# Patient Record
Sex: FEMALE | Race: WHITE | NOT HISPANIC OR LATINO | Employment: OTHER | URBAN - METROPOLITAN AREA
[De-identification: names, ages, dates, MRNs, and addresses within clinical notes are randomized per-mention and may not be internally consistent; named-entity substitution may affect disease eponyms.]

---

## 2017-01-17 ENCOUNTER — RECORDS - HEALTHEAST (OUTPATIENT)
Dept: LAB | Facility: CLINIC | Age: 67
End: 2017-01-17

## 2017-01-17 LAB
ALT SERPL-CCNC: 16 U/L (ref 0–45)
AST SERPL-CCNC: 22 U/L (ref 0–40)
CHOLEST SERPL-MCNC: 218 MG/DL
CHOLEST SERPL-MCNC: 218 MG/DL
CREAT SERPL-MCNC: 0.6 MG/DL (ref 0.6–1.1)
FASTING STATUS PATIENT QL REPORTED: YES
GFR SERPL CREATININE-BSD FRML MDRD: >60 ML/MIN/1.73M2
GLUCOSE SERPL-MCNC: 93 MG/DL (ref 70–125)
HDLC SERPL-MCNC: 84 MG/DL
HDLC SERPL-MCNC: 84 MG/DL
LDLC SERPL CALC-MCNC: 112 MG/DL
LDLC SERPL CALC-MCNC: 112 MG/DL
POTASSIUM SERPL-SCNC: 4.6 MMOL/L (ref 3.5–5)
TRIGL SERPL-MCNC: 109 MG/DL
TRIGL SERPL-MCNC: 109 MG/DL

## 2017-01-18 LAB — HBA1C MFR BLD: 6.1 % (ref 4.2–6.1)

## 2017-01-23 ENCOUNTER — RECORDS - HEALTHEAST (OUTPATIENT)
Dept: ADMINISTRATIVE | Facility: OTHER | Age: 67
End: 2017-01-23

## 2017-01-23 LAB
BKR LAB AP ABNORMAL BLEEDING: NO
BKR LAB AP BIRTH CONTROL/HORMONES: NORMAL
BKR LAB AP CERVICAL APPEARANCE: NORMAL
BKR LAB AP GYN ADEQUACY: NORMAL
BKR LAB AP GYN INTERPRETATION: NORMAL
BKR LAB AP HPV REFLEX: NO
BKR LAB AP LMP: 1995
BKR LAB AP PATIENT STATUS: NORMAL
BKR LAB AP PREVIOUS ABNORMAL: NORMAL
BKR LAB AP PREVIOUS NORMAL: NORMAL
HIGH RISK?: NO
PATH REPORT.COMMENTS IMP SPEC: NORMAL
RESULT FLAG (HE HISTORICAL CONVERSION): NORMAL

## 2017-01-31 ENCOUNTER — TRANSFERRED RECORDS (OUTPATIENT)
Dept: HEALTH INFORMATION MANAGEMENT | Facility: CLINIC | Age: 67
End: 2017-01-31

## 2017-01-31 ENCOUNTER — RECORDS - HEALTHEAST (OUTPATIENT)
Dept: LAB | Facility: CLINIC | Age: 67
End: 2017-01-31

## 2017-02-01 LAB — HBA1C MFR BLD: 5.8 % (ref 4.2–6.1)

## 2017-02-14 ENCOUNTER — TRANSFERRED RECORDS (OUTPATIENT)
Dept: HEALTH INFORMATION MANAGEMENT | Facility: CLINIC | Age: 67
End: 2017-02-14

## 2017-03-15 ENCOUNTER — TRANSFERRED RECORDS (OUTPATIENT)
Dept: HEALTH INFORMATION MANAGEMENT | Facility: CLINIC | Age: 67
End: 2017-03-15

## 2018-01-23 ENCOUNTER — APPOINTMENT (OUTPATIENT)
Dept: GENERAL RADIOLOGY | Facility: CLINIC | Age: 68
DRG: 193 | End: 2018-01-23
Attending: INTERNAL MEDICINE
Payer: COMMERCIAL

## 2018-01-23 ENCOUNTER — RECORDS - HEALTHEAST (OUTPATIENT)
Dept: LAB | Facility: CLINIC | Age: 68
End: 2018-01-23

## 2018-01-23 ENCOUNTER — HOSPITAL ENCOUNTER (INPATIENT)
Facility: CLINIC | Age: 68
LOS: 13 days | Discharge: HOME | DRG: 193 | End: 2018-02-05
Attending: INTERNAL MEDICINE | Admitting: INTERNAL MEDICINE
Payer: COMMERCIAL

## 2018-01-23 ENCOUNTER — APPOINTMENT (OUTPATIENT)
Dept: CT IMAGING | Facility: CLINIC | Age: 68
DRG: 193 | End: 2018-01-23
Attending: INTERNAL MEDICINE
Payer: COMMERCIAL

## 2018-01-23 DIAGNOSIS — J44.1 CHRONIC OBSTRUCTIVE PULMONARY DISEASE WITH ACUTE EXACERBATION (H): Primary | ICD-10-CM

## 2018-01-23 DIAGNOSIS — I48.91 ATRIAL FIBRILLATION, UNSPECIFIED TYPE (H): ICD-10-CM

## 2018-01-23 DIAGNOSIS — F17.200 TOBACCO DEPENDENCE SYNDROME: ICD-10-CM

## 2018-01-23 DIAGNOSIS — J44.1 COPD EXACERBATION (H): ICD-10-CM

## 2018-01-23 LAB
ALBUMIN SERPL-MCNC: 3.5 G/DL (ref 3.4–5)
ALBUMIN UR-MCNC: 100 MG/DL
ALP SERPL-CCNC: 110 U/L (ref 40–150)
ALT SERPL W P-5'-P-CCNC: 70 U/L (ref 0–50)
AMORPH CRY #/AREA URNS HPF: ABNORMAL /HPF
ANION GAP SERPL CALCULATED.3IONS-SCNC: 11 MMOL/L (ref 3–14)
APPEARANCE UR: ABNORMAL
AST SERPL W P-5'-P-CCNC: 101 U/L (ref 0–45)
BASE DEFICIT BLDV-SCNC: 4.7 MMOL/L
BASOPHILS # BLD AUTO: 0 10E9/L (ref 0–0.2)
BASOPHILS NFR BLD AUTO: 0.5 %
BILIRUB SERPL-MCNC: 0.5 MG/DL (ref 0.2–1.3)
BILIRUB UR QL STRIP: NEGATIVE
BUN SERPL-MCNC: 32 MG/DL (ref 7–30)
CALCIUM SERPL-MCNC: 8.7 MG/DL (ref 8.5–10.1)
CHLORIDE SERPL-SCNC: 91 MMOL/L (ref 94–109)
CO2 SERPL-SCNC: 28 MMOL/L (ref 20–32)
COLOR UR AUTO: YELLOW
CREAT SERPL-MCNC: 1.03 MG/DL (ref 0.52–1.04)
DIFFERENTIAL METHOD BLD: ABNORMAL
EOSINOPHIL # BLD AUTO: 0 10E9/L (ref 0–0.7)
EOSINOPHIL NFR BLD AUTO: 0 %
ERYTHROCYTE [DISTWIDTH] IN BLOOD BY AUTOMATED COUNT: 13 % (ref 10–15)
GFR SERPL CREATININE-BSD FRML MDRD: 54 ML/MIN/1.7M2
GLUCOSE BLDC GLUCOMTR-MCNC: 171 MG/DL (ref 70–99)
GLUCOSE SERPL-MCNC: 195 MG/DL (ref 70–99)
GLUCOSE UR STRIP-MCNC: NEGATIVE MG/DL
HBA1C MFR BLD: 5.6 % (ref 4.3–6)
HCO3 BLDV-SCNC: 26 MMOL/L (ref 21–28)
HCT VFR BLD AUTO: 48.1 % (ref 35–47)
HGB BLD-MCNC: 16.3 G/DL (ref 11.7–15.7)
HGB UR QL STRIP: ABNORMAL
HYALINE CASTS #/AREA URNS LPF: 62 /LPF (ref 0–2)
IMM GRANULOCYTES # BLD: 0.1 10E9/L (ref 0–0.4)
IMM GRANULOCYTES NFR BLD: 0.6 %
KETONES UR STRIP-MCNC: NEGATIVE MG/DL
LACTATE BLD-SCNC: 1.3 MMOL/L (ref 0.7–2)
LEUKOCYTE ESTERASE UR QL STRIP: NEGATIVE
LYMPHOCYTES # BLD AUTO: 0.6 10E9/L (ref 0.8–5.3)
LYMPHOCYTES NFR BLD AUTO: 8 %
MAGNESIUM SERPL-MCNC: 2.5 MG/DL (ref 1.6–2.3)
MCH RBC QN AUTO: 32.6 PG (ref 26.5–33)
MCHC RBC AUTO-ENTMCNC: 33.9 G/DL (ref 31.5–36.5)
MCV RBC AUTO: 96 FL (ref 78–100)
MONOCYTES # BLD AUTO: 0.5 10E9/L (ref 0–1.3)
MONOCYTES NFR BLD AUTO: 6.6 %
MUCOUS THREADS #/AREA URNS LPF: PRESENT /LPF
NEUTROPHILS # BLD AUTO: 6.6 10E9/L (ref 1.6–8.3)
NEUTROPHILS NFR BLD AUTO: 84.3 %
NITRATE UR QL: NEGATIVE
NRBC # BLD AUTO: 0 10*3/UL
NRBC BLD AUTO-RTO: 0 /100
NT-PROBNP SERPL-MCNC: 7282 PG/ML (ref 0–900)
O2/TOTAL GAS SETTING VFR VENT: 12 %
PCO2 BLDV: 74 MM HG (ref 40–50)
PH BLDV: 7.16 PH (ref 7.32–7.43)
PH UR STRIP: 5 PH (ref 5–7)
PLATELET # BLD AUTO: 261 10E9/L (ref 150–450)
PO2 BLDV: 28 MM HG (ref 25–47)
POTASSIUM SERPL-SCNC: 3.6 MMOL/L (ref 3.4–5.3)
PROT SERPL-MCNC: 8.2 G/DL (ref 6.8–8.8)
RBC # BLD AUTO: 5 10E12/L (ref 3.8–5.2)
RBC #/AREA URNS AUTO: 3 /HPF (ref 0–2)
SODIUM SERPL-SCNC: 130 MMOL/L (ref 133–144)
SOURCE: ABNORMAL
SP GR UR STRIP: 1.02 (ref 1–1.03)
SQUAMOUS #/AREA URNS AUTO: 1 /HPF (ref 0–1)
TROPONIN I SERPL-MCNC: 0.03 UG/L (ref 0–0.04)
TSH SERPL DL<=0.005 MIU/L-ACNC: 2 MU/L (ref 0.4–4)
UROBILINOGEN UR STRIP-MCNC: 2 MG/DL (ref 0–2)
WBC # BLD AUTO: 7.8 10E9/L (ref 4–11)
WBC #/AREA URNS AUTO: 3 /HPF (ref 0–2)

## 2018-01-23 PROCEDURE — 25000125 ZZHC RX 250: Performed by: INTERNAL MEDICINE

## 2018-01-23 PROCEDURE — 40000275 ZZH STATISTIC RCP TIME EA 10 MIN

## 2018-01-23 PROCEDURE — 96366 THER/PROPH/DIAG IV INF ADDON: CPT

## 2018-01-23 PROCEDURE — 25000128 H RX IP 250 OP 636: Performed by: INTERNAL MEDICINE

## 2018-01-23 PROCEDURE — 96368 THER/DIAG CONCURRENT INF: CPT

## 2018-01-23 PROCEDURE — 83735 ASSAY OF MAGNESIUM: CPT | Performed by: INTERNAL MEDICINE

## 2018-01-23 PROCEDURE — 94640 AIRWAY INHALATION TREATMENT: CPT

## 2018-01-23 PROCEDURE — 96375 TX/PRO/DX INJ NEW DRUG ADDON: CPT

## 2018-01-23 PROCEDURE — 80053 COMPREHEN METABOLIC PANEL: CPT | Performed by: INTERNAL MEDICINE

## 2018-01-23 PROCEDURE — 71045 X-RAY EXAM CHEST 1 VIEW: CPT

## 2018-01-23 PROCEDURE — 00000146 ZZHCL STATISTIC GLUCOSE BY METER IP

## 2018-01-23 PROCEDURE — 99223 1ST HOSP IP/OBS HIGH 75: CPT | Mod: AI | Performed by: INTERNAL MEDICINE

## 2018-01-23 PROCEDURE — 96365 THER/PROPH/DIAG IV INF INIT: CPT | Mod: 59

## 2018-01-23 PROCEDURE — 81001 URINALYSIS AUTO W/SCOPE: CPT | Performed by: INTERNAL MEDICINE

## 2018-01-23 PROCEDURE — 99285 EMERGENCY DEPT VISIT HI MDM: CPT | Mod: 25

## 2018-01-23 PROCEDURE — 82803 BLOOD GASES ANY COMBINATION: CPT | Performed by: INTERNAL MEDICINE

## 2018-01-23 PROCEDURE — 36415 COLL VENOUS BLD VENIPUNCTURE: CPT | Performed by: INTERNAL MEDICINE

## 2018-01-23 PROCEDURE — 25000132 ZZH RX MED GY IP 250 OP 250 PS 637: Performed by: INTERNAL MEDICINE

## 2018-01-23 PROCEDURE — 83880 ASSAY OF NATRIURETIC PEPTIDE: CPT | Performed by: INTERNAL MEDICINE

## 2018-01-23 PROCEDURE — 12000047 ZZH R&B IMCU

## 2018-01-23 PROCEDURE — 83036 HEMOGLOBIN GLYCOSYLATED A1C: CPT | Performed by: INTERNAL MEDICINE

## 2018-01-23 PROCEDURE — 85025 COMPLETE CBC W/AUTO DIFF WBC: CPT | Performed by: INTERNAL MEDICINE

## 2018-01-23 PROCEDURE — 96376 TX/PRO/DX INJ SAME DRUG ADON: CPT

## 2018-01-23 PROCEDURE — 93005 ELECTROCARDIOGRAM TRACING: CPT

## 2018-01-23 PROCEDURE — 83605 ASSAY OF LACTIC ACID: CPT | Performed by: INTERNAL MEDICINE

## 2018-01-23 PROCEDURE — 84484 ASSAY OF TROPONIN QUANT: CPT | Performed by: INTERNAL MEDICINE

## 2018-01-23 PROCEDURE — 71260 CT THORAX DX C+: CPT

## 2018-01-23 PROCEDURE — 93010 ELECTROCARDIOGRAM REPORT: CPT | Performed by: INTERNAL MEDICINE

## 2018-01-23 PROCEDURE — 84443 ASSAY THYROID STIM HORMONE: CPT | Performed by: INTERNAL MEDICINE

## 2018-01-23 RX ORDER — DILTIAZEM HYDROCHLORIDE 5 MG/ML
10 INJECTION INTRAVENOUS ONCE
Status: COMPLETED | OUTPATIENT
Start: 2018-01-23 | End: 2018-01-23

## 2018-01-23 RX ORDER — PROCHLORPERAZINE 25 MG
12.5 SUPPOSITORY, RECTAL RECTAL EVERY 12 HOURS PRN
Status: DISCONTINUED | OUTPATIENT
Start: 2018-01-23 | End: 2018-02-05 | Stop reason: HOSPADM

## 2018-01-23 RX ORDER — AMOXICILLIN 250 MG
1 CAPSULE ORAL 2 TIMES DAILY PRN
Status: DISCONTINUED | OUTPATIENT
Start: 2018-01-23 | End: 2018-01-23

## 2018-01-23 RX ORDER — MAGNESIUM SULFATE HEPTAHYDRATE 40 MG/ML
4 INJECTION, SOLUTION INTRAVENOUS EVERY 4 HOURS PRN
Status: DISCONTINUED | OUTPATIENT
Start: 2018-01-23 | End: 2018-02-04

## 2018-01-23 RX ORDER — IPRATROPIUM BROMIDE AND ALBUTEROL SULFATE 2.5; .5 MG/3ML; MG/3ML
3 SOLUTION RESPIRATORY (INHALATION)
Status: DISCONTINUED | OUTPATIENT
Start: 2018-01-23 | End: 2018-01-26

## 2018-01-23 RX ORDER — POTASSIUM CL/LIDO/0.9 % NACL 10MEQ/0.1L
10 INTRAVENOUS SOLUTION, PIGGYBACK (ML) INTRAVENOUS
Status: DISCONTINUED | OUTPATIENT
Start: 2018-01-23 | End: 2018-02-04

## 2018-01-23 RX ORDER — AMOXICILLIN 250 MG
2 CAPSULE ORAL 2 TIMES DAILY PRN
Status: DISCONTINUED | OUTPATIENT
Start: 2018-01-23 | End: 2018-01-23

## 2018-01-23 RX ORDER — IOPAMIDOL 755 MG/ML
500 INJECTION, SOLUTION INTRAVASCULAR ONCE
Status: COMPLETED | OUTPATIENT
Start: 2018-01-23 | End: 2018-01-23

## 2018-01-23 RX ORDER — MEMANTINE HYDROCHLORIDE 28 MG/1
28 CAPSULE, EXTENDED RELEASE ORAL DAILY
Status: DISCONTINUED | OUTPATIENT
Start: 2018-01-24 | End: 2018-02-05 | Stop reason: HOSPADM

## 2018-01-23 RX ORDER — METHYLPREDNISOLONE SODIUM SUCCINATE 40 MG/ML
40 INJECTION, POWDER, LYOPHILIZED, FOR SOLUTION INTRAMUSCULAR; INTRAVENOUS ONCE
Status: COMPLETED | OUTPATIENT
Start: 2018-01-23 | End: 2018-01-23

## 2018-01-23 RX ORDER — POTASSIUM CHLORIDE 7.45 MG/ML
10 INJECTION INTRAVENOUS
Status: DISCONTINUED | OUTPATIENT
Start: 2018-01-23 | End: 2018-02-04

## 2018-01-23 RX ORDER — SODIUM CHLORIDE AND POTASSIUM CHLORIDE 150; 900 MG/100ML; MG/100ML
INJECTION, SOLUTION INTRAVENOUS CONTINUOUS
Status: DISCONTINUED | OUTPATIENT
Start: 2018-01-23 | End: 2018-01-24

## 2018-01-23 RX ORDER — PROCHLORPERAZINE MALEATE 5 MG
5 TABLET ORAL EVERY 6 HOURS PRN
Status: DISCONTINUED | OUTPATIENT
Start: 2018-01-23 | End: 2018-02-05 | Stop reason: HOSPADM

## 2018-01-23 RX ORDER — NICOTINE POLACRILEX 4 MG
15-30 LOZENGE BUCCAL
Status: DISCONTINUED | OUTPATIENT
Start: 2018-01-23 | End: 2018-02-05 | Stop reason: HOSPADM

## 2018-01-23 RX ORDER — METHYLPREDNISOLONE SODIUM SUCCINATE 40 MG/ML
40 INJECTION, POWDER, LYOPHILIZED, FOR SOLUTION INTRAMUSCULAR; INTRAVENOUS EVERY 8 HOURS
Status: DISCONTINUED | OUTPATIENT
Start: 2018-01-24 | End: 2018-01-28

## 2018-01-23 RX ORDER — POTASSIUM CHLORIDE 29.8 MG/ML
20 INJECTION INTRAVENOUS
Status: DISCONTINUED | OUTPATIENT
Start: 2018-01-23 | End: 2018-01-23

## 2018-01-23 RX ORDER — NALOXONE HYDROCHLORIDE 0.4 MG/ML
.1-.4 INJECTION, SOLUTION INTRAMUSCULAR; INTRAVENOUS; SUBCUTANEOUS
Status: DISCONTINUED | OUTPATIENT
Start: 2018-01-23 | End: 2018-02-05 | Stop reason: HOSPADM

## 2018-01-23 RX ORDER — POTASSIUM CHLORIDE 1500 MG/1
20-40 TABLET, EXTENDED RELEASE ORAL
Status: DISCONTINUED | OUTPATIENT
Start: 2018-01-23 | End: 2018-02-04

## 2018-01-23 RX ORDER — CEFTRIAXONE SODIUM 1 G/50ML
1 INJECTION, SOLUTION INTRAVENOUS ONCE
Status: COMPLETED | OUTPATIENT
Start: 2018-01-23 | End: 2018-01-23

## 2018-01-23 RX ORDER — DILTIAZEM HYDROCHLORIDE 5 MG/ML
10 INJECTION INTRAVENOUS
Status: COMPLETED | OUTPATIENT
Start: 2018-01-23 | End: 2018-01-23

## 2018-01-23 RX ORDER — IPRATROPIUM BROMIDE AND ALBUTEROL SULFATE 2.5; .5 MG/3ML; MG/3ML
3 SOLUTION RESPIRATORY (INHALATION) ONCE
Status: COMPLETED | OUTPATIENT
Start: 2018-01-23 | End: 2018-01-23

## 2018-01-23 RX ORDER — ACETAMINOPHEN 325 MG/1
650 TABLET ORAL EVERY 4 HOURS PRN
Status: DISCONTINUED | OUTPATIENT
Start: 2018-01-23 | End: 2018-02-05 | Stop reason: HOSPADM

## 2018-01-23 RX ORDER — DULOXETIN HYDROCHLORIDE 30 MG/1
120 CAPSULE, DELAYED RELEASE ORAL DAILY
Status: DISCONTINUED | OUTPATIENT
Start: 2018-01-24 | End: 2018-02-05 | Stop reason: HOSPADM

## 2018-01-23 RX ORDER — POTASSIUM CHLORIDE 1.5 G/1.58G
20-40 POWDER, FOR SOLUTION ORAL
Status: DISCONTINUED | OUTPATIENT
Start: 2018-01-23 | End: 2018-02-04

## 2018-01-23 RX ORDER — AMOXICILLIN 250 MG
2 CAPSULE ORAL 2 TIMES DAILY PRN
Status: DISCONTINUED | OUTPATIENT
Start: 2018-01-23 | End: 2018-02-05 | Stop reason: HOSPADM

## 2018-01-23 RX ORDER — ASPIRIN 81 MG/1
81 TABLET, CHEWABLE ORAL DAILY
Status: DISCONTINUED | OUTPATIENT
Start: 2018-01-23 | End: 2018-02-05 | Stop reason: HOSPADM

## 2018-01-23 RX ORDER — MEMANTINE HYDROCHLORIDE 28 MG/1
10 CAPSULE, EXTENDED RELEASE ORAL DAILY
COMMUNITY
End: 2020-07-16

## 2018-01-23 RX ORDER — NIFEDIPINE 30 MG/1
30 TABLET, EXTENDED RELEASE ORAL 2 TIMES DAILY
Status: CANCELLED | OUTPATIENT
Start: 2018-01-23

## 2018-01-23 RX ORDER — AMOXICILLIN 250 MG
1 CAPSULE ORAL 2 TIMES DAILY PRN
Status: DISCONTINUED | OUTPATIENT
Start: 2018-01-23 | End: 2018-02-05 | Stop reason: HOSPADM

## 2018-01-23 RX ORDER — NIFEDIPINE 30 MG
30 TABLET, EXTENDED RELEASE ORAL 2 TIMES DAILY
Status: ON HOLD | COMMUNITY
End: 2018-02-05

## 2018-01-23 RX ORDER — CEFTRIAXONE SODIUM 1 G/50ML
1 INJECTION, SOLUTION INTRAVENOUS EVERY 24 HOURS
Status: DISCONTINUED | OUTPATIENT
Start: 2018-01-24 | End: 2018-01-28

## 2018-01-23 RX ORDER — ACETAMINOPHEN 650 MG/1
650 SUPPOSITORY RECTAL EVERY 4 HOURS PRN
Status: DISCONTINUED | OUTPATIENT
Start: 2018-01-23 | End: 2018-02-05 | Stop reason: HOSPADM

## 2018-01-23 RX ORDER — DEXTROSE MONOHYDRATE 25 G/50ML
25-50 INJECTION, SOLUTION INTRAVENOUS
Status: DISCONTINUED | OUTPATIENT
Start: 2018-01-23 | End: 2018-02-05 | Stop reason: HOSPADM

## 2018-01-23 RX ORDER — ONDANSETRON 4 MG/1
4 TABLET, ORALLY DISINTEGRATING ORAL EVERY 6 HOURS PRN
Status: DISCONTINUED | OUTPATIENT
Start: 2018-01-23 | End: 2018-02-05 | Stop reason: HOSPADM

## 2018-01-23 RX ORDER — OLANZAPINE 5 MG/1
10 TABLET ORAL AT BEDTIME
Status: DISCONTINUED | OUTPATIENT
Start: 2018-01-23 | End: 2018-02-05 | Stop reason: HOSPADM

## 2018-01-23 RX ORDER — NICOTINE 21 MG/24HR
1 PATCH, TRANSDERMAL 24 HOURS TRANSDERMAL DAILY
Status: DISCONTINUED | OUTPATIENT
Start: 2018-01-23 | End: 2018-01-26

## 2018-01-23 RX ORDER — ONDANSETRON 2 MG/ML
4 INJECTION INTRAMUSCULAR; INTRAVENOUS EVERY 6 HOURS PRN
Status: DISCONTINUED | OUTPATIENT
Start: 2018-01-23 | End: 2018-02-05 | Stop reason: HOSPADM

## 2018-01-23 RX ORDER — ALBUTEROL SULFATE 0.83 MG/ML
2.5 SOLUTION RESPIRATORY (INHALATION)
Status: DISCONTINUED | OUTPATIENT
Start: 2018-01-23 | End: 2018-01-25

## 2018-01-23 RX ADMIN — DILTIAZEM HYDROCHLORIDE 10 MG/HR: 5 INJECTION INTRAVENOUS at 16:00

## 2018-01-23 RX ADMIN — IPRATROPIUM BROMIDE AND ALBUTEROL SULFATE 3 ML: .5; 3 SOLUTION RESPIRATORY (INHALATION) at 16:02

## 2018-01-23 RX ADMIN — SODIUM CHLORIDE 66 ML: 9 INJECTION, SOLUTION INTRAVENOUS at 18:36

## 2018-01-23 RX ADMIN — DILTIAZEM HYDROCHLORIDE 10 MG: 5 INJECTION INTRAVENOUS at 16:27

## 2018-01-23 RX ADMIN — ASPIRIN 81 MG 81 MG: 81 TABLET ORAL at 19:50

## 2018-01-23 RX ADMIN — POTASSIUM CHLORIDE AND SODIUM CHLORIDE: 900; 150 INJECTION, SOLUTION INTRAVENOUS at 19:49

## 2018-01-23 RX ADMIN — DILTIAZEM HYDROCHLORIDE 10 MG: 5 INJECTION INTRAVENOUS at 15:55

## 2018-01-23 RX ADMIN — IOPAMIDOL 46 ML: 755 INJECTION, SOLUTION INTRAVENOUS at 18:36

## 2018-01-23 RX ADMIN — METHYLPREDNISOLONE SODIUM SUCCINATE 40 MG: 40 INJECTION, POWDER, FOR SOLUTION INTRAMUSCULAR; INTRAVENOUS at 16:35

## 2018-01-23 RX ADMIN — Medication 2500 UNITS: at 18:12

## 2018-01-23 RX ADMIN — CEFTRIAXONE SODIUM 1 G: 1 INJECTION, SOLUTION INTRAVENOUS at 16:35

## 2018-01-23 RX ADMIN — HEPARIN SODIUM 12 UNITS/KG/HR: 10000 INJECTION, SOLUTION INTRAVENOUS at 18:12

## 2018-01-23 RX ADMIN — IPRATROPIUM BROMIDE AND ALBUTEROL SULFATE 3 ML: .5; 3 SOLUTION RESPIRATORY (INHALATION) at 20:07

## 2018-01-23 RX ADMIN — NICOTINE 1 PATCH: 21 PATCH, EXTENDED RELEASE TRANSDERMAL at 19:50

## 2018-01-23 RX ADMIN — OLANZAPINE 10 MG: 5 TABLET, FILM COATED ORAL at 21:07

## 2018-01-23 RX ADMIN — AZITHROMYCIN MONOHYDRATE 500 MG: 500 INJECTION, POWDER, LYOPHILIZED, FOR SOLUTION INTRAVENOUS at 19:55

## 2018-01-23 ASSESSMENT — ENCOUNTER SYMPTOMS
CONFUSION: 1
FEVER: 0
SHORTNESS OF BREATH: 1

## 2018-01-23 ASSESSMENT — ACTIVITIES OF DAILY LIVING (ADL): ADLS_ACUITY_SCORE: 13

## 2018-01-23 NOTE — LETTER
Transition Communication Hand-off for Care Transitions to Next Level of Care Provider    Name: Kelly Nunez  MRN #: [id number]  Primary Care Provider: Kenneth Amin  Primary Care MD Name: Dr. Kenneth Amin  Primary Clinic: [address]  [place] [address]  Primary Care Clinic Name: [facility]  Reason for Hospitalization:  COPD exacerbation (H) [J44.1]  Atrial fibrillation, unspecified type (H) [I48.91]  Admit Date/Time: 1/23/2018  3:05 PM  Discharge Date: ***  Payor Source: Payor: [employer] / Plan:  OPEN ACCESS FULLY INSURED / Product Type: HMO /     Key Recommendations:  Pt does have some cognitive deficits (from dementia) but her immediate needs are physical deconditioning. TCU has been discussed. She will be discharging on new home oxygen as her CXR showed emphysema.       Anna Arnold    AVS/Discharge Summary is the source of truth; this is a helpful guide for improved communication of patient story

## 2018-01-23 NOTE — IP AVS SNAPSHOT
MRN:[id number]                      After Visit Summary   1/23/2018    Kelly Nunez    MRN: [id number]           Thank you!     Thank you for choosing [facility] for your care. Our goal is always to provide you with excellent care. Hearing back from our patients is one way we can continue to improve our services. Please take a few minutes to complete the written survey that you may receive in the mail after you visit. If you would like to speak to someone directly about your visit please contact Patient Relations at [phone]. Thank you!          Patient Information     Date Of Birth          1950        Designated Caregiver       Most Recent Value    Caregiver    Will someone help with your care after discharge? yes    Name of designated caregiver Kendall    Phone number of caregiver [phone]    Caregiver address see [facility]      About your hospital stay     You were admitted on:  January 23, 2018 You last received care in the:  [place] [address] [facility]    You were discharged on:  February 5, 2018        Reason for your hospital stay       You were hospitalized for respiratory failure.  You were treated with antibiotics and steroids and will complete a course of prednisone on discharge.  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  We have prescribed a nebulizer machine that can be used as needed for shortness of breath and wheezing.  You will need ongoing home oxygen at all times, likely long term.  It also is recommended you have 24 hours supervision at home.  Continue with tobacco cessation and nicotine patches can be used as needed.  A cardiac event monitor is prescribed for the next 30 days to see if you have any more episodes of atrial fibrillation.                  Who to Call     For medical emergencies, please call 911.  For non-urgent questions about your medical care, please call your primary care provider or  [facility], [phone]          Attending Provider     Provider Specialty    Madeline Mistry MD Emergency Medicine    [facility], Darren Yañez MD Internal Medicine       Primary Care Provider Office Phone # Fax #    Kenneth Amin [phone]      After Care Instructions     Activity       Your activity upon discharge: activity as tolerated            Diet       Follow this diet upon discharge: Orders Placed This Encounter      Regular Diet Adult            Oxygen Adult       [place] [facility] Order 2 liter(s) by nasal cannula continuously with use of portable tank. Expected treatment length is indefinite (99 months).. Test on conserving device as applicable.    Patients who qualify for home O2 coverage under the CMS guidelines require ABG tests or O2 sat readings obtained closest to, but no earlier than 2 days prior to the discharge, as evidence of the need for home oxygen therapy. Testing must be performed while patient is in the chronic stable state. See notes for O2 sats.    I certify that this patient, Kelly Nunez has been under my care and that I, or a nurse practitioner or physician's assistant working with me, had a face-to-face encounter that meets the face-to-face encounter requirements with this patient on 2/5/2018. The patient, Kelly Nunez was evaluated or treated in whole, or in part, for the following medical condition, which necessitates the use of the ordered oxygen. Treatment Diagnosis: moderate to severe COPD with acute exacerbation    Attending Provider: Kyle Chung  Physician signature: See electronic signature associated with these discharge orders  Date of Order: February 5, 2018                  Follow-up Appointments     Follow-up and recommended labs and tests        Follow up with primary care provider, Kenneth Amin, within 7 days for hospital follow- up.  No follow up labs or test are needed.                  Further instructions from your care team      "  Continue oral nutrition supplements (see recipe book) at d/c until oral intake returns to baseline    Oxygen Provider:  Arranged through [facility], contact number [phone]. If you have any questions or concerns please call the oxygen company directly.      Pending Results     Date and Time Order Name Status Description    2018 1614 Zio Patch Holter In process             Statement of Approval     Ordered          18 1153  I have reviewed and agree with all the recommendations and orders detailed in this document.  EFFECTIVE NOW     Approved and electronically signed by:  Kyle Chung MD             Admission Information     Date & Time Provider Department Dept. Phone    2018 Darren Beard MD [place] [address] [facility] [phone]      Your Vitals Were     Blood Pressure Pulse Temperature Respirations Height Weight    130/84 (BP Location: Right arm) 66 97.9  F (36.6  C) (Oral) 18 1.6 m (5' 2.99\") 40.5 kg (89 lb 3.2 oz)    Pulse Oximetry BMI (Body Mass Index)                96% 15.81 kg/m2          [employer] Information     [employer] lets you send messages to your doctor, view your test results, renew your prescriptions, schedule appointments and more. To sign up, go to [email or web address] . Click on \"Log in\" on the left side of the screen, which will take you to the Welcome page. Then click on \"Sign up Now\" on the right side of the page.     You will be asked to enter the access code listed below, as well as some personal information. Please follow the directions to create your username and password.     Your access code is: [email or web address]  Expires: 2018  1:20 PM     Your access code will  in 90 days. If you need help or a new code, please call your [facility] clinic or [phone].        Care EveryWhere ID     This is your Care EveryWhere ID. This could be used by other organizations to access your [facility] " records  [id number]        Equal Access to Services     MEDINA SUMAN : Hadii desiree kitchen barbaragypsy Sosam, waaxda luqadaha, qaybta kaireneglenroy stovall, jasbir gardunosurajeneida cunningham. So [facility] [phone].    ATENCIÓN: Si habla español, tiene a wolf disposición servicios gratuitos de asistencia lingüística. Llame al [phone].    We comply with applicable federal civil rights laws and [place] laws. We do not discriminate on the basis of race, color, national origin, age, disability, sex, sexual orientation, or gender identity.               Review of your medicines      START taking        Dose / Directions    diltiazem 180 MG 24 hr capsule   Used for:  Atrial fibrillation, unspecified type (H)        Dose:  180 mg   Start taking on:  2/6/2018   Take 1 capsule (180 mg) by mouth daily   Quantity:  30 capsule   Refills:  0       ipratropium - albuterol 0.5 mg/2.5 mg/3 mL 0.5-2.5 (3) MG/3ML neb solution   Commonly known as:  DUONEB   Used for:  Chronic obstructive pulmonary disease with acute exacerbation (H)        Dose:  1 vial   Take 1 vial (3 mLs) by nebulization every 6 hours as needed for shortness of breath / dyspnea or wheezing   Quantity:  90 vial   Refills:  1       metoprolol tartrate 50 MG tablet   Commonly known as:  LOPRESSOR   Used for:  Atrial fibrillation, unspecified type (H)        Dose:  50 mg   Take 1 tablet (50 mg) by mouth 2 times daily   Quantity:  60 tablet   Refills:  0       nicotine 14 MG/24HR 24 hr patch   Commonly known as:  NICODERM CQ   Used for:  Tobacco dependence syndrome        Dose:  1 patch   Place 1 patch onto the skin every 24 hours   Quantity:  30 patch   Refills:  0       order for DME   Used for:  Chronic obstructive pulmonary disease with acute exacerbation (H)        Equipment being ordered: Nebulizer   Quantity:  1 each   Refills:  0       predniSONE 10 MG tablet   Commonly known as:  DELTASONE   Used for:  Chronic obstructive pulmonary disease with acute  exacerbation (H)        Take 20mg (2 tablets) for 3 days then 10mg (1tablet) for 3 days   Quantity:  9 tablet   Refills:  0         CONTINUE these medicines which have NOT CHANGED        Dose / Directions    ASPIRIN PO        Dose:  81 mg   Take 81 mg by mouth daily   Refills:  0       DULOXETINE HCL PO        Dose:  120 mg   Take 120 mg by mouth daily   Refills:  0       NAMENDA XR 28 MG 24 hr capsule   Generic drug:  memantine XR        Dose:  28 mg   Take 28 mg by mouth daily   Refills:  0       OLANZAPINE PO        Dose:  10 mg   Take 10 mg by mouth At Bedtime   Refills:  0         STOP taking     NIFEdipine ER 30 MG Tb24   Commonly known as:  ADALAT CC                Where to get your medicines      These medications were sent to [place], [place] - [address] [facility]  [address] [facility] [address]     Phone:  [phone]     diltiazem 180 MG 24 hr capsule    ipratropium - albuterol 0.5 mg/2.5 mg/3 mL 0.5-2.5 (3) MG/3ML neb solution    metoprolol tartrate 50 MG tablet    nicotine 14 MG/24HR 24 hr patch    predniSONE 10 MG tablet         Some of these will need a paper prescription and others can be bought over the counter. Ask your nurse if you have questions.     Bring a paper prescription for each of these medications     order for DME                Protect others around you: Learn how to safely use, store and throw away your medicines at www.disposemymeds.org.             Medication List: This is a list of all your medications and when to take them. Check marks below indicate your daily home schedule. Keep this list as a reference.      Medications           Morning Afternoon Evening Bedtime As Needed    ASPIRIN PO   Take 81 mg by mouth daily   Last time this was given:  81 mg on 2/5/2018  9:12 AM   Next Dose Due:  Tomorrow 2/6/2018                                   diltiazem 180 MG 24 hr capsule   Take 1 capsule (180 mg) by mouth daily   Start taking on:   2/6/2018   Last time this was given:  180 mg on 2/5/2018  9:12 AM   Next Dose Due:  Tomorrow 2/6/18                                       DULOXETINE HCL PO   Take 120 mg by mouth daily   Last time this was given:  120 mg on 2/5/2018  9:11 AM   Next Dose Due:  Tomorrow 2/6/18                                     ipratropium - albuterol 0.5 mg/2.5 mg/3 mL 0.5-2.5 (3) MG/3ML neb solution   Commonly known as:  DUONEB   Take 1 vial (3 mLs) by nebulization every 6 hours as needed for shortness of breath / dyspnea or wheezing   Last time this was given:  3 mLs on 2/5/2018  4:09 PM                                   metoprolol tartrate 50 MG tablet   Commonly known as:  LOPRESSOR   Take 1 tablet (50 mg) by mouth 2 times daily   Last time this was given:  50 mg on 2/5/2018  9:12 AM   Next Dose Due:  Today 2/5/2018                                      NAMENDA XR 28 MG 24 hr capsule   Take 28 mg by mouth daily   Last time this was given:  28 mg on 2/5/2018  9:12 AM   Generic drug:  memantine XR   Next Dose Due:  Tomorrow 02/06/18                                     nicotine 14 MG/24HR 24 hr patch   Commonly known as:  NICODERM CQ   Place 1 patch onto the skin every 24 hours                                OLANZAPINE PO   Take 10 mg by mouth At Bedtime   Last time this was given:  10 mg on 2/4/2018  9:21 PM   Next Dose Due:  Tonight 2/5/2018                                     order for DME   Equipment being ordered: Nebulizer                                predniSONE 10 MG tablet   Commonly known as:  DELTASONE   Take 20mg (2 tablets) for 3 days then 10mg (1tablet) for 3 days   Last time this was given:  20 mg on 2/5/2018  9:12 AM                                             More Information        What is COPD?  COPD stands for chronic obstructive pulmonary disease. It means the airways in your lungs are blocked (obstructed). Because of this, it is hard to breathe. You may have trouble with daily activities because of shortness  of breath. Over time the shortness of breath usually worsens making it more and more difficult to take care of yourself and take part in activities. Chronic bronchitis and emphysema are two common types of COPD.  What happens in chronic bronchitis?    The cells in the airways make more mucus than normal. The mucus builds up, narrowing the airways. This means less air travels into and out of the lungs. The lining of the airways may also become inflamed (swollen) and causes the airways to narrow even more.        What happens in emphysema?    The small airways are damaged and lose their stretchiness. The airways collapse when you exhale, causing air to get trapped in the air sacs. This means that less oxygen enters the blood vessels and less oxygen is delivered to all of the cells of your body. This makes it hard to breathe.     Damage to cilia    Cilia are small hairs that line and protect the airways. Smoking damages the cilia. Damaged cilia can t sweep mucus and particles away. Some of the cilia are destroyed. This damage worsens COPD.  How did I get COPD?  Most people get COPD from smoking. Cigarette smoke damages lungs, which can develop into COPD over many years.  How COPD affects you  COPD makes you work harder to breathe. Air may get trapped in the lungs, which prevents your lungs from filling completely with fresh oxygen-filled air when you inhale (breathe in). It's harder to take deep breaths especially when you are active and start breathing faster. Over time, your lungs may become enlarged filling the lung with air that does not transfer oxygen into the blood. These problems cause you to have shortness of breath (also called dyspnea). Wheezing (hoarse, whistling breathing), chronic cough, and fatigue (feeling tired and worn out) are also common.   Date Last Reviewed: 5/1/2016 2000-2017 [employer]. [address] [place] [address], [place] [address]. All rights reserved. This information is not  intended as a substitute for professional medical care. Always follow your healthcare professional's instructions.                Atrial Fibrillation    Atrial fibrillation is a condition in which the heart beats in an irregular pattern. It is caused by a problem in the heart's electrical pathways. It can be a sign of heart disease or other health problems that affect the heart.  Heart palpitations are the most common symptom of atrial fibrillation. This is the feeling that your heart is fluttering, beating fast, hard, or irregular. When the heart beats too fast, it doesn't pump blood very well. This can cause other symptoms like anxiety, fatigue, shortness of breath, chest pain, dizziness, or fainting. Atrial fibrillation may come and go. It can last from a few hours to a couple of days. Or, it may become chronic, lasting for months at a time or even become permanent.  Atrial fibrillation may be caused by heart disease or other conditions in the body that affect the heart:    Coronary artery disease (atherosclerosis)    High blood pressure    Disease of the heart valves    Enlarged heart    Heart failure  Atrial fibrillation can also occur without heart disease because of:    Overactive thyroid (hyperthyroid)    Chronic lung disease (COPD, emphysema, bronchitis)    Heavy alcohol use    Cardiac stimulants like cocaine, amphetamines, diet pills, certain decongestant cold medicines, caffeine, or nicotine    Infection    Blood clot in the lung (pulmonary embolus)    Diabetes    Chronic kidney disease    Obesity    Extreme athletic conditioning  Treating or removing these causes will help your treatment for atrial fibrillation. It will also make it less likely for the atrial fibrillation to come back.  Atrial fibrillation can alternate back and forth with another abnormal rhythm called atrial flutter. Atrial flutter is a more regular heart rhythm and is also associated with an increased stroke risk. Proper treatment can  lower your risk for stroke.  Home care  Follow these guidelines when caring for yourself at home:    Go back to your usual activities as soon as you are feeling back to normal.    If you smoke, stop smoking. Contact your healthcare provider or a local stop-smoking program for help.    Don't use stimulants like alcohol, cocaine, amphetamines, diet pills, certain decongestant cold medicines, caffeine, or nicotine.    If your provider prescribed medicine to stop atrial fibrillation from coming back, take it exactly as directed. Some medicines must be taken every day, not just when you have symptoms. This will help them work as they should.    If you were prescribed warfarin to lower your risk for stroke, have your blood tested on a regular basis as advised by your provider. This will make sure you are getting the dose that is right for you. It also lower your risk for side effects.  Follow-up care  Follow up with your healthcare provider, or as advised.  When to seek medical advice  Call your healthcare provider right away if any of these following occur:    Shortness of breath or swelling in the legs gets worse    Unexpected weight gain    Chest pain or the sense that your heart is fluttering or beating fast or hard (palpitations)    Any sign of bleeding if you are on a blood thinner     Pain, redness, or swelling in one leg  Also call your provider right away if you have these signs of stroke:    Weakness of an arm or leg or one side of the face    Difficulty with speech or vision    Extreme drowsiness, confusion, dizziness, or fainting  Date Last Reviewed: 5/1/2016 2000-2017 [employer]. [address] [place] [address]. All rights reserved. This information is not intended as a substitute for professional medical care. Always follow your healthcare professional's instructions.                Patient Education    Diltiazem Hydrochloride Oral capsule, extended-release    Diltiazem  Hydrochloride Oral tablet    Diltiazem Hydrochloride Oral tablet, extended-release    Diltiazem Hydrochloride Solution for injection    Diltiazem Maleate Oral tablet, extended-release  Diltiazem Hydrochloride Oral capsule, extended-release  What is this medicine?  DILTIAZEM (dil JAMIN gonzalez zetyrone) is a calcium-channel blocker. It affects the amount of calcium found in your heart and muscle cells. This relaxes your blood vessels, which can reduce the amount of work the heart has to do. This medicine is used to treat high blood pressure and chest pain caused by angina.  This medicine may be used for other purposes; ask your health care provider or pharmacist if you have questions.  What should I tell my health care provider before I take this medicine?  They need to know if you have any of these conditions:    heart problems, low blood pressure, irregular heartbeat    liver disease    previous heart attack    an unusual or allergic reaction to diltiazem, other medicines, foods, dyes, or preservatives    pregnant or trying to get pregnant    breast-feeding  How should I use this medicine?  Take this medicine by mouth with a glass of water. Follow the directions on the prescription label. Swallow whole, do not crush or chew. Ask your doctor or pharmacist if your should take this medicine with food. Take your doses at regular intervals. Do not take your medicine more often then directed. Do not stop taking except on the advice of your doctor or health care professional. Ask your doctor or health care professional how to gradually reduce the dose.  Talk to your pediatrician regarding the use of this medicine in children. Special care may be needed.  Overdosage: If you think you have taken too much of this medicine contact a poison control center or emergency room at once.  NOTE: This medicine is only for you. Do not share this medicine with others.  What if I miss a dose?  If you miss a dose, take it as soon as you can. If it is  almost time for your next dose, take only that dose. Do not take double or extra doses.  What may interact with this medicine?  Do not take this medicine with any of the following medications:    cisapride    hawthorn    pimozide    ranolazine    red yeast rice  This medicine may also interact with the following medications:    buspirone    carbamazepine    cimetidine    cyclosporine    digoxin    local anesthetics or general anesthetics    lovastatin    medicines for anxiety or difficulty sleeping like midazolam and triazolam    medicines for high blood pressure or heart problems    quinidine    rifampin, rifabutin, or rifapentine  This list may not describe all possible interactions. Give your health care provider a list of all the medicines, herbs, non-prescription drugs, or dietary supplements you use. Also tell them if you smoke, drink alcohol, or use illegal drugs. Some items may interact with your medicine.  What should I watch for while using this medicine?  Check your blood pressure and pulse rate regularly. Ask your doctor or health care professional what your blood pressure and pulse rate should be and when you should contact him or her.  You may feel dizzy or lightheaded. Do not drive, use machinery, or do anything that needs mental alertness until you know how this medicine affects you. To reduce the risk of dizzy or fainting spells, do not sit or stand up quickly, especially if you are an older patient. Alcohol can make you more dizzy or increase flushing and rapid heartbeats. Avoid alcoholic drinks.  What side effects may I notice from receiving this medicine?  Side effects that you should report to your doctor or health care professional as soon as possible:    allergic reactions like skin rash, itching or hives, swelling of the face, lips, or tongue    confusion, mental depression    feeling faint or lightheaded, falls    redness, blistering, peeling or loosening of the skin, including inside the  mouth    slow, irregular heartbeat    swelling of the feet and ankles    unusual bleeding or bruising, pinpoint red spots on the skin  Side effects that usually do not require medical attention (report to your doctor or health care professional if they continue or are bothersome):    constipation or diarrhea    difficulty sleeping    facial flushing    headache    nausea, vomiting    sexual dysfunction    weak or tired  This list may not describe all possible side effects. Call your doctor for medical advice about side effects. You may report side effects to FDA at 3-906-SMB-4590.  Where should I keep my medicine?  Keep out of the reach of children.  Store at room temperature between 15 and 30 degrees C (59 and 86 degrees F). Protect from humidity. Throw away any unused medicine after the expiration date.  NOTE:This sheet is a summary. It may not cover all possible information. If you have questions about this medicine, talk to your doctor, pharmacist, or health care provider. Copyright  2016 [employer]                Metoprolol tablets  Brand Name: Lopressor  What is this medicine?  METOPROLOL (me TOE proe lole) is a beta-blocker. Beta-blockers reduce the workload on the heart and help it to beat more regularly. This medicine is used to treat high blood pressure and to prevent chest pain. It is also used to after a heart attack and to prevent an additional heart attack from occurring.  How should I use this medicine?  Take this medicine by mouth with a drink of water. Follow the directions on the prescription label. Take this medicine immediately after meals. Take your doses at regular intervals. Do not take more medicine than directed. Do not stop taking this medicine suddenly. This could lead to serious heart-related effects.  Talk to your pediatrician regarding the use of this medicine in children. Special care may be needed.  What side effects may I notice from receiving this medicine?  Side effects that you  should report to your doctor or health care professional as soon as possible:    allergic reactions like skin rash, itching or hives    cold or numb hands or feet    depression    difficulty breathing    faint    fever with sore throat    irregular heartbeat, chest pain    rapid weight gain    swollen legs or ankles  Side effects that usually do not require medical attention (report to your doctor or health care professional if they continue or are bothersome):    anxiety or nervousness    change in sex drive or performance    dry skin    headache    nightmares or trouble sleeping    short term memory loss    stomach upset or diarrhea    unusually tired  What may interact with this medicine?  This medicine may interact with the following medications:    certain medicines for blood pressure, heart disease, irregular heart beat    certain medicines for depression like monoamine oxidase (MAO) inhibitors, fluoxetine, or paroxetine    clonidine    dobutamine    epinephrine    isoproterenol    reserpine  What if I miss a dose?  If you miss a dose, take it as soon as you can. If it is almost time for your next dose, take only that dose. Do not take double or extra doses.  Where should I keep my medicine?  Keep out of the reach of children.  Store at room temperature between 15 and 30 degrees C (59 and 86 degrees F). Throw away any unused medicine after the expiration date.  What should I tell my health care provider before I take this medicine?  They need to know if you have any of these conditions:    diabetes    heart or vessel disease like slow heart rate, worsening heart failure, heart block, sick sinus syndrome or Raynaud's disease    kidney disease    liver disease    lung or breathing disease, like asthma or emphysema    pheochromocytoma    thyroid disease    an unusual or allergic reaction to metoprolol, other beta-blockers, medicines, foods, dyes, or preservatives    pregnant or trying to get  pregnant    breast-feeding  What should I watch for while using this medicine?  Visit your doctor or health care professional for regular check ups. Contact your doctor right away if your symptoms worsen. Check your blood pressure and pulse rate regularly. Ask your health care professional what your blood pressure and pulse rate should be, and when you should contact them.  You may get drowsy or dizzy. Do not drive, use machinery, or do anything that needs mental alertness until you know how this medicine affects you. Do not sit or stand up quickly, especially if you are an older patient. This reduces the risk of dizzy or fainting spells. Contact your doctor if these symptoms continue. Alcohol may interfere with the effect of this medicine. Avoid alcoholic drinks.  NOTE:This sheet is a summary. It may not cover all possible information. If you have questions about this medicine, talk to your doctor, pharmacist, or health care provider. Copyright  2017 [employer]

## 2018-01-23 NOTE — IP AVS SNAPSHOT
[place] [address] [facility]    [address]    [facility] [address]    Phone:  [phone]    Fax:  [phone]                                       After Visit Summary   1/23/2018    Kelly Nunez    MRN: [id number]           After Visit Summary Signature Page     I have received my discharge instructions, and my questions have been answered. I have discussed any challenges I see with this plan with the nurse or doctor.    ..........................................................................................................................................  Patient/Patient Representative Signature      ..........................................................................................................................................  Patient Representative Print Name and Relationship to Patient    ..................................................               ................................................  Date                                            Time    ..........................................................................................................................................  Reviewed by Signature/Title    ...................................................              ..............................................  Date                                                            Time

## 2018-01-23 NOTE — PHARMACY-ADMISSION MEDICATION HISTORY
Admission medication history interview status for this patient is complete. See [facility] admission navigator for allergy information, prior to admission medications and immunization status.     Medication history interview source(s):Patient and Family  Medication history resources (including written lists, pill bottles, clinic record):pill bottles    Changes made to PTA medication list:  Added: none  Deleted: none  Changed: namenda and nifedipine    Actions taken by pharmacist (provider contacted, etc):None     Additional medication history information:patient has memory issues and Family unsure of last doses.     Medication reconciliation/reorder completed by provider prior to medication history? No    For patients on insulin therapy: no (Yes/No)   Lantus/levemir/NPH/Mix 70/30 dose: ___ in AM/PM or twice daily   Sliding scale Novolog Y/N   If Yes, do you have a baseline novolog pre-meal dose: ______units with meals   Patients eat three meals a day: Y/N ---  How many episodes of hypoglycemia (low blood glucose) do you have weekly: ---   How many missed doses do you have a week: ---  How many times do you check your blood glucose per day: ---  Any Barriers to therapy: cost of medications/comfortable with giving injections (if applicable)/ comfortable and confident with current diabetes regimen ---      Prior to Admission medications    Medication Sig Last Dose Taking? Auth Provider   DULOXETINE HCL PO Take 120 mg by mouth daily  Yes Reported, Patient   OLANZAPINE PO Take 10 mg by mouth At Bedtime  Yes Reported, Patient   ASPIRIN PO Take 81 mg by mouth daily  Yes Reported, Patient   memantine XR (NAMENDA XR) 28 MG 24 hr capsule Take 28 mg by mouth daily  Yes Unknown, Entered By History   NIFEdipine ER (ADALAT CC) 30 MG TB24 Take 30 mg by mouth 2 times daily  Yes Unknown, Entered By History

## 2018-01-23 NOTE — LETTER
Transition Communication Hand-off for Care Transitions to Next Level of Care Provider    Hand-off for Care Transitions to Next Level of Care Provider  Name: Kelly Nunez  : 1950  MRN #: [id number]  Reason for Hospitalization:  COPD exacerbation (H) [J44.1]  Atrial fibrillation, unspecified type (H) [I48.91]  Admit Date/Time: 2018  3:05 PM  Discharge Date: 2018    Reason for Communication Hand-off Referral: Admission diagnoses: PN  Admission diagnoses: COPD    Discharge Plan:  Discharged to: Home with homecare                   Patient agreeable to post-hospital support suggestions:  Yes  Discharge Plan:  Home with [facility].            Patient is on new medications:   Yes. See After Visit Summary (AVS).            MTM follow up recommended: No           Tel-Assurance program:  Ineligible           Patient will receive  HOME CARE  at:  Discharge through [facility].   Follow-up specialty is recommended: No.     Follow-up plan:  No future appointments.    Any outstanding tests or procedures:    Procedures     Future Labs/Procedures    Oxygen Adult     Comments:    New Home Oxygen Order 2 liter(s) by nasal cannula continuously with use of portable tank. Expected treatment length is indefinite (99 months).. Test on conserving device as applicable.    Patients who qualify for home O2 coverage under the CMS guidelines require ABG tests or O2 sat readings obtained closest to, but no earlier than 2 days prior to the discharge, as evidence of the need for home oxygen therapy. Testing must be performed while patient is in the chronic stable state. See notes for O2 sats.    I certify that this patient, Kelly Nunez has been under my care and that I, or a nurse practitioner or physician's assistant working with me, had a face-to-face encounter that meets the face-to-face encounter requirements with this patient on 2018. The patient, Kelly Nunez was evaluated or treated in  whole, or in part, for the following medical condition, which necessitates the use of the ordered oxygen. Treatment Diagnosis: moderate to severe COPD with acute exacerbation    Attending Provider: Kyle Chung  Physician signature: See electronic signature associated with these discharge orders  Date of Order: February 5, 2018          Key Recommendations:  Pt does have some cognitive deficits (from dementia) but her immediate needs are physical deconditioning. TCU has been discussed. She did not qualify for TCU by the end of her hospitalization. Her family arranged private pay home health aid for while her spouse is at work. She will be discharging on new home oxygen as her CXR showed emphysema. Family is actively looking into memory care options.     Communicated handoff via [employer] Mgt to (PCP's CC) at (fax number)     Yasmin Arnold RN, BSN, CTS  [facility]  [phone]    AVS/Discharge Summary is the source of truth; this is a helpful guide for improved communication of patient story

## 2018-01-23 NOTE — ED NOTES
Pt went to clinic today due to some memory issues and confusion that was getting worse. Pt having increased breathing difficulty and UTI symptoms. At the clinic /90 and oxygen saturations low. Family thinks in the 70s. Sent to ER. Upon arrival  and saturation in the 70s on room air. Pt has been having a cough. Is a smoker. Was diagnosed with UTI at clinic.

## 2018-01-24 ENCOUNTER — APPOINTMENT (OUTPATIENT)
Dept: CARDIOLOGY | Facility: CLINIC | Age: 68
DRG: 193 | End: 2018-01-24
Attending: INTERNAL MEDICINE
Payer: COMMERCIAL

## 2018-01-24 LAB
ANION GAP SERPL CALCULATED.3IONS-SCNC: 8 MMOL/L (ref 3–14)
BACTERIA SPEC CULT: NO GROWTH
BUN SERPL-MCNC: 26 MG/DL (ref 7–30)
CALCIUM SERPL-MCNC: 8.1 MG/DL (ref 8.5–10.1)
CHLORIDE SERPL-SCNC: 101 MMOL/L (ref 94–109)
CO2 SERPL-SCNC: 28 MMOL/L (ref 20–32)
CREAT SERPL-MCNC: 0.57 MG/DL (ref 0.52–1.04)
ERYTHROCYTE [DISTWIDTH] IN BLOOD BY AUTOMATED COUNT: 12.6 % (ref 10–15)
GFR SERPL CREATININE-BSD FRML MDRD: >90 ML/MIN/1.7M2
GLUCOSE BLDC GLUCOMTR-MCNC: 138 MG/DL (ref 70–99)
GLUCOSE BLDC GLUCOMTR-MCNC: 150 MG/DL (ref 70–99)
GLUCOSE BLDC GLUCOMTR-MCNC: 180 MG/DL (ref 70–99)
GLUCOSE BLDC GLUCOMTR-MCNC: 194 MG/DL (ref 70–99)
GLUCOSE BLDC GLUCOMTR-MCNC: 252 MG/DL (ref 70–99)
GLUCOSE SERPL-MCNC: 155 MG/DL (ref 70–99)
HCT VFR BLD AUTO: 43.2 % (ref 35–47)
HGB BLD-MCNC: 14.6 G/DL (ref 11.7–15.7)
INTERPRETATION ECG - MUSE: NORMAL
INTERPRETATION ECG - MUSE: NORMAL
LMWH PPP CHRO-ACNC: <0.1 IU/ML
LMWH PPP CHRO-ACNC: <0.1 IU/ML
MCH RBC QN AUTO: 32.6 PG (ref 26.5–33)
MCHC RBC AUTO-ENTMCNC: 33.8 G/DL (ref 31.5–36.5)
MCV RBC AUTO: 96 FL (ref 78–100)
PLATELET # BLD AUTO: 231 10E9/L (ref 150–450)
POTASSIUM SERPL-SCNC: 4 MMOL/L (ref 3.4–5.3)
RBC # BLD AUTO: 4.48 10E12/L (ref 3.8–5.2)
SODIUM SERPL-SCNC: 137 MMOL/L (ref 133–144)
WBC # BLD AUTO: 4.5 10E9/L (ref 4–11)

## 2018-01-24 PROCEDURE — 80048 BASIC METABOLIC PNL TOTAL CA: CPT | Performed by: INTERNAL MEDICINE

## 2018-01-24 PROCEDURE — 94640 AIRWAY INHALATION TREATMENT: CPT | Mod: 76

## 2018-01-24 PROCEDURE — 25000128 H RX IP 250 OP 636: Performed by: INTERNAL MEDICINE

## 2018-01-24 PROCEDURE — 99222 1ST HOSP IP/OBS MODERATE 55: CPT | Performed by: INTERNAL MEDICINE

## 2018-01-24 PROCEDURE — 25000131 ZZH RX MED GY IP 250 OP 636 PS 637: Performed by: INTERNAL MEDICINE

## 2018-01-24 PROCEDURE — 25000132 ZZH RX MED GY IP 250 OP 250 PS 637: Performed by: INTERNAL MEDICINE

## 2018-01-24 PROCEDURE — 93306 TTE W/DOPPLER COMPLETE: CPT | Mod: 26 | Performed by: INTERNAL MEDICINE

## 2018-01-24 PROCEDURE — 90732 PPSV23 VACC 2 YRS+ SUBQ/IM: CPT | Performed by: INTERNAL MEDICINE

## 2018-01-24 PROCEDURE — 00000146 ZZHCL STATISTIC GLUCOSE BY METER IP

## 2018-01-24 PROCEDURE — 93306 TTE W/DOPPLER COMPLETE: CPT

## 2018-01-24 PROCEDURE — 36415 COLL VENOUS BLD VENIPUNCTURE: CPT | Performed by: INTERNAL MEDICINE

## 2018-01-24 PROCEDURE — 25000125 ZZHC RX 250: Performed by: INTERNAL MEDICINE

## 2018-01-24 PROCEDURE — 40000275 ZZH STATISTIC RCP TIME EA 10 MIN

## 2018-01-24 PROCEDURE — 85520 HEPARIN ASSAY: CPT | Performed by: INTERNAL MEDICINE

## 2018-01-24 PROCEDURE — 85027 COMPLETE CBC AUTOMATED: CPT | Performed by: INTERNAL MEDICINE

## 2018-01-24 PROCEDURE — 40000914 ZZH STATISTIC SITTER, DAY HOURS

## 2018-01-24 PROCEDURE — 94640 AIRWAY INHALATION TREATMENT: CPT

## 2018-01-24 PROCEDURE — 90662 IIV NO PRSV INCREASED AG IM: CPT | Performed by: INTERNAL MEDICINE

## 2018-01-24 PROCEDURE — 40000916 ZZH STATISTIC SITTER, NIGHT HOURS

## 2018-01-24 PROCEDURE — 12000007 ZZH R&B INTERMEDIATE

## 2018-01-24 PROCEDURE — 99233 SBSQ HOSP IP/OBS HIGH 50: CPT | Performed by: INTERNAL MEDICINE

## 2018-01-24 RX ORDER — HEPARIN SODIUM 5000 [USP'U]/.5ML
5000 INJECTION, SOLUTION INTRAVENOUS; SUBCUTANEOUS EVERY 12 HOURS
Status: DISCONTINUED | OUTPATIENT
Start: 2018-01-24 | End: 2018-02-05 | Stop reason: HOSPADM

## 2018-01-24 RX ORDER — AMLODIPINE BESYLATE 5 MG/1
5 TABLET ORAL DAILY
Status: DISCONTINUED | OUTPATIENT
Start: 2018-01-24 | End: 2018-01-25

## 2018-01-24 RX ORDER — DILTIAZEM HYDROCHLORIDE 60 MG/1
60 CAPSULE, EXTENDED RELEASE ORAL 2 TIMES DAILY
Status: DISCONTINUED | OUTPATIENT
Start: 2018-01-24 | End: 2018-01-31

## 2018-01-24 RX ADMIN — INSULIN ASPART 1 UNITS: 100 INJECTION, SOLUTION INTRAVENOUS; SUBCUTANEOUS at 13:16

## 2018-01-24 RX ADMIN — AMLODIPINE BESYLATE 5 MG: 5 TABLET ORAL at 16:14

## 2018-01-24 RX ADMIN — DILTIAZEM HYDROCHLORIDE 60 MG: 60 CAPSULE, EXTENDED RELEASE ORAL at 20:59

## 2018-01-24 RX ADMIN — INSULIN ASPART 2 UNITS: 100 INJECTION, SOLUTION INTRAVENOUS; SUBCUTANEOUS at 17:20

## 2018-01-24 RX ADMIN — INFLUENZA A VIRUSA/MICHIGAN/45/2015 X-275 (H1N1) ANTIGEN (FORMALDEHYDE INACTIVATED), INFLUENZA A VIRUS A/HONG KONG/4801/2014 X-263B (H3N2) ANTIGEN (FORMALDEHYDE INACTIVATED), AND INFLUENZA B VIRUS B/BRISBANE/60/2008 ANTIGEN (FORMALDEHYDE INACTIVATED) 0.5 ML: 60; 60; 60 INJECTION, SUSPENSION INTRAMUSCULAR at 10:40

## 2018-01-24 RX ADMIN — NICOTINE 1 PATCH: 21 PATCH, EXTENDED RELEASE TRANSDERMAL at 09:06

## 2018-01-24 RX ADMIN — ASPIRIN 81 MG 81 MG: 81 TABLET ORAL at 09:05

## 2018-01-24 RX ADMIN — IPRATROPIUM BROMIDE AND ALBUTEROL SULFATE 3 ML: .5; 3 SOLUTION RESPIRATORY (INHALATION) at 08:04

## 2018-01-24 RX ADMIN — INSULIN ASPART 2 UNITS: 100 INJECTION, SOLUTION INTRAVENOUS; SUBCUTANEOUS at 21:00

## 2018-01-24 RX ADMIN — METHYLPREDNISOLONE SODIUM SUCCINATE 40 MG: 40 INJECTION, POWDER, FOR SOLUTION INTRAMUSCULAR; INTRAVENOUS at 01:06

## 2018-01-24 RX ADMIN — DULOXETINE HYDROCHLORIDE 120 MG: 30 CAPSULE, DELAYED RELEASE ORAL at 09:05

## 2018-01-24 RX ADMIN — MEMANTINE HYDROCHLORIDE 28 MG: 28 CAPSULE, EXTENDED RELEASE ORAL at 09:05

## 2018-01-24 RX ADMIN — DILTIAZEM HYDROCHLORIDE 60 MG: 60 CAPSULE, EXTENDED RELEASE ORAL at 01:23

## 2018-01-24 RX ADMIN — Medication 1 MG: at 23:05

## 2018-01-24 RX ADMIN — INSULIN ASPART 1 UNITS: 100 INJECTION, SOLUTION INTRAVENOUS; SUBCUTANEOUS at 09:44

## 2018-01-24 RX ADMIN — PNEUMOCOCCAL VACCINE POLYVALENT 0.5 ML
25; 25; 25; 25; 25; 25; 25; 25; 25; 25; 25; 25; 25; 25; 25; 25; 25; 25; 25; 25; 25; 25; 25 INJECTION, SOLUTION INTRAMUSCULAR; SUBCUTANEOUS at 10:39

## 2018-01-24 RX ADMIN — METHYLPREDNISOLONE SODIUM SUCCINATE 40 MG: 40 INJECTION, POWDER, FOR SOLUTION INTRAMUSCULAR; INTRAVENOUS at 09:41

## 2018-01-24 RX ADMIN — AZITHROMYCIN MONOHYDRATE 500 MG: 500 INJECTION, POWDER, LYOPHILIZED, FOR SOLUTION INTRAVENOUS at 19:27

## 2018-01-24 RX ADMIN — DILTIAZEM HYDROCHLORIDE 60 MG: 60 CAPSULE, EXTENDED RELEASE ORAL at 09:05

## 2018-01-24 RX ADMIN — IPRATROPIUM BROMIDE AND ALBUTEROL SULFATE 3 ML: .5; 3 SOLUTION RESPIRATORY (INHALATION) at 11:22

## 2018-01-24 RX ADMIN — CEFTRIAXONE SODIUM 1 G: 1 INJECTION, SOLUTION INTRAVENOUS at 15:43

## 2018-01-24 RX ADMIN — OLANZAPINE 10 MG: 5 TABLET, FILM COATED ORAL at 21:01

## 2018-01-24 RX ADMIN — HEPARIN SODIUM 5000 UNITS: 5000 INJECTION, SOLUTION INTRAVENOUS; SUBCUTANEOUS at 15:37

## 2018-01-24 RX ADMIN — IPRATROPIUM BROMIDE AND ALBUTEROL SULFATE 3 ML: .5; 3 SOLUTION RESPIRATORY (INHALATION) at 20:35

## 2018-01-24 RX ADMIN — IPRATROPIUM BROMIDE AND ALBUTEROL SULFATE 3 ML: .5; 3 SOLUTION RESPIRATORY (INHALATION) at 15:21

## 2018-01-24 RX ADMIN — METHYLPREDNISOLONE SODIUM SUCCINATE 40 MG: 40 INJECTION, POWDER, FOR SOLUTION INTRAMUSCULAR; INTRAVENOUS at 15:37

## 2018-01-24 ASSESSMENT — ACTIVITIES OF DAILY LIVING (ADL)
ADLS_ACUITY_SCORE: 11

## 2018-01-24 NOTE — ED NOTES
[facility]  ED Nurse Handoff Report    Kelly Nunez is a 67 year old female   ED Chief complaint: Low Oxygen Saturation and UTI  . ED Diagnosis:   Final diagnoses:   COPD exacerbation (H)   Atrial fibrillation, unspecified type (H)     Allergies:   Allergies   Allergen Reactions     Penicillins Swelling     Pt states she has tolerated cephalosporins in the past.        Code Status: Full Code  Activity level - Baseline/Home:  Independent. Activity Level - Current:   Stand with Assist. Lift room needed: No. Bariatric: No   Needed: No   Isolation: No. Infection: Not Applicable.     Vital Signs:   Vitals:    01/23/18 1715 01/23/18 1723 01/23/18 1730 01/23/18 1745   BP: 111/54  115/83 (!) 132/97   Pulse:       Resp: 24  26 (!) 39   Temp:       TempSrc:       SpO2: 95% 97% 92%    Weight:       Height:           Cardiac Rhythm:  ,      Pain level:    Patient confused: Forgetful Patient Falls Risk: Yes.   Elimination Status: Has voided   Patient Report - Initial Complaint: Hypoxia. Focused Assessment: Saturation 60-70s on room air upon arrival. SOB and tachypnea. HR 170s afib RVR upon arrival.    Tests Performed: UA, CT, XR, Labs. Abnormal Results:   Labs Ordered and Resulted from Time of ED Arrival Up to the Time of Departure from the ED   CBC WITH PLATELETS DIFFERENTIAL - Abnormal; Notable for the following:        Result Value    Hemoglobin 16.3 (*)     Hematocrit 48.1 (*)     Absolute Lymphocytes 0.6 (*)     All other components within normal limits   COMPREHENSIVE METABOLIC PANEL - Abnormal; Notable for the following:     Sodium 130 (*)     Chloride 91 (*)     Glucose 195 (*)     Urea Nitrogen 32 (*)     GFR Estimate 54 (*)     ALT 70 (*)      (*)     All other components within normal limits   NT PROBNP INPATIENT - Abnormal; Notable for the following:     N-Terminal Pro BNP Inpatient 7282 (*)     All other components within normal limits   BLOOD GAS VENOUS - Abnormal; Notable for  the following:     Ph Venous 7.16 (*)     PCO2 Venous 74 (*)     All other components within normal limits   ROUTINE UA WITH MICROSCOPIC REFLEX TO CULTURE - Abnormal; Notable for the following:     Blood Urine Small (*)     Protein Albumin Urine 100 (*)     WBC Urine 3 (*)     RBC Urine 3 (*)     Mucous Urine Present (*)     Hyaline Casts 62 (*)     Amorphous Crystals Moderate (*)     All other components within normal limits   TROPONIN I   TSH   CARDIAC CONTINUOUS MONITORING   PULSE OXIMETRY NURSING   PERIPHERAL IV CATHETER   PLATELETS MONITORED PER HEPARIN TREATMENT PROTOCOL (FOR MEANINGFUL USE   MEASURE WEIGHT     .   Treatments provided: Meds, oxygen, neb  Family Comments: Starting dementia- short term memory  OBS brochure/video discussed/provided to patient:  N/A  ED Medications:   Medications   diltiazem (CARDIZEM) 125 mg in NaCl 0.9 % 125 mL infusion (10 mg/hr Intravenous New Bag 1/23/18 1600)   heparin infusion 25,000 units in 0.45% NaCl 250 mL (12 Units/kg/hr × 41.7 kg Intravenous New Bag 1/23/18 1812)   diltiazem (CARDIZEM) injection 10 mg (10 mg Intravenous Given 1/23/18 1555)   diltiazem (CARDIZEM) injection 10 mg (10 mg Intravenous Given 1/23/18 1627)   ipratropium - albuterol 0.5 mg/2.5 mg/3 mL (DUONEB) neb solution 3 mL (3 mLs Nebulization Given 1/23/18 1602)   methylPREDNISolone sodium succinate (solu-MEDROL) injection 40 mg (40 mg Intravenous Given 1/23/18 1635)   cefTRIAXone in d5w (ROCEPHIN) intermittent infusion 1 g (0 g Intravenous Stopped 1/23/18 1720)   heparin Loading Dose bolus dose from infusion pump 2,500 Units (2,500 Units Intravenous Given 1/23/18 1812)     Drips infusing:  Yes  For the majority of the shift, the patient's behavior Green. Interventions performed were NA.     Severe Sepsis OR Septic Shock Diagnosis Present: No      ED Nurse Name/Phone Number: Pilar Olivo,   6:21 PM      RECEIVING UNIT ED HANDOFF REVIEW    Above ED Nurse Handoff Report was reviewed: Yes  Reviewed by:  Sanam Lowry on January 23, 2018 at 6:42 PM

## 2018-01-24 NOTE — PROGRESS NOTES
[facility]  Hospitalist Progress Note  Name: Kelly Nunez    MRN: [id number]  Provider:  Breana Gaines MD  01/24/18    Initial presenting complaint/issue to hospital (Diagnosis): acute respiratory distress.         Assessment and Plan:      Summary of Stay: Kelly Nunez is a 67 year old female admitted on 1/23/2018 with acute respiratory distress. Hx of dementia, DM, likely COPD who presented with SOB.      Problem List:     1. Acute respiratory distress likely due to CAP with probable superimposed severe COPD.  - On IV rocephin and azithromycin.  - On oxygen, nebs and IV solumedrol.    2. PAF.  - Converted spontaneously to NSR.  - Cardiology consulted and to assist with OAC.  - TTE pending.    3. Dementia with superimposed acute encephalopathy.  - Likely due to pneumonia.  - Monitor, avoid benzo's.  - On namenda.    4. DM.  - On insulin.    DVT Prophylaxis:  -  PCD's.  Code Status: Full Code  Discharge Dispo: TBD.  Estimated Disch Date / # of Days until Discharge: > 3 days.        Interval History:        + SOB. No chest pain/fever/chills/productive cough.                  Physical Exam:      Last Vital Signs:  Temp: 97.3  F (36.3  C) Temp src: Axillary BP: 121/68 Pulse: 170 Heart Rate: 75 Resp: 20 SpO2: 96 % O2 Device: Nasal cannula Oxygen Delivery: 5 LPM    Intake/Output Summary (Last 24 hours) at 01/24/18 0803  Last data filed at 01/23/18 2309   Gross per 24 hour   Intake              240 ml   Output                0 ml   Net              240 ml     I/O last 3 completed shifts:  In: 240 [P.O.:240]  Out: -   Vitals:    01/23/18 1512   Weight: 41.7 kg (92 lb)       Gen - Alert, awake, oriented to self in NAD, follows commands. Cachetic and appears older than stated age.  Lungs - diminished BS.  Heart - distant HS, RR, S1+S2 nml, no m/g/r.  Abd - soft, NT, ND, + BS.  Ext - no edema.         Medications:      All current medications were reviewed.         Data:      All new lab and imaging  data was reviewed.   Labs:  No results for input(s): CULT in the last 168 hours.    Recent Labs  Lab 01/23/18  1524   WBC 7.8   HGB 16.3*   HCT 48.1*   MCV 96          Recent Labs  Lab 01/23/18  1524   *   POTASSIUM 3.6   CHLORIDE 91*   CO2 28   ANIONGAP 11   *   BUN 32*   CR 1.03   GFRESTIMATED 54*   GFRESTBLACK 65   ATTILA 8.7   MAG 2.5*   PROTTOTAL 8.2   ALBUMIN 3.5   BILITOTAL 0.5   ALKPHOS 110   *   ALT 70*       Recent Labs  Lab 01/23/18  1524   TROPI 0.030      Recent Imaging:   Recent Results (from the past 24 hour(s))   XR Chest Port 1 View    Narrative    CHEST ONE VIEW PORTABLE    1/23/2018 3:55 PM     HISTORY: Chest pain/shortness of breath.    COMPARISON: None.      Impression    IMPRESSION: Pulmonary vasculature is indistinct suggestive of vascular  congestion. Micronodule airspace opacities at the left lung base  concerning for pneumonia versus atelectasis. Small bilateral pleural  effusions. Right apical pleural thickening. No definite pneumothorax.  Cardiac silhouette within normal limits.    MAR ALEXIS MD   Chest CT, IV contrast only - PE protocol    Narrative    CT CHEST WITH CONTRAST  1/23/2018  6:47 PM    HISTORY: Shortness of breath. Evaluate for pulmonary embolism.    COMPARISON: A radiograph earlier today.    TECHNIQUE: Routine transverse CT imaging of the chest was performed  following the uneventful intravenous administration of 46mL Isovue-370  contrast. A pulmonary embolism protocol was utilized. Radiation dose  for this scan was reduced using automated exposure control, adjustment  of the mA and/or kV according to patient size, or iterative  reconstruction technique.    FINDINGS: The heart size is normal. No enlarged lymph node or other  abnormal mediastinal mass is seen. There is calcification of the  thoracic aorta. There is very good opacification of the pulmonary  arteries with contrast. No pulmonary embolism is seen. Emphysematous  changes are seen in both  lungs. There is more prominent abnormality in  the superior aspect of the right lung adjacent to the major fissure.  This consists of more prominent emphysematous change with diffuse  thickening of the adjacent pulmonary parenchyma. This area measures  just over 8 cm transverse. This appears to be a chronic process. There  is a small area of consolidation in the anterior aspect of the lingula  of the left lung. There is additional mild atelectasis in the left  lung base. The lungs are otherwise clear. No pneumothorax is  demonstrated. No pleural effusion is identified. There are  degenerative changes in the spine. No chest wall pathology is seen.  The visualized upper abdomen is unremarkable.      Impression    IMPRESSION:   1. There is a small area of consolidation of the lingula of the left  lung. An infiltrate/pneumonia cannot be excluded.  2. Diffuse emphysematous changes throughout both lungs. There is a  prominent area of more extensive emphysematous change and probable  chronic scarring within the right upper lung.   3. No evidence of pulmonary embolism.    JR FENG MD

## 2018-01-24 NOTE — PLAN OF CARE
Pt admitted this shift due to SOB. Pt O2 in 70s on RA upon admit. Pt HR A-fib in 170s on admit. Cardizem gtt initiated per ED.    PMH of DM and Dementia. Pt 2 pack per day smoker since age of 14 per pt report. Recent diagnosis of UTI (1/22).     Pt alert and oriented to person and time only. Pt very forgetful and impulsive. Per spouse do not believe what pt says due to significant dementia. Pt attempting to get out of bed and pulling at lines throughout shift. VSS on 2L O2 per NC. Wheezes throughout lung fields. RT following with nebs. Bilateral infiltrates per chest x-ray. Pt converted to NSR around 1930, cardizem gtt stopped. Cardiology consult ordered. Echo scheduled for tomorrow. Heparin gtt discontinued. NS+20KCl at 75mL/hr.  Intermittent zithromax. PIV to R AC and R FA. Pt SBA with transfers and ambulation. Clear liquid diet, advance as tolerated. Pt only had water this shift, tolerated well. Stated she did not want anything else. QID blood glucose checks, last blood sugar 171. Pt resting in bed at this time.     Will continue to monitor and report off to next nurse.

## 2018-01-24 NOTE — PLAN OF CARE
Confused and somewhat impulsive  Steady. Alarms on.  Dyspneic even at rest.   02 on 4L   Tele remains in SR

## 2018-01-24 NOTE — PLAN OF CARE
Pt lethargic throughout shift. Oriented only to person and time, occasionally to situation. History of dementia. Pt confused and very forgetful, impulsive at times. BP elevated today, amlodipine started this shift. Pt on 5L O2 per NC, saturations in low 90s. Pt does not wear any O2 at home. NSR per telemetry. Started on oral cardizem yesterday. Seen by cardiology today, see note. Echo done today, EF: 55-60%. PIV to L FA, SL with intermittent antibiotics. Pt SBA with transfers and ambulation. Regular diet, fair apetite. QID glucose monitoring, blood sugars this shift: 194 and 252, corrective insulin given as ordered.  Pt would like Restasis eye drops ordered, as she takes these at home. Sticky note to physician made.      Will continue to monitor and report off to next nurse.

## 2018-01-24 NOTE — CONSULTS
Cardiology Consultation      Kelly Nunez MRN# [id number]   YOB: 1950 Age: 67 year old   Date of Admission: 1/23/2018     Reason for consult:  Paroxysmal atrial fibrillation           Assessment and Plan:     1. Paroxysmal atrial fibrillation, now in sinus rhythm.  Minimally symptomatic when in atrial fibrillation.    Presentation was in the setting of acute illness.  Continue medical treatment for her pneumonia/COPD exacerbation.    30 day event monitor on discharge and if recurrence of atrial fibrillation then anticoagulation would be reasonable if patient's strength and stability of gait has improved.  At this time okay to not anticoagulate unless recurrent atrial fibrillation.      Please call with questions        2. History of coronary artery disease and angioplasty, outside institution    Continue medical management.  Tolerated rapid ventricular response with trivial troponin elevation and no ECG evidence of ischemia.  Echocardiogram also without regional wall motion abnormalities or standing, suggests that major epicardial coronary arteries are patent at this time.               Chief Complaint:   Low Oxygen Saturation and UTI           History of Present Illness:   This patient is a 67 year old female who does not have extensive records in our system but apparently has history of coronary artery disease status post PCI as well as hypertension and COPD with long-term smoking and diabetes.  She comes in with presumptive pneumonia.  She was found to be in atrial fibrillation on admission.  Troponin was trivially elevated at 0.030.  Echocardiogram without regional wall motion abnormalities.  Some pulmonary hypertension, no significant valvular heart disease.    ECG 1/23/2018 with atrial fibrillation, heart rate 168 bpm with no gross ischemia despite heart rates that fast.  Patient denies any chest discomfort or lightheadedness with the atrial fibrillation.  Her memory is very poor.  Her  "daughter is here and corroborates that her memory is poor.  They deny imbalance but she has been kind of weak here and is unsteady on her feet.  Currently in sinus rhythm.         Physical Exam:   Vitals were reviewed  Blood pressure (!) 188/111, pulse 170, temperature 98.1  F (36.7  C), temperature source Axillary, resp. rate 20, height 1.6 m (5' 3\"), weight 41.7 kg (92 lb), SpO2 92 %.  Temperatures:  Current - Temp: 98.1  F (36.7  C); Max - Temp  Av.8  F (36.6  C)  Min: 97.3  F (36.3  C)  Max: 98.3  F (36.8  C)  Respiration range: Resp  Av.4  Min: 17  Max: 60  Pulse range: Pulse  Av  Min: 170  Max: 170  Blood pressure range: Systolic (24hrs), Av , Min:111 , Max:193   ; Diastolic (24hrs), Av, Min:54, Max:140    Pulse oximetry range: SpO2  Av.8 %  Min: 70 %  Max: 100 %    Intake/Output Summary (Last 24 hours) at 18 1220  Last data filed at 18 2309   Gross per 24 hour   Intake              240 ml   Output                0 ml   Net              240 ml     Constitutional:   awake, alert, cooperative, no apparent distress, and appears older than stated age     Eyes:   Lids and lashes normal, pupils equal, round and reactive to light, extra ocular muscles intact, sclera clear, conjunctiva normal     Neck:   supple, symmetrical, trachea midline, no JVD     Back:   symmetric     Lungs:   Diffuse wheezes     Cardiovascular:   Regular, II/VI LLSB     Abdomen:   scaphoid     Musculoskeletal:   motor strength is 5 out of 5 all extremities bilaterally     Neurologic:   Decreased short term memory     Skin:   normal skin color, texture, turgor     Additional findings:   Clubbing of fingernails               Past Medical History:   I have reviewed this patient's past medical history  Past Medical History:   Diagnosis Date     Dementia      Diabetes (H)              Past Surgical History:   I have reviewed this patient's past surgical history  Past Surgical History:   Procedure Laterality " Date     HEART CATH, ANGIOPLASTY                 Social History:   I have reviewed this patient's social history  Social History   Substance Use Topics     Smoking status: Current Every Day Smoker     Packs/day: 1.00     Smokeless tobacco: Never Used     Alcohol use Yes      Comment: 1/week             Family History:   I have reviewed this patient's family history  No family history on file.          Allergies:     Allergies   Allergen Reactions     Penicillins Swelling     Pt states she has tolerated cephalosporins in the past.              Medications:   I have reviewed this patient's current medications  Prescriptions Prior to Admission   Medication Sig Dispense Refill Last Dose     DULOXETINE HCL PO Take 120 mg by mouth daily        OLANZAPINE PO Take 10 mg by mouth At Bedtime        ASPIRIN PO Take 81 mg by mouth daily        memantine XR (NAMENDA XR) 28 MG 24 hr capsule Take 28 mg by mouth daily        NIFEdipine ER (ADALAT CC) 30 MG TB24 Take 30 mg by mouth 2 times daily        Current Facility-Administered Medications Ordered in Epic   Medication Dose Route Frequency Last Rate Last Dose     diltiazem (CARDIZEM SR) 12 hr capsule 60 mg  60 mg Oral BID   60 mg at 01/24/18 0905     aspirin chewable tablet 81 mg  81 mg Oral Daily   81 mg at 01/24/18 0905     DULoxetine (CYMBALTA) EC capsule 120 mg  120 mg Oral Daily   120 mg at 01/24/18 0905     memantine XR (NAMENDA XR) 24 hr capsule 28 mg  28 mg Oral Daily   28 mg at 01/24/18 0905     OLANZapine (zyPREXA) tablet 10 mg  10 mg Oral At Bedtime   10 mg at 01/23/18 2107     naloxone (NARCAN) injection 0.1-0.4 mg  0.1-0.4 mg Intravenous Q2 Min PRN         melatonin tablet 1 mg  1 mg Oral At Bedtime PRN         Patient is already receiving mechanical prophylaxis   Does not apply Continuous PRN         potassium chloride SA (K-DUR/KLOR-CON M) CR tablet 20-40 mEq  20-40 mEq Oral Q2H PRN         potassium chloride (KLOR-CON) Packet 20-40 mEq  20-40 mEq Oral or Feeding  Tube Q2H PRN         potassium chloride 10 mEq in 100 mL sterile water intermittent infusion (premix)  10 mEq Intravenous Q1H PRN         potassium chloride 10 mEq in 100 mL intermittent infusion with 10 mg lidocaine  10 mEq Intravenous Q1H PRN         magnesium sulfate 2 g in NS intermittent infusion (PharMEDium or FV Cmpd)  2 g Intravenous Daily PRN         magnesium sulfate 4 g in 100 mL sterile water (premade)  4 g Intravenous Q4H PRN         acetaminophen (TYLENOL) tablet 650 mg  650 mg Oral Q4H PRN         acetaminophen (TYLENOL) Suppository 650 mg  650 mg Rectal Q4H PRN         senna-docusate (SENOKOT-S;PERICOLACE) 8.6-50 MG per tablet 1 tablet  1 tablet Oral BID PRN        Or     senna-docusate (SENOKOT-S;PERICOLACE) 8.6-50 MG per tablet 2 tablet  2 tablet Oral BID PRN         ondansetron (ZOFRAN-ODT) ODT tab 4 mg  4 mg Oral Q6H PRN        Or     ondansetron (ZOFRAN) injection 4 mg  4 mg Intravenous Q6H PRN         prochlorperazine (COMPAZINE) injection 5 mg  5 mg Intravenous Q6H PRN        Or     prochlorperazine (COMPAZINE) tablet 5 mg  5 mg Oral Q6H PRN        Or     prochlorperazine (COMPAZINE) Suppository 12.5 mg  12.5 mg Rectal Q12H PRN         cefTRIAXone in d5w (ROCEPHIN) intermittent infusion 1 g  1 g Intravenous Q24H         methylPREDNISolone sodium succinate (solu-MEDROL) injection 40 mg  40 mg Intravenous Q8H   40 mg at 01/24/18 0941     azithromycin (ZITHROMAX) 500 mg in NaCl 0.9 % 250 mL intermittent infusion  500 mg Intravenous Q24H   500 mg at 01/23/18 1955     ipratropium - albuterol 0.5 mg/2.5 mg/3 mL (DUONEB) neb solution 3 mL  3 mL Nebulization 4x daily   3 mL at 01/24/18 1122     nicotine Patch in Place   Transdermal Q8H         nicotine patch REMOVAL   Transdermal Daily         nicotine (NICODERM CQ) 21 MG/24HR 24 hr patch 1 patch  1 patch Transdermal Daily   1 patch at 01/24/18 0906     glucose 40 % gel 15-30 g  15-30 g Oral Q15 Min PRN        Or     dextrose 50 % injection 25-50 mL   25-50 mL Intravenous Q15 Min PRN        Or     glucagon injection 1 mg  1 mg Subcutaneous Q15 Min PRN         insulin aspart (NovoLOG) inj (RAPID ACTING)  1-7 Units Subcutaneous TID AC   1 Units at 01/24/18 0944     insulin aspart (NovoLOG) inj (RAPID ACTING)  1-5 Units Subcutaneous At Bedtime         albuterol neb solution 2.5 mg  2.5 mg Nebulization Q2H PRN         No current Epic-ordered outpatient prescriptions on file.             Review of Systems:   The 10 point Review of Systems is negative other than noted in the HPI            Data:   All laboratory data reviewed  Results for orders placed or performed during the hospital encounter of 01/23/18 (from the past 24 hour(s))   EKG 12 lead   Result Value Ref Range    Interpretation ECG Click View Image link to view waveform and result    CBC with platelets differential   Result Value Ref Range    WBC 7.8 4.0 - 11.0 10e9/L    RBC Count 5.00 3.8 - 5.2 10e12/L    Hemoglobin 16.3 (H) 11.7 - 15.7 g/dL    Hematocrit 48.1 (H) 35.0 - 47.0 %    MCV 96 78 - 100 fl    MCH 32.6 26.5 - 33.0 pg    MCHC 33.9 31.5 - 36.5 g/dL    RDW 13.0 10.0 - 15.0 %    Platelet Count 261 150 - 450 10e9/L    Diff Method Automated Method     % Neutrophils 84.3 %    % Lymphocytes 8.0 %    % Monocytes 6.6 %    % Eosinophils 0.0 %    % Basophils 0.5 %    % Immature Granulocytes 0.6 %    Nucleated RBCs 0 0 /100    Absolute Neutrophil 6.6 1.6 - 8.3 10e9/L    Absolute Lymphocytes 0.6 (L) 0.8 - 5.3 10e9/L    Absolute Monocytes 0.5 0.0 - 1.3 10e9/L    Absolute Eosinophils 0.0 0.0 - 0.7 10e9/L    Absolute Basophils 0.0 0.0 - 0.2 10e9/L    Abs Immature Granulocytes 0.1 0 - 0.4 10e9/L    Absolute Nucleated RBC 0.0    Comprehensive metabolic panel   Result Value Ref Range    Sodium 130 (L) 133 - 144 mmol/L    Potassium 3.6 3.4 - 5.3 mmol/L    Chloride 91 (L) 94 - 109 mmol/L    Carbon Dioxide 28 20 - 32 mmol/L    Anion Gap 11 3 - 14 mmol/L    Glucose 195 (H) 70 - 99 mg/dL    Urea Nitrogen 32 (H) 7 - 30  mg/dL    Creatinine 1.03 0.52 - 1.04 mg/dL    GFR Estimate 54 (L) >60 mL/min/1.7m2    GFR Estimate If Black 65 >60 mL/min/1.7m2    Calcium 8.7 8.5 - 10.1 mg/dL    Bilirubin Total 0.5 0.2 - 1.3 mg/dL    Albumin 3.5 3.4 - 5.0 g/dL    Protein Total 8.2 6.8 - 8.8 g/dL    Alkaline Phosphatase 110 40 - 150 U/L    ALT 70 (H) 0 - 50 U/L     (H) 0 - 45 U/L   Troponin I   Result Value Ref Range    Troponin I ES 0.030 0.000 - 0.045 ug/L   Nt probnp inpatient (BNP)   Result Value Ref Range    N-Terminal Pro BNP Inpatient 7282 (H) 0 - 900 pg/mL   TSH   Result Value Ref Range    TSH 2.00 0.40 - 4.00 mU/L   Magnesium   Result Value Ref Range    Magnesium 2.5 (H) 1.6 - 2.3 mg/dL   Blood gas venous   Result Value Ref Range    Ph Venous 7.16 (LL) 7.32 - 7.43 pH    PCO2 Venous 74 (H) 40 - 50 mm Hg    PO2 Venous 28 25 - 47 mm Hg    Bicarbonate Venous 26 21 - 28 mmol/L    Base Deficit Venous 4.7 mmol/L    FIO2 12    XR Chest Port 1 View    Narrative    CHEST ONE VIEW PORTABLE    1/23/2018 3:55 PM     HISTORY: Chest pain/shortness of breath.    COMPARISON: None.      Impression    IMPRESSION: Pulmonary vasculature is indistinct suggestive of vascular  congestion. Micronodule airspace opacities at the left lung base  concerning for pneumonia versus atelectasis. Small bilateral pleural  effusions. Right apical pleural thickening. No definite pneumothorax.  Cardiac silhouette within normal limits.    MAR ALEXIS MD   UA with Microscopic reflex to Culture   Result Value Ref Range    Color Urine Yellow     Appearance Urine Cloudy     Glucose Urine Negative NEG^Negative mg/dL    Bilirubin Urine Negative NEG^Negative    Ketones Urine Negative NEG^Negative mg/dL    Specific Gravity Urine 1.018 1.003 - 1.035    Blood Urine Small (A) NEG^Negative    pH Urine 5.0 5.0 - 7.0 pH    Protein Albumin Urine 100 (A) NEG^Negative mg/dL    Urobilinogen mg/dL 2.0 0.0 - 2.0 mg/dL    Nitrite Urine Negative NEG^Negative    Leukocyte Esterase Urine  Negative NEG^Negative    Source Catheterized Urine     WBC Urine 3 (H) 0 - 2 /HPF    RBC Urine 3 (H) 0 - 2 /HPF    Squamous Epithelial /HPF Urine 1 0 - 1 /HPF    Mucous Urine Present (A) NEG^Negative /LPF    Hyaline Casts 62 (H) 0 - 2 /LPF    Amorphous Crystals Moderate (A) NEG^Negative /HPF   Chest CT, IV contrast only - PE protocol    Narrative    CT CHEST WITH CONTRAST  1/23/2018  6:47 PM    HISTORY: Shortness of breath. Evaluate for pulmonary embolism.    COMPARISON: A radiograph earlier today.    TECHNIQUE: Routine transverse CT imaging of the chest was performed  following the uneventful intravenous administration of 46mL Isovue-370  contrast. A pulmonary embolism protocol was utilized. Radiation dose  for this scan was reduced using automated exposure control, adjustment  of the mA and/or kV according to patient size, or iterative  reconstruction technique.    FINDINGS: The heart size is normal. No enlarged lymph node or other  abnormal mediastinal mass is seen. There is calcification of the  thoracic aorta. There is very good opacification of the pulmonary  arteries with contrast. No pulmonary embolism is seen. Emphysematous  changes are seen in both lungs. There is more prominent abnormality in  the superior aspect of the right lung adjacent to the major fissure.  This consists of more prominent emphysematous change with diffuse  thickening of the adjacent pulmonary parenchyma. This area measures  just over 8 cm transverse. This appears to be a chronic process. There  is a small area of consolidation in the anterior aspect of the lingula  of the left lung. There is additional mild atelectasis in the left  lung base. The lungs are otherwise clear. No pneumothorax is  demonstrated. No pleural effusion is identified. There are  degenerative changes in the spine. No chest wall pathology is seen.  The visualized upper abdomen is unremarkable.      Impression    IMPRESSION:   1. There is a small area of  consolidation of the lingula of the left  lung. An infiltrate/pneumonia cannot be excluded.  2. Diffuse emphysematous changes throughout both lungs. There is a  prominent area of more extensive emphysematous change and probable  chronic scarring within the right upper lung.   3. No evidence of pulmonary embolism.    JR FENG MD   Lactic acid level STAT   Result Value Ref Range    Lactic Acid 1.3 0.7 - 2.0 mmol/L   Hemoglobin A1c   Result Value Ref Range    Hemoglobin A1C 5.6 4.3 - 6.0 %   Glucose by meter   Result Value Ref Range    Glucose 171 (H) 70 - 99 mg/dL   Heparin 10a Level   Result Value Ref Range    Heparin 10A Level <0.10 IU/mL   Glucose by meter   Result Value Ref Range    Glucose 138 (H) 70 - 99 mg/dL   Echo complete    Narrative    [id number]  [id number]  [id number]  [id number]^RUBY^CAMI^[facility]  Echocardiography Laboratory  [address] [place] [address]        Name: KIM JORDAN  MRN: [id number]  : 1950  Study Date: 2018 07:21 AM  Age: 67 yrs  Gender: Female  Patient Location: [facility]  Reason For Study: Afib  Ordering Physician: CAMI BELTRAN  Referring Physician: Kenneth Amin MD  Performed By: Leanne Hidalgo RDCS     BSA: 1.4 m2  Height: 63 in  Weight: 92 lb  HR: 102  BP: 131/73 mmHg  _____________________________________________________________________________  __        Procedure  Complete Portable Echo Adult.  _____________________________________________________________________________  __        Interpretation Summary     The left ventricle is normal in size. There is normal left ventricular wall  thickness. Left ventricular systolic function is normal. The visual ejection  fraction is estimated at 55-60%. Grade II or moderate diastolic dysfunction. E  by E prime ratio is greater than 15, that likely suggests increased left  ventricular filling pressures. No regional wall motion abnormalities noted.  The right ventricle  is normal size. The right ventricular systolic function is  normal.  Moderate biatrial enlargement.  There is mild (1+) tricuspid regurgitation. The right ventricular systolic  pressure is approximated at 47.6 mmHg plus the right atrial pressure. Right  ventricular systolic pressure is elevated, consistent with moderate to severe  pulmonary hypertension.  No pericardial effusion.  No previous study for comparison.  _____________________________________________________________________________  __        Left Ventricle  The left ventricle is normal in size. There is normal left ventricular wall  thickness. Left ventricular systolic function is normal. The visual ejection  fraction is estimated at 55-60%. Grade II or moderate diastolic dysfunction. E  by E prime ratio is greater than 15, that likely suggests increased left  ventricular filling pressures. No regional wall motion abnormalities noted.     Right Ventricle  The right ventricle is normal size. The right ventricular systolic function is  normal.     Atria  There is moderate biatrial enlargement. There is no color Doppler evidence of  an atrial shunt.     Mitral Valve  There is trace to mild mitral regurgitation.        Tricuspid Valve  There is mild (1+) tricuspid regurgitation. The right ventricular systolic  pressure is approximated at 47.6 mmHg plus the right atrial pressure. Right  ventricular systolic pressure is elevated, consistent with moderate to severe  pulmonary hypertension.     Aortic Valve  There is mild trileaflet aortic sclerosis. No aortic regurgitation is present.  No aortic stenosis is present.     Pulmonic Valve  There is no pulmonic valvular stenosis.     Vessels  The aortic root is normal size. Normal size ascending aorta. The IVC is normal  in size and reactivity with respiration, suggesting normal central venous  pressure.     Pericardium  There is no pericardial effusion.        Rhythm  Sinus rhythm was  noted.  _____________________________________________________________________________  __  MMode/2D Measurements & Calculations  IVSd: 0.84 cm     LVIDd: 4.5 cm  LVIDs: 2.8 cm  LVPWd: 1.0 cm  FS: 37.6 %  EDV(Teich): 93.4 ml  ESV(Teich): 30.1 ml  LV mass(C)d: 142.6 grams  LV mass(C)dI: 102.6 grams/m2  Ao root diam: 2.9 cm  LA dimension: 3.8 cm  asc Aorta Diam: 2.9 cm  LA/Ao: 1.3  LVOT diam: 2.2 cm  LVOT area: 3.8 cm2  LA Volume (BP): 68.0 ml  LA Volume Index (BP): 48.9 ml/m2  RWT: 0.46           Doppler Measurements & Calculations  MV E max [place]: 104.0 cm/sec  MV A max [place]: 91.8 cm/sec  MV E/A: 1.1  MV dec time: 0.14 sec  Ao V2 max: 125.0 cm/sec  Ao max P.0 mmHg  PA acc time: 0.07 sec  TR max [place]: 332.7 cm/sec  TR max P.6 mmHg  E/E' avg: 15.5  Lateral E/e': 14.8  Medial E/e': 16.2           _____________________________________________________________________________  __           Report approved by: Alexi Asencio 2018 08:47 AM      Basic metabolic panel   Result Value Ref Range    Sodium 137 133 - 144 mmol/L    Potassium 4.0 3.4 - 5.3 mmol/L    Chloride 101 94 - 109 mmol/L    Carbon Dioxide 28 20 - 32 mmol/L    Anion Gap 8 3 - 14 mmol/L    Glucose 155 (H) 70 - 99 mg/dL    Urea Nitrogen 26 7 - 30 mg/dL    Creatinine 0.57 0.52 - 1.04 mg/dL    GFR Estimate >90 >60 mL/min/1.7m2    GFR Estimate If Black >90 >60 mL/min/1.7m2    Calcium 8.1 (L) 8.5 - 10.1 mg/dL   CBC with platelets   Result Value Ref Range    WBC 4.5 4.0 - 11.0 10e9/L    RBC Count 4.48 3.8 - 5.2 10e12/L    Hemoglobin 14.6 11.7 - 15.7 g/dL    Hematocrit 43.2 35.0 - 47.0 %    MCV 96 78 - 100 fl    MCH 32.6 26.5 - 33.0 pg    MCHC 33.8 31.5 - 36.5 g/dL    RDW 12.6 10.0 - 15.0 %    Platelet Count 231 150 - 450 10e9/L   Heparin Xa (10a) Level   Result Value Ref Range    Heparin 10A Level <0.10 IU/mL   Glucose by meter   Result Value Ref Range    Glucose 150 (H) 70 - 99 mg/dL       No results found for: CHOL  No results found for: HDL  No results  found for: LDL  No results found for: TRIG  No results found for: CHOLHDLRATIO  TSH   Date Value Ref Range Status   01/23/2018 2.00 0.40 - 4.00 mU/L Final

## 2018-01-24 NOTE — PROGRESS NOTES
"[facility] RCAT     Date: 1-23-18    Admission Dx: COPD Exacerbation, A-FIB    Pulmonary History Current Smoker    Home Nebulizer/MDI Use: None    Home Oxygen: None    Acuity Level (RCAT flow sheet): 3    Aerosol Therapy initiated: Duoneb QID, Albuterol Q2 prn      Pulmonary Hygiene initiated: Deep Breathe and Cough      Volume Expansion initiated: Incentive Spirometery      Current Oxygen Requirements: 5L Oxymizer Cannula    Current SpO2: 92%    Re-evaluation date: 1-26-18    Patient Education: Educated patient on Bronchodilators      See \"RT Assessments\" flow sheet for patient assessment scoring and Acuity Level Details.             "

## 2018-01-24 NOTE — PROGRESS NOTES
Notified patient converted to sinus.   - Cardizem gtt and heparin stopped.  - Continue to monitor on telemetry.   - Start her on oral Cardizem Po low dose.  - CHADS vasc score is  4 pending echo.  - She needs AC. Consider starting her on Oral AC tomorrow.  - Need to discuss with patient and family long term AC.

## 2018-01-24 NOTE — PLAN OF CARE
Problem: Breathing Pattern Ineffective (Adult)  Goal: Effective Oxygenation/Ventilation  Patient will demonstrate the desired outcomes by discharge/transition of care.  Outcome: Improving  Ms. Nunez was admitted on the prior shift.  Reportedly has dementia and has difficulty with orientation.  Zyprexa administered at HS and patient has been very slumberous since.  It was quite difficult to arouse her for 0030 dilt PO medication.  She has remained converted to SR thus far this shift.  VSS.on 4LNC.  [id number] continues to infuse at 75/hr.  Sitter at bedside due to reported agitation yesterday and pulling out of her IV's and oxygen off.  Sitter reports she has been calm and sleeping thus far this shift.  Lung sounds are diminished posteriorly at rest.  Defined inspiratory wheeze in all bases after activity. BG stable.  Will continue to monitor.      0545 update: patient more alert and lucid at this time.  Remains confused as is baseline but able to answer questions.  She was reoriented to her situation and place and the time and her plan of care was explained as well as how she became to be in the ER.  Very kind to talk to.  Will continue to monitor. Sitter remains at bedside.

## 2018-01-24 NOTE — H&P
Admitted:     01/23/2018      CHIEF COMPLAINT:  Progressively worsening shortness of breath.      HISTORY OF PRESENT ILLNESS:  Kelly Nunez is a 67-year-old female with past medical history of diabetes, cognitive deficit and dementia, a smoker since teenager, never diagnosed with COPD, who presented today to the emergency room with shortness of breath.  The patient was seen at [facility] earlier and evaluated there and sent to the emergency room for shortness of breath.  The patient had UA done, which was positive for urinary tract infection at the clinic.  At the clinic, she was also having low oxygen saturation.  Per her , the patient has been having breathing heavily the last 1 day and there was some confusion earlier this morning after she woke up.  The patient also has coughing and using accessory muscles.  She has been smoking since teenager and smokes about 1-1/2 packs per day.  The patient had felt warm and episode of sweating, but she did not have fever.  The patient does not give any of the history.  Her daughter and  at bedside, and most of the history is obtained from them and also from ED physician, Dr. Mistry.       PAST MEDICAL HISTORY:  Diabetes mellitus, dementia, depression and hypertension.       PAST SURGICAL HISTORY:  History of coronary artery disease, status post angiography with angioplasty.      FAMILY HISTORY:  Reviewed and significant family medical condition unknown by the patient.      SOCIAL HISTORY:  She is , lives with her .   She still smokes about 1-1/2 packs per day.  She drinks alcohol twice a week.  She does not use illicit drugs.      REVIEW OF SYSTEMS:  Ten points reviewed, all are negative except those mentioned in history of present illness.        HOME MEDICATIONS:    Prior to Admission Medications   Prescriptions Last Dose Informant Patient Reported? Taking?   ASPIRIN PO   Yes Yes   Sig: Take 81 mg by mouth daily   DULOXETINE HCL  PO   Yes Yes   Sig: Take 120 mg by mouth daily   NIFEdipine ER (ADALAT CC) 30 MG TB24   Yes Yes   Sig: Take 30 mg by mouth 2 times daily   OLANZAPINE PO   Yes Yes   Sig: Take 10 mg by mouth At Bedtime   memantine XR (NAMENDA XR) 28 MG 24 hr capsule   Yes Yes   Sig: Take 28 mg by mouth daily      Facility-Administered Medications: None       PHYSICAL EXAMINATION:   GENERAL:  The patient is awake, alert, oriented.   VITAL SIGNS:  Blood pressure 172/100, temperature 97, oxygen saturation on presentation was 70, later on 93% on Oxymizer.   HEENT:  Pink, nonicteric.  Extraocular muscle movement intact.   NECK:  Supple, no JVD, no thyromegaly.   CHEST:  Good air entry in upper lung field.  Decreased breath sounds, bilateral expiratory wheezes.   CARDIOVASCULAR:  S1, S2 were heard.  Irregular, tachycardic.   ABDOMEN:  Soft, nontender, nondistended.  Positive bowel sounds.  No organomegaly.   EXTREMITIES:  The patient is emaciated.  Poor skin turgor.  No edema.   NEUROLOGIC:  No focal neurologic deficit.  Cranial nerves grossly intact.  The patient moves all her extremities.   PSYCHIATRIC:  Normal mood and affect, keeps eye contact, responds to questions appropriately.      LABORATORIES:  Sodium 130, potassium 3.6, BUN 32, creatinine 1, calcium 8.7.  Liver function tests showed ALT 70, .  Troponin 0.03.  TSH 2.  Mag pending.  Glucose 195.  Venous pH is 7.16, pCO2 is 74, pO2 is 28.  Chest x-ray showed pulmonary vasculature suggestive of vascular congestion, micronodules, airspace opacities in left lung base concerning for pneumonia.  Small bilateral effusions seen.  Right apical pleural thickening also seen.  CT scan of the chest, pulmonary protocol, showed no pulmonary embolism.  Diffuse emphysematous change, small area of consolidation in the left lung.  Urinalysis done here does not show any urinary tract infection, but it was reported earlier from clinic.       ASSESSMENT:  Kelly Nunez is a 67-year-old female  with a history significant for dementia and diabetes, lifelong smoker who does not carry any diagnosis of chronic obstructive pulmonary disease, presented with shortness of breath, seen in the clinic, found to have possible urinary tract infection, remained hypoxic and transferred here to the hospital.  In the emergency room, she was evaluated, found to be in atrial fibrillation with rapid ventricular response and hypoxic and basically started on treatment and being admitted to the hospital as an inpatient.   1.  Acute respiratory failure secondary to chronic obstructive pulmonary disease exacerbation and pneumonia. She is a lifelong smoker and there is evidence of significant emphysema on imaging studies.  We will treat her with steroids, IV antibiotics, bronchodilators, titrate her oxygen down.  VBG showed increased pCO2 and monitor her mental status as she is retaining CO2.   2.  Community-acquired pneumonia.  Continue IV antibiotics, Zithromax and Rocephin and steroids.   3.  Chronic obstructive pulmonary disease exacerbation. The patient does not carry a diagnosis of chronic obstructive pulmonary disease, but definitely she has one and needs to follow up with pulmonologist as an outpatient when the infection and inflammation subside for pulmonary function test and to be treated according with appropriate maintenance medications for COPD .  Monitor pulse ox.  At this point, the patient is stable, saturating well.  Does not need any additional support for breathing like BiPAP.  But she is at increased risk of deterioration and needs to be monitored  4.  Atrial fibrillation with rapid ventricular response.  This is new for this patient.  Likely exacerbating factor is pneumonia and chronic obstructive pulmonary disease.  Started on Cardizem drip and heparin drip.  We will get echocardiogram in the morning.  Cardiology will be consulted. We will closely monitor the patient.  Goal is rate controlled,  but if she  becomes hemodynamically unstable, she may need cardioversion.  His CHADS Vasc score is at least 4.  She needs long term anticoagulation, and that needs to be discussed with the patient for now she is on heparin drip.  5.  Urinary tract infection.  The patient was told she had a urinary tract infection earlier at the clinic and that is confirmed by the .  She is on IV antibiotics.  Repeated UA here is negative.  Urine culture is pending.  In any case she is getting Rocephin for pneumonia can cover the UTI.  It to get the urine test done earlier the clinic.  6.  History of diabetes mellitus.  The patient is not currently on any medication. We will monitor on sliding scale insulin.   7.  Hyponatremia.  Sodium is 130.  We will check sodium level again in the morning.  I will hold off giving her more IV fluid at this time.   8.  Tobacco dependence.  Smoking cessation counseled.  Nicotine patch ordered.      I discussed with the patient, her , daughter and son-in-law at bedside, the plan of care.  All of their questions and concerns addressed and showed understanding.  In the event of cardiorespiratory arrest, the patient is FULL CODE.         CAMI BELTRAN MD             D: 2018   T: 2018   MT: MEGHANA      Name:     KIM JORDAN   MRN:      [id number]        Account:      [id number]   :      1950        Admitted:     2018                   Document: [id number]

## 2018-01-25 ENCOUNTER — APPOINTMENT (OUTPATIENT)
Dept: OCCUPATIONAL THERAPY | Facility: CLINIC | Age: 68
DRG: 193 | End: 2018-01-25
Attending: INTERNAL MEDICINE
Payer: COMMERCIAL

## 2018-01-25 LAB
ANION GAP SERPL CALCULATED.3IONS-SCNC: 5 MMOL/L (ref 3–14)
BASOPHILS # BLD AUTO: 0 10E9/L (ref 0–0.2)
BASOPHILS NFR BLD AUTO: 0.1 %
BUN SERPL-MCNC: 20 MG/DL (ref 7–30)
CALCIUM SERPL-MCNC: 8.3 MG/DL (ref 8.5–10.1)
CHLORIDE SERPL-SCNC: 99 MMOL/L (ref 94–109)
CO2 SERPL-SCNC: 34 MMOL/L (ref 20–32)
CREAT SERPL-MCNC: 0.46 MG/DL (ref 0.52–1.04)
DIFFERENTIAL METHOD BLD: ABNORMAL
EOSINOPHIL # BLD AUTO: 0 10E9/L (ref 0–0.7)
EOSINOPHIL NFR BLD AUTO: 0 %
ERYTHROCYTE [DISTWIDTH] IN BLOOD BY AUTOMATED COUNT: 12.7 % (ref 10–15)
GFR SERPL CREATININE-BSD FRML MDRD: >90 ML/MIN/1.7M2
GLUCOSE BLDC GLUCOMTR-MCNC: 147 MG/DL (ref 70–99)
GLUCOSE BLDC GLUCOMTR-MCNC: 163 MG/DL (ref 70–99)
GLUCOSE BLDC GLUCOMTR-MCNC: 164 MG/DL (ref 70–99)
GLUCOSE BLDC GLUCOMTR-MCNC: 175 MG/DL (ref 70–99)
GLUCOSE BLDC GLUCOMTR-MCNC: 181 MG/DL (ref 70–99)
GLUCOSE SERPL-MCNC: 157 MG/DL (ref 70–99)
HCT VFR BLD AUTO: 44.3 % (ref 35–47)
HGB BLD-MCNC: 14.9 G/DL (ref 11.7–15.7)
IMM GRANULOCYTES # BLD: 0 10E9/L (ref 0–0.4)
IMM GRANULOCYTES NFR BLD: 0.5 %
LYMPHOCYTES # BLD AUTO: 0.6 10E9/L (ref 0.8–5.3)
LYMPHOCYTES NFR BLD AUTO: 7.6 %
MCH RBC QN AUTO: 32.7 PG (ref 26.5–33)
MCHC RBC AUTO-ENTMCNC: 33.6 G/DL (ref 31.5–36.5)
MCV RBC AUTO: 97 FL (ref 78–100)
MONOCYTES # BLD AUTO: 0.4 10E9/L (ref 0–1.3)
MONOCYTES NFR BLD AUTO: 4.9 %
NEUTROPHILS # BLD AUTO: 7.1 10E9/L (ref 1.6–8.3)
NEUTROPHILS NFR BLD AUTO: 86.9 %
NRBC # BLD AUTO: 0 10*3/UL
NRBC BLD AUTO-RTO: 0 /100
PLATELET # BLD AUTO: 274 10E9/L (ref 150–450)
POTASSIUM SERPL-SCNC: 3.8 MMOL/L (ref 3.4–5.3)
RBC # BLD AUTO: 4.55 10E12/L (ref 3.8–5.2)
SODIUM SERPL-SCNC: 138 MMOL/L (ref 133–144)
WBC # BLD AUTO: 8.1 10E9/L (ref 4–11)

## 2018-01-25 PROCEDURE — 85025 COMPLETE CBC W/AUTO DIFF WBC: CPT | Performed by: INTERNAL MEDICINE

## 2018-01-25 PROCEDURE — 93010 ELECTROCARDIOGRAM REPORT: CPT | Performed by: INTERNAL MEDICINE

## 2018-01-25 PROCEDURE — 40000133 ZZH STATISTIC OT WARD VISIT

## 2018-01-25 PROCEDURE — 25000132 ZZH RX MED GY IP 250 OP 250 PS 637: Performed by: INTERNAL MEDICINE

## 2018-01-25 PROCEDURE — 40000893 ZZH STATISTIC PT IP EVAL DEFER: Performed by: PHYSICAL THERAPIST

## 2018-01-25 PROCEDURE — 25000125 ZZHC RX 250

## 2018-01-25 PROCEDURE — 97530 THERAPEUTIC ACTIVITIES: CPT | Mod: GO

## 2018-01-25 PROCEDURE — 40000275 ZZH STATISTIC RCP TIME EA 10 MIN

## 2018-01-25 PROCEDURE — 97165 OT EVAL LOW COMPLEX 30 MIN: CPT | Mod: GO

## 2018-01-25 PROCEDURE — 97535 SELF CARE MNGMENT TRAINING: CPT | Mod: GO

## 2018-01-25 PROCEDURE — 25000128 H RX IP 250 OP 636: Performed by: INTERNAL MEDICINE

## 2018-01-25 PROCEDURE — 94640 AIRWAY INHALATION TREATMENT: CPT

## 2018-01-25 PROCEDURE — 93005 ELECTROCARDIOGRAM TRACING: CPT

## 2018-01-25 PROCEDURE — 99406 BEHAV CHNG SMOKING 3-10 MIN: CPT

## 2018-01-25 PROCEDURE — 80048 BASIC METABOLIC PNL TOTAL CA: CPT | Performed by: INTERNAL MEDICINE

## 2018-01-25 PROCEDURE — 12000007 ZZH R&B INTERMEDIATE

## 2018-01-25 PROCEDURE — 94640 AIRWAY INHALATION TREATMENT: CPT | Mod: 76

## 2018-01-25 PROCEDURE — 00000146 ZZHCL STATISTIC GLUCOSE BY METER IP

## 2018-01-25 PROCEDURE — 36415 COLL VENOUS BLD VENIPUNCTURE: CPT | Performed by: INTERNAL MEDICINE

## 2018-01-25 PROCEDURE — 25000125 ZZHC RX 250: Performed by: INTERNAL MEDICINE

## 2018-01-25 PROCEDURE — 99233 SBSQ HOSP IP/OBS HIGH 50: CPT | Performed by: INTERNAL MEDICINE

## 2018-01-25 RX ORDER — AMLODIPINE BESYLATE 10 MG/1
10 TABLET ORAL DAILY
Status: DISCONTINUED | OUTPATIENT
Start: 2018-01-25 | End: 2018-01-31

## 2018-01-25 RX ORDER — METOPROLOL TARTRATE 1 MG/ML
5 INJECTION, SOLUTION INTRAVENOUS EVERY 5 MIN PRN
Status: DISCONTINUED | OUTPATIENT
Start: 2018-01-25 | End: 2018-02-05 | Stop reason: HOSPADM

## 2018-01-25 RX ORDER — CYCLOSPORINE 0.5 MG/ML
1 EMULSION OPHTHALMIC 2 TIMES DAILY
Status: DISCONTINUED | OUTPATIENT
Start: 2018-01-25 | End: 2018-01-28 | Stop reason: RX

## 2018-01-25 RX ORDER — LEVALBUTEROL INHALATION SOLUTION 1.25 MG/3ML
1.25 SOLUTION RESPIRATORY (INHALATION) EVERY 4 HOURS PRN
Status: DISCONTINUED | OUTPATIENT
Start: 2018-01-25 | End: 2018-02-05 | Stop reason: HOSPADM

## 2018-01-25 RX ORDER — HYDRALAZINE HYDROCHLORIDE 20 MG/ML
10 INJECTION INTRAMUSCULAR; INTRAVENOUS EVERY 4 HOURS PRN
Status: DISCONTINUED | OUTPATIENT
Start: 2018-01-25 | End: 2018-02-05 | Stop reason: HOSPADM

## 2018-01-25 RX ORDER — METOPROLOL TARTRATE 1 MG/ML
INJECTION, SOLUTION INTRAVENOUS
Status: COMPLETED
Start: 2018-01-25 | End: 2018-01-25

## 2018-01-25 RX ADMIN — IPRATROPIUM BROMIDE AND ALBUTEROL SULFATE 3 ML: .5; 3 SOLUTION RESPIRATORY (INHALATION) at 11:21

## 2018-01-25 RX ADMIN — AMLODIPINE BESYLATE 10 MG: 10 TABLET ORAL at 09:53

## 2018-01-25 RX ADMIN — NICOTINE 1 PATCH: 21 PATCH, EXTENDED RELEASE TRANSDERMAL at 08:17

## 2018-01-25 RX ADMIN — METHYLPREDNISOLONE SODIUM SUCCINATE 40 MG: 40 INJECTION, POWDER, FOR SOLUTION INTRAMUSCULAR; INTRAVENOUS at 08:14

## 2018-01-25 RX ADMIN — DULOXETINE HYDROCHLORIDE 120 MG: 30 CAPSULE, DELAYED RELEASE ORAL at 08:20

## 2018-01-25 RX ADMIN — METOPROLOL TARTRATE 5 MG: 5 INJECTION, SOLUTION INTRAVENOUS at 18:39

## 2018-01-25 RX ADMIN — DILTIAZEM HYDROCHLORIDE 60 MG: 60 CAPSULE, EXTENDED RELEASE ORAL at 08:20

## 2018-01-25 RX ADMIN — CYCLOSPORINE 1 DROP: 0.5 EMULSION OPHTHALMIC at 08:25

## 2018-01-25 RX ADMIN — HEPARIN SODIUM 5000 UNITS: 5000 INJECTION, SOLUTION INTRAVENOUS; SUBCUTANEOUS at 19:43

## 2018-01-25 RX ADMIN — MEMANTINE HYDROCHLORIDE 28 MG: 28 CAPSULE, EXTENDED RELEASE ORAL at 08:20

## 2018-01-25 RX ADMIN — INSULIN ASPART 1 UNITS: 100 INJECTION, SOLUTION INTRAVENOUS; SUBCUTANEOUS at 08:27

## 2018-01-25 RX ADMIN — Medication 1 MG: at 21:15

## 2018-01-25 RX ADMIN — HEPARIN SODIUM 5000 UNITS: 5000 INJECTION, SOLUTION INTRAVENOUS; SUBCUTANEOUS at 05:50

## 2018-01-25 RX ADMIN — IPRATROPIUM BROMIDE AND ALBUTEROL SULFATE 3 ML: .5; 3 SOLUTION RESPIRATORY (INHALATION) at 19:21

## 2018-01-25 RX ADMIN — METHYLPREDNISOLONE SODIUM SUCCINATE 40 MG: 40 INJECTION, POWDER, FOR SOLUTION INTRAMUSCULAR; INTRAVENOUS at 23:35

## 2018-01-25 RX ADMIN — IPRATROPIUM BROMIDE AND ALBUTEROL SULFATE 3 ML: .5; 3 SOLUTION RESPIRATORY (INHALATION) at 07:19

## 2018-01-25 RX ADMIN — DILTIAZEM HYDROCHLORIDE 60 MG: 60 CAPSULE, EXTENDED RELEASE ORAL at 21:15

## 2018-01-25 RX ADMIN — INSULIN ASPART 1 UNITS: 100 INJECTION, SOLUTION INTRAVENOUS; SUBCUTANEOUS at 18:22

## 2018-01-25 RX ADMIN — AZITHROMYCIN MONOHYDRATE 500 MG: 500 INJECTION, POWDER, LYOPHILIZED, FOR SOLUTION INTRAVENOUS at 19:43

## 2018-01-25 RX ADMIN — INSULIN ASPART 1 UNITS: 100 INJECTION, SOLUTION INTRAVENOUS; SUBCUTANEOUS at 13:00

## 2018-01-25 RX ADMIN — METOPROLOL TARTRATE 5 MG: 1 INJECTION, SOLUTION INTRAVENOUS at 18:39

## 2018-01-25 RX ADMIN — METHYLPREDNISOLONE SODIUM SUCCINATE 40 MG: 40 INJECTION, POWDER, FOR SOLUTION INTRAMUSCULAR; INTRAVENOUS at 01:03

## 2018-01-25 RX ADMIN — ASPIRIN 81 MG 81 MG: 81 TABLET ORAL at 08:20

## 2018-01-25 RX ADMIN — CYCLOSPORINE 1 DROP: 0.5 EMULSION OPHTHALMIC at 22:10

## 2018-01-25 RX ADMIN — METHYLPREDNISOLONE SODIUM SUCCINATE 40 MG: 40 INJECTION, POWDER, FOR SOLUTION INTRAMUSCULAR; INTRAVENOUS at 16:20

## 2018-01-25 RX ADMIN — OLANZAPINE 10 MG: 5 TABLET, FILM COATED ORAL at 21:15

## 2018-01-25 RX ADMIN — CEFTRIAXONE SODIUM 1 G: 1 INJECTION, SOLUTION INTRAVENOUS at 16:20

## 2018-01-25 RX ADMIN — IPRATROPIUM BROMIDE AND ALBUTEROL SULFATE 3 ML: .5; 3 SOLUTION RESPIRATORY (INHALATION) at 16:27

## 2018-01-25 ASSESSMENT — ACTIVITIES OF DAILY LIVING (ADL)
ADLS_ACUITY_SCORE: 14
ADLS_ACUITY_SCORE: 11
ADLS_ACUITY_SCORE: 14
ADLS_ACUITY_SCORE: 13

## 2018-01-25 NOTE — PLAN OF CARE
Problem: Patient Care Overview  Goal: Plan of Care/Patient Progress Review  PT: Order received; Chart reviewed; Per conversation with OT, patient currently unable to tolerate both PT/OT services currently as an IP; Will plan to defer PT start to TCU to prevent duplication of services; Patient to continue to mobilize with nursing in addition to OT while hospitalized.

## 2018-01-25 NOTE — PROGRESS NOTES
01/25/18 0846   Quick Adds   Type of Visit Initial Occupational Therapy Evaluation   Living Environment   Lives With spouse   Living Arrangements house   Home Accessibility stairs to enter home;stairs within home   Number of Stairs to Enter Home 2   Number of Stairs Within Home 13   Transportation Available family or friend will provide  (pt no longer drives)   Living Environment Comment 1 level home w/basement walk-out but all needs on main floor including laundry; daughter reports pt does not use stairs   Self-Care   Dominant Hand right   Usual Activity Tolerance moderate   Current Activity Tolerance poor   Regular Exercise no   Equipment Currently Used at Home none   Functional Level Prior   Ambulation 0-->independent   Transferring 0-->independent   Toileting 0-->independent   Bathing 0-->independent   Dressing 0-->independent   Eating 0-->independent   Communication 0-->understands/communicates without difficulty   Swallowing 0-->swallows foods/liquids without difficulty   Cognition 1 - attention or memory deficits   Fall history within last six months no   Which of the above functional risks had a recent onset or change? cognition   Prior Functional Level Comment Pt and spouse share household tasks, cooking. Hire cleaning company and help for outdoor tasks (lawn/snow removal).  Daughter lives close and helps frequently when needed.   General Information   Onset of Illness/Injury or Date of Surgery - Date 01/23/18   Referring Physician Dr. Breana Ardon   Patient/Family Goals Statement return home w/family    Additional Occupational Profile Info/Pertinent History of Current Problem 66yo female admitted with SOB, respiratory failure, in afib with RVR. No uptrending trops, echo negative for sig ds. Diagnoses pneumonia, COPD exac, UTI. PMH includes DM2 non-insulin dep at baseline, HTN, CAD w/PCI, dementia   Precautions/Limitations fall precautions;oxygen therapy device and L/min  (3L at 87% at rest)   General Info  Comments OT increased 02 to 4L for out of bed activity,nursing agreed   Cognitive Status Examination   Orientation person   Level of Consciousness alert   Able to Follow Commands success, 1-step commands   Memory impaired   Cognitive Comment Not oriented to month, KISHA, state, place or time   Visual Perception   Visual Perception No deficits were identified   Sensory Examination   Sensory Quick Adds No deficits were identified   Pain Assessment   Patient Currently in Pain No   Range of Motion (ROM)   ROM Quick Adds No deficits were identified   Strength   Strength Comments B geovanny 4/5   Coordination   Upper Extremity Coordination No deficits were identified   Mobility   Bed Mobility Comments SBA   Transfer Skills   Transfer Comments SBA   Transfer Skill: Toilet Transfer   Level of Coles: Toilet stand-by assist   Physical Assist/Nonphysical Assist: Toilet supervision;verbal cues   Assistive Device grab bars   Balance   Balance Comments slightly unsteady but no LOB with turns, transfers   Upper Body Dressing   Level of Coles: Dress Upper Body independent   Physical Assist/Nonphysical Assist: Dress Upper Body set-up required  (seated)   Lower Body Dressing   Level of Coles: Dress Lower Body stand-by assist   Physical Assist/Nonphysical Assist: Dress Lower Body set-up required;supervision   Toileting   Level of Coles: Toilet stand-by assist   Physical Assist/Nonphysical Assist: Toilet supervision   Instrumental Activities of Daily Living (IADL)   Previous Responsibilities medication management;meal prep;laundry   Activities of Daily Living Analysis   Impairments Contributing to Impaired Activities of Daily Living cognition impaired;balance impaired;strength decreased   General Therapy Interventions   Planned Therapy Interventions ADL retraining;strengthening;cognition;transfer training;progressive activity/exercise   Intervention Comments Smoking cessation counselling as well   Clinical  "Impression   Criteria for Skilled Therapeutic Interventions Met yes, treatment indicated   OT Diagnosis decreased ADL/IADL performance   Influenced by the following impairments cognitive, strength, respiratory function   Assessment of Occupational Performance 1-3 Performance Deficits   Identified Performance Deficits functional mob, household mgmt, med mgmt   Clinical Decision Making (Complexity) Low complexity   Therapy Frequency daily   Predicted Duration of Therapy Intervention (days/wks) 5 days   Anticipated Discharge Disposition Transitional Care Facility   Risks and Benefits of Treatment have been explained. Yes   Patient, Family & other staff in agreement with plan of care Yes   [facility] TM \"6 Clicks\"   2016, Trustees of [facility], under license to [employer].  All rights reserved.   6 Clicks Short Forms Daily Activity Inpatient Short Form   [facility]  \"6 Clicks\" Daily Activity Inpatient Short Form   1. Putting on and taking off regular lower body clothing? 4 - None   2. Bathing (including washing, rinsing, drying)? 3 - A Little   3. Toileting, which includes using toilet, bedpan or urinal? 4 - None   4. Putting on and taking off regular upper body clothing? 4 - None   5. Taking care of personal grooming such as brushing teeth? 4 - None   6. Eating meals? 4 - None   Daily Activity Raw Score (Score out of 24.Lower scores equate to lower levels of function) 23   Total Evaluation Time   Total Evaluation Time (Minutes) 15     "

## 2018-01-25 NOTE — PLAN OF CARE
"Problem: OT General Care Plan  Goal: OT Goal 1  OT Goal 1  OT eval completed and treatment initiated in am. Daughter present throughout. Pt admitted with SOB, respiratory failure, confusion in afib with RVR. Diagnoses include pneumonia, COPD exac, UTI. Echo negative for significant cardiac disease. Pt provided baseline information however daughter present and corrected pt's report when needed. Pt lives w/spouse in 1-story home w/walkout, she can stay on main level. She is home alone during day as spouse works. She has been indep with mob, no falls, indep with basic self-cares and med mgmt and she and spouse share meal prep, hire cleaning and outdoor work. Daughter reports pt's memory progressively worse, that spouse will be taking over medication management and they will be getting a Life Alert button. Pt no longer drives. Pt inactive due to poor activity tolerance, she continues to smoke 1-2ppd.  Discharge Planner OT   Patient plan for discharge: did not state  Current status: SBA bed mob, SBA bed to bathroom for toileting however no further standing or ambulatory activity attempted due to high BP and desatting with minimal activity. Able to perform dressing task EOB with set-up. BP's 189/100, 171/99, 173/105. 02 88% on 3L at rest, increased up to 5L when up and still desatted to low 80's quickly. Pt oriented to self, , season, \"health facility\". Not oriented to month, year, state, time, KISHA  and provided inaccurate information regarding baseline at times per daughter. OT will cont to see to advance ADLs. PT can be deferred to next level of care as balance currently minimally affected by her general weakness and OT will address as pt's tolerance for out of bed activity improves.   Barriers to return to prior living situation: impaired cognition/safety awareness, slightly unsteady on feet, home alone during day  Recommendations for discharge: TCU  Rationale for recommendations:  Daughter reports pt will now need " home 02; pt will need much training to manage safely w/use given cognitive deficits and will benefit from daily therapies to improve strength, balance and for further training in modified ADLs and home 02 use prior to return home. Daughter supportive if needed. Pt had no comment on recommendation - may not have understood.        Entered by: Zana Marsh 01/25/2018 12:58 PM     .dc

## 2018-01-25 NOTE — PROGRESS NOTES
[facility]  Hospitalist Progress Note  Name: Kelly Nunez    MRN: [id number]  Provider:  Breana Gaines MD  18    Initial presenting complaint/issue to hospital (Diagnosis): acute respiratory distress.         Assessment and Plan:      Summary of Stay: Kelly Nunez is a 67 year old female admitted on 2018 with acute respiratory distress. Hx of dementia, DM, likely COPD who presented with SOB.       Problem List:      1. Acute respiratory distress likely due to CAP with probable superimposed severe COPD.  - On IV rocephin and azithromycin.  - On oxygen, nebs and IV solumedrol.     2. PAF.  - Converted spontaneously to NSR.  - Cardiology consulted and we plan to hold OAC due to concern for falls.  - TTE showed grade 2 DD and moderate to severe pulm HTN.     3. Dementia with superimposed acute encephalopathy.  - Likely due to pneumonia.  - Monitor, avoid benzo's.  - On namenda.     4. DM.  - On insulin.    5. HTN urgency.  - On norvasc and diltiazem.      Pt discussed with RN and daughter Brianna in rounds.    DVT Prophylaxis:  -  SQ heparin.  Code Status: Full Code  Discharge Dispo: TCU.  Estimated Disch Date / # of Days until Discharge: > 3 days.              Interval History:        No fevers/chills/chest pain. + SOB.                   Physical Exam:      Last Vital Signs:  Temp: 98.3  F (36.8  C) Temp src: Oral BP: 153/89 Pulse: 89 Heart Rate: 93 Resp: 18 SpO2: 92 % O2 Device: Nasal cannula Oxygen Delivery: 4 LPM    Intake/Output Summary (Last 24 hours) at 18 0802  Last data filed at 18   Gross per 24 hour   Intake              600 ml   Output                0 ml   Net              600 ml     I/O last 3 completed shifts:  In: 600 [P.O.:600]  Out: -   Vitals:    18 1512   Weight: 41.7 kg (92 lb)     Gen - Alert, awake, oriented to self, thinks she is in a hotel and going for a . Cachetic and appears older than stated age.  Lungs - + wheezing bilaterally  with some crackles LLL.  Heart - RR,S1+S2 nml, no m/g/r.  Abd - soft, NT, ND, + BS.  Ext - no edema.           Medications:      All current medications were reviewed.         Data:      All new lab and imaging data was reviewed.   Labs:  No results for input(s): CULT in the last 168 hours.    Recent Labs  Lab 01/25/18  0727 01/24/18  0744 01/23/18  1524   WBC 8.1 4.5 7.8   HGB 14.9 14.6 16.3*   HCT 44.3 43.2 48.1*   MCV 97 96 96    231 261       Recent Labs  Lab 01/25/18 0727 01/24/18  0744 01/23/18  1524    137 130*   POTASSIUM 3.8 4.0 3.6   CHLORIDE 99 101 91*   CO2 34* 28 28   ANIONGAP 5 8 11   * 155* 195*   BUN 20 26 32*   CR 0.46* 0.57 1.03   GFRESTIMATED >90 >90 54*   GFRESTBLACK >90 >90 65   ATTILA 8.3* 8.1* 8.7   MAG  --   --  2.5*   PROTTOTAL  --   --  8.2   ALBUMIN  --   --  3.5   BILITOTAL  --   --  0.5   ALKPHOS  --   --  110   AST  --   --  101*   ALT  --   --  70*      Recent Imaging:   No results found for this or any previous visit (from the past 24 hour(s)).

## 2018-01-25 NOTE — CONSULTS
Care Transition Initial Assessment - RN    Reason For Consult: care coordination/care conference, discharge planning   Met with: Patient and Family (spouse, Kendall, and daughter Brianna).    DATA   Active Problems:    A-fib (H)     CAP Action Plan discussed with pt & family at bedside.   Cognitive Status: awake, alert and confused.  Primary Care Clinic Name: [facility]  Primary Care MD Name: Dr. Kenneth Amin  Contact information and PCP information verified: Yes    Lives With: spouse  Living Arrangements: house     Description of Support System: Supportive, Involved   Who is your support system?: , Children       Insurance concerns: No Insurance issues identified    ASSESSMENT  Patient currently receives the following services:  Housekeeping, lawn & snow removal services. No homecare.         Identified issues/concerns regarding health management: Pt does have some cognitive deficits (from dementia) but her immediate needs are physical deconditioning. TCU has been discussed. She will be discharging on new home oxygen as her CXR showed emphysema.     PLAN  Financial costs for the patient were not discussed.  Patient given options and choices for discharge.  Patient/family is agreeable to the plan?  Yes: spouse and dtr agreeable to TCU.   Patient anticipates discharging to a TCU.     Other Resources: Other (see comment) (PNA Action Plan)  Patient anticipates needs for home equipment: No  Discharge Planner   Discharge Plans in progress: Yes  Barriers to discharge plan: IV antibiotics; therapies have been unable to complete their assessments.   Plan/Disposition: TCU   Appointments: No appointments made d/t plan to dc to TCU.     CM will continue to follow patient until discharge for any additional needs.     Yasmin Arnold RN, BSN, CTS  [facility]  [phone]

## 2018-01-25 NOTE — PLAN OF CARE
Confused  Continue with 02 at 4L, as desats to 87% on 3L at rest. Very dyspneic with any activity  HR .  Continue to treat pneumonia and UTI with TCU planned at dischg

## 2018-01-25 NOTE — PLAN OF CARE
Problem: Patient Care Overview  Goal: Plan of Care/Patient Progress Review  T98.3 R18 P89 /89 90% 4L.    Transfers SBA.  Pt has been lethargic.  Disorientated to place, time & situation.   Denies pain.  Tele SR.  BS was 163.  LS dim.  SMITH.  Encouraged use of IS.  Pt continues on Solu-medrol, Rocephin, & Zithromax.  Pt voiding w/o difficulty.

## 2018-01-25 NOTE — PROGRESS NOTES
"   01/25/18 1047   General Information   Patient is receptive to smoking cessation at this time Yes   Packs Per Day 1 PPD  (daughter states \"2\")   Years Smoked (#) 40 yrs   Cigarette Pack Years 40   Stage of Behavior Change   Patient's Stage of Behavior Change Action \"I am\"   Processes of Change   The following interventions were used (smoking cessation) Increase awareness of effects of smoking on health;Develop strategies to increase confidence to quit;Problem-solve barriers to quitting;Identify healthy alternatives;Identify support system   Motivation to Quit Scale (1-10) 10   Confidence to Quit Scale (1-10) 8   Final Quit Date (pt states \"I haven't smoked in 2 days since here\")   Education/Recommendations   Education [place] [employer] phone follow up program   Recommendations Nicotine Replacement   Treatment Time (Minutes) 10 minutes   Total Evaluation Time   Total Evaluation Time (Minutes) 5     "

## 2018-01-25 NOTE — PLAN OF CARE
Problem: Breathing Pattern Ineffective (Adult)  Goal: Effective Oxygenation/Ventilation  Patient will demonstrate the desired outcomes by discharge/transition of care.   Outcome: Improving  Pt on 5L O2 per NC. O2 saturations >90 this shift. Pt using incentive spirometer appropriately. Pt able to get up to 1000, only was able to get up to 500 1/23 upon admit. RT following with nebs. Wheezes present throughout lung fields. Will continue to monitor.

## 2018-01-26 LAB
ANION GAP SERPL CALCULATED.3IONS-SCNC: 4 MMOL/L (ref 3–14)
BASOPHILS # BLD AUTO: 0 10E9/L (ref 0–0.2)
BASOPHILS NFR BLD AUTO: 0 %
BUN SERPL-MCNC: 20 MG/DL (ref 7–30)
CALCIUM SERPL-MCNC: 8.5 MG/DL (ref 8.5–10.1)
CHLORIDE SERPL-SCNC: 98 MMOL/L (ref 94–109)
CO2 SERPL-SCNC: 37 MMOL/L (ref 20–32)
CREAT SERPL-MCNC: 0.45 MG/DL (ref 0.52–1.04)
DIFFERENTIAL METHOD BLD: ABNORMAL
EOSINOPHIL # BLD AUTO: 0 10E9/L (ref 0–0.7)
EOSINOPHIL NFR BLD AUTO: 0 %
ERYTHROCYTE [DISTWIDTH] IN BLOOD BY AUTOMATED COUNT: 12.7 % (ref 10–15)
GFR SERPL CREATININE-BSD FRML MDRD: >90 ML/MIN/1.7M2
GLUCOSE BLDC GLUCOMTR-MCNC: 153 MG/DL (ref 70–99)
GLUCOSE BLDC GLUCOMTR-MCNC: 156 MG/DL (ref 70–99)
GLUCOSE BLDC GLUCOMTR-MCNC: 178 MG/DL (ref 70–99)
GLUCOSE BLDC GLUCOMTR-MCNC: 186 MG/DL (ref 70–99)
GLUCOSE BLDC GLUCOMTR-MCNC: 188 MG/DL (ref 70–99)
GLUCOSE SERPL-MCNC: 153 MG/DL (ref 70–99)
HCT VFR BLD AUTO: 44.5 % (ref 35–47)
HGB BLD-MCNC: 14.7 G/DL (ref 11.7–15.7)
IMM GRANULOCYTES # BLD: 0 10E9/L (ref 0–0.4)
IMM GRANULOCYTES NFR BLD: 0.3 %
LYMPHOCYTES # BLD AUTO: 0.6 10E9/L (ref 0.8–5.3)
LYMPHOCYTES NFR BLD AUTO: 9 %
MCH RBC QN AUTO: 32 PG (ref 26.5–33)
MCHC RBC AUTO-ENTMCNC: 33 G/DL (ref 31.5–36.5)
MCV RBC AUTO: 97 FL (ref 78–100)
MONOCYTES # BLD AUTO: 0.3 10E9/L (ref 0–1.3)
MONOCYTES NFR BLD AUTO: 4.1 %
NEUTROPHILS # BLD AUTO: 5.5 10E9/L (ref 1.6–8.3)
NEUTROPHILS NFR BLD AUTO: 86.6 %
NRBC # BLD AUTO: 0 10*3/UL
NRBC BLD AUTO-RTO: 0 /100
PLATELET # BLD AUTO: 287 10E9/L (ref 150–450)
POTASSIUM SERPL-SCNC: 3.6 MMOL/L (ref 3.4–5.3)
RBC # BLD AUTO: 4.59 10E12/L (ref 3.8–5.2)
SODIUM SERPL-SCNC: 139 MMOL/L (ref 133–144)
WBC # BLD AUTO: 6.3 10E9/L (ref 4–11)

## 2018-01-26 PROCEDURE — 25000132 ZZH RX MED GY IP 250 OP 250 PS 637: Performed by: INTERNAL MEDICINE

## 2018-01-26 PROCEDURE — 94640 AIRWAY INHALATION TREATMENT: CPT | Mod: 76

## 2018-01-26 PROCEDURE — 80048 BASIC METABOLIC PNL TOTAL CA: CPT | Performed by: INTERNAL MEDICINE

## 2018-01-26 PROCEDURE — 40000275 ZZH STATISTIC RCP TIME EA 10 MIN

## 2018-01-26 PROCEDURE — 25000128 H RX IP 250 OP 636: Performed by: INTERNAL MEDICINE

## 2018-01-26 PROCEDURE — 25000125 ZZHC RX 250: Performed by: INTERNAL MEDICINE

## 2018-01-26 PROCEDURE — 99233 SBSQ HOSP IP/OBS HIGH 50: CPT | Performed by: INTERNAL MEDICINE

## 2018-01-26 PROCEDURE — 99223 1ST HOSP IP/OBS HIGH 75: CPT | Performed by: CLINICAL NURSE SPECIALIST

## 2018-01-26 PROCEDURE — 85025 COMPLETE CBC W/AUTO DIFF WBC: CPT | Performed by: INTERNAL MEDICINE

## 2018-01-26 PROCEDURE — 12000007 ZZH R&B INTERMEDIATE

## 2018-01-26 PROCEDURE — 00000146 ZZHCL STATISTIC GLUCOSE BY METER IP

## 2018-01-26 PROCEDURE — 94150 VITAL CAPACITY TEST: CPT

## 2018-01-26 PROCEDURE — 36415 COLL VENOUS BLD VENIPUNCTURE: CPT | Performed by: INTERNAL MEDICINE

## 2018-01-26 PROCEDURE — 94640 AIRWAY INHALATION TREATMENT: CPT

## 2018-01-26 PROCEDURE — 40000274 ZZH STATISTIC RCP CONSULT EA 30 MIN

## 2018-01-26 PROCEDURE — 25000125 ZZHC RX 250: Performed by: CLINICAL NURSE SPECIALIST

## 2018-01-26 RX ORDER — IPRATROPIUM BROMIDE AND ALBUTEROL SULFATE 2.5; .5 MG/3ML; MG/3ML
3 SOLUTION RESPIRATORY (INHALATION)
Status: DISCONTINUED | OUTPATIENT
Start: 2018-01-26 | End: 2018-01-28

## 2018-01-26 RX ORDER — HYDROXYZINE HYDROCHLORIDE 25 MG/1
25 TABLET, FILM COATED ORAL EVERY 6 HOURS PRN
Status: DISCONTINUED | OUTPATIENT
Start: 2018-01-26 | End: 2018-02-05 | Stop reason: HOSPADM

## 2018-01-26 RX ADMIN — OLANZAPINE 10 MG: 5 TABLET, FILM COATED ORAL at 21:20

## 2018-01-26 RX ADMIN — IPRATROPIUM BROMIDE AND ALBUTEROL SULFATE 3 ML: .5; 3 SOLUTION RESPIRATORY (INHALATION) at 11:44

## 2018-01-26 RX ADMIN — METHYLPREDNISOLONE SODIUM SUCCINATE 40 MG: 40 INJECTION, POWDER, FOR SOLUTION INTRAMUSCULAR; INTRAVENOUS at 16:47

## 2018-01-26 RX ADMIN — CYCLOSPORINE 1 DROP: 0.5 EMULSION OPHTHALMIC at 21:19

## 2018-01-26 RX ADMIN — METHYLPREDNISOLONE SODIUM SUCCINATE 40 MG: 40 INJECTION, POWDER, FOR SOLUTION INTRAMUSCULAR; INTRAVENOUS at 23:56

## 2018-01-26 RX ADMIN — CEFTRIAXONE SODIUM 1 G: 1 INJECTION, SOLUTION INTRAVENOUS at 16:47

## 2018-01-26 RX ADMIN — IPRATROPIUM BROMIDE AND ALBUTEROL SULFATE 3 ML: .5; 3 SOLUTION RESPIRATORY (INHALATION) at 07:16

## 2018-01-26 RX ADMIN — AMLODIPINE BESYLATE 10 MG: 10 TABLET ORAL at 08:20

## 2018-01-26 RX ADMIN — DILTIAZEM HYDROCHLORIDE 60 MG: 60 CAPSULE, EXTENDED RELEASE ORAL at 20:21

## 2018-01-26 RX ADMIN — HEPARIN SODIUM 5000 UNITS: 5000 INJECTION, SOLUTION INTRAVENOUS; SUBCUTANEOUS at 20:21

## 2018-01-26 RX ADMIN — DULOXETINE HYDROCHLORIDE 120 MG: 30 CAPSULE, DELAYED RELEASE ORAL at 08:20

## 2018-01-26 RX ADMIN — INSULIN ASPART 1 UNITS: 100 INJECTION, SOLUTION INTRAVENOUS; SUBCUTANEOUS at 14:00

## 2018-01-26 RX ADMIN — AZITHROMYCIN MONOHYDRATE 500 MG: 500 INJECTION, POWDER, LYOPHILIZED, FOR SOLUTION INTRAVENOUS at 19:48

## 2018-01-26 RX ADMIN — NICOTINE 1 PATCH: 21 PATCH, EXTENDED RELEASE TRANSDERMAL at 08:19

## 2018-01-26 RX ADMIN — ASPIRIN 81 MG 81 MG: 81 TABLET ORAL at 08:20

## 2018-01-26 RX ADMIN — NICOTINE 1 CARTRIDGE: 4 INHALANT RESPIRATORY (INHALATION) at 20:17

## 2018-01-26 RX ADMIN — INSULIN ASPART 1 UNITS: 100 INJECTION, SOLUTION INTRAVENOUS; SUBCUTANEOUS at 08:30

## 2018-01-26 RX ADMIN — HEPARIN SODIUM 5000 UNITS: 5000 INJECTION, SOLUTION INTRAVENOUS; SUBCUTANEOUS at 08:19

## 2018-01-26 RX ADMIN — MINERAL OIL AND WHITE PETROLATUM: 150; 830 OINTMENT OPHTHALMIC at 21:19

## 2018-01-26 RX ADMIN — CYCLOSPORINE 1 DROP: 0.5 EMULSION OPHTHALMIC at 09:40

## 2018-01-26 RX ADMIN — IPRATROPIUM BROMIDE AND ALBUTEROL SULFATE 3 ML: .5; 3 SOLUTION RESPIRATORY (INHALATION) at 19:53

## 2018-01-26 RX ADMIN — MEMANTINE HYDROCHLORIDE 28 MG: 28 CAPSULE, EXTENDED RELEASE ORAL at 08:20

## 2018-01-26 RX ADMIN — INSULIN ASPART 1 UNITS: 100 INJECTION, SOLUTION INTRAVENOUS; SUBCUTANEOUS at 18:13

## 2018-01-26 RX ADMIN — METHYLPREDNISOLONE SODIUM SUCCINATE 40 MG: 40 INJECTION, POWDER, FOR SOLUTION INTRAMUSCULAR; INTRAVENOUS at 08:19

## 2018-01-26 RX ADMIN — DILTIAZEM HYDROCHLORIDE 60 MG: 60 CAPSULE, EXTENDED RELEASE ORAL at 08:20

## 2018-01-26 RX ADMIN — IPRATROPIUM BROMIDE AND ALBUTEROL SULFATE 3 ML: .5; 3 SOLUTION RESPIRATORY (INHALATION) at 15:47

## 2018-01-26 ASSESSMENT — ACTIVITIES OF DAILY LIVING (ADL)
ADLS_ACUITY_SCORE: 13
ADLS_ACUITY_SCORE: 13
ADLS_ACUITY_SCORE: 14
ADLS_ACUITY_SCORE: 13
ADLS_ACUITY_SCORE: 14
ADLS_ACUITY_SCORE: 14

## 2018-01-26 NOTE — PLAN OF CARE
Problem: Patient Care Overview  Goal: Plan of Care/Patient Progress Review  Transfers SBA.  Disorientated to place, time & situation.   Denies pain.  Tele SR.  BS was 156.  LS coarse/exp wheezes.  SMITH.  Encouraged use of IS.  Pt continues on Solu-medrol, Rocephin, & Zithromax. Pt voiding w/o difficulty.  OT recommending TCU at DC.

## 2018-01-26 NOTE — PROGRESS NOTES
[facility]  Hospitalist Progress Note  Name: Klely Nunez    MRN: [id number]  Provider:  Breana Gaines MD  01/26/18    Initial presenting complaint/issue to hospital (Diagnosis): acute resp distress.         Assessment and Plan:      Summary of Stay: Kelly Nunez is a 67 year old female admitted on 1/23/2018 withacute respiratory distress. Hx of dementia, DM, likely COPD who presented with SOB.        Problem List:       1. Acute respiratory distress likely due to CAP with probable superimposed severe COPD.  - On IV rocephin and azithromycin.  - On oxygen, nebs and IV solumedrol.      2. PAF.  - Converted spontaneously to NSR.  - Cardiology consulted and we plan to hold OAC due to concern for falls.  - TTE showed grade 2 DD and moderate to severe pulm HTN.      3. Dementia with superimposed acute encephalopathy.  - Likely due to pneumonia.  - Monitor, avoid benzo's.  - On namenda.      4. DM.  - On insulin.     5. HTN urgency.  - On norvasc and diltiazem.       Pt discussed with RN and daughter Brianna in rounds.     DVT Prophylaxis:  -  SQ heparin.  Code Status: Full Code  Discharge Dispo: TCU.  Estimated Disch Date / # of Days until Discharge:  2 days.                 Interval History:        No fevers/chills/chest pain. + SOB. + A fib last night spontaneously converted.                  Physical Exam:      Last Vital Signs:  Temp: 97.7  F (36.5  C) Temp src: Oral BP: 131/74 Pulse: 93 Heart Rate: 99 Resp: 18 SpO2: 96 % O2 Device: Oxymizer cannula Oxygen Delivery: 5 LPM    Intake/Output Summary (Last 24 hours) at 01/26/18 0741  Last data filed at 01/26/18 0605   Gross per 24 hour   Intake              790 ml   Output                0 ml   Net              790 ml     I/O last 3 completed shifts:  In: 790 [P.O.:540; I.V.:250]  Out: -   Vitals:    01/23/18 1512   Weight: 41.7 kg (92 lb)     Gen - Alert, awake, oriented to self, unsure of month/year. Cachetic and appears older than stated  age.  Lungs - + wheezing bilaterally.  Heart - RR,S1+S2 nml, no m/g/r.  Abd - soft, NT, ND, + BS.  Ext - no edema.           Medications:      All current medications were reviewed.         Data:      All new lab and imaging data was reviewed.   Labs:  No results for input(s): CULT in the last 168 hours.    Recent Labs  Lab 01/26/18  0700 01/25/18  0727 01/24/18  0744   WBC 6.3 8.1 4.5   HGB 14.7 14.9 14.6   HCT 44.5 44.3 43.2   MCV 97 97 96    274 231       Recent Labs  Lab 01/25/18  0727 01/24/18  0744 01/23/18  1524    137 130*   POTASSIUM 3.8 4.0 3.6   CHLORIDE 99 101 91*   CO2 34* 28 28   ANIONGAP 5 8 11   * 155* 195*   BUN 20 26 32*   CR 0.46* 0.57 1.03   GFRESTIMATED >90 >90 54*   GFRESTBLACK >90 >90 65   ATTILA 8.3* 8.1* 8.7   MAG  --   --  2.5*   PROTTOTAL  --   --  8.2   ALBUMIN  --   --  3.5   BILITOTAL  --   --  0.5   ALKPHOS  --   --  110   AST  --   --  101*   ALT  --   --  70*      Recent Imaging:   No results found for this or any previous visit (from the past 24 hour(s)).

## 2018-01-26 NOTE — PROGRESS NOTES
Care Coordinator Progress Note     Admission Date/Time:  1/23/2018  Attending MD:  Darren Beard, *     Data  Chart reviewed, discussed with interdisciplinary team.   Patient was admitted for:    COPD exacerbation (H)  Atrial fibrillation, unspecified type (H).    Concerns with insurance coverage for discharge needs: None.  Current Living Situation: Patient lives with spouse.  Support System: Supportive and Involved  Services Involved: Housekeeping/ Chore Agency  Transportation: Family or Friend will provide  Barriers to Discharge: needs improvement in oxygen sats    Coordination of Care and Referrals: Provided patient/family with options for Acute Rehab.        Assessment  CM met with pt and daughter, Brianna for TCU choices. Brianna is thinking her mother will not be ready for discharge until after the weekend with her current oxygen needs. They are expecting a need for new home oxygen at discharge. TCU choices are 1. [facility] 2. [facility] in [place] 3. [facility] 4. [facility] 5.  [facility].     Plan  Anticipated Discharge Date:  1/29/18?  Anticipated Discharge Plan:  TCU    Rosario Geller RN, BSN CTS  Care Coordinator  [phone]

## 2018-01-26 NOTE — PROGRESS NOTES
"[facility] RCAT     Date: 1/26/2018  Admission Dx: COPD  Pulmonary History: COPD (new)  Home Nebulizer/MDI Use: None  Home Oxygen: Room Air  Acuity Level (RCAT flow sheet): 2  Aerosol Therapy initiated: Increasing Duoneb from QID to Q4 hours wile awake.  Pt did not feel that they were helping much and did not want to be woken up.      Pulmonary Hygiene initiated: Deep breath and cough QID.      Volume Expansion initiated: IS QID      Current Oxygen Requirements: 6 Lpm Oxymizer  Current SpO2: 94%    Re-evaluation date: 1/29/2017    Patient Education: Informed Pt of RCAT.  Gave instruction in the use of an IS and deep breathing.      See \"RT Assessments\" flow sheet for patient assessment scoring and Acuity Level Details.     Adiel Ang  January 26, 2018.3:47 PM                "

## 2018-01-26 NOTE — PLAN OF CARE
Problem: Patient Care Overview  Goal: Plan of Care/Patient Progress Review  Outcome: Improving  Neuro: Alert. Orientated to self only. Calm, cooperative.   VS:  VSS  Tele: SR  Respiratory: Lung sounds course, diminished. Productive cough. Continues on Nebs, 5-6 L oxygen via oximyzer.   GI: WDL  : WDL  Pain: denies pain  IV: PIV WDL SL  Transfer: A1  Diet: regular. Tolerating PO foods and fluids. Poor appetite.   Plan: TBD. Hospitalist following. Palliative consult today.

## 2018-01-26 NOTE — PLAN OF CARE
Problem: Patient Care Overview  Goal: Plan of Care/Patient Progress Review  Outcome: No Change  Cared for patient from 1611-1117. Hypertensive at times. Initially on 4L 02 via NC. After ambulating to bathroom, desaturated quickly and took a while to recover. Placed on oxymizer and 02 increased to 6L. Denies pain. Up with SBA.  Oriented to self, baseline dementia. Bed alarm on, however patient frequently setting off bed alarm and taking off 02. VPM ordered and at bedside. L/S inspiratory wheezing and coarse. SMITH. Frequent, loose cough. Need sputum sample when able. On rocephin, zithromax and solumedrol. Tele SR, however earlier in shift patient was afib RVR, this was corrected with PRN metoprolol. Likely will need 02 at d/c, wean as able. Continue POC.

## 2018-01-26 NOTE — PROGRESS NOTES
"SPIRITUAL HEALTH SERVICES  SPIRITUAL ASSESSMENT Progress Note  [facility] 3rd floor Med Surg    PRIMARY FOCUS:     Goals of care    Symptom/pain management    Emotional/spiritual/Buddhist distress    Support for coping    ILLNESS CIRCUMSTANCES:   Reviewed documentation. Reflective conversation shared with Adeline which integrated elements of illness and family narratives.     Context of Serious Illness/Symptom(s) - Adeline was admitted with pneumonia and a new diagnosis of COPD    Persons/Resources Involved -     DISTRESS:     Emotional/Existential/Relational Distress - Adeline talked about the distress of being in the hospital. She is hoping to be able to feel better, have more strength and go home soon. She stated \" I have had it. I am sick of being sick.\"    Spiritual/Christian Distress - No direct distress expressed.    Social/Cultural/Economic Distress - None expressed.    SPIRIT (Coping):     Sabianism/Anne - Kelly does not currently attend a Bahai but has a history of being Sabianist.    Spiritual Practice(s) - None expressed.    Emotional/Existential/Relational Connections -  and children. She specifically mentioned how helpful her daughter has been to her.    SENSE-MAKING:    Goals of Care - Kelly's goals are restorative. She said she just wants to feel well and go home.    Meaning/Sense-Making - Kelly talked about her supportive family. She said her  is \"committed\" and their marriage is \"one that will last.\" She said her daughter has been very helpful to her. 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      Bina Parisi  Chaplain Resident  Pager [phone]    "

## 2018-01-27 LAB
GLUCOSE BLDC GLUCOMTR-MCNC: 147 MG/DL (ref 70–99)
GLUCOSE BLDC GLUCOMTR-MCNC: 157 MG/DL (ref 70–99)
GLUCOSE BLDC GLUCOMTR-MCNC: 171 MG/DL (ref 70–99)
GLUCOSE BLDC GLUCOMTR-MCNC: 178 MG/DL (ref 70–99)
GLUCOSE BLDC GLUCOMTR-MCNC: 208 MG/DL (ref 70–99)

## 2018-01-27 PROCEDURE — 25000128 H RX IP 250 OP 636: Performed by: INTERNAL MEDICINE

## 2018-01-27 PROCEDURE — 25000125 ZZHC RX 250: Performed by: CLINICAL NURSE SPECIALIST

## 2018-01-27 PROCEDURE — 25000132 ZZH RX MED GY IP 250 OP 250 PS 637: Performed by: INTERNAL MEDICINE

## 2018-01-27 PROCEDURE — 94640 AIRWAY INHALATION TREATMENT: CPT

## 2018-01-27 PROCEDURE — 94640 AIRWAY INHALATION TREATMENT: CPT | Mod: 76

## 2018-01-27 PROCEDURE — 40000275 ZZH STATISTIC RCP TIME EA 10 MIN

## 2018-01-27 PROCEDURE — 00000146 ZZHCL STATISTIC GLUCOSE BY METER IP

## 2018-01-27 PROCEDURE — 25000125 ZZHC RX 250: Performed by: INTERNAL MEDICINE

## 2018-01-27 PROCEDURE — 12000007 ZZH R&B INTERMEDIATE

## 2018-01-27 PROCEDURE — 99233 SBSQ HOSP IP/OBS HIGH 50: CPT | Performed by: INTERNAL MEDICINE

## 2018-01-27 RX ADMIN — NICOTINE 1 CARTRIDGE: 4 INHALANT RESPIRATORY (INHALATION) at 19:28

## 2018-01-27 RX ADMIN — INSULIN ASPART 1 UNITS: 100 INJECTION, SOLUTION INTRAVENOUS; SUBCUTANEOUS at 13:48

## 2018-01-27 RX ADMIN — IPRATROPIUM BROMIDE AND ALBUTEROL SULFATE 3 ML: .5; 3 SOLUTION RESPIRATORY (INHALATION) at 08:22

## 2018-01-27 RX ADMIN — HEPARIN SODIUM 5000 UNITS: 5000 INJECTION, SOLUTION INTRAVENOUS; SUBCUTANEOUS at 08:12

## 2018-01-27 RX ADMIN — IPRATROPIUM BROMIDE AND ALBUTEROL SULFATE 3 ML: .5; 3 SOLUTION RESPIRATORY (INHALATION) at 16:01

## 2018-01-27 RX ADMIN — HEPARIN SODIUM 5000 UNITS: 5000 INJECTION, SOLUTION INTRAVENOUS; SUBCUTANEOUS at 19:20

## 2018-01-27 RX ADMIN — CEFTRIAXONE SODIUM 1 G: 1 INJECTION, SOLUTION INTRAVENOUS at 16:47

## 2018-01-27 RX ADMIN — DULOXETINE HYDROCHLORIDE 120 MG: 30 CAPSULE, DELAYED RELEASE ORAL at 08:12

## 2018-01-27 RX ADMIN — IPRATROPIUM BROMIDE AND ALBUTEROL SULFATE 3 ML: .5; 3 SOLUTION RESPIRATORY (INHALATION) at 19:47

## 2018-01-27 RX ADMIN — METHYLPREDNISOLONE SODIUM SUCCINATE 40 MG: 40 INJECTION, POWDER, FOR SOLUTION INTRAMUSCULAR; INTRAVENOUS at 16:46

## 2018-01-27 RX ADMIN — DILTIAZEM HYDROCHLORIDE 60 MG: 60 CAPSULE, EXTENDED RELEASE ORAL at 08:12

## 2018-01-27 RX ADMIN — ASPIRIN 81 MG 81 MG: 81 TABLET ORAL at 08:12

## 2018-01-27 RX ADMIN — INSULIN ASPART 1 UNITS: 100 INJECTION, SOLUTION INTRAVENOUS; SUBCUTANEOUS at 22:10

## 2018-01-27 RX ADMIN — AMLODIPINE BESYLATE 10 MG: 10 TABLET ORAL at 08:12

## 2018-01-27 RX ADMIN — OLANZAPINE 10 MG: 5 TABLET, FILM COATED ORAL at 22:15

## 2018-01-27 RX ADMIN — CYCLOSPORINE 1 DROP: 0.5 EMULSION OPHTHALMIC at 09:57

## 2018-01-27 RX ADMIN — INSULIN ASPART 1 UNITS: 100 INJECTION, SOLUTION INTRAVENOUS; SUBCUTANEOUS at 09:57

## 2018-01-27 RX ADMIN — METHYLPREDNISOLONE SODIUM SUCCINATE 40 MG: 40 INJECTION, POWDER, FOR SOLUTION INTRAMUSCULAR; INTRAVENOUS at 09:56

## 2018-01-27 RX ADMIN — NICOTINE 1 CARTRIDGE: 4 INHALANT RESPIRATORY (INHALATION) at 12:23

## 2018-01-27 RX ADMIN — IPRATROPIUM BROMIDE AND ALBUTEROL SULFATE 3 ML: .5; 3 SOLUTION RESPIRATORY (INHALATION) at 11:40

## 2018-01-27 RX ADMIN — MINERAL OIL AND WHITE PETROLATUM: 150; 830 OINTMENT OPHTHALMIC at 22:10

## 2018-01-27 RX ADMIN — MEMANTINE HYDROCHLORIDE 28 MG: 28 CAPSULE, EXTENDED RELEASE ORAL at 08:12

## 2018-01-27 RX ADMIN — DILTIAZEM HYDROCHLORIDE 60 MG: 60 CAPSULE, EXTENDED RELEASE ORAL at 20:22

## 2018-01-27 RX ADMIN — AZITHROMYCIN MONOHYDRATE 500 MG: 500 INJECTION, POWDER, LYOPHILIZED, FOR SOLUTION INTRAVENOUS at 18:59

## 2018-01-27 RX ADMIN — INSULIN ASPART 1 UNITS: 100 INJECTION, SOLUTION INTRAVENOUS; SUBCUTANEOUS at 16:55

## 2018-01-27 ASSESSMENT — ACTIVITIES OF DAILY LIVING (ADL)
ADLS_ACUITY_SCORE: 13
ADLS_ACUITY_SCORE: 13
ADLS_ACUITY_SCORE: 14

## 2018-01-27 NOTE — PROGRESS NOTES
[facility]  Hospitalist Progress Note  Name: Kelly Nunez    MRN: [id number]  Provider:  Breana Gaines MD  01/27/18    Initial presenting complaint/issue to hospital (Diagnosis): acute resp distress.         Assessment and Plan:      Summary of Stay: Kelly Nunez is a 67 year old female admitted on 1/23/2018 with acute respiratory distress. Hx of dementia, DM, likely COPD who presented with SOB.        Problem List:       1. Acute respiratory distress likely due to CAP with probable superimposed severe COPD.  - On IV rocephin and azithromycin.  - On oxygen, nebs and IV solumedrol.      2. PAF.  - Converted spontaneously to NSR.  - Cardiology consulted and we plan to hold OAC due to concern for falls.  - TTE showed grade 2 DD and moderate to severe pulm HTN.      3. Dementia with superimposed acute encephalopathy.  - Likely due to pneumonia.  - Monitor, avoid benzo's.  - On namenda.      4. DM.  - On insulin.      5. HTN urgency.  - On norvasc and diltiazem.       Pt discussed with RN and daughter Brianna in rounds.      DVT Prophylaxis:  -  SQ heparin.  Code Status: Full Code  Discharge Dispo: TCU.  Estimated Disch Date / # of Days until Discharge:  1-2 days.             Interval History:        No fevers/chills/chest pain. + SOB which is slightly better.                  Physical Exam:      Last Vital Signs:  Temp: 98  F (36.7  C) Temp src: Oral BP: (!) 151/94 Pulse: 88 Heart Rate: 92 Resp: 16 SpO2: 92 % O2 Device: Oxymizer cannula Oxygen Delivery: 6 LPM  No intake or output data in the 24 hours ending 01/27/18 0751     Vitals:    01/23/18 1512   Weight: 41.7 kg (92 lb)     Gen - Alert, awake, oriented to self, unsure of month/year. Cachetic and appears older than stated age.  Lungs - + diminished BS bilaterally.  Heart - RR,S1+S2 nml, no m/g/r.  Abd - soft, NT, ND, + BS.  Ext - no edema.           Medications:      All current medications were reviewed.         Data:      All new lab and  imaging data was reviewed.   Labs:  No results for input(s): CULT in the last 168 hours.    Recent Labs  Lab 01/26/18  0700 01/25/18  0727 01/24/18  0744   WBC 6.3 8.1 4.5   HGB 14.7 14.9 14.6   HCT 44.5 44.3 43.2   MCV 97 97 96    274 231       Recent Labs  Lab 01/26/18  0700 01/25/18  0727 01/24/18  0744 01/23/18  1524    138 137 130*   POTASSIUM 3.6 3.8 4.0 3.6   CHLORIDE 98 99 101 91*   CO2 37* 34* 28 28   ANIONGAP 4 5 8 11   * 157* 155* 195*   BUN 20 20 26 32*   CR 0.45* 0.46* 0.57 1.03   GFRESTIMATED >90 >90 >90 54*   GFRESTBLACK >90 >90 >90 65   ATTILA 8.5 8.3* 8.1* 8.7   MAG  --   --   --  2.5*   PROTTOTAL  --   --   --  8.2   ALBUMIN  --   --   --  3.5   BILITOTAL  --   --   --  0.5   ALKPHOS  --   --   --  110   AST  --   --   --  101*   ALT  --   --   --  70*      Recent Imaging:   No results found for this or any previous visit (from the past 24 hour(s)).

## 2018-01-27 NOTE — PLAN OF CARE
Problem: Patient Care Overview  Goal: Plan of Care/Patient Progress Review  Transfers SBA.  A/O to self.  Denies pain.  Tele SR. LS coarse/ins & exp wheezes.  SMITH.  Encouraged use of IS.  Pt continues on Solu-medrol, Rocephin, & Zithromax. Pt voiding w/o difficulty.  TCU when ready for DC.  Pt had palliative consult-see note for plan.  VPM at bedside for safety as pt can be impulsive at times.

## 2018-01-27 NOTE — PLAN OF CARE
Problem: Patient Care Overview  Goal: Plan of Care/Patient Progress Review  Outcome: No Change  VSS, Alert, oriented to self.  SBA.  LS-dim, coarse, scattered exp wheezes.  Pt on 6L/oxymizer, sats 93-94%.  Tele- SR.  NEbs q4h.  IV solumedrol q8h, IV zithro and rocephin q24h.  Nicotine patch on R deltoid.  Palliative following.  BG checks, 186 this evening.  Family visiting this evening.  Discharge in about 2 days to TCU.  VPM in room for safety.      [id number] Text page to MD: Pt anxious and restless. Family requesting something for anxiety. Can you please change nicotine patch to nicotrol inhaler?  Thank you

## 2018-01-27 NOTE — PLAN OF CARE
Problem: Patient Care Overview  Goal: Plan of Care/Patient Progress Review  Remains confused, VPM in room. Up in chair and ambulates to bathroom. O2 decreased to 2.5 Liters via oximizer, Sats 93% Lung sounds diminished. Up with SBA.  Appetite improved from Thursday.  and daughter in during the day.

## 2018-01-27 NOTE — PLAN OF CARE
Problem: Infection, Risk/Actual (Adult)  Goal: Identify Related Risk Factors and Signs and Symptoms  Related risk factors and signs and symptoms are identified upon initiation of Human Response Clinical Practice Guideline (CPG).   VSS, tele SR.  Pt up SBA in room, impulsive most of shift.  Confused with her location, thinking that she needed to see cats etc.  Pt sleeping well intermittently.  BG stable this shift.  VPM still in place.  Continue POC.

## 2018-01-27 NOTE — PROGRESS NOTES
D: SWS is following to coordinate d/c to TCU. Pt is likely to d/c in 1-2 days.   A: SW sent the TCU referral to [facility], [facility] and [facility].   P: TCU placement is pending. SWS will continue to follow to coordinate d/c.     Update: [facility] isn't able to verify pt's insurance until Monday. [facility] does not have weekend admission.    JOSE Garvin  Casual SW [phone]

## 2018-01-28 LAB
GLUCOSE BLDC GLUCOMTR-MCNC: 166 MG/DL (ref 70–99)
GLUCOSE BLDC GLUCOMTR-MCNC: 179 MG/DL (ref 70–99)
GLUCOSE BLDC GLUCOMTR-MCNC: 191 MG/DL (ref 70–99)
GLUCOSE BLDC GLUCOMTR-MCNC: 207 MG/DL (ref 70–99)
GLUCOSE BLDC GLUCOMTR-MCNC: 216 MG/DL (ref 70–99)
PLATELET # BLD AUTO: 305 10E9/L (ref 150–450)

## 2018-01-28 PROCEDURE — 99233 SBSQ HOSP IP/OBS HIGH 50: CPT | Performed by: INTERNAL MEDICINE

## 2018-01-28 PROCEDURE — 25000125 ZZHC RX 250: Performed by: INTERNAL MEDICINE

## 2018-01-28 PROCEDURE — 25000132 ZZH RX MED GY IP 250 OP 250 PS 637: Performed by: INTERNAL MEDICINE

## 2018-01-28 PROCEDURE — 12000007 ZZH R&B INTERMEDIATE

## 2018-01-28 PROCEDURE — 25000131 ZZH RX MED GY IP 250 OP 636 PS 637: Performed by: INTERNAL MEDICINE

## 2018-01-28 PROCEDURE — 40000274 ZZH STATISTIC RCP CONSULT EA 30 MIN

## 2018-01-28 PROCEDURE — 94640 AIRWAY INHALATION TREATMENT: CPT

## 2018-01-28 PROCEDURE — 94640 AIRWAY INHALATION TREATMENT: CPT | Mod: 76

## 2018-01-28 PROCEDURE — 25000128 H RX IP 250 OP 636: Performed by: INTERNAL MEDICINE

## 2018-01-28 PROCEDURE — 85049 AUTOMATED PLATELET COUNT: CPT | Performed by: INTERNAL MEDICINE

## 2018-01-28 PROCEDURE — 25000125 ZZHC RX 250: Performed by: CLINICAL NURSE SPECIALIST

## 2018-01-28 PROCEDURE — 36415 COLL VENOUS BLD VENIPUNCTURE: CPT | Performed by: INTERNAL MEDICINE

## 2018-01-28 PROCEDURE — 40000275 ZZH STATISTIC RCP TIME EA 10 MIN

## 2018-01-28 PROCEDURE — 00000146 ZZHCL STATISTIC GLUCOSE BY METER IP

## 2018-01-28 PROCEDURE — 25000132 ZZH RX MED GY IP 250 OP 250 PS 637

## 2018-01-28 RX ORDER — CEFUROXIME AXETIL 250 MG/1
500 TABLET ORAL EVERY 12 HOURS SCHEDULED
Status: DISCONTINUED | OUTPATIENT
Start: 2018-01-28 | End: 2018-02-01

## 2018-01-28 RX ORDER — IPRATROPIUM BROMIDE AND ALBUTEROL SULFATE 2.5; .5 MG/3ML; MG/3ML
3 SOLUTION RESPIRATORY (INHALATION) 4 TIMES DAILY
Status: DISCONTINUED | OUTPATIENT
Start: 2018-01-29 | End: 2018-02-05 | Stop reason: HOSPADM

## 2018-01-28 RX ADMIN — DEXTRAN 70, AND HYPROMELLOSE 2910 1 DROP: 1; 3 SOLUTION/ DROPS OPHTHALMIC at 20:10

## 2018-01-28 RX ADMIN — IPRATROPIUM BROMIDE AND ALBUTEROL SULFATE 3 ML: .5; 3 SOLUTION RESPIRATORY (INHALATION) at 20:28

## 2018-01-28 RX ADMIN — HEPARIN SODIUM 5000 UNITS: 5000 INJECTION, SOLUTION INTRAVENOUS; SUBCUTANEOUS at 20:09

## 2018-01-28 RX ADMIN — INSULIN ASPART 1 UNITS: 100 INJECTION, SOLUTION INTRAVENOUS; SUBCUTANEOUS at 13:35

## 2018-01-28 RX ADMIN — DEXTRAN 70, AND HYPROMELLOSE 2910 1 DROP: 1; 3 SOLUTION/ DROPS OPHTHALMIC at 10:56

## 2018-01-28 RX ADMIN — DILTIAZEM HYDROCHLORIDE 60 MG: 60 CAPSULE, EXTENDED RELEASE ORAL at 20:09

## 2018-01-28 RX ADMIN — CEFUROXIME AXETIL 500 MG: 250 TABLET ORAL at 20:09

## 2018-01-28 RX ADMIN — IPRATROPIUM BROMIDE AND ALBUTEROL SULFATE 3 ML: .5; 3 SOLUTION RESPIRATORY (INHALATION) at 08:09

## 2018-01-28 RX ADMIN — Medication 1 MG: at 21:27

## 2018-01-28 RX ADMIN — DULOXETINE HYDROCHLORIDE 120 MG: 30 CAPSULE, DELAYED RELEASE ORAL at 08:45

## 2018-01-28 RX ADMIN — NICOTINE 1 CARTRIDGE: 4 INHALANT RESPIRATORY (INHALATION) at 16:51

## 2018-01-28 RX ADMIN — IPRATROPIUM BROMIDE AND ALBUTEROL SULFATE 3 ML: .5; 3 SOLUTION RESPIRATORY (INHALATION) at 11:37

## 2018-01-28 RX ADMIN — NICOTINE 1 CARTRIDGE: 4 INHALANT RESPIRATORY (INHALATION) at 08:52

## 2018-01-28 RX ADMIN — AMLODIPINE BESYLATE 10 MG: 10 TABLET ORAL at 08:45

## 2018-01-28 RX ADMIN — IPRATROPIUM BROMIDE AND ALBUTEROL SULFATE 3 ML: .5; 3 SOLUTION RESPIRATORY (INHALATION) at 15:30

## 2018-01-28 RX ADMIN — METHYLPREDNISOLONE SODIUM SUCCINATE 40 MG: 40 INJECTION, POWDER, FOR SOLUTION INTRAMUSCULAR; INTRAVENOUS at 00:28

## 2018-01-28 RX ADMIN — OLANZAPINE 10 MG: 5 TABLET, FILM COATED ORAL at 21:27

## 2018-01-28 RX ADMIN — MINERAL OIL AND WHITE PETROLATUM: 150; 830 OINTMENT OPHTHALMIC at 21:23

## 2018-01-28 RX ADMIN — PREDNISONE 60 MG: 10 TABLET ORAL at 08:45

## 2018-01-28 RX ADMIN — CEFUROXIME AXETIL 500 MG: 250 TABLET ORAL at 09:22

## 2018-01-28 RX ADMIN — INSULIN ASPART 1 UNITS: 100 INJECTION, SOLUTION INTRAVENOUS; SUBCUTANEOUS at 21:24

## 2018-01-28 RX ADMIN — DILTIAZEM HYDROCHLORIDE 60 MG: 60 CAPSULE, EXTENDED RELEASE ORAL at 08:45

## 2018-01-28 RX ADMIN — INSULIN ASPART 2 UNITS: 100 INJECTION, SOLUTION INTRAVENOUS; SUBCUTANEOUS at 17:50

## 2018-01-28 RX ADMIN — MEMANTINE HYDROCHLORIDE 28 MG: 28 CAPSULE, EXTENDED RELEASE ORAL at 08:45

## 2018-01-28 RX ADMIN — HEPARIN SODIUM 5000 UNITS: 5000 INJECTION, SOLUTION INTRAVENOUS; SUBCUTANEOUS at 08:45

## 2018-01-28 RX ADMIN — ASPIRIN 81 MG 81 MG: 81 TABLET ORAL at 08:45

## 2018-01-28 ASSESSMENT — ACTIVITIES OF DAILY LIVING (ADL)
ADLS_ACUITY_SCORE: 14

## 2018-01-28 NOTE — PROGRESS NOTES
Pt has not attempted to get OOB on own this AM, pt able to be reoriented at this time, family at bedside at this time. VPM discontinued and will trial without this in anticipation of discharge next 1-2 days. Discussed with family and they are in agreement as well. Discussed with VPM monitor tech and removed at 0900.

## 2018-01-28 NOTE — PLAN OF CARE
Problem: Patient Care Overview  Goal: Plan of Care/Patient Progress Review  Outcome: Improving  Transfers SBA.  A/O to self.  Denies pain. VSS.  Tele SR 90's. LS dim.  SMITH.  Encouraged use of IS.  Pt meds changed to PO. Pt voiding w/o difficulty.  TCU when ready for DC.   VPM discontinued as pt needs to be off 24 prior to TCU. , 166.  Pt ate breakfast prior to BS check, did not give AM insulin dose. O2 weaned on nocs to 2L, cont if able.

## 2018-01-28 NOTE — PLAN OF CARE
"Problem: Patient Care Overview  Goal: Plan of Care/Patient Progress Review  OT session attempted, pt declining participation at this time stating \"I'm watching my show right now and in a little bit I need to go straighten up my bedroom upstairs.\" Pt re-oriented to hospital, however continues to decline participation at this time.       "

## 2018-01-28 NOTE — PLAN OF CARE
Problem: Patient Care Overview  Goal: Plan of Care/Patient Progress Review  Transfers SBA.  A/O to self.  Denies pain. VSS.  Tele SR. LS coarse.  SMITH.  Encouraged use of IS.  Pt continues on Solu-medrol, Rocephin, & Zithromax. Pt voiding w/o difficulty.  TCU when ready for DC.   VPM at bedside for safety as pt can be impulsive at times.

## 2018-01-28 NOTE — PLAN OF CARE
Problem: Infection, Risk/Actual (Adult)  Goal: Identify Related Risk Factors and Signs and Symptoms  Related risk factors and signs and symptoms are identified upon initiation of Human Response Clinical Practice Guideline (CPG).   Outcome: Improving  VSS, afebrile.  Cont on IV abx for PNA/UTI.  Unable to wean O2 lower than 2.5 L.   IV  Solumedrol Q 8 hrs.  Cont to set off bed alarm and VPM, usually because she needs to use bathroom.   Denies pain.  POC reviewed with pt, dtr and family, questions answered.

## 2018-01-29 ENCOUNTER — APPOINTMENT (OUTPATIENT)
Dept: OCCUPATIONAL THERAPY | Facility: CLINIC | Age: 68
DRG: 193 | End: 2018-01-29
Payer: COMMERCIAL

## 2018-01-29 LAB
GLUCOSE BLDC GLUCOMTR-MCNC: 139 MG/DL (ref 70–99)
GLUCOSE BLDC GLUCOMTR-MCNC: 151 MG/DL (ref 70–99)
GLUCOSE BLDC GLUCOMTR-MCNC: 199 MG/DL (ref 70–99)
GLUCOSE BLDC GLUCOMTR-MCNC: 233 MG/DL (ref 70–99)
GLUCOSE BLDC GLUCOMTR-MCNC: 74 MG/DL (ref 70–99)

## 2018-01-29 PROCEDURE — 94640 AIRWAY INHALATION TREATMENT: CPT

## 2018-01-29 PROCEDURE — 97535 SELF CARE MNGMENT TRAINING: CPT | Mod: GO | Performed by: STUDENT IN AN ORGANIZED HEALTH CARE EDUCATION/TRAINING PROGRAM

## 2018-01-29 PROCEDURE — 25000125 ZZHC RX 250: Performed by: INTERNAL MEDICINE

## 2018-01-29 PROCEDURE — 25000128 H RX IP 250 OP 636: Performed by: INTERNAL MEDICINE

## 2018-01-29 PROCEDURE — 40000275 ZZH STATISTIC RCP TIME EA 10 MIN

## 2018-01-29 PROCEDURE — 25000131 ZZH RX MED GY IP 250 OP 636 PS 637: Performed by: INTERNAL MEDICINE

## 2018-01-29 PROCEDURE — 94640 AIRWAY INHALATION TREATMENT: CPT | Mod: 76

## 2018-01-29 PROCEDURE — 00000146 ZZHCL STATISTIC GLUCOSE BY METER IP

## 2018-01-29 PROCEDURE — 12000007 ZZH R&B INTERMEDIATE

## 2018-01-29 PROCEDURE — 99233 SBSQ HOSP IP/OBS HIGH 50: CPT | Performed by: INTERNAL MEDICINE

## 2018-01-29 PROCEDURE — 40000133 ZZH STATISTIC OT WARD VISIT: Performed by: STUDENT IN AN ORGANIZED HEALTH CARE EDUCATION/TRAINING PROGRAM

## 2018-01-29 PROCEDURE — 25000125 ZZHC RX 250: Performed by: CLINICAL NURSE SPECIALIST

## 2018-01-29 PROCEDURE — 25000132 ZZH RX MED GY IP 250 OP 250 PS 637: Performed by: INTERNAL MEDICINE

## 2018-01-29 RX ORDER — CYCLOSPORINE 0.5 MG/ML
1 EMULSION OPHTHALMIC 2 TIMES DAILY
Status: DISCONTINUED | OUTPATIENT
Start: 2018-01-29 | End: 2018-01-29 | Stop reason: RX

## 2018-01-29 RX ADMIN — CEFUROXIME AXETIL 500 MG: 250 TABLET ORAL at 08:41

## 2018-01-29 RX ADMIN — MEMANTINE HYDROCHLORIDE 28 MG: 28 CAPSULE, EXTENDED RELEASE ORAL at 08:41

## 2018-01-29 RX ADMIN — DILTIAZEM HYDROCHLORIDE 60 MG: 60 CAPSULE, EXTENDED RELEASE ORAL at 21:10

## 2018-01-29 RX ADMIN — DEXTRAN 70, AND HYPROMELLOSE 2910 1 DROP: 1; 3 SOLUTION/ DROPS OPHTHALMIC at 08:42

## 2018-01-29 RX ADMIN — DEXTRAN 70, AND HYPROMELLOSE 2910 1 DROP: 1; 3 SOLUTION/ DROPS OPHTHALMIC at 21:14

## 2018-01-29 RX ADMIN — MINERAL OIL AND WHITE PETROLATUM: 150; 830 OINTMENT OPHTHALMIC at 21:18

## 2018-01-29 RX ADMIN — AMLODIPINE BESYLATE 10 MG: 10 TABLET ORAL at 08:41

## 2018-01-29 RX ADMIN — HEPARIN SODIUM 5000 UNITS: 5000 INJECTION, SOLUTION INTRAVENOUS; SUBCUTANEOUS at 21:12

## 2018-01-29 RX ADMIN — DULOXETINE HYDROCHLORIDE 120 MG: 30 CAPSULE, DELAYED RELEASE ORAL at 08:40

## 2018-01-29 RX ADMIN — ASPIRIN 81 MG 81 MG: 81 TABLET ORAL at 08:41

## 2018-01-29 RX ADMIN — INSULIN ASPART 1 UNITS: 100 INJECTION, SOLUTION INTRAVENOUS; SUBCUTANEOUS at 21:17

## 2018-01-29 RX ADMIN — INSULIN ASPART 2 UNITS: 100 INJECTION, SOLUTION INTRAVENOUS; SUBCUTANEOUS at 12:24

## 2018-01-29 RX ADMIN — HEPARIN SODIUM 5000 UNITS: 5000 INJECTION, SOLUTION INTRAVENOUS; SUBCUTANEOUS at 08:39

## 2018-01-29 RX ADMIN — CEFUROXIME AXETIL 500 MG: 250 TABLET ORAL at 21:10

## 2018-01-29 RX ADMIN — DILTIAZEM HYDROCHLORIDE 60 MG: 60 CAPSULE, EXTENDED RELEASE ORAL at 08:41

## 2018-01-29 RX ADMIN — INSULIN ASPART 1 UNITS: 100 INJECTION, SOLUTION INTRAVENOUS; SUBCUTANEOUS at 18:19

## 2018-01-29 RX ADMIN — PREDNISONE 60 MG: 10 TABLET ORAL at 08:41

## 2018-01-29 RX ADMIN — IPRATROPIUM BROMIDE AND ALBUTEROL SULFATE 3 ML: .5; 3 SOLUTION RESPIRATORY (INHALATION) at 19:42

## 2018-01-29 RX ADMIN — OLANZAPINE 10 MG: 5 TABLET, FILM COATED ORAL at 21:10

## 2018-01-29 RX ADMIN — IPRATROPIUM BROMIDE AND ALBUTEROL SULFATE 3 ML: .5; 3 SOLUTION RESPIRATORY (INHALATION) at 15:09

## 2018-01-29 RX ADMIN — IPRATROPIUM BROMIDE AND ALBUTEROL SULFATE 3 ML: .5; 3 SOLUTION RESPIRATORY (INHALATION) at 11:30

## 2018-01-29 ASSESSMENT — ACTIVITIES OF DAILY LIVING (ADL)
ADLS_ACUITY_SCORE: 12
ADLS_ACUITY_SCORE: 12
ADLS_ACUITY_SCORE: 14
ADLS_ACUITY_SCORE: 12

## 2018-01-29 NOTE — PROGRESS NOTES
[facility]  Hospitalist Progress Note  Name: Kelly Nunez    MRN: [id number]  Provider: Tulio Thomas MD  01/29/2018    Initial presenting complaint/issue to hospital (Diagnosis): acute resp distress.         Assessment and Plan:      Summary of Stay: Kelly Nunez is a 67 year old female admitted on 1/23/2018 with acute respiratory distress. Hx of dementia, DM, likely underlying COPD who presented with SOB.        Problem List:       # Acute respiratory distress likely due to CAP with probable superimposed severe COPD.  - On IV rocephin and azithromycin.--> now switched to Ceftin   - On oxygen, nebs and steroids, changed to PO prednisone       # PAF.  - Converted spontaneously to NSR.  - Cardiology consulted and plan to hold OAC due to concern for falls.   -- Changed from procardia to diltiazem   - TTE showed grade 2 DD and moderate to severe pulm HTN.  - Needs event monitor on d/c and fup with cards as outpatient to re-visit       # Dementia with superimposed acute encephalopathy.  - Likely due to pneumonia.  - Monitor, avoid benzo's.  - On namenda.  - On zyprexa.      #. HTN urgency.  - On norvasc and diltiazem    Discussed in detail with daughter Brianna       DVT Prophylaxis:  -  SQ heparin.    Code Status: Full Code  Discharge Dispo: TCU.  Estimated Disch Date / # of Days until Discharge:  1-2 days. If oxygen requirements stay stable, ability to work with therapies , will need oxygen at discharge         Interval History:        Chart reviewed and patient seen. Case discussed with nursing staff.     Patient daughter at bedside, tells me that patient's daughter avoids going to doctors, had been declining for some time, long term smoker, no established dx of COPD previously, doing better since admission, breathing improved, they are aware that mentation may not improve a lot further, Denies any chest pain,  No nausea, vomiting or diarrhea. No other complaints voiced.                      Physical Exam:      Last Vital Signs:  Temp: 97.4  F (36.3  C) Temp src: Oral BP: 162/88   Heart Rate: 97 Resp: 18 SpO2: 91 % O2 Device: Nasal cannula Oxygen Delivery: 2 LPM    I/O last 3 completed shifts:  In: 920 [P.O.:920]  Out: -   Vitals:    01/23/18 1512   Weight: 41.7 kg (92 lb)     Gen - Alert, awake, Cachetic and appears much older than stated age.  Lungs - diminished BS bilaterally. No crackles, no wheezing   Heart - RRR,S1+S2 nml, no m/g/r.  Abd - soft, NT, ND, + BS.  Ext - no edema.  Neuro - oriented to self, unsure of month/year. , moving all extremities          Medications:      All current medications were reviewed.         Data:      All new lab and imaging data was reviewed.   Labs:  No results for input(s): CULT in the last 168 hours.    Recent Labs  Lab 01/28/18  0718 01/26/18  0700 01/25/18  0727 01/24/18  0744   WBC  --  6.3 8.1 4.5   HGB  --  14.7 14.9 14.6   HCT  --  44.5 44.3 43.2   MCV  --  97 97 96    287 274 231       Recent Labs  Lab 01/26/18  0700 01/25/18  0727 01/24/18  0744 01/23/18  1524    138 137 130*   POTASSIUM 3.6 3.8 4.0 3.6   CHLORIDE 98 99 101 91*   CO2 37* 34* 28 28   ANIONGAP 4 5 8 11   * 157* 155* 195*   BUN 20 20 26 32*   CR 0.45* 0.46* 0.57 1.03   GFRESTIMATED >90 >90 >90 54*   GFRESTBLACK >90 >90 >90 65   ATTILA 8.5 8.3* 8.1* 8.7   MAG  --   --   --  2.5*   PROTTOTAL  --   --   --  8.2   ALBUMIN  --   --   --  3.5   BILITOTAL  --   --   --  0.5   ALKPHOS  --   --   --  110   AST  --   --   --  101*   ALT  --   --   --  70*      Recent Imaging:   No results found for this or any previous visit (from the past 24 hour(s)).

## 2018-01-29 NOTE — PLAN OF CARE
Problem: Patient Care Overview  Goal: Plan of Care/Patient Progress Review  Transfers SBA.  A/O to self.  Denies pain. VSS.  Tele SR. LS dim.  SMITH.  Encouraged use of IS.  Pt continues on Prednisone & ceftin.   Pt voiding w/o difficulty.  TCU when ready for DC.   Pt will need event monitor at DC.  [facility] has been off this shift.

## 2018-01-29 NOTE — PLAN OF CARE
Problem: Infection, Risk/Actual (Adult)  Goal: Identify Related Risk Factors and Signs and Symptoms  Related risk factors and signs and symptoms are identified upon initiation of Human Response Clinical Practice Guideline (CPG).   Outcome: Improving  VSS, afebrile.  Cont to need 2 L O2 via nc, pt desats to mid-upper 80's when attempts to wean.   Denies pain.  Cont to set off alarms when she needs to go to BR, she is easily redirected.  Up to BR with SBA and 1.  IV abx & steroids now po.  POC reviewed with pt and family, questions answered.

## 2018-01-29 NOTE — PLAN OF CARE
Problem: Patient Care Overview  Goal: Plan of Care/Patient Progress Review  Outcome: No Change  Vss ex /88 this afternoon (PRN only for sbp > 180), no co pain/cp/sob, [place] - pt does desat with activity and has needed increased O2 to 4L and 2L at rest (RA sats as low as 84%).   Tele SR with biphasic Ps.  Alarms intact for safety, pt is confused/forgetful, family at bedside this shift and supportive.  Up with SBA, in chair for meals, LS with insp wheezes, BM yester per report.  BG 74, 199.  Po ceftin, sq heparin, restasis eye drops ordered and then dc'd by pharmacy due to availability issues.  Plan to dc to TCU.  Continue poc and monitoring.

## 2018-01-29 NOTE — PLAN OF CARE
Problem: OT General Care Plan    Discharge Planner OT   Patient plan for discharge: did not state  Current status: Pt able to complete bed mobility, supine<>sitting edge of bed, SBA; completed sit<>stand transfers and functional mobility in room with close SBA-CGA and management of O2 tubing for safety; pt able to gather ADL items and complete 2 standing g/h tasks at sink, SBA with verbal directive cues as pt demonstrated confusion, making references to home bathroom set-up; pt oriented to self only and aware that she did not know where she was or why, able to redirect and focus pt attention on task; pt noted shortness of breath with activity, O2 sats upon return to bed 84% with elevated , able to improve within 1-2 minutes of rest, pt remained on supplemental O2 of 2 Lpm via NC  Barriers to return to prior living situation: impaired cognition/safety awareness, slightly unsteady on feet, home alone during day  Recommendations for discharge: TCU  Rationale for recommendations:  Daughter reports pt will now need home 02; pt will need much training to manage safely w/use given cognitive deficits and will benefit from daily therapies to improve strength, balance and for further training in modified ADLs and home 02 use prior to return home. Daughter supportive if needed. Pt had no comment on recommendation - may not have understood.        Entered by: Nkechi Morales 01/29/2018 4:07 PM

## 2018-01-29 NOTE — PROGRESS NOTES
"[facility] RCAT     Date: 1/28/18  Admission Dx: COPD  Pulmonary History: COPD  Home Nebulizer/MDI Use: none  Home Oxygen: none  Acuity Level (RCAT flow sheet): 3  Aerosol Therapy initiated: Changed from Q4 WA to QID.      Pulmonary Hygiene initiated: continue deep breathe and cough      Volume Expansion initiated: continue IS      Current Oxygen Requirements: 4lpm NC  Current SpO2: 92%    Re-evaluation date: 1/31/18    Patient Education: Patient aware of medication benefits      See \"RT Assessments\" flow sheet for patient assessment scoring and Acuity Level Details.             "

## 2018-01-29 NOTE — PLAN OF CARE
Problem: Patient Care Overview  Goal: Plan of Care/Patient Progress Review  Transfers SBA.  A/O to self.  Denies pain. VSS.  Tele SR. LS dim.  SMITH.  Encouraged use of IS.  Pt continues on Prednisone & ceftin.   Pt voiding w/o difficulty.  TCU when ready for DC.   Pt will need event monitor at DC.  [facility] has been off this shift.

## 2018-01-30 LAB
ANION GAP SERPL CALCULATED.3IONS-SCNC: 4 MMOL/L (ref 3–14)
BUN SERPL-MCNC: 17 MG/DL (ref 7–30)
CALCIUM SERPL-MCNC: 8.3 MG/DL (ref 8.5–10.1)
CHLORIDE SERPL-SCNC: 101 MMOL/L (ref 94–109)
CO2 SERPL-SCNC: 37 MMOL/L (ref 20–32)
CREAT SERPL-MCNC: 0.46 MG/DL (ref 0.52–1.04)
ERYTHROCYTE [DISTWIDTH] IN BLOOD BY AUTOMATED COUNT: 12.5 % (ref 10–15)
GFR SERPL CREATININE-BSD FRML MDRD: >90 ML/MIN/1.7M2
GLUCOSE BLDC GLUCOMTR-MCNC: 113 MG/DL (ref 70–99)
GLUCOSE BLDC GLUCOMTR-MCNC: 131 MG/DL (ref 70–99)
GLUCOSE BLDC GLUCOMTR-MCNC: 213 MG/DL (ref 70–99)
GLUCOSE BLDC GLUCOMTR-MCNC: 221 MG/DL (ref 70–99)
GLUCOSE BLDC GLUCOMTR-MCNC: 78 MG/DL (ref 70–99)
GLUCOSE SERPL-MCNC: 79 MG/DL (ref 70–99)
HCT VFR BLD AUTO: 46.1 % (ref 35–47)
HGB BLD-MCNC: 15.1 G/DL (ref 11.7–15.7)
MCH RBC QN AUTO: 31.9 PG (ref 26.5–33)
MCHC RBC AUTO-ENTMCNC: 32.8 G/DL (ref 31.5–36.5)
MCV RBC AUTO: 97 FL (ref 78–100)
PLATELET # BLD AUTO: 334 10E9/L (ref 150–450)
POTASSIUM SERPL-SCNC: 3.8 MMOL/L (ref 3.4–5.3)
RBC # BLD AUTO: 4.74 10E12/L (ref 3.8–5.2)
SODIUM SERPL-SCNC: 142 MMOL/L (ref 133–144)
WBC # BLD AUTO: 4.7 10E9/L (ref 4–11)

## 2018-01-30 PROCEDURE — 25000125 ZZHC RX 250: Performed by: INTERNAL MEDICINE

## 2018-01-30 PROCEDURE — 36415 COLL VENOUS BLD VENIPUNCTURE: CPT | Performed by: INTERNAL MEDICINE

## 2018-01-30 PROCEDURE — 25000128 H RX IP 250 OP 636: Performed by: INTERNAL MEDICINE

## 2018-01-30 PROCEDURE — 25000125 ZZHC RX 250: Performed by: CLINICAL NURSE SPECIALIST

## 2018-01-30 PROCEDURE — 80048 BASIC METABOLIC PNL TOTAL CA: CPT | Performed by: INTERNAL MEDICINE

## 2018-01-30 PROCEDURE — 85027 COMPLETE CBC AUTOMATED: CPT | Performed by: INTERNAL MEDICINE

## 2018-01-30 PROCEDURE — 94640 AIRWAY INHALATION TREATMENT: CPT

## 2018-01-30 PROCEDURE — 25000132 ZZH RX MED GY IP 250 OP 250 PS 637: Performed by: INTERNAL MEDICINE

## 2018-01-30 PROCEDURE — 99232 SBSQ HOSP IP/OBS MODERATE 35: CPT | Performed by: INTERNAL MEDICINE

## 2018-01-30 PROCEDURE — 25000131 ZZH RX MED GY IP 250 OP 636 PS 637: Performed by: INTERNAL MEDICINE

## 2018-01-30 PROCEDURE — 94640 AIRWAY INHALATION TREATMENT: CPT | Mod: 76

## 2018-01-30 PROCEDURE — 40000275 ZZH STATISTIC RCP TIME EA 10 MIN

## 2018-01-30 PROCEDURE — 00000146 ZZHCL STATISTIC GLUCOSE BY METER IP

## 2018-01-30 PROCEDURE — 12000007 ZZH R&B INTERMEDIATE

## 2018-01-30 RX ADMIN — PREDNISONE 60 MG: 10 TABLET ORAL at 09:15

## 2018-01-30 RX ADMIN — MINERAL OIL AND WHITE PETROLATUM: 150; 830 OINTMENT OPHTHALMIC at 21:02

## 2018-01-30 RX ADMIN — IPRATROPIUM BROMIDE AND ALBUTEROL SULFATE 3 ML: .5; 3 SOLUTION RESPIRATORY (INHALATION) at 15:22

## 2018-01-30 RX ADMIN — INSULIN ASPART 1 UNITS: 100 INJECTION, SOLUTION INTRAVENOUS; SUBCUTANEOUS at 21:04

## 2018-01-30 RX ADMIN — CEFUROXIME AXETIL 500 MG: 250 TABLET ORAL at 09:14

## 2018-01-30 RX ADMIN — CEFUROXIME AXETIL 500 MG: 250 TABLET ORAL at 20:29

## 2018-01-30 RX ADMIN — MEMANTINE HYDROCHLORIDE 28 MG: 28 CAPSULE, EXTENDED RELEASE ORAL at 09:14

## 2018-01-30 RX ADMIN — HEPARIN SODIUM 5000 UNITS: 5000 INJECTION, SOLUTION INTRAVENOUS; SUBCUTANEOUS at 09:15

## 2018-01-30 RX ADMIN — DILTIAZEM HYDROCHLORIDE 60 MG: 60 CAPSULE, EXTENDED RELEASE ORAL at 20:29

## 2018-01-30 RX ADMIN — DILTIAZEM HYDROCHLORIDE 60 MG: 60 CAPSULE, EXTENDED RELEASE ORAL at 09:15

## 2018-01-30 RX ADMIN — IPRATROPIUM BROMIDE AND ALBUTEROL SULFATE 3 ML: .5; 3 SOLUTION RESPIRATORY (INHALATION) at 11:25

## 2018-01-30 RX ADMIN — DEXTRAN 70, AND HYPROMELLOSE 2910 1 DROP: 1; 3 SOLUTION/ DROPS OPHTHALMIC at 09:16

## 2018-01-30 RX ADMIN — IPRATROPIUM BROMIDE AND ALBUTEROL SULFATE 3 ML: .5; 3 SOLUTION RESPIRATORY (INHALATION) at 20:10

## 2018-01-30 RX ADMIN — HEPARIN SODIUM 5000 UNITS: 5000 INJECTION, SOLUTION INTRAVENOUS; SUBCUTANEOUS at 20:29

## 2018-01-30 RX ADMIN — ASPIRIN 81 MG 81 MG: 81 TABLET ORAL at 09:15

## 2018-01-30 RX ADMIN — IPRATROPIUM BROMIDE AND ALBUTEROL SULFATE 3 ML: .5; 3 SOLUTION RESPIRATORY (INHALATION) at 07:32

## 2018-01-30 RX ADMIN — INSULIN ASPART 2 UNITS: 100 INJECTION, SOLUTION INTRAVENOUS; SUBCUTANEOUS at 17:57

## 2018-01-30 RX ADMIN — DULOXETINE HYDROCHLORIDE 120 MG: 30 CAPSULE, DELAYED RELEASE ORAL at 09:14

## 2018-01-30 RX ADMIN — AMLODIPINE BESYLATE 10 MG: 10 TABLET ORAL at 09:15

## 2018-01-30 RX ADMIN — OLANZAPINE 10 MG: 5 TABLET, FILM COATED ORAL at 21:02

## 2018-01-30 RX ADMIN — DEXTRAN 70, AND HYPROMELLOSE 2910 1 DROP: 1; 3 SOLUTION/ DROPS OPHTHALMIC at 20:31

## 2018-01-30 ASSESSMENT — ACTIVITIES OF DAILY LIVING (ADL)
ADLS_ACUITY_SCORE: 13
ADLS_ACUITY_SCORE: 12
ADLS_ACUITY_SCORE: 13
ADLS_ACUITY_SCORE: 12

## 2018-01-30 NOTE — PLAN OF CARE
Problem: Patient Care Overview  Goal: Plan of Care/Patient Progress Review  Transfers SBA.  Pt is disorientated to situation.  Denies pain.  VSS.  Tele SR. LS dim.  SMITH.  Encouraged use of IS.  Pt continues on Prednisone & ceftin.   Pt voiding w/o difficulty.  TCU when ready for DC.   Pt will need event monitor at DC.

## 2018-01-30 NOTE — PROGRESS NOTES
"CLINICAL NUTRITION SERVICES  -  ASSESSMENT NOTE      Malnutrition:     Severe malnutrition in the context of chronic illness     REASON FOR ASSESSMENT  Kelly Nunez is a 67 year old female seen by the dietitian for LOS  Hx of DM, dementia, underlying COPD    NUTRITION HISTORY  - Information obtained from patient  - Patient is on a regular diet at home - chronically low intake.  - Typical food/fluid intake PTA:  Breakfast: egs (made by )  Lunch: often skips - doesn't prepare food for self and  goes to work  Dinner: \"big meal\" based on patient report, family suggested this is only a small portion of food  Previously consumed [place] or Oral nutrition supplements but not recently  Issues chewing or swallowing: denies  Endorses decreased appetite PTA  Food allergies/intolerances: NKFA and denies food intolerances    CURRENT NUTRITION ORDERS  - Diet: regular  - Supplement: none   Current Intake/Tolerance:  - Per flow sheet review, % intake for documented meals. Patient reports good appetite and overall volume has increased with meals readily available, ordering TID  - Factors affecting nutrition intake include: none since admit, SOB and decreased appetite PTA    PHYSICAL FINDINGS  Observed  Overall very thin  Fat wasting:    Orbital region and surrounding area - somewhat hollow look    Upper arm region / triceps/biceps - fingers touch, very little space between folds with loose skin and bone structure very pronounced    Thoracic and lumbar region: midaxillary line, lower back, ribs - depression between ribs very apparent, iliac crest very prominent; ribs apparent, depressions between them less pronounced; chest is full, ribs do not show, slight protrusion of the iliac crest  Muscle wasting:    Temple - at least moderate hollowing    Clavicle and acromion bone region: deltoid muscle - shoulder to arm joint look square and clavicle protruding    Dorsal hand / Interosseous Muscle - depressed area " "between thumb and forefinger  Did not observe lower extremities - pants on  Obtained from Chart/Interdisciplinary Team  Cachectic, emaciated, poor skin turgor    ANTHROPOMETRICS  Height: 5' 3\"  Weight: 41.7 kg (92 lb)  Body mass index is 16.3 kg/(m^2).  Weight Status:  Underweight BMI <18.5  Ideal body weight: 52.3 kg+/- 10%, 80% of IBW    Weight History:  Unable to evaluate with no previous weight documentation in EMR. Family endorses stable weight loss after losing weight 1-2 years ago  Wt Readings from Last 10 Encounters:   01/23/18 41.7 kg (92 lb)       ASSESSED NUTRITION NEEDS (PER APPROVED PRACTICE GUIDELINES, Dosing weight: 41.7 kg)  Estimated Energy Needs: 6028-9420+ kcals (35-40 Kcal/Kg)  Justification: underweight  Estimated Protein Needs: 50-75+ grams protein (1.2-1.8 g pro/Kg)  Justification: Repletion  Estimated Fluid Needs: >1 mL/Kcal  Justification: maintenance    LABS  Labs reviewed    MEDICATIONS  Medications reviewed  Prednisone    MALNUTRITION:  % Weight Loss:Weight loss does not meet criteria for malnutrition, chronically underweight  % Intake:</= 75% for >/= 1 month (severe malnutrition)  Subcutaneous Fat Loss: Moderate to severe, as noted above  Muscle Loss: Severe, as noted above  Fluid Retention:None noted    Malnutrition Diagnosis: Severe malnutrition  In Context of:  Chronic illness or disease    NUTRITION DIAGNOSIS:  Malnutrition related to inadequate intake and increased metabolic demands 2/2 COPD as evidenced by fat and muscle wasting, BMI of 16.3 and chronically poor PO likely meeting <75% of needs for >1 month    INTERVENTIONS  Recommendations / Nutrition Prescription  Continue diet as ordered. If appetite drops during admit, scheduled Oral nutrition supplements    MVI+M if remains hospitalized (d/c to TCU pending)    Implementation  Nutrition education: Provided education on tips for increasing protein and nutrient-dense snacks:    Nutrition Education (Content):  a. Provided " handouts:    Tips to increase calories in your diet    Tips to increase protein in your diet    High calorie high protein recipes  b. Discussed role of adequate calories and protein to maintain LBM, prevent further weight loss.   c. Reviewed nutrient/energy dense foods and snacks, protein sources    Nutrition Education (Application):  a. Discusses small, frequent meals and source of protein at each meal/snack with snacks at bedside.   b. Also reviewed oral nutrition supplement options and use of powdered milk, encouraged consumption at d/c as appropriate to promote ongoing intake    Anticipate good compliance with family support    Left RD contact information should future questions arise  Collaboration and Referral of care: Discussed patient during interdisciplinary care rounds this morning    Goals  Patient to consume >/= 75% of meals TID   Weight >41.7 kg during review period    MONITORING AND EVALUATION:  Progress towards goals will be monitored and evaluated per protocol and Practice Guidelines      Cheryl Ardon RDN, LD, [facility]  Pager - 3rd floor/ICU: [phone]  Pager - All other floors: [phone]  Pager - Weekend/holiday: [phone]  Office: [phone]

## 2018-01-30 NOTE — PLAN OF CARE
Problem: Patient Care Overview  Goal: Plan of Care/Patient Progress Review  Outcome: Improving  Patient improving SBA. Ready for discharge, SW looking for TCU placement

## 2018-01-30 NOTE — PLAN OF CARE
Problem: Patient Care Overview  Goal: Plan of Care/Patient Progress Review  Outcome: No Change  Confused, up with assist of one and walker. Patient on Prednisone and Ceftin PO, continue to monitor.

## 2018-01-30 NOTE — PROGRESS NOTES
[facility]  Hospitalist Progress Note  Name: Kelly Nunez    MRN: [id number]  Provider: Tulio Thomas MD  01/30/2018    Initial presenting complaint/issue to hospital (Diagnosis): acute resp distress.         Assessment and Plan:      Summary of Stay: Kelly Nunez is a 67 year old female admitted on 1/23/2018 with acute respiratory distress. Hx of dementia, DM, likely underlying COPD who presented with SOB.        Problem List:       # Acute respiratory distress likely due to CAP with probable superimposed severe COPD.  - On IV rocephin and azithromycin.--> now switched to Ceftin   - On oxygen, nebs and steroids, changed to PO prednisone       # PAF.  - Converted spontaneously to NSR.  - Cardiology consulted and plan to hold OAC due to concern for falls.   -- Changed from procardia to diltiazem   - TTE showed grade 2 DD and moderate to severe pulm HTN.  - Needs event monitor on d/c and fup with cards as outpatient to re-visit       # Dementia with superimposed acute encephalopathy.  - Likely due to pneumonia.  - Monitor, avoid benzo's.  - On namenda.  - On zyprexa.      #. HTN urgency. BP better now.   - On norvasc and diltiazem    Discussed in detail with daughter Brianna       DVT Prophylaxis:  -  SQ heparin.    Code Status: Full Code  Discharge Dispo: TCU.  Estimated Disch Date / # of Days until Discharge:  pending bed availability at TCU, anticipate 1 day         Interval History:        Chart reviewed and patient seen. Case discussed with nursing staff.     Doing well, breathing is stable, dyspnea with activity, states she is not very active at baseline, oxygenation stable on 2L, Denies any chest pain,  No nausea, vomiting or diarrhea. No other complaints voiced.                     Physical Exam:      Last Vital Signs:  Temp: 97.8  F (36.6  C) Temp src: Oral BP: 146/70   Heart Rate: 92 Resp: 18 SpO2: 92 % O2 Device: Nasal cannula Oxygen Delivery: 2 LPM    I/O last 3 completed  shifts:  In: 720 [P.O.:720]  Out: -   Vitals:    01/23/18 1512   Weight: 41.7 kg (92 lb)     Gen - Alert, awake, Cachetic and appears much older than stated age.  Lungs - diminished BS bilaterally. No crackles, no wheezing   Heart - RRR,S1+S2 nml, no m/g/r.  Abd - soft, NT, ND, + BS.  Ext - no edema.  Neuro - oriented to self, unsure of month/year. , moving all extremities          Medications:      All current medications were reviewed.         Data:      All new lab and imaging data was reviewed.   Labs:  No results for input(s): CULT in the last 168 hours.    Recent Labs  Lab 01/30/18 0743 01/28/18 0718 01/26/18  0700 01/25/18  0727   WBC 4.7  --  6.3 8.1   HGB 15.1  --  14.7 14.9   HCT 46.1  --  44.5 44.3   MCV 97  --  97 97    305 287 274       Recent Labs  Lab 01/30/18  0743 01/26/18  0700 01/25/18  0727 01/23/18  1524    139 138  < > 130*   POTASSIUM 3.8 3.6 3.8  < > 3.6   CHLORIDE 101 98 99  < > 91*   CO2 37* 37* 34*  < > 28   ANIONGAP 4 4 5  < > 11   GLC 79 153* 157*  < > 195*   BUN 17 20 20  < > 32*   CR 0.46* 0.45* 0.46*  < > 1.03   GFRESTIMATED >90 >90 >90  < > 54*   GFRESTBLACK >90 >90 >90  < > 65   ATTILA 8.3* 8.5 8.3*  < > 8.7   MAG  --   --   --   --  2.5*   PROTTOTAL  --   --   --   --  8.2   ALBUMIN  --   --   --   --  3.5   BILITOTAL  --   --   --   --  0.5   ALKPHOS  --   --   --   --  110   AST  --   --   --   --  101*   ALT  --   --   --   --  70*   < > = values in this interval not displayed.   Recent Imaging:   No results found for this or any previous visit (from the past 24 hour(s)).

## 2018-01-31 LAB
ALBUMIN SERPL-MCNC: 2.6 G/DL (ref 3.4–5)
ALP SERPL-CCNC: 47 U/L (ref 40–150)
ALT SERPL W P-5'-P-CCNC: 64 U/L (ref 0–50)
AST SERPL W P-5'-P-CCNC: 38 U/L (ref 0–45)
BILIRUB DIRECT SERPL-MCNC: <0.1 MG/DL (ref 0–0.2)
BILIRUB SERPL-MCNC: 0.2 MG/DL (ref 0.2–1.3)
GLUCOSE BLDC GLUCOMTR-MCNC: 124 MG/DL (ref 70–99)
GLUCOSE BLDC GLUCOMTR-MCNC: 147 MG/DL (ref 70–99)
GLUCOSE BLDC GLUCOMTR-MCNC: 179 MG/DL (ref 70–99)
GLUCOSE BLDC GLUCOMTR-MCNC: 196 MG/DL (ref 70–99)
GLUCOSE BLDC GLUCOMTR-MCNC: 83 MG/DL (ref 70–99)
INTERPRETATION ECG - MUSE: NORMAL
PLATELET # BLD AUTO: 334 10E9/L (ref 150–450)
PROT SERPL-MCNC: 6 G/DL (ref 6.8–8.8)

## 2018-01-31 PROCEDURE — 36415 COLL VENOUS BLD VENIPUNCTURE: CPT | Performed by: INTERNAL MEDICINE

## 2018-01-31 PROCEDURE — 25000132 ZZH RX MED GY IP 250 OP 250 PS 637: Performed by: INTERNAL MEDICINE

## 2018-01-31 PROCEDURE — 85049 AUTOMATED PLATELET COUNT: CPT | Performed by: INTERNAL MEDICINE

## 2018-01-31 PROCEDURE — 99232 SBSQ HOSP IP/OBS MODERATE 35: CPT | Performed by: INTERNAL MEDICINE

## 2018-01-31 PROCEDURE — 25000131 ZZH RX MED GY IP 250 OP 636 PS 637: Performed by: INTERNAL MEDICINE

## 2018-01-31 PROCEDURE — 25000125 ZZHC RX 250: Performed by: INTERNAL MEDICINE

## 2018-01-31 PROCEDURE — 40000274 ZZH STATISTIC RCP CONSULT EA 30 MIN

## 2018-01-31 PROCEDURE — 80076 HEPATIC FUNCTION PANEL: CPT | Performed by: INTERNAL MEDICINE

## 2018-01-31 PROCEDURE — 94640 AIRWAY INHALATION TREATMENT: CPT | Mod: 76

## 2018-01-31 PROCEDURE — 25000125 ZZHC RX 250: Performed by: CLINICAL NURSE SPECIALIST

## 2018-01-31 PROCEDURE — 40000275 ZZH STATISTIC RCP TIME EA 10 MIN

## 2018-01-31 PROCEDURE — 00000146 ZZHCL STATISTIC GLUCOSE BY METER IP

## 2018-01-31 PROCEDURE — 25000128 H RX IP 250 OP 636: Performed by: INTERNAL MEDICINE

## 2018-01-31 PROCEDURE — 94640 AIRWAY INHALATION TREATMENT: CPT

## 2018-01-31 PROCEDURE — 12000007 ZZH R&B INTERMEDIATE

## 2018-01-31 RX ORDER — DILTIAZEM HYDROCHLORIDE 180 MG/1
180 CAPSULE, COATED, EXTENDED RELEASE ORAL DAILY
Status: DISCONTINUED | OUTPATIENT
Start: 2018-01-31 | End: 2018-02-05 | Stop reason: HOSPADM

## 2018-01-31 RX ORDER — LOSARTAN POTASSIUM 50 MG/1
50 TABLET ORAL DAILY
Status: DISCONTINUED | OUTPATIENT
Start: 2018-01-31 | End: 2018-02-02

## 2018-01-31 RX ADMIN — CEFUROXIME AXETIL 500 MG: 250 TABLET ORAL at 08:43

## 2018-01-31 RX ADMIN — CEFUROXIME AXETIL 500 MG: 250 TABLET ORAL at 21:06

## 2018-01-31 RX ADMIN — LOSARTAN POTASSIUM 50 MG: 50 TABLET ORAL at 08:44

## 2018-01-31 RX ADMIN — ASPIRIN 81 MG 81 MG: 81 TABLET ORAL at 08:44

## 2018-01-31 RX ADMIN — PREDNISONE 60 MG: 10 TABLET ORAL at 08:44

## 2018-01-31 RX ADMIN — MEMANTINE HYDROCHLORIDE 28 MG: 28 CAPSULE, EXTENDED RELEASE ORAL at 08:44

## 2018-01-31 RX ADMIN — IPRATROPIUM BROMIDE AND ALBUTEROL SULFATE 3 ML: .5; 3 SOLUTION RESPIRATORY (INHALATION) at 19:25

## 2018-01-31 RX ADMIN — OLANZAPINE 10 MG: 5 TABLET, FILM COATED ORAL at 21:06

## 2018-01-31 RX ADMIN — IPRATROPIUM BROMIDE AND ALBUTEROL SULFATE 3 ML: .5; 3 SOLUTION RESPIRATORY (INHALATION) at 07:26

## 2018-01-31 RX ADMIN — DEXTRAN 70, AND HYPROMELLOSE 2910 1 DROP: 1; 3 SOLUTION/ DROPS OPHTHALMIC at 08:52

## 2018-01-31 RX ADMIN — MINERAL OIL AND WHITE PETROLATUM: 150; 830 OINTMENT OPHTHALMIC at 21:06

## 2018-01-31 RX ADMIN — HEPARIN SODIUM 5000 UNITS: 5000 INJECTION, SOLUTION INTRAVENOUS; SUBCUTANEOUS at 08:44

## 2018-01-31 RX ADMIN — IPRATROPIUM BROMIDE AND ALBUTEROL SULFATE 3 ML: .5; 3 SOLUTION RESPIRATORY (INHALATION) at 11:19

## 2018-01-31 RX ADMIN — HEPARIN SODIUM 5000 UNITS: 5000 INJECTION, SOLUTION INTRAVENOUS; SUBCUTANEOUS at 21:06

## 2018-01-31 RX ADMIN — DILTIAZEM HYDROCHLORIDE 180 MG: 180 CAPSULE, COATED, EXTENDED RELEASE ORAL at 08:44

## 2018-01-31 RX ADMIN — INSULIN ASPART 1 UNITS: 100 INJECTION, SOLUTION INTRAVENOUS; SUBCUTANEOUS at 18:04

## 2018-01-31 RX ADMIN — IPRATROPIUM BROMIDE AND ALBUTEROL SULFATE 3 ML: .5; 3 SOLUTION RESPIRATORY (INHALATION) at 15:14

## 2018-01-31 RX ADMIN — DEXTRAN 70, AND HYPROMELLOSE 2910 1 DROP: 1; 3 SOLUTION/ DROPS OPHTHALMIC at 21:05

## 2018-01-31 RX ADMIN — DULOXETINE HYDROCHLORIDE 120 MG: 30 CAPSULE, DELAYED RELEASE ORAL at 08:44

## 2018-01-31 ASSESSMENT — ACTIVITIES OF DAILY LIVING (ADL)
ADLS_ACUITY_SCORE: 12

## 2018-01-31 NOTE — PROGRESS NOTES
[facility]    Palliative Care Chart Check    This patient's most recent hospitalist note, medication profile and labs from the past 24 hours have been reviewed.    Recommendation:   1. Dementia - does not demonstrate medical capacity. Family is assisting in care plan.      2.  Dyspnea - Continues to improve.     3. Weakness - Appreciate input of Occupational Therapist Zana Marsh, OT and Physical Therapist Marjan Cadena, PT.  Anna Arnold CM has seen regarding care transitions.      4. Anxiety - due to tobacco withdrawal and dyspnea. Continue restorative efforts. Avoid benzodiazepines.      Goals of Care: FULL CODE - Restorative care.  Plan for TCU on dismissal.  It has been determined that no change is necessary to the current plan noted above.   The chart will be reviewed regularly and the patient will be seen if necessary.   If you would like the patient to be seen, please contact the service at [phone] and ask to have the patient seen.  Time Spent 5  minutes, none in direct contact with patient or family.    Thank you!    Brissa Lopez   Pain Management and Palliative Care  [facility]  Pgr: [phone]

## 2018-01-31 NOTE — PROGRESS NOTES
"[facility] RCAT     Date: 01/31/2018  Admission Dx: acute resp distress, COPD Ex  Pulmonary History: COPD, Sm 1 1/2/ppd since teenager  Home Nebulizer/MDI Use: None  Home Oxygen: None  Acuity Level (RCAT flow sheet): 3  Aerosol Therapy initiated: Duoneb QID, Xopenex Q4 prn  Pulmonary Hygiene initiated: COugh and deep breathing techniques  Volume Expansion initiated: IS TID  Current Oxygen Requirements: 3 LPM NC  Current SpO2: 95%  Re-evaluation date:02/30/2018  Patient Education:  Ongoing education was performed with the patient in regards to indications/benefits and possible side effects of the bronchodilators. Will continue to do education with patient.     See \"RT Assessments\" flow sheet for patient assessment scoring and Acuity Level Details.       Vital signs:  Temp: 97.5  F (36.4  C) Temp src: Oral BP: 129/75   Heart Rate: 90 Resp: 16 SpO2: 95 % O2 Device: Nasal cannula Oxygen Delivery: 3 LPM Height: 160 cm (5' 2.99\") Weight: 41.7 kg (92 lb)  Estimated body mass index is 16.3 kg/(m^2) as calculated from the following:    Height as of this encounter: 1.6 m (5' 3\").    Weight as of this encounter: 41.7 kg (92 lb).      PAST MEDICAL HISTORY:   Past Medical History:   Diagnosis Date     Dementia      Diabetes (H)        PAST SURGICAL HISTORY:   Past Surgical History:   Procedure Laterality Date     HEART CATH, ANGIOPLASTY         FAMILY HISTORY: No family history on file.    SOCIAL HISTORY:   Social History   Substance Use Topics     Smoking status: Current Every Day Smoker     Packs/day: 1.00     Smokeless tobacco: Never Used     Alcohol use Yes      Comment: 1/week     Study Result      CT CHEST WITH CONTRAST  1/23/2018  6:47 PM     HISTORY: Shortness of breath. Evaluate for pulmonary embolism.     COMPARISON: A radiograph earlier today.     TECHNIQUE: Routine transverse CT imaging of the chest was performed  following the uneventful intravenous administration of 46mL Isovue-370  contrast. A pulmonary embolism protocol " was utilized. Radiation dose  for this scan was reduced using automated exposure control, adjustment  of the mA and/or kV according to patient size, or iterative  reconstruction technique.     FINDINGS: The heart size is normal. No enlarged lymph node or other  abnormal mediastinal mass is seen. There is calcification of the  thoracic aorta. There is very good opacification of the pulmonary  arteries with contrast. No pulmonary embolism is seen. Emphysematous  changes are seen in both lungs. There is more prominent abnormality in  the superior aspect of the right lung adjacent to the major fissure.  This consists of more prominent emphysematous change with diffuse  thickening of the adjacent pulmonary parenchyma. This area measures  just over 8 cm transverse. This appears to be a chronic process. There  is a small area of consolidation in the anterior aspect of the lingula  of the left lung. There is additional mild atelectasis in the left  lung base. The lungs are otherwise clear. No pneumothorax is  demonstrated. No pleural effusion is identified. There are  degenerative changes in the spine. No chest wall pathology is seen.  The visualized upper abdomen is unremarkable.         IMPRESSION:   1. There is a small area of consolidation of the lingula of the left  lung. An infiltrate/pneumonia cannot be excluded.  2. Diffuse emphysematous changes throughout both lungs. There is a  prominent area of more extensive emphysematous change and probable  chronic scarring within the right upper lung.   3. No evidence of pulmonary embolism.     JR FENG MD           Prior to Admission medications    Medication Sig Last Dose Taking? Auth Provider   DULOXETINE HCL PO Take 120 mg by mouth daily   Yes Reported, Patient   OLANZAPINE PO Take 10 mg by mouth At Bedtime   Yes Reported, Patient   ASPIRIN PO Take 81 mg by mouth daily   Yes Reported, Patient   memantine XR (NAMENDA XR) 28 MG 24 hr capsule Take 28 mg by mouth  daily   Yes Unknown, Entered By History   NIFEdipine ER (ADALAT CC) 30 MG TB24 Take 30 mg by mouth 2 times daily   Yes Unknown, Entered By History      Adam Glover RT  1/31/2018

## 2018-01-31 NOTE — PLAN OF CARE
Problem: Patient Care Overview  Goal: Plan of Care/Patient Progress Review  Outcome: No Change  Vss and afebrile. O2 sats stable on 2.5L via NC. . Pt denies CP, SOB, N/V, pain. Disoriented to situation, time intermittently. Up SBA and walker. Tele NSR. Cont with POC.

## 2018-01-31 NOTE — CONSULTS
SWS:  SW aware of dc planning consult. TCU referrals sent. Daughter reviewing list of facilities and also looking into [facility] in [place].  CTS RN also following.  SW will complete consult but continue to follow.

## 2018-01-31 NOTE — PROGRESS NOTES
[facility]  Hospitalist Progress Note  Name: Kelly Nunez    MRN: [id number]  Provider: Tulio Thomas MD  01/31/2018    Initial presenting complaint/issue to hospital (Diagnosis): acute resp distress.         Assessment and Plan:      Summary of Stay: Kelly Nunez is a 67 year old female admitted on 1/23/2018 with acute respiratory distress. Hx of dementia, DM, likely underlying COPD who presented with SOB.        Problem List:       # Acute respiratory distress likely due to CAP with probable superimposed severe COPD.  - On IV rocephin and azithromycin.--> now switched to Ceftin   - On oxygen, nebs and steroids, changed to PO prednisone       # PAF.  - Converted spontaneously to NSR.  - Cardiology consulted and plan to hold OAC due to concern for falls.   -- Changed from procardia to diltiazem   - TTE showed grade 2 DD and moderate to severe pulm HTN.  - Needs event monitor on d/c and fup with cards as outpatient to re-visit       # Dementia with superimposed acute encephalopathy.  - Likely due to pneumonia.  - Monitor, avoid benzo's.  - On namenda.  - On zyprexa.      #. HTN urgency. BP better but still high at times   - On diltiazem, stop amlodipine and start losartan 50 mg daily     Discussed in detail with daughter Brianna       DVT Prophylaxis:  -  SQ heparin.    Code Status: Full Code  Discharge Dispo: TCU.  Estimated Disch Date / # of Days until Discharge:  pending bed availability at TCU, SW consulted, family also looking at memory care , will need oxygen at discharge         Interval History:        Chart reviewed and patient seen. Case discussed with nursing staff.     Doing well, breathing is stable, dyspnea with activity, oxygenation stable , tolerating PO, some confusion persists, Denies any chest pain,  No nausea, vomiting or diarrhea. No other complaints voiced.                     Physical Exam:      Last Vital Signs:  Temp: 97.5  F (36.4  C) Temp src: Oral BP: 129/75    Heart Rate: 90 Resp: 16 SpO2: 92 % O2 Device: Nasal cannula Oxygen Delivery: 3 LPM    I/O last 3 completed shifts:  In: 240 [P.O.:240]  Out: -   Vitals:    01/23/18 1512   Weight: 41.7 kg (92 lb)     Gen - Alert, awake, Cachetic and appears much older than stated age.  Lungs - diminished BS bilaterally. No crackles, no wheezing   Heart - RRR,S1+S2 nml, no m/g/r.  Abd - soft, NT, ND, + BS.  Ext - no edema.  Neuro - oriented to self, knows in hospital, intermittent confusion. , moving all extremities          Medications:      All current medications were reviewed.         Data:      All new lab and imaging data was reviewed.   Labs:  No results for input(s): CULT in the last 168 hours.    Recent Labs  Lab 01/31/18 0730 01/30/18 0743 01/28/18 0718 01/26/18  0700 01/25/18  0727   WBC  --  4.7  --  6.3 8.1   HGB  --  15.1  --  14.7 14.9   HCT  --  46.1  --  44.5 44.3   MCV  --  97  --  97 97    334 305 287 274       Recent Labs  Lab 01/31/18 0730 01/30/18 0743 01/26/18 0700 01/25/18  0727   NA  --  142 139 138   POTASSIUM  --  3.8 3.6 3.8   CHLORIDE  --  101 98 99   CO2  --  37* 37* 34*   ANIONGAP  --  4 4 5   GLC  --  79 153* 157*   BUN  --  17 20 20   CR  --  0.46* 0.45* 0.46*   GFRESTIMATED  --  >90 >90 >90   GFRESTBLACK  --  >90 >90 >90   ATTILA  --  8.3* 8.5 8.3*   PROTTOTAL 6.0*  --   --   --    ALBUMIN 2.6*  --   --   --    BILITOTAL 0.2  --   --   --    ALKPHOS 47  --   --   --    AST 38  --   --   --    ALT 64*  --   --   --       Recent Imaging:   No results found for this or any previous visit (from the past 24 hour(s)).

## 2018-01-31 NOTE — PROGRESS NOTES
Patient's daughter contacted CM this am re: TCU placement. Her father, pt's spouse, has visited [facility] in [place]. They have the ability to place her in LTC/MC. Waiting to hear from [facility] re: possible bed tomorrow, 2/1/18.     CM will continue to follow patient until discharge for any additional needs.     Yasmin Arnold RN, BSN, CTS  [facility]  [phone]    Addendum 2:50pm - received call from Emelyn at [facility]. They will NOT have an opening this week. Need additional choices from family.    Ds    Addendum 3:15pm - Received call from Kendall, patient's . He has been unable to connect with anyone from [facility]. CM asked for additional choices. [facility] [address]  [facility]  [facility]) [facility]. Sent referrals. Updated SW.      ely

## 2018-02-01 LAB
GLUCOSE BLDC GLUCOMTR-MCNC: 110 MG/DL (ref 70–99)
GLUCOSE BLDC GLUCOMTR-MCNC: 175 MG/DL (ref 70–99)
GLUCOSE BLDC GLUCOMTR-MCNC: 183 MG/DL (ref 70–99)
GLUCOSE BLDC GLUCOMTR-MCNC: 196 MG/DL (ref 70–99)
GLUCOSE BLDC GLUCOMTR-MCNC: 88 MG/DL (ref 70–99)

## 2018-02-01 PROCEDURE — 40000275 ZZH STATISTIC RCP TIME EA 10 MIN

## 2018-02-01 PROCEDURE — 99232 SBSQ HOSP IP/OBS MODERATE 35: CPT | Performed by: INTERNAL MEDICINE

## 2018-02-01 PROCEDURE — 25000128 H RX IP 250 OP 636: Performed by: INTERNAL MEDICINE

## 2018-02-01 PROCEDURE — 00000146 ZZHCL STATISTIC GLUCOSE BY METER IP

## 2018-02-01 PROCEDURE — 94640 AIRWAY INHALATION TREATMENT: CPT | Mod: 76

## 2018-02-01 PROCEDURE — 99207 ZZC CDG-MDM COMPONENT: MEETS MODERATE - UP CODED: CPT | Performed by: INTERNAL MEDICINE

## 2018-02-01 PROCEDURE — 25000125 ZZHC RX 250: Performed by: INTERNAL MEDICINE

## 2018-02-01 PROCEDURE — 25000132 ZZH RX MED GY IP 250 OP 250 PS 637: Performed by: INTERNAL MEDICINE

## 2018-02-01 PROCEDURE — 12000007 ZZH R&B INTERMEDIATE

## 2018-02-01 PROCEDURE — 40000893 ZZH STATISTIC PT IP EVAL DEFER: Performed by: PHYSICAL THERAPIST

## 2018-02-01 PROCEDURE — 25000125 ZZHC RX 250: Performed by: CLINICAL NURSE SPECIALIST

## 2018-02-01 PROCEDURE — 25000131 ZZH RX MED GY IP 250 OP 636 PS 637: Performed by: INTERNAL MEDICINE

## 2018-02-01 PROCEDURE — 94640 AIRWAY INHALATION TREATMENT: CPT

## 2018-02-01 RX ORDER — PREDNISONE 20 MG/1
40 TABLET ORAL DAILY
Status: DISCONTINUED | OUTPATIENT
Start: 2018-02-02 | End: 2018-02-04

## 2018-02-01 RX ORDER — CEFUROXIME AXETIL 250 MG/1
500 TABLET ORAL EVERY 12 HOURS SCHEDULED
Status: COMPLETED | OUTPATIENT
Start: 2018-02-01 | End: 2018-02-01

## 2018-02-01 RX ADMIN — DULOXETINE HYDROCHLORIDE 120 MG: 30 CAPSULE, DELAYED RELEASE ORAL at 08:30

## 2018-02-01 RX ADMIN — CEFUROXIME AXETIL 500 MG: 250 TABLET ORAL at 08:30

## 2018-02-01 RX ADMIN — INSULIN ASPART 1 UNITS: 100 INJECTION, SOLUTION INTRAVENOUS; SUBCUTANEOUS at 13:03

## 2018-02-01 RX ADMIN — IPRATROPIUM BROMIDE AND ALBUTEROL SULFATE 3 ML: .5; 3 SOLUTION RESPIRATORY (INHALATION) at 11:27

## 2018-02-01 RX ADMIN — MEMANTINE HYDROCHLORIDE 28 MG: 28 CAPSULE, EXTENDED RELEASE ORAL at 08:30

## 2018-02-01 RX ADMIN — IPRATROPIUM BROMIDE AND ALBUTEROL SULFATE 3 ML: .5; 3 SOLUTION RESPIRATORY (INHALATION) at 07:41

## 2018-02-01 RX ADMIN — PREDNISONE 60 MG: 10 TABLET ORAL at 08:30

## 2018-02-01 RX ADMIN — LOSARTAN POTASSIUM 50 MG: 50 TABLET ORAL at 08:31

## 2018-02-01 RX ADMIN — IPRATROPIUM BROMIDE AND ALBUTEROL SULFATE 3 ML: .5; 3 SOLUTION RESPIRATORY (INHALATION) at 15:20

## 2018-02-01 RX ADMIN — CEFUROXIME AXETIL 500 MG: 250 TABLET ORAL at 19:55

## 2018-02-01 RX ADMIN — HEPARIN SODIUM 5000 UNITS: 5000 INJECTION, SOLUTION INTRAVENOUS; SUBCUTANEOUS at 08:31

## 2018-02-01 RX ADMIN — DILTIAZEM HYDROCHLORIDE 180 MG: 180 CAPSULE, COATED, EXTENDED RELEASE ORAL at 08:30

## 2018-02-01 RX ADMIN — GUAIFENESIN AND DEXTROMETHORPHAN HYDROBROMIDE 1 TABLET: 600; 30 TABLET, EXTENDED RELEASE ORAL at 21:19

## 2018-02-01 RX ADMIN — INSULIN ASPART 2 UNITS: 100 INJECTION, SOLUTION INTRAVENOUS; SUBCUTANEOUS at 18:46

## 2018-02-01 RX ADMIN — DEXTRAN 70, AND HYPROMELLOSE 2910 1 DROP: 1; 3 SOLUTION/ DROPS OPHTHALMIC at 21:17

## 2018-02-01 RX ADMIN — HEPARIN SODIUM 5000 UNITS: 5000 INJECTION, SOLUTION INTRAVENOUS; SUBCUTANEOUS at 19:54

## 2018-02-01 RX ADMIN — ASPIRIN 81 MG 81 MG: 81 TABLET ORAL at 08:30

## 2018-02-01 RX ADMIN — OLANZAPINE 10 MG: 5 TABLET, FILM COATED ORAL at 21:18

## 2018-02-01 RX ADMIN — GUAIFENESIN AND DEXTROMETHORPHAN HYDROBROMIDE 1 TABLET: 600; 30 TABLET, EXTENDED RELEASE ORAL at 11:26

## 2018-02-01 RX ADMIN — MINERAL OIL AND WHITE PETROLATUM: 150; 830 OINTMENT OPHTHALMIC at 21:19

## 2018-02-01 RX ADMIN — IPRATROPIUM BROMIDE AND ALBUTEROL SULFATE 3 ML: .5; 3 SOLUTION RESPIRATORY (INHALATION) at 19:59

## 2018-02-01 ASSESSMENT — ACTIVITIES OF DAILY LIVING (ADL)
ADLS_ACUITY_SCORE: 11
ADLS_ACUITY_SCORE: 12
ADLS_ACUITY_SCORE: 12
ADLS_ACUITY_SCORE: 11
ADLS_ACUITY_SCORE: 11
ADLS_ACUITY_SCORE: 12

## 2018-02-01 NOTE — PLAN OF CARE
Problem: Patient Care Overview  Goal: Plan of Care/Patient Progress Review  Outcome: Improving  Pt on 2.5-3 lpm/NC. Sats 92%. LS diminished/coarse. Encouraged cough/deep breathing/IS use. Pt ambulated in the schroeder with assist of 1; 02 sats dropped to 85% on 3 lpm/NC; recovered within 2 minutes on 4 lpm. Denied SOB at the time, but appeared dyspneic. Up in chair most of the day. Denies pain. Tele- SR. Afebrile. Plan for TCU when available.

## 2018-02-01 NOTE — PLAN OF CARE
Problem: Patient Care Overview  Goal: Plan of Care/Patient Progress Review    PT:  New orders received.  Per chart review, PT orders were initially deferred with OT following and plan in place for TCU.  Although, pt did not discharge as planned, now with new PT orders.  Appears pt is requiring CGA/SBA with close supervision to ensure safety, limited functional activity tolerance, O2 desaturation with activity, and does not have full 24/7 support/supervision at time of discharge.  Given cognition concerns, unsteadiness at times on her feet, and alone during the day at home, TCU is recommended discharge destination in order to have 24/7 support and continued work with skilled PT/OT to regain strength, balance, gait stability, and safety with ADLs/mobility needs.

## 2018-02-01 NOTE — PLAN OF CARE
Problem: Breathing Pattern Ineffective (Adult)  Goal: Identify Related Risk Factors and Signs and Symptoms  Related risk factors and signs and symptoms are identified upon initiation of Human Response Clinical Practice Guideline (CPG).   Outcome: No Change  BP elevated, did not meet parameters for prn intervention; improved on recheck. Other VSS on 2.5 LPM. Denies pain, CP, SMITH/SOB. Weak, non-productive cough, unable to collect sputum. PIV SL, on reg diet and tolerating well. Voiding without saving, no BM overnight. Tele in place, SR. Bed alarm in place with infrequent attempts to exit bed without assistance. Oriented to self, place; able to respond appropriately to conversation and make needs known. Continue to monitor.

## 2018-02-01 NOTE — PLAN OF CARE
Problem: Patient Care Overview  Goal: Plan of Care/Patient Progress Review  Outcome: Improving  Patient forgetful, A1 with ambulation. Desats with activity. Showered-tolerated while. Await TCU bed placement, reorder PT consult due to request of AV TCU.

## 2018-02-01 NOTE — PROGRESS NOTES
Updated that [facility] has no beds available. Also, [facility] has open shared rooms. CM updated family on closer facility w/shared rooms & requested additional choices from family. Resubmitted referral to [facility]; sent [facility], [facility] & [facility]. Contacted [facility] to let them know family agreeable to shared until private available. Await return call.     CM will continue to follow patient until discharge for any additional needs.     Yasmin Arnold RN, BSN, CTS  [facility]  [phone]    Addendum 2:53pm - Sent referral to [facility]. Contacted [facility] ([phone]) to request assistance with placement to TCU, MC or LTC. Faxed referral directly to Ria per her request to [phone].      ds

## 2018-02-01 NOTE — PROGRESS NOTES
Patient has been assessed for Home Oxygen needs. Oxygen readings:    *Pulse oximetry (SpO2) = 87 % on room air at rest while awake.    *SpO2 improved to 92% on 3 liters/minute at rest.    *SpO2 = 85% on 3 lpm during activity/with exercise.    *SpO2 improved to 89-90% on 4 liters/minute during activity/with exercise.

## 2018-02-01 NOTE — PROGRESS NOTES
[facility]  Hospitalist Progress Note  Name: Kelly Nunez    MRN: [id number]  Provider: Tulio Thomas MD  02/01/2018    Initial presenting complaint/issue to hospital (Diagnosis): acute resp distress.         Assessment and Plan:      Summary of Stay: Kelly Nunez is a 67 year old female admitted on 1/23/2018 with acute respiratory distress. Hx of dementia, DM, likely underlying COPD who presented with SOB.        Problem List:       # Acute respiratory distress likely due to CAP with probable superimposed severe COPD.  - On IV rocephin and azithromycin.--> now switched to Ceftin   - On oxygen, nebs and steroids, changed to PO prednisone , decrease to 40 mg daily       # PAF.  - Converted spontaneously to NSR.  - Cardiology consulted and plan to hold OAC due to concern for falls.   -- Changed from procardia to diltiazem   - TTE showed grade 2 DD and moderate to severe pulm HTN.  - Needs event monitor on d/c and fup with cards as outpatient to re-visit       # Dementia with superimposed acute encephalopathy.  - Likely due to pneumonia.  - Monitor, avoid benzo's.  - On namenda.  - On zyprexa.      #. HTN urgency. BP better but still high at times   - On diltiazem, stop amlodipine and start losartan 50 mg daily     Discussed in detail with daughter Brianna and spouse at bedside      DVT Prophylaxis:  -  SQ heparin.    Code Status: Full Code  Discharge Dispo: TCU.  Estimated Disch Date / # of Days until Discharge:  pending bed availability at TCU, SW consulted, family also looking at memory care , will need oxygen at discharge         Interval History:        Chart reviewed and patient seen. Case discussed with nursing staff.     Doing well, breathing is stable, dyspnea with activity, oxygenation stable between 2-4L , tolerating PO, some confusion persists, Denies any chest pain,  No nausea, vomiting or diarrhea. No other complaints voiced.                     Physical Exam:      Last Vital  Signs:  Temp: 97.5  F (36.4  C) Temp src: Oral BP: (!) 132/95   Heart Rate: 92 Resp: 20 SpO2: 91 % O2 Device: Nasal cannula Oxygen Delivery: 2.5 LPM    I/O last 3 completed shifts:  In: 480 [P.O.:480]  Out: -   Vitals:    01/23/18 1512   Weight: 41.7 kg (92 lb)     Gen - Alert, awake, Cachetic and appears much older than stated age.  Lungs - diminished BS bilaterally. No crackles, no wheezing   Heart - RRR,S1+S2 nml, no m/g/r.  Abd - soft, NT, ND, + BS.  Ext - no edema.  Neuro - oriented to self, knows in hospital, intermittent confusion. , moving all extremities          Medications:      All current medications were reviewed.         Data:      All new lab and imaging data was reviewed.   Labs:  No results for input(s): CULT in the last 168 hours.    Recent Labs  Lab 01/31/18  0730 01/30/18  0743 01/28/18  0718 01/26/18  0700   WBC  --  4.7  --  6.3   HGB  --  15.1  --  14.7   HCT  --  46.1  --  44.5   MCV  --  97  --  97    334 305 287       Recent Labs  Lab 01/31/18 0730 01/30/18  0743 01/26/18  0700   NA  --  142 139   POTASSIUM  --  3.8 3.6   CHLORIDE  --  101 98   CO2  --  37* 37*   ANIONGAP  --  4 4   GLC  --  79 153*   BUN  --  17 20   CR  --  0.46* 0.45*   GFRESTIMATED  --  >90 >90   GFRESTBLACK  --  >90 >90   ATTILA  --  8.3* 8.5   PROTTOTAL 6.0*  --   --    ALBUMIN 2.6*  --   --    BILITOTAL 0.2  --   --    ALKPHOS 47  --   --    AST 38  --   --    ALT 64*  --   --       Recent Imaging:   No results found for this or any previous visit (from the past 24 hour(s)).

## 2018-02-02 ENCOUNTER — APPOINTMENT (OUTPATIENT)
Dept: OCCUPATIONAL THERAPY | Facility: CLINIC | Age: 68
DRG: 193 | End: 2018-02-02
Payer: COMMERCIAL

## 2018-02-02 ENCOUNTER — APPOINTMENT (OUTPATIENT)
Dept: GENERAL RADIOLOGY | Facility: CLINIC | Age: 68
DRG: 193 | End: 2018-02-02
Attending: INTERNAL MEDICINE
Payer: COMMERCIAL

## 2018-02-02 LAB
ANION GAP SERPL CALCULATED.3IONS-SCNC: 5 MMOL/L (ref 3–14)
BUN SERPL-MCNC: 26 MG/DL (ref 7–30)
CALCIUM SERPL-MCNC: 8.6 MG/DL (ref 8.5–10.1)
CHLORIDE SERPL-SCNC: 102 MMOL/L (ref 94–109)
CO2 SERPL-SCNC: 34 MMOL/L (ref 20–32)
CREAT SERPL-MCNC: 0.55 MG/DL (ref 0.52–1.04)
GFR SERPL CREATININE-BSD FRML MDRD: >90 ML/MIN/1.7M2
GLUCOSE BLDC GLUCOMTR-MCNC: 109 MG/DL (ref 70–99)
GLUCOSE BLDC GLUCOMTR-MCNC: 86 MG/DL (ref 70–99)
GLUCOSE SERPL-MCNC: 69 MG/DL (ref 70–99)
LACTATE BLD-SCNC: 1.2 MMOL/L (ref 0.7–2)
MAGNESIUM SERPL-MCNC: 2.3 MG/DL (ref 1.6–2.3)
POTASSIUM SERPL-SCNC: 4.1 MMOL/L (ref 3.4–5.3)
SODIUM SERPL-SCNC: 141 MMOL/L (ref 133–144)

## 2018-02-02 PROCEDURE — 94640 AIRWAY INHALATION TREATMENT: CPT

## 2018-02-02 PROCEDURE — 83605 ASSAY OF LACTIC ACID: CPT | Performed by: INTERNAL MEDICINE

## 2018-02-02 PROCEDURE — 25000128 H RX IP 250 OP 636: Performed by: INTERNAL MEDICINE

## 2018-02-02 PROCEDURE — 83735 ASSAY OF MAGNESIUM: CPT | Performed by: INTERNAL MEDICINE

## 2018-02-02 PROCEDURE — 40000133 ZZH STATISTIC OT WARD VISIT

## 2018-02-02 PROCEDURE — 25000132 ZZH RX MED GY IP 250 OP 250 PS 637: Performed by: INTERNAL MEDICINE

## 2018-02-02 PROCEDURE — 71046 X-RAY EXAM CHEST 2 VIEWS: CPT

## 2018-02-02 PROCEDURE — 99233 SBSQ HOSP IP/OBS HIGH 50: CPT | Performed by: INTERNAL MEDICINE

## 2018-02-02 PROCEDURE — 36415 COLL VENOUS BLD VENIPUNCTURE: CPT | Performed by: INTERNAL MEDICINE

## 2018-02-02 PROCEDURE — 94640 AIRWAY INHALATION TREATMENT: CPT | Mod: 76

## 2018-02-02 PROCEDURE — 80048 BASIC METABOLIC PNL TOTAL CA: CPT | Performed by: INTERNAL MEDICINE

## 2018-02-02 PROCEDURE — 40000275 ZZH STATISTIC RCP TIME EA 10 MIN

## 2018-02-02 PROCEDURE — 25000125 ZZHC RX 250: Performed by: INTERNAL MEDICINE

## 2018-02-02 PROCEDURE — 00000146 ZZHCL STATISTIC GLUCOSE BY METER IP

## 2018-02-02 PROCEDURE — 97535 SELF CARE MNGMENT TRAINING: CPT | Mod: GO

## 2018-02-02 PROCEDURE — 12000007 ZZH R&B INTERMEDIATE

## 2018-02-02 RX ORDER — METOPROLOL TARTRATE 1 MG/ML
10 INJECTION, SOLUTION INTRAVENOUS ONCE
Status: DISCONTINUED | OUTPATIENT
Start: 2018-02-02 | End: 2018-02-02

## 2018-02-02 RX ORDER — METOPROLOL TARTRATE 50 MG
50 TABLET ORAL 2 TIMES DAILY
Status: DISCONTINUED | OUTPATIENT
Start: 2018-02-02 | End: 2018-02-05 | Stop reason: HOSPADM

## 2018-02-02 RX ADMIN — METOPROLOL TARTRATE 50 MG: 50 TABLET, FILM COATED ORAL at 07:58

## 2018-02-02 RX ADMIN — METOPROLOL TARTRATE 50 MG: 50 TABLET, FILM COATED ORAL at 00:53

## 2018-02-02 RX ADMIN — DILTIAZEM HYDROCHLORIDE 180 MG: 180 CAPSULE, COATED, EXTENDED RELEASE ORAL at 07:56

## 2018-02-02 RX ADMIN — OLANZAPINE 10 MG: 5 TABLET, FILM COATED ORAL at 21:28

## 2018-02-02 RX ADMIN — LOSARTAN POTASSIUM 50 MG: 50 TABLET ORAL at 07:57

## 2018-02-02 RX ADMIN — GUAIFENESIN AND DEXTROMETHORPHAN HYDROBROMIDE 1 TABLET: 600; 30 TABLET, EXTENDED RELEASE ORAL at 21:28

## 2018-02-02 RX ADMIN — METOPROLOL TARTRATE 50 MG: 50 TABLET, FILM COATED ORAL at 21:28

## 2018-02-02 RX ADMIN — DEXTRAN 70, AND HYPROMELLOSE 2910 1 DROP: 1; 3 SOLUTION/ DROPS OPHTHALMIC at 21:34

## 2018-02-02 RX ADMIN — DEXTRAN 70, AND HYPROMELLOSE 2910 1 DROP: 1; 3 SOLUTION/ DROPS OPHTHALMIC at 08:04

## 2018-02-02 RX ADMIN — HEPARIN SODIUM 5000 UNITS: 5000 INJECTION, SOLUTION INTRAVENOUS; SUBCUTANEOUS at 19:43

## 2018-02-02 RX ADMIN — GUAIFENESIN AND DEXTROMETHORPHAN HYDROBROMIDE 1 TABLET: 600; 30 TABLET, EXTENDED RELEASE ORAL at 07:56

## 2018-02-02 RX ADMIN — ASPIRIN 81 MG 81 MG: 81 TABLET ORAL at 07:57

## 2018-02-02 RX ADMIN — IPRATROPIUM BROMIDE AND ALBUTEROL SULFATE 3 ML: .5; 3 SOLUTION RESPIRATORY (INHALATION) at 16:14

## 2018-02-02 RX ADMIN — IPRATROPIUM BROMIDE AND ALBUTEROL SULFATE 3 ML: .5; 3 SOLUTION RESPIRATORY (INHALATION) at 19:22

## 2018-02-02 RX ADMIN — IPRATROPIUM BROMIDE AND ALBUTEROL SULFATE 3 ML: .5; 3 SOLUTION RESPIRATORY (INHALATION) at 08:38

## 2018-02-02 RX ADMIN — DULOXETINE HYDROCHLORIDE 120 MG: 30 CAPSULE, DELAYED RELEASE ORAL at 07:57

## 2018-02-02 RX ADMIN — IPRATROPIUM BROMIDE AND ALBUTEROL SULFATE 3 ML: .5; 3 SOLUTION RESPIRATORY (INHALATION) at 12:33

## 2018-02-02 RX ADMIN — MEMANTINE HYDROCHLORIDE 28 MG: 28 CAPSULE, EXTENDED RELEASE ORAL at 08:07

## 2018-02-02 RX ADMIN — PREDNISONE 40 MG: 20 TABLET ORAL at 07:57

## 2018-02-02 RX ADMIN — Medication 1 MG: at 00:53

## 2018-02-02 RX ADMIN — HEPARIN SODIUM 5000 UNITS: 5000 INJECTION, SOLUTION INTRAVENOUS; SUBCUTANEOUS at 07:56

## 2018-02-02 ASSESSMENT — ACTIVITIES OF DAILY LIVING (ADL)
ADLS_ACUITY_SCORE: 13
ADLS_ACUITY_SCORE: 12
ADLS_ACUITY_SCORE: 13
ADLS_ACUITY_SCORE: 12

## 2018-02-02 NOTE — PLAN OF CARE
Problem: OT General Care Plan    Discharge Planner OT   Patient plan for discharge: did not state    Current status: Pt ambulated to the bathroom with SBA/CGA. While standing at the sink pt completed 2 hygiene tasks with CGA/SBA and verbal cues for safety with Oxygen cord. Pt transferred to the toilet with SBA/CGA with verbal cues for safety. Pt on oxygen during session, oxygen 86% with activity, requiring education and verbal cues for breathing techniques.  Frequency changed to 3x/week. Recommend nursing to walk with patient in the hallway 2-3x per day      Barriers to return to prior living situation: impaired cognition/safety awareness, slightly unsteady on feet, home alone during day    Recommendations for discharge: TCU    Rationale for recommendations:  pt will need much training to manage oxygen safely with use given cognitive deficits and will benefit from daily therapies to improve strength, balance and for further training in modified ADLs and home 02 use prior to return home. Daughter supportive if needed        Entered by: Giuseppe Loco 02/02/2018 3:15 PM

## 2018-02-02 NOTE — PROGRESS NOTES
D;  Received call from Marcia in Admissions at [facility] ([phone]) they have a bed available and could accept pt on Saturday.  Note:  Pt cannot smoke at this facility.  Neither pt nor family have been notified at this time.

## 2018-02-02 NOTE — PLAN OF CARE
Problem: Patient Care Overview  Goal: Plan of Care/Patient Progress Review  Outcome: Improving  VSS, tele SR 80's. Forgetful. Regular diet. LS wheezes. BG checks d/t Prednisone. Plan to discharge to TCU/Memory Care facility when bed available.

## 2018-02-02 NOTE — PLAN OF CARE
Problem: Patient Care Overview  Goal: Plan of Care/Patient Progress Review  Orientation: Alert w/cares disoriented to time and situation   VSS. 96% on 2.5 LPM NC. Afebrile.   IVF: SL  Tele: SR. HR 60s. Episodes of A flutter HR in the 150's, PO metoprolol given and HR remained down in the 60's  LS: fine crackles w/expiratory wheezes.   GI: Passing gas. No BM. Denies N/V. BS active.   : no urine output this shift  Skin: bruising noted, Skin otherwise intact.   Activity: SBA to assist of 1. Restless and c/o not being able to sleep, melatonin given, Pt slept comfortably throughout shift.   Pain: No c/o pain. No PRN interventions utilized. Pt sleeping.   DC Plan: Continue with current cares.

## 2018-02-02 NOTE — PLAN OF CARE
Problem: Patient Care Overview  Goal: Plan of Care/Patient Progress Review  Damian barger advised of AFlutter - VS: /81(left arm), , T 98.0, Resp 16. Oxygen saturation 96% on 2.5LPM. NC. Alert and oriented X2, forgetful at times, no c/o chest pain, lactate flagged,VS due Q2-1/2 hour and Labs drawn.

## 2018-02-02 NOTE — PROGRESS NOTES
[facility]  Hospitalist Progress Note  Name: Kelly Nunez    MRN: [id number]  Provider: Tulio Thomas MD  02/02/2018    Initial presenting complaint/issue to hospital (Diagnosis): acute resp distress.         Assessment and Plan:      Summary of Stay: Kelly Nunez is a 67 year old female admitted on 1/23/2018 with acute respiratory distress. Hx of dementia, DM, likely underlying COPD who presented with SOB.  CT chest on admission was negative for PE. Showed consolidation in left lung lingula. Also showed extensive emphysema. She was also in a.fib RVR.       Problem List:       # Acute respiratory distress likely due to CAP with probable superimposed severe COPD.  - On IV rocephin and azithromycin.--> now switched to Ceftin. Completed 10 days, stop ABx  - On oxygen, nebs and steroids, changed to PO prednisone , decrease to 40 mg daily.     --> patient had elevated BNP on admission, but otherwise no convincing signs of CHD, no vascular congestion on imaging, No edema, ECHO showed normal IVC. Will repeat CXR today to evaluate for vascular congestion       # PAF. With RVR. Converted spontaneously to NSR soon after admission.   -- Cardiology consulted and plan to hold OAC due to concern for falls.   -- Changed from procardia to diltiazem   - TTE showed grade 2 DD and moderate to severe pulm HTN.  - Needs event monitor on d/c and fup with cards as outpatient to re-visit     --> Has been in NSR for several days, had some runs of atrial flutter last night, fairly short duration, prompting her to be started on metoprolol 50 mg BID. I again had a detailed discussion with patient and her  about anti-coagulation, risk of stroke etc. They are worried about fall risk and prefer to her keep her on event monitor to assess a.fib burden and then fup with cardiology as outpatient. Aware of increased risk of stroke.       # Dementia with superimposed acute encephalopathy.  - Likely due to pneumonia.  Mentation improved now.  I have discussed this with the family in detail.  They mentioned that the patient has had memory issues going on for some time.  She has also not been seeking regular medical care.  She likely has chronic dementia with worsening due to the acute illness and possible delirium which has unmasked underling cognitive issues.  I discussed with them that we hope for some improvement as her medical condition improves, although not sure what her new baseline will be like.  Family is quite aware of that and looking at options including memory care down the line.  - On namenda. And zyprexa.      #. HTN urgency. BP better but still high at times   - On diltiazem, we started her on losartan 50 mg daily, however, now on metoprolol 50 BID due to the a.fib, will stop losartan     # Nicotine dependence - complete smoking cessation, patient in agreement     Discussed in detail with spouse at bedside      DVT Prophylaxis:  -  SQ heparin.    Code Status: Full Code  Discharge Dispo: TCU.  Estimated Disch Date / # of Days until Discharge:  1-2 days. pending bed availability at TCU, difficulty with arranging bed due to her memory issues. Has been declined at multiple TCU,. She still has a medical necessity to be in hospital due to need to control a.fib, eval for pulm edema. Etc. However, I discussed with family that she will be ready soon and if no TCU is available, they need to look at home care options, hiring extra help.  mentioned that he is aware of his and will work on a back up plan in case TCU option does not work out         Interval History:        Chart reviewed and patient seen. Case discussed with nursing staff.     Doing well, breathing is stable, dyspnea with activity, oxygenation stable between 2-4L , tolerating PO, confusion and mentation is about the same, no major changes. Denies any chest pain,  No nausea, vomiting or diarrhea. No other complaints voiced.                     Physical  Exam:      Last Vital Signs:  Temp: 97.4  F (36.3  C) Temp src: Oral BP: 144/89 Pulse: 92 Heart Rate: 67 Resp: 20 SpO2: 92 % O2 Device: Nasal cannula Oxygen Delivery: 2.5 LPM    I/O last 3 completed shifts:  In: 300 [P.O.:300]  Out: -   Vitals:    01/23/18 1512   Weight: 41.7 kg (92 lb)     Gen - Alert, awake, Cachetic and appears much older than stated age.  Lungs - diminished BS bilaterally. No crackles, mild wheezing   Heart - RRR,S1+S2 nml, no m/g/r.  Abd - soft, NT, ND, + BS.  Ext - no edema.  Neuro - oriented to self, knows in hospital, intermittent confusion. , moving all extremities          Medications:      All current medications were reviewed.         Data:      All new lab and imaging data was reviewed.   Labs:  No results for input(s): CULT in the last 168 hours.    Recent Labs  Lab 01/31/18  0730 01/30/18  0743 01/28/18  0718   WBC  --  4.7  --    HGB  --  15.1  --    HCT  --  46.1  --    MCV  --  97  --     334 305       Recent Labs  Lab 02/02/18  0835 01/31/18  0730 01/30/18  0743     --  142   POTASSIUM 4.1  --  3.8   CHLORIDE 102  --  101   CO2 34*  --  37*   ANIONGAP 5  --  4   GLC 69*  --  79   BUN 26  --  17   CR 0.55  --  0.46*   GFRESTIMATED >90  --  >90   GFRESTBLACK >90  --  >90   ATTILA 8.6  --  8.3*   MAG 2.3  --   --    PROTTOTAL  --  6.0*  --    ALBUMIN  --  2.6*  --    BILITOTAL  --  0.2  --    ALKPHOS  --  47  --    AST  --  38  --    ALT  --  64*  --       Recent Imaging:   No results found for this or any previous visit (from the past 24 hour(s)).

## 2018-02-02 NOTE — PLAN OF CARE
Problem: Infection, Risk/Actual (Adult)  Goal: Identify Related Risk Factors and Signs and Symptoms  Related risk factors and signs and symptoms are identified upon initiation of Human Response Clinical Practice Guideline (CPG).   Outcome: Improving  Dx: Intermittent Afib, Pneumonia    Vitals: Temp: 97.4  F (36.3  C) Temp src: Oral BP: 144/89 Pulse: 92 Heart Rate: 67 Resp: 20 SpO2: 95 % O2 Device: Nasal cannula Oxygen Delivery: 2.5 LPM    Pain: Denies  Neuro: Oriented to self and situation. Dementia. On Namenda and Zyprexa.   Cardiac: Tele: SB/SR with rare PVC's.  Resp: Continues on 2.5L NC, non productive cough. Continues on PO prednisone and Duonebs QID.   GI/: Voiding, BM today.   Diet: Regular  Labs: K+ 4.1-no replacement needed per protocol. Mag 2.3-no replacement needed per protocol. Crt 0.55, lactic 1.2.  Skin: pale, bruising.   Activity: Up with SBA.   IV: Right PIV SL'd.     PLAN: Completed Ceftin for UTI. Event monitor on discharge-continue on Diltiazem. PPX: SQ heparin. Discussion about starting blood thinner for intermittent Afib. No discharge today-SW following for TCU vs memory care placement.

## 2018-02-02 NOTE — PROGRESS NOTES
CM received call from [facility] - admissions. They need therapy notes. Answered questions re: patient. Bed available over weekend. Faxed latest OT & PT notes to [phone]. Await decision.     [facility] agreed to review pt again. They declined d/t mentation, smoker, etc.     CM will continue to follow patient until discharge for any additional needs.     Yasmin Arnold RN, BSN, CTS  [facility]  [phone]

## 2018-02-02 NOTE — PROVIDER NOTIFICATION
MD Mallory - Pt is experiencing small spurts of A Flutter - 's-150's, no c/o of chest pain, VSS recorded. Please advise    0045 - PO 50 mg Metoprolol given per MD order, 10 mg IV metoprolol Held due to low HR in the 80's

## 2018-02-02 NOTE — PROVIDER NOTIFICATION
FYI pt had a run of Aflutter 160bpm then converted back to SR. Pt was resting in bed at the time of event.

## 2018-02-02 NOTE — PLAN OF CARE
0012  Problem: Patient Care Overview  Goal: Plan of Care/Patient Progress Review  Tele tech advised of A flutter episode VS: /89 (BP Location: Left arm)  Temp 98  F (Oral)  Resp 16  SpO2 96%  Oxygen saturation 96% on 2.5LPM. NC. Alert and oriented X2.Forgetful/confused, no c/o chest pain, MD paged

## 2018-02-03 ENCOUNTER — APPOINTMENT (OUTPATIENT)
Dept: OCCUPATIONAL THERAPY | Facility: CLINIC | Age: 68
DRG: 193 | End: 2018-02-03
Payer: COMMERCIAL

## 2018-02-03 LAB
ANION GAP SERPL CALCULATED.3IONS-SCNC: 4 MMOL/L (ref 3–14)
BUN SERPL-MCNC: 28 MG/DL (ref 7–30)
CALCIUM SERPL-MCNC: 8.1 MG/DL (ref 8.5–10.1)
CHLORIDE SERPL-SCNC: 103 MMOL/L (ref 94–109)
CO2 SERPL-SCNC: 34 MMOL/L (ref 20–32)
CREAT SERPL-MCNC: 0.6 MG/DL (ref 0.52–1.04)
ERYTHROCYTE [DISTWIDTH] IN BLOOD BY AUTOMATED COUNT: 12.6 % (ref 10–15)
GFR SERPL CREATININE-BSD FRML MDRD: >90 ML/MIN/1.7M2
GLUCOSE SERPL-MCNC: 80 MG/DL (ref 70–99)
HCT VFR BLD AUTO: 42.8 % (ref 35–47)
HGB BLD-MCNC: 13.8 G/DL (ref 11.7–15.7)
MAGNESIUM SERPL-MCNC: 2 MG/DL (ref 1.6–2.3)
MCH RBC QN AUTO: 31.2 PG (ref 26.5–33)
MCHC RBC AUTO-ENTMCNC: 32.2 G/DL (ref 31.5–36.5)
MCV RBC AUTO: 97 FL (ref 78–100)
PLATELET # BLD AUTO: 322 10E9/L (ref 150–450)
POTASSIUM SERPL-SCNC: 3.9 MMOL/L (ref 3.4–5.3)
RBC # BLD AUTO: 4.43 10E12/L (ref 3.8–5.2)
SODIUM SERPL-SCNC: 141 MMOL/L (ref 133–144)
WBC # BLD AUTO: 7.1 10E9/L (ref 4–11)

## 2018-02-03 PROCEDURE — 94640 AIRWAY INHALATION TREATMENT: CPT | Mod: 76

## 2018-02-03 PROCEDURE — 99232 SBSQ HOSP IP/OBS MODERATE 35: CPT | Performed by: INTERNAL MEDICINE

## 2018-02-03 PROCEDURE — 25000125 ZZHC RX 250: Performed by: INTERNAL MEDICINE

## 2018-02-03 PROCEDURE — 25000132 ZZH RX MED GY IP 250 OP 250 PS 637: Performed by: INTERNAL MEDICINE

## 2018-02-03 PROCEDURE — 40000274 ZZH STATISTIC RCP CONSULT EA 30 MIN

## 2018-02-03 PROCEDURE — 83735 ASSAY OF MAGNESIUM: CPT | Performed by: INTERNAL MEDICINE

## 2018-02-03 PROCEDURE — 85027 COMPLETE CBC AUTOMATED: CPT | Performed by: INTERNAL MEDICINE

## 2018-02-03 PROCEDURE — 40000133 ZZH STATISTIC OT WARD VISIT: Performed by: REHABILITATION PRACTITIONER

## 2018-02-03 PROCEDURE — 94640 AIRWAY INHALATION TREATMENT: CPT

## 2018-02-03 PROCEDURE — 80048 BASIC METABOLIC PNL TOTAL CA: CPT | Performed by: INTERNAL MEDICINE

## 2018-02-03 PROCEDURE — 12000007 ZZH R&B INTERMEDIATE

## 2018-02-03 PROCEDURE — 97530 THERAPEUTIC ACTIVITIES: CPT | Mod: GO | Performed by: REHABILITATION PRACTITIONER

## 2018-02-03 PROCEDURE — 36415 COLL VENOUS BLD VENIPUNCTURE: CPT | Performed by: INTERNAL MEDICINE

## 2018-02-03 PROCEDURE — 25000128 H RX IP 250 OP 636: Performed by: INTERNAL MEDICINE

## 2018-02-03 PROCEDURE — 25000125 ZZHC RX 250: Performed by: CLINICAL NURSE SPECIALIST

## 2018-02-03 PROCEDURE — 40000275 ZZH STATISTIC RCP TIME EA 10 MIN

## 2018-02-03 RX ADMIN — METOPROLOL TARTRATE 50 MG: 50 TABLET, FILM COATED ORAL at 08:44

## 2018-02-03 RX ADMIN — DULOXETINE HYDROCHLORIDE 120 MG: 30 CAPSULE, DELAYED RELEASE ORAL at 08:44

## 2018-02-03 RX ADMIN — MEMANTINE HYDROCHLORIDE 28 MG: 28 CAPSULE, EXTENDED RELEASE ORAL at 08:44

## 2018-02-03 RX ADMIN — IPRATROPIUM BROMIDE AND ALBUTEROL SULFATE 3 ML: .5; 3 SOLUTION RESPIRATORY (INHALATION) at 11:24

## 2018-02-03 RX ADMIN — IPRATROPIUM BROMIDE AND ALBUTEROL SULFATE 3 ML: .5; 3 SOLUTION RESPIRATORY (INHALATION) at 07:16

## 2018-02-03 RX ADMIN — DILTIAZEM HYDROCHLORIDE 180 MG: 180 CAPSULE, COATED, EXTENDED RELEASE ORAL at 08:44

## 2018-02-03 RX ADMIN — HEPARIN SODIUM 5000 UNITS: 5000 INJECTION, SOLUTION INTRAVENOUS; SUBCUTANEOUS at 20:38

## 2018-02-03 RX ADMIN — GUAIFENESIN AND DEXTROMETHORPHAN HYDROBROMIDE 1 TABLET: 600; 30 TABLET, EXTENDED RELEASE ORAL at 08:44

## 2018-02-03 RX ADMIN — OLANZAPINE 10 MG: 5 TABLET, FILM COATED ORAL at 20:38

## 2018-02-03 RX ADMIN — IPRATROPIUM BROMIDE AND ALBUTEROL SULFATE 3 ML: .5; 3 SOLUTION RESPIRATORY (INHALATION) at 15:36

## 2018-02-03 RX ADMIN — GUAIFENESIN AND DEXTROMETHORPHAN HYDROBROMIDE 1 TABLET: 600; 30 TABLET, EXTENDED RELEASE ORAL at 20:42

## 2018-02-03 RX ADMIN — HEPARIN SODIUM 5000 UNITS: 5000 INJECTION, SOLUTION INTRAVENOUS; SUBCUTANEOUS at 08:44

## 2018-02-03 RX ADMIN — METOPROLOL TARTRATE 50 MG: 50 TABLET, FILM COATED ORAL at 20:38

## 2018-02-03 RX ADMIN — PREDNISONE 40 MG: 20 TABLET ORAL at 08:44

## 2018-02-03 RX ADMIN — DEXTRAN 70, AND HYPROMELLOSE 2910 1 DROP: 1; 3 SOLUTION/ DROPS OPHTHALMIC at 20:46

## 2018-02-03 RX ADMIN — ASPIRIN 81 MG 81 MG: 81 TABLET ORAL at 08:44

## 2018-02-03 RX ADMIN — IPRATROPIUM BROMIDE AND ALBUTEROL SULFATE 3 ML: .5; 3 SOLUTION RESPIRATORY (INHALATION) at 19:51

## 2018-02-03 RX ADMIN — DEXTRAN 70, AND HYPROMELLOSE 2910 1 DROP: 1; 3 SOLUTION/ DROPS OPHTHALMIC at 08:50

## 2018-02-03 RX ADMIN — MINERAL OIL AND WHITE PETROLATUM: 150; 830 OINTMENT OPHTHALMIC at 20:47

## 2018-02-03 ASSESSMENT — ACTIVITIES OF DAILY LIVING (ADL)
ADLS_ACUITY_SCORE: 12

## 2018-02-03 NOTE — PLAN OF CARE
Problem: Patient Care Overview  Goal: Plan of Care/Patient Progress Review  Discharge Planner OT   Patient plan for discharge: not stated  Current status: Patient was I with sit in bed to sit at EOB and sit<>stand. SBA for functional mobility in room and schroeder and transfer to chair. Ambulated approx. 450 feet with 1 rest break while remaining standing on 2L O2. Noted heavier breathing with functional mobility, but patient denied SOB. O2 sats upon return back to room were 90%. Patient was SBA for toilet transfer and toileting skills, including clothing management, pericares and sinkside hygiene, min vc' s for follow throught with tasks. Patient asked to look and recite recall room number upon leaving after aprox 2-3 minutes into walk, patient was unable to recall room number or locate schroeder room was located. Patient able to locate room after given number when we were approaching.    Barriers to return to prior living situation:  impaired cognition/safety awareness, slightly unsteady on feet, home alone during day, O2 needs    Recommendations for discharge: TCU, if patient is to dc home, strict 24 hour supervision is needed due to below mentioned safety concerns.     Rationale for recommendations:  Per previous OT notes; Daughter reports pt will now need home 02; pt will need much training to manage safely w/use given cognitive deficits and will benefit from daily therapies to improve strength, balance and for further training in modified ADLs and home 02 use prior to return home. Currently, patient does not attend to O2 tubing and balance was slightly unsteady. Due to need for continued O2 paired with cognitive deficits- impulsive, poor insight, decreased sequencing- patient continues to be a safety and fall risk.        Entered by: Dayanara Stone 02/03/2018 1:37 PM

## 2018-02-03 NOTE — PROGRESS NOTES
AMERICO met with pt to discuss referral to [facility] for TCU.  Pt said she is in agreement but would like SW to call her  as well and let him know.  AMERICO contacted Kendlal at [phone] and he said he would like to discuss it with his daughter and call SW back.   states Plan B would be pt goes home on Monday with services already in place.      AMERICO contacted [facility] and confirmed availability of room.     AMERICO discussed placement with pt's  and he declined [facility] due to bad Yelp review.   states he made a plan with doctor yesterday who said he would authorize pt to stay in hospital until Monday.   said [facility] will meet with family and staff on Monday at 10 am at hospital to arrange for services at home and pt will discharge at that time.   states services will include someone to stay with pt while he works and he and his daughter will provide care all other hours.   also states he will begin process of possible memory care for his wife.

## 2018-02-03 NOTE — PLAN OF CARE
Problem: Breathing Pattern Ineffective (Adult)  Intervention: Optimize Oxygenation/Ventilation/Perfusion  Pleasant confused, easily redirectable. Alarms on for safety. On oxygen at 2.5L per NC, sats mid 90's, rest VS, afebrile. Tele SR, HR 70's, lung sounds coarse/dimished.  PIV saline locked to R arm. MD rounded on patient this morning, no new changes to medical plan. SW, PT following. Anticipated discharge today, awaiting for placement. Patient's  at bedside. Will cont with POC.     1600: Per SW, patient will not be discharging today, anticipated discharge likely Monday after family meets with home care providers. Please see SW's note.

## 2018-02-03 NOTE — PLAN OF CARE
Problem: Patient Care Overview  Goal: Plan of Care/Patient Progress Review  Outcome: No Change  Orientation:Pt sleeping throughout shift, alert w/cares, oriented to self only.   VSS. 79% on 2.5 LM NC. Afebrile.  IVF: SL  Tele: SR. HR 60s.   LS: Diminished.   GI: Passing gas. No BM. Denies N/V.   : Adequate urine output. Clear yellow.   Skin: bruising noted, Skin otherwise intact.   Activity: SBA to assist of 1. Pt slept comfortably throughout shift.   Pain: No c/o pain. No PRN interventions utilized. Pt sleeping.   DC Plan: Continue with current cares.

## 2018-02-03 NOTE — PROGRESS NOTES
[facility]  Hospitalist Progress Note  Kyle Chung MD 02/03/18    Reason for Stay (Diagnosis): CAP and COPD         Assessment and Plan:      Summary of Stay: Kelly Nunez is a 67 year old female w/ hx of dementia DM2, tobacco dependence, HTN, and COPD who was admitted on 1/23/2018 with hypoxemic respiratory failure likely due to CAP and COPD exacerbation.  CTPA showed extensive emphysema and consolidation in L lingula.  Started on ceftriaxone and azithromycin and solumedrol.  Also presented with paroxysmal afib with RVR while here, now back in NSR.  Seen by cardiology who recommended to hold on AC due to falls.  Completed 10 days abx.  PT and OT recommending TCU due to cognitive deficits and gait instability.  Family thinking about 24 hr supervision at home.    Problem List/Assessment and Plan:   Acute hypoxemic respiratory failure:  CTPA showing now PE, but did have L lingula consolidation so likely CAP.  Also extensive emphysematous changes and given her ongoing smoking suspected COPD exacerbation.  Patient had elevated BNP on admission, but otherwise no convincing signs of CHD, no vascular congestion on imaging, no edema, ECHO showed normal IVC.  Unable to fully wean off oxygen.  -was on IV rocephin and azithromycin then switched to po Ceftin. Completed 10 days abx now stopped.  -prednisone down to 40 mg daily  -nebs  -wean O2, likely will need home O2      Paroxysmal afib with RVR: Converted spontaneously to NSR soon after admission.   -Cardiology consulted and plan to hold AC due to concern for falls.   -Changed from procardia to diltiazem 180mg q 24 hrs  -metoprolol 50mg bid  -30 day event monitor on discharge, if afib returns would need to consider AC    Severe pulmonary HTN and chronic diastolic CHF:  TTE showed grade 2 DD and moderate to severe pulm HTN.  Does not look volume overloaded now.  Suspect in part severe pulmonary HTN is from her underlying emphysema.  -metoprolol 50mg  "bid      Dementia with superimposed acute encephalopathy:  Likely infectious encephalopathy due to pneumonia. Mentation improved now.  Family informed that the patient has had memory issues going on for some time.  She has also not been seeking regular medical care.  She likely has chronic dementia with worsening due to the acute illness and possible delirium which has unmasked underling cognitive issues.  I discussed with them that we hope for some improvement as her medical condition improves, although not sure what her new baseline will be like.  Family is quite aware of that and looking at options including memory care down the line.  -continue namenda and zyprexa  -OT recommending 24 hr supervision on discharge      HTN urgency:  Now improved control with med adjustment.  -nifedipine stopped  -now on diltiazem 180mg q 24 hr and metoprolol 50mg bid     Nicotine dependence: complete smoking cessation, patient in agreement     DVT Prophylaxis: Heparin SQ  Code Status: Full Code  FEN: regular diet  Discharge Dispo: likely home with 24 hr supervision vs TCU  Estimated Disch Date / # of Days until Disch: discharge Monday to home likely after family meeting with a home care provider        Interval History (Subjective):      Assumed care today.  Patient denies feeling sob, but remains on supplemental O2.  Says she walked in the schroeder yesterday.  Denies pain.  No fevers or chills.                  Physical Exam:      Last Vital Signs:  /79 (BP Location: Right arm)  Pulse 92  Temp 97.4  F (36.3  C) (Oral)  Resp 18  Ht 1.6 m (5' 2.99\")  Wt 40.5 kg (89 lb 3.2 oz)  SpO2 94%  BMI 15.81 kg/m2      Intake/Output Summary (Last 24 hours) at 02/03/18 1537  Last data filed at 02/03/18 1407   Gross per 24 hour   Intake              590 ml   Output             1150 ml   Net             -560 ml       Constitutional: Awake, NAD   Eyes: sclera white   HEENT:   MMM  Respiratory:   lungs cta bilaterally, no crackles or " wheeze  Cardiovascular: RRR.  No murmur   GI: non-tender, not distended, bowel sounds present  Skin: no rash  Musculoskeletal/extremities:   No edema  Neurologic: Alert, oriented to being in the hospital  Psychiatric: calm, cooperative          Medications:      All current medications were reviewed with changes reflected in problem list.         Data:      All new lab and imaging data was reviewed.   Labs:    Recent Labs  Lab 02/03/18  0608 02/02/18  0835 01/30/18  0743    141 142   POTASSIUM 3.9 4.1 3.8   CHLORIDE 103 102 101   CO2 34* 34* 37*   ANIONGAP 4 5 4   GLC 80 69* 79   BUN 28 26 17   CR 0.60 0.55 0.46*   GFRESTIMATED >90 >90 >90   GFRESTBLACK >90 >90 >90   ATTILA 8.1* 8.6 8.3*       Recent Labs  Lab 02/03/18  0608 01/31/18  0730 01/30/18  0743   WBC 7.1  --  4.7   HGB 13.8  --  15.1   HCT 42.8  --  46.1   MCV 97  --  97    334 334      Imaging:   None today      Kyle Chung MD

## 2018-02-04 ENCOUNTER — APPOINTMENT (OUTPATIENT)
Dept: OCCUPATIONAL THERAPY | Facility: CLINIC | Age: 68
DRG: 193 | End: 2018-02-04
Payer: COMMERCIAL

## 2018-02-04 PROCEDURE — 25000125 ZZHC RX 250: Performed by: INTERNAL MEDICINE

## 2018-02-04 PROCEDURE — 12000007 ZZH R&B INTERMEDIATE

## 2018-02-04 PROCEDURE — 25000125 ZZHC RX 250: Performed by: CLINICAL NURSE SPECIALIST

## 2018-02-04 PROCEDURE — 25000132 ZZH RX MED GY IP 250 OP 250 PS 637: Performed by: INTERNAL MEDICINE

## 2018-02-04 PROCEDURE — 40000275 ZZH STATISTIC RCP TIME EA 10 MIN

## 2018-02-04 PROCEDURE — 25000128 H RX IP 250 OP 636: Performed by: INTERNAL MEDICINE

## 2018-02-04 PROCEDURE — 94640 AIRWAY INHALATION TREATMENT: CPT

## 2018-02-04 PROCEDURE — 94640 AIRWAY INHALATION TREATMENT: CPT | Mod: 76

## 2018-02-04 PROCEDURE — 99232 SBSQ HOSP IP/OBS MODERATE 35: CPT | Performed by: INTERNAL MEDICINE

## 2018-02-04 PROCEDURE — 40000133 ZZH STATISTIC OT WARD VISIT: Performed by: REHABILITATION PRACTITIONER

## 2018-02-04 PROCEDURE — 97530 THERAPEUTIC ACTIVITIES: CPT | Mod: GO | Performed by: REHABILITATION PRACTITIONER

## 2018-02-04 RX ORDER — PREDNISONE 20 MG/1
20 TABLET ORAL DAILY
Status: DISCONTINUED | OUTPATIENT
Start: 2018-02-05 | End: 2018-02-05 | Stop reason: HOSPADM

## 2018-02-04 RX ADMIN — MINERAL OIL AND WHITE PETROLATUM: 150; 830 OINTMENT OPHTHALMIC at 21:22

## 2018-02-04 RX ADMIN — GUAIFENESIN AND DEXTROMETHORPHAN HYDROBROMIDE 1 TABLET: 600; 30 TABLET, EXTENDED RELEASE ORAL at 21:21

## 2018-02-04 RX ADMIN — DEXTRAN 70, AND HYPROMELLOSE 2910 1 DROP: 1; 3 SOLUTION/ DROPS OPHTHALMIC at 21:22

## 2018-02-04 RX ADMIN — IPRATROPIUM BROMIDE AND ALBUTEROL SULFATE 3 ML: .5; 3 SOLUTION RESPIRATORY (INHALATION) at 11:18

## 2018-02-04 RX ADMIN — DILTIAZEM HYDROCHLORIDE 180 MG: 180 CAPSULE, COATED, EXTENDED RELEASE ORAL at 08:42

## 2018-02-04 RX ADMIN — IPRATROPIUM BROMIDE AND ALBUTEROL SULFATE 3 ML: .5; 3 SOLUTION RESPIRATORY (INHALATION) at 19:40

## 2018-02-04 RX ADMIN — HEPARIN SODIUM 5000 UNITS: 5000 INJECTION, SOLUTION INTRAVENOUS; SUBCUTANEOUS at 08:42

## 2018-02-04 RX ADMIN — OLANZAPINE 10 MG: 5 TABLET, FILM COATED ORAL at 21:21

## 2018-02-04 RX ADMIN — HEPARIN SODIUM 5000 UNITS: 5000 INJECTION, SOLUTION INTRAVENOUS; SUBCUTANEOUS at 21:20

## 2018-02-04 RX ADMIN — GUAIFENESIN AND DEXTROMETHORPHAN HYDROBROMIDE 1 TABLET: 600; 30 TABLET, EXTENDED RELEASE ORAL at 08:42

## 2018-02-04 RX ADMIN — PREDNISONE 40 MG: 20 TABLET ORAL at 08:42

## 2018-02-04 RX ADMIN — MEMANTINE HYDROCHLORIDE 28 MG: 28 CAPSULE, EXTENDED RELEASE ORAL at 08:42

## 2018-02-04 RX ADMIN — DULOXETINE HYDROCHLORIDE 120 MG: 30 CAPSULE, DELAYED RELEASE ORAL at 08:42

## 2018-02-04 RX ADMIN — DEXTRAN 70, AND HYPROMELLOSE 2910 1 DROP: 1; 3 SOLUTION/ DROPS OPHTHALMIC at 08:42

## 2018-02-04 RX ADMIN — Medication 1 MG: at 00:02

## 2018-02-04 RX ADMIN — METOPROLOL TARTRATE 50 MG: 50 TABLET, FILM COATED ORAL at 08:42

## 2018-02-04 RX ADMIN — METOPROLOL TARTRATE 50 MG: 50 TABLET, FILM COATED ORAL at 21:21

## 2018-02-04 RX ADMIN — ASPIRIN 81 MG 81 MG: 81 TABLET ORAL at 08:42

## 2018-02-04 RX ADMIN — IPRATROPIUM BROMIDE AND ALBUTEROL SULFATE 3 ML: .5; 3 SOLUTION RESPIRATORY (INHALATION) at 15:44

## 2018-02-04 RX ADMIN — IPRATROPIUM BROMIDE AND ALBUTEROL SULFATE 3 ML: .5; 3 SOLUTION RESPIRATORY (INHALATION) at 07:23

## 2018-02-04 ASSESSMENT — ACTIVITIES OF DAILY LIVING (ADL)
ADLS_ACUITY_SCORE: 12
ADLS_ACUITY_SCORE: 13
ADLS_ACUITY_SCORE: 13

## 2018-02-04 NOTE — PLAN OF CARE
Problem: Patient Care Overview  Goal: Plan of Care/Patient Progress Review  Outcome: No Change    Orientation: Alert w/cares, oriented to self.   VSS. 96% on 2.5 LPM NC. Afebrile.  IVF: SL  Tele: SR. HR 60s.   LS: Diminished, dyspneic on exertion   GI: Passing gas. No BM. Denies N/V. BS active.   : No urine output this shift   Skin: bruising noted, Skin otherwise intact.   Activity: SBA to assist of 1. Pt slept comfortably throughout shift.   Pain: No c/o pain. No PRN interventions utilized. Pt sleeping.   DC Plan: Continue with current cares.

## 2018-02-04 NOTE — PROGRESS NOTES
"[facility] RCAT     Date: 2/3/2018  Admission Dx: Hypoxemic respiratory failure due to CAP and COPD  Pulmonary History  Never diagnosed with CPAP but long standing smoking hx.   Home Nebulizer/MDI Use: NO  Home Oxygen: NO  Acuity Level (RCAT flow sheet): 13   Aerosol Therapy initiated: Duonebs QID, Xopenex Q4prn      Pulmonary Hygiene initiated: Cough and deep breathing      Volume Expansion initiated: patient has IS in room      Current Oxygen Requirements: 2.5 L   Current SpO2: 98%    Re-evaluation date: 2/6/18    Patient Education:      See \"RT Assessments\" flow sheet for patient assessment scoring and Acuity Level Details.           "

## 2018-02-04 NOTE — PLAN OF CARE
Problem: Patient Care Overview  Goal: Plan of Care/Patient Progress Review  Discharge Planner OT   Patient plan for discharge: home with help  Current status: Patient was I with sit to stand. Ambulated approx. 450 feet without rest break while remaining standing on 2L O2. Noted heavier breathing with functional mobility, patient reported mild SOB, but wanted to keep walking. O2 sats upon return back to room were 90%.  Patient able to follow single step directions with mobility and locate room after given number when we were approaching. Noted barriers in hallway floor that patient did not initiate moving around until directed by OT.  Barriers to return to prior living situation:  impaired cognition/safety awareness, slightly unsteady on feet, home alone during day, O2 needs     Recommendations for discharge: TCU, if patient is to dc home, strict 24 hour supervision is needed due to below mentioned safety concerns.      Rationale for recommendations:  Per previous OT notes; Daughter reports pt will now need home 02; pt will need much training to manage safely w/use given cognitive deficits and will benefit from daily therapies to improve strength, balance and for further training in modified ADLs and home 02 use prior to return home. Currently, patient does not attend to O2 tubing and balance was slightly unsteady. Due to need for continued O2 paired with cognitive deficits- impulsive, poor insight, decreased sequencing- patient continues to be a safety and fall risk.        Entered by: Dayanara Stone 02/04/2018 3:24 PM

## 2018-02-04 NOTE — PLAN OF CARE
Problem: Patient Care Overview  Goal: Individualization & Mutuality  Outcome: Improving  A&OX3. Very forgetful. Alarms on. Up SBA ambulated schroeder well. desats to 85% on RA. Sob with exertion. Good appetite. Impulsive. Vss. Tele sr.

## 2018-02-04 NOTE — PLAN OF CARE
"Problem: Patient Care Overview  Goal: Plan of Care/Patient Progress Review  Outcome: No Change  /83  Pulse 92  Temp 97.7  F (36.5  C) (Oral)  Resp 20  Ht 1.6 m (5' 2.99\")  Wt 40.5 kg (89 lb 3.2 oz)  SpO2 94%  BMI 15.81 kg/m2    VSS et afebrile on 2.5 L NC. Disoriented and gets up out of bed and takes off O2 by herself. Frequent checks and bed alarm in place. Unable to give Mag IVPB that's due with 2.0 Mg level b/c of confusion and high fall risk. SLIV to right in place. Tele reads SR. Up SBA.    Plan to DC Monday.      "

## 2018-02-04 NOTE — PROGRESS NOTES
.Patient has been assessed for Home Oxygen needs.  Oxygen readings:   *   RA - at rest  Pulse oximetry SPO2 85 %  *   RA - during activity/with exercise SPO2 87 %  *   O2 at  2 liters/minute (at rest) ...SPO2 93%  *   O2 at  2 liters/minute (during activity/with exercise) ...SPO2 90 %

## 2018-02-05 ENCOUNTER — DOCUMENTATION ONLY (OUTPATIENT)
Dept: MEDSURG UNIT | Facility: CLINIC | Age: 68
End: 2018-02-05

## 2018-02-05 VITALS
HEART RATE: 66 BPM | HEIGHT: 63 IN | RESPIRATION RATE: 18 BRPM | WEIGHT: 89.2 LBS | BODY MASS INDEX: 15.8 KG/M2 | TEMPERATURE: 97.9 F | DIASTOLIC BLOOD PRESSURE: 84 MMHG | OXYGEN SATURATION: 96 % | SYSTOLIC BLOOD PRESSURE: 130 MMHG

## 2018-02-05 PROCEDURE — 25000132 ZZH RX MED GY IP 250 OP 250 PS 637: Performed by: INTERNAL MEDICINE

## 2018-02-05 PROCEDURE — 40000275 ZZH STATISTIC RCP TIME EA 10 MIN

## 2018-02-05 PROCEDURE — 94640 AIRWAY INHALATION TREATMENT: CPT

## 2018-02-05 PROCEDURE — 25000125 ZZHC RX 250: Performed by: INTERNAL MEDICINE

## 2018-02-05 PROCEDURE — 94640 AIRWAY INHALATION TREATMENT: CPT | Mod: 76

## 2018-02-05 PROCEDURE — 25000128 H RX IP 250 OP 636: Performed by: INTERNAL MEDICINE

## 2018-02-05 PROCEDURE — 99239 HOSP IP/OBS DSCHRG MGMT >30: CPT | Performed by: INTERNAL MEDICINE

## 2018-02-05 PROCEDURE — 0298T ZZC EXT ECG > 48HR TO 21 DAY REVIEW AND INTERPRETATN: CPT | Performed by: INTERNAL MEDICINE

## 2018-02-05 PROCEDURE — 0296T ZIO PATCH HOLTER: CPT | Performed by: INTERNAL MEDICINE

## 2018-02-05 RX ORDER — IPRATROPIUM BROMIDE AND ALBUTEROL SULFATE 2.5; .5 MG/3ML; MG/3ML
1 SOLUTION RESPIRATORY (INHALATION) EVERY 6 HOURS PRN
Qty: 90 VIAL | Refills: 1 | Status: ON HOLD | OUTPATIENT
Start: 2018-02-05 | End: 2020-10-11

## 2018-02-05 RX ORDER — METOPROLOL TARTRATE 50 MG
50 TABLET ORAL 2 TIMES DAILY
Qty: 60 TABLET | Refills: 0 | Status: SHIPPED | OUTPATIENT
Start: 2018-02-05 | End: 2020-07-10

## 2018-02-05 RX ORDER — PREDNISONE 10 MG/1
TABLET ORAL
Qty: 9 TABLET | Refills: 0 | Status: SHIPPED | OUTPATIENT
Start: 2018-02-05 | End: 2020-07-16

## 2018-02-05 RX ORDER — DILTIAZEM HYDROCHLORIDE 180 MG/1
180 CAPSULE, COATED, EXTENDED RELEASE ORAL DAILY
Qty: 30 CAPSULE | Refills: 0 | Status: SHIPPED | OUTPATIENT
Start: 2018-02-06 | End: 2020-07-10

## 2018-02-05 RX ORDER — NICOTINE 21 MG/24HR
1 PATCH, TRANSDERMAL 24 HOURS TRANSDERMAL EVERY 24 HOURS
Qty: 30 PATCH | Refills: 0 | Status: SHIPPED | OUTPATIENT
Start: 2018-02-05 | End: 2020-07-16

## 2018-02-05 RX ADMIN — IPRATROPIUM BROMIDE AND ALBUTEROL SULFATE 3 ML: .5; 3 SOLUTION RESPIRATORY (INHALATION) at 16:09

## 2018-02-05 RX ADMIN — PREDNISONE 20 MG: 20 TABLET ORAL at 09:12

## 2018-02-05 RX ADMIN — IPRATROPIUM BROMIDE AND ALBUTEROL SULFATE 3 ML: .5; 3 SOLUTION RESPIRATORY (INHALATION) at 12:11

## 2018-02-05 RX ADMIN — HEPARIN SODIUM 5000 UNITS: 5000 INJECTION, SOLUTION INTRAVENOUS; SUBCUTANEOUS at 09:12

## 2018-02-05 RX ADMIN — IPRATROPIUM BROMIDE AND ALBUTEROL SULFATE 3 ML: .5; 3 SOLUTION RESPIRATORY (INHALATION) at 07:14

## 2018-02-05 RX ADMIN — MEMANTINE HYDROCHLORIDE 28 MG: 28 CAPSULE, EXTENDED RELEASE ORAL at 09:12

## 2018-02-05 RX ADMIN — ASPIRIN 81 MG 81 MG: 81 TABLET ORAL at 09:12

## 2018-02-05 RX ADMIN — DEXTRAN 70, AND HYPROMELLOSE 2910 1 DROP: 1; 3 SOLUTION/ DROPS OPHTHALMIC at 09:12

## 2018-02-05 RX ADMIN — GUAIFENESIN AND DEXTROMETHORPHAN HYDROBROMIDE 1 TABLET: 600; 30 TABLET, EXTENDED RELEASE ORAL at 09:12

## 2018-02-05 RX ADMIN — DULOXETINE HYDROCHLORIDE 120 MG: 30 CAPSULE, DELAYED RELEASE ORAL at 09:11

## 2018-02-05 RX ADMIN — DILTIAZEM HYDROCHLORIDE 180 MG: 180 CAPSULE, COATED, EXTENDED RELEASE ORAL at 09:12

## 2018-02-05 RX ADMIN — METOPROLOL TARTRATE 50 MG: 50 TABLET, FILM COATED ORAL at 09:12

## 2018-02-05 ASSESSMENT — ACTIVITIES OF DAILY LIVING (ADL)
ADLS_ACUITY_SCORE: 12

## 2018-02-05 NOTE — PROGRESS NOTES
"Discharge plan reviewed with pt and  Kendall, verbalized understanding. IV is out, Tele removed. All belonging with pt. Oxygen tank with patient this nurse helped with setup. RT helped with Nebulizer equipment education. All prescription medication with pt. All questions answered. Discharged home with  transporting. [facility] contacted, will follow up with pt in a \"couple of days\".   "

## 2018-02-05 NOTE — PROGRESS NOTES
Placed a 14 day Zio patch. Written and verbal instructions were given and all questions were answered.

## 2018-02-05 NOTE — PLAN OF CARE
Problem: Patient Care Overview  Goal: Plan of Care/Patient Progress Review  Outcome: Improving  Vss. Confused. Alert. Sr. Will d/c with family

## 2018-02-05 NOTE — PLAN OF CARE
Problem: Patient Care Overview  Goal: Plan of Care/Patient Progress Review  OT: Pt is discharging home with family and homecare services    Occupational Therapy Discharge Summary    Reason for therapy discharge:    Discharged to home with home therapy.    Progress towards therapy goal(s). See goals on Care Plan in [facility] electronic health record for goal details.  Goals partially met.  Barriers to achieving goals:   discharge from facility.    Therapy recommendation(s):    Continued therapy is recommended.  Rationale/Recommendations:  TCU recommended, pt is discharging home, strict 24 hr supervision recommended as pt with poor safety and new O2 tubing, pt & family may benefit from home care therapy services to improve safety with O2 tubing with home set-up.

## 2018-02-05 NOTE — PLAN OF CARE
Problem: Patient Care Overview  Goal: Plan of Care/Patient Progress Review  Outcome: No Change  Orientation: Alert w/cares, oriented to self and place.   VSS. 91% on 2 LPM NC. Afebrile.  IVF: SL  Tele: SR. HR 70s.   LS: Diminished.   GI: Passing gas. No BM. Denies N/V. BS active  : Adequate urine output. Clear yellow.   Skin: bruising noted, Skin otherwise intact.   Activity: SBA to assist of 1. Pt slept comfortably throughout shift.   Pain: No c/o pain. No PRN interventions utilized. Pt sleeping.   DC Plan: Continue with current cares.

## 2018-02-05 NOTE — PROGRESS NOTES
Received intake call for home oxygen at 2:11PM. Reviewed patient's chart; Patient qualifies under Medicare guidelines and all documentation is in the chart including a good order for oxygen and nebulizer  Called to offer choice with  Patients  Yao, per nurse Geller request. They are ok with using [facility] setting them up. Discussed equipment with patients  and informed them that we would be to bedside with oxygen in the next 2 hours and would go through the teaching with patient and patients nurse Marisol.   Spoke with care coordinator, Marisol, confirmed we received the order for both Oxygen and Nebulizer, and provided them with ETA to bedside.

## 2018-02-05 NOTE — DISCHARGE SUMMARY
[facility]  Discharge Summary  Name: Kelly Nunez    MRN: [id number]  YOB: 1950    Age: 67 year old  Date of Discharge:  2/5/2018  Date of Admission: 1/23/2018  Primary Care Provider: Kenneth Amin  Discharge Physician:  Kyle Chung MD  Discharging Service:  Hospitalist      Discharge Diagnoses:  Acute hypoxemic respiratory failure  Possible CAP  COPD with acute exacerbation  Severe pulmonary HTN  Chronic diastolic CHF  Paroxysmal afib with RVR  Dementia  Acute infectious encephalopathy  Tobacco dependence  HTN urgency     Hospital Course:  Summary of Stay: Kelly Nunez is a 67 year old female w/ hx of dementia DM2, tobacco dependence, HTN, and COPD who was admitted on 1/23/2018 with hypoxemic respiratory failure likely due to CAP and COPD exacerbation.  CTPA showed extensive emphysema and consolidation in L lingula.  Started on ceftriaxone and azithromycin and solumedrol.  Also presented with paroxysmal afib with RVR while here, now back in NSR.  Seen by cardiology who recommended to hold on AC due to falls.  Completed 10 days abx.  PT and OT recommending TCU due to cognitive deficits.  Family arranged with home care service for 24 hours supervision at home on discharge.  Home O2 prescribed.  Also will have ziopatch holter monitor to see if any paroxysmal afib.  RX prednisone taper and nebulizer machine/duonebs.  F/u with pcp in one week.     Problem List/Assessment and Plan:   Acute on likely chronic hypoxemic respiratory failure:  CTPA showing now PE, but did have L lingula consolidation so likely CAP.  Also extensive emphysematous changes and given her ongoing smoking suspected COPD exacerbation.  Patient had elevated BNP on admission, but otherwise no convincing signs of CHD, no vascular congestion on imaging, no edema, ECHO showed normal IVC.  Unable to fully wean off oxygen and I suspect there is chronicity to this hypoxia.  -was on IV rocephin and azithromycin then  switched to po Ceftin. Completed 10 days abx now stopped.  -prednisone taper  -DME nebulizer and duonebs prescribed   -2L continuous home oxygen due to O2 sats 85% or less on RA at rest.  Suspect this will be an indefinite need for COPD      Paroxysmal afib with RVR: Converted spontaneously to NSR soon after admission.   -Cardiology consulted and plan to hold AC due to concern for falls.   -Changed from procardia to diltiazem 180mg q 24 hrs  -metoprolol 50mg bid  -ziopatch on discharge.  If does have paroxysmal afib on monitor could consider AC discussion again, however if fall risk remains this may not be an ideal choice even if 24 hr superversion.  Defer follow up discussion to PCP pending ziopatch results.  -81mg aspirin daily     Severe pulmonary HTN and chronic diastolic CHF:  TTE showed grade 2 DD and moderate to severe pulm HTN.  Does not look volume overloaded now.  Suspect in part severe pulmonary HTN is from her underlying emphysema.  -metoprolol 50mg bid      Dementia with superimposed acute encephalopathy:  Likely infectious encephalopathy due to pneumonia. Mentation improved now.  Family informed that the patient has had memory issues going on for some time.  She was a former heavy alcohol user, but no longer.  She has also not been seeking regular medical care.  She likely has chronic dementia with worsening due to the acute illness and possible delirium which has unmasked underling cognitive issues.  Family is quite aware of that and looking at options including memory care down the line.  -continue namenda and zyprexa  -OT recommending 24 hr supervision on discharge which was arranged with family and a home care agency  -if she continues to lose weight and if respiratory status worsens I would recommend palliative care discussion      HTN urgency:  Now improved control with med adjustment.  -nifedipine stopped  -now on diltiazem 180mg q 24 hr and metoprolol 50mg bid and tolerating.  Rx'd on  discharge      Tobacco dependence: complete smoking cessation, patient in agreement.  Nicotine patches provided     Discharge Disposition:  Discharged to home     Allergies:  Allergies   Allergen Reactions     Penicillins Swelling     Pt states she has tolerated cephalosporins in the past.         Discharge Medications:   Current Discharge Medication List      START taking these medications    Details   order for DME Equipment being ordered: Nebulizer  Qty: 1 each, Refills: 0    Associated Diagnoses: Chronic obstructive pulmonary disease with acute exacerbation (H)      ipratropium - albuterol 0.5 mg/2.5 mg/3 mL (DUONEB) 0.5-2.5 (3) MG/3ML neb solution Take 1 vial (3 mLs) by nebulization every 6 hours as needed for shortness of breath / dyspnea or wheezing  Qty: 90 vial, Refills: 1    Associated Diagnoses: Chronic obstructive pulmonary disease with acute exacerbation (H)      metoprolol tartrate (LOPRESSOR) 50 MG tablet Take 1 tablet (50 mg) by mouth 2 times daily  Qty: 60 tablet, Refills: 0    Associated Diagnoses: Atrial fibrillation, unspecified type (H)      diltiazem 180 MG 24 hr capsule Take 1 capsule (180 mg) by mouth daily  Qty: 30 capsule, Refills: 0    Associated Diagnoses: Atrial fibrillation, unspecified type (H)      predniSONE (DELTASONE) 10 MG tablet Take 20mg (2 tablets) for 3 days then 10mg (1tablet) for 3 days  Qty: 9 tablet, Refills: 0    Associated Diagnoses: Chronic obstructive pulmonary disease with acute exacerbation (H)      nicotine (NICODERM CQ) 14 MG/24HR 24 hr patch Place 1 patch onto the skin every 24 hours  Qty: 30 patch, Refills: 0    Associated Diagnoses: Tobacco dependence syndrome         CONTINUE these medications which have NOT CHANGED    Details   DULOXETINE HCL PO Take 120 mg by mouth daily      OLANZAPINE PO Take 10 mg by mouth At Bedtime      ASPIRIN PO Take 81 mg by mouth daily      memantine XR (NAMENDA XR) 28 MG 24 hr capsule Take 28 mg by mouth daily         STOP taking  "these medications       NIFEdipine ER (ADALAT CC) 30 MG TB24 Comments:   Reason for Stopping:                Condition on Discharge:  Discharge condition: Stable   Discharge vitals: Blood pressure 130/84, pulse 66, temperature 97.9  F (36.6  C), temperature source Oral, resp. rate 18, height 1.6 m (5' 2.99\"), weight 40.5 kg (89 lb 3.2 oz), SpO2 97 %.   Code status on discharge: Full Code     History of Illness:  See detailed admission note for full details.    Physical Exam:  Blood pressure 130/84, pulse 66, temperature 97.9  F (36.6  C), temperature source Oral, resp. rate 18, height 1.6 m (5' 2.99\"), weight 40.5 kg (89 lb 3.2 oz), SpO2 97 %.  Wt Readings from Last 1 Encounters:   02/03/18 40.5 kg (89 lb 3.2 oz)     Constitutional: Awake, NAD, frail  Eyes: sclera white   HEENT: atraumatic, MMM  Respiratory: no respiratory distress on NC 2L.  No crackles.  Slight wheeze  Cardiovascular: RRR.  No murmur   GI: non-tender, not distended, bowel sounds present  Skin: no rash or lesions, acyanotic  Musculoskeletal/extremities: decreased muscle bulk. No edema  Neurologic: Alert, oriented to season and being in the hospital  Psychiatric: calm, cooperative     Procedures other than Imaging:  None     Imaging:  Results for orders placed or performed during the hospital encounter of 01/23/18   XR Chest Port 1 View    Narrative    CHEST ONE VIEW PORTABLE    1/23/2018 3:55 PM     HISTORY: Chest pain/shortness of breath.    COMPARISON: None.      Impression    IMPRESSION: Pulmonary vasculature is indistinct suggestive of vascular  congestion. Micronodule airspace opacities at the left lung base  concerning for pneumonia versus atelectasis. Small bilateral pleural  effusions. Right apical pleural thickening. No definite pneumothorax.  Cardiac silhouette within normal limits.    MAR ALEXIS MD   Chest CT, IV contrast only - PE protocol    Narrative    CT CHEST WITH CONTRAST  1/23/2018  6:47 PM    HISTORY: Shortness of breath. " Evaluate for pulmonary embolism.    COMPARISON: A radiograph earlier today.    TECHNIQUE: Routine transverse CT imaging of the chest was performed  following the uneventful intravenous administration of 46mL Isovue-370  contrast. A pulmonary embolism protocol was utilized. Radiation dose  for this scan was reduced using automated exposure control, adjustment  of the mA and/or kV according to patient size, or iterative  reconstruction technique.    FINDINGS: The heart size is normal. No enlarged lymph node or other  abnormal mediastinal mass is seen. There is calcification of the  thoracic aorta. There is very good opacification of the pulmonary  arteries with contrast. No pulmonary embolism is seen. Emphysematous  changes are seen in both lungs. There is more prominent abnormality in  the superior aspect of the right lung adjacent to the major fissure.  This consists of more prominent emphysematous change with diffuse  thickening of the adjacent pulmonary parenchyma. This area measures  just over 8 cm transverse. This appears to be a chronic process. There  is a small area of consolidation in the anterior aspect of the lingula  of the left lung. There is additional mild atelectasis in the left  lung base. The lungs are otherwise clear. No pneumothorax is  demonstrated. No pleural effusion is identified. There are  degenerative changes in the spine. No chest wall pathology is seen.  The visualized upper abdomen is unremarkable.      Impression    IMPRESSION:   1. There is a small area of consolidation of the lingula of the left  lung. An infiltrate/pneumonia cannot be excluded.  2. Diffuse emphysematous changes throughout both lungs. There is a  prominent area of more extensive emphysematous change and probable  chronic scarring within the right upper lung.   3. No evidence of pulmonary embolism.    JR FENG MD   XR Chest 2 Views    Narrative    XR CHEST 2 VW 2/2/2018 3:32 PM    HISTORY: Evaluate  lingula.    COMPARISON: Chest CT, 1/23/2018    FINDINGS: Chronic right apical scarring. No evidence of significant  lingular atelectasis on the current radiograph. No new consolidation,  pleural effusion or pneumothorax. Normal heart size.      Impression    IMPRESSION: No acute cardiopulmonary abnormality.    DENISE RIVERA MD        Consultations:  No consultations were requested during this admission.       Recent Lab Results:    Recent Labs  Lab 02/03/18  0608 01/31/18  0730 01/30/18  0743   WBC 7.1  --  4.7   HGB 13.8  --  15.1   HCT 42.8  --  46.1   MCV 97  --  97    334 334     No results for input(s): CULT in the last 168 hours.    Recent Labs  Lab 02/03/18  0608 02/02/18  0835 01/31/18  0730 01/30/18  0743    141  --  142   POTASSIUM 3.9 4.1  --  3.8   CHLORIDE 103 102  --  101   CO2 34* 34*  --  37*   ANIONGAP 4 5  --  4   GLC 80 69*  --  79   BUN 28 26  --  17   CR 0.60 0.55  --  0.46*   GFRESTIMATED >90 >90  --  >90   GFRESTBLACK >90 >90  --  >90   ATTILA 8.1* 8.6  --  8.3*   MAG 2.0 2.3  --   --    PROTTOTAL  --   --  6.0*  --    ALBUMIN  --   --  2.6*  --    BILITOTAL  --   --  0.2  --    ALKPHOS  --   --  47  --    AST  --   --  38  --    ALT  --   --  64*  --      No results for input(s): COLOR, APPEARANCE, URINEGLC, URINEBILI, URINEKETONE, SG, UBLD, URINEPH, PROTEIN, UROBILINOGEN, NITRITE, LEUKEST, RBCU, WBCU in the last 168 hours.       Pending Results:    Unresulted Labs Ordered in the Past 30 Days of this Admission     No orders found from 11/24/2017 to 1/24/2018.         These results will be followed up by patient's primary care provider.    Discharge Instructions and Follow-Up:     Discharge Procedure Orders  Reason for your hospital stay   Order Comments: You were hospitalized for respiratory failure.  You were treated with antibiotics and steroids and will complete a course of prednisone on discharge.  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  We have  prescribed a nebulizer machine that can be used as needed for shortness of breath and wheezing.  You will need ongoing home oxygen at all times, likely long term.  It also is recommended you have 24 hours supervision at home.  Continue with tobacco cessation and nicotine patches can be used as needed.  A cardiac event monitor is prescribed for the next 30 days to see if you have any more episodes of atrial fibrillation.     Follow-up and recommended labs and tests    Order Comments: Follow up with primary care provider, Kenneth Amin, within 7 days for hospital follow- up.  No follow up labs or test are needed.     Activity   Order Comments: Your activity upon discharge: activity as tolerated   Order Specific Question Answer Comments   Is discharge order? Yes      Full Code     Oxygen Adult   Order Comments: New Home Oxygen Order 2 liter(s) by nasal cannula continuously with use of portable tank. Expected treatment length is indefinite (99 months).. Test on conserving device as applicable.    Patients who qualify for home O2 coverage under the CMS guidelines require ABG tests or O2 sat readings obtained closest to, but no earlier than 2 days prior to the discharge, as evidence of the need for home oxygen therapy. Testing must be performed while patient is in the chronic stable state. See notes for O2 sats.    I certify that this patient, Kelly Nunez has been under my care and that I, or a nurse practitioner or physician's assistant working with me, had a face-to-face encounter that meets the face-to-face encounter requirements with this patient on 2/5/2018. The patient, Kelly Nunez was evaluated or treated in whole, or in part, for the following medical condition, which necessitates the use of the ordered oxygen. Treatment Diagnosis: moderate to severe COPD with acute exacerbation    Attending Provider: Kyle Chung  Physician signature: See electronic signature associated with these discharge  orders  Date of Order: February 5, 2018     Diet   Order Comments: Follow this diet upon discharge: Orders Placed This Encounter     Regular Diet Adult   Order Specific Question Answer Comments   Is discharge order? Yes          Recommendations:  -home O2 chronically 2L  -prednisone taper  -dilt and metoprolol  -ziopatch.  If parox afib consider AC, however not if having ongoing falls  -pcp f/u within the week  -24 hr home supervision  -DME nebulizer ordered with duonebs prn  -nicotine patches    I, Kyle Chung, personally saw the patient today and spent greater than 30 minutes discharging this patient.    Kyle Chung MD

## 2018-02-05 NOTE — PROGRESS NOTES
CLINICAL NUTRITION SERVICES - REASSESSMENT NOTE      Malnutrition: Severe (1/30/2018)       EVALUATION OF PROGRESS TOWARD GOALS   Diet:  Regular  Intake:  % meal consumption BID-TID since RD assessment.  Meals ordered can be quite small and sometimes lack a substantial amount of protein. Patient herself reports good appetite.      ASSESSED NUTRITION NEEDS (PER APPROVED PRACTICE GUIDELINES, Dosing weight: 41.7 kg)  Estimated Energy Needs: 1918-2742+ kcals (35-40 Kcal/Kg)  Justification: underweight  Estimated Protein Needs: 50-75+ grams protein (1.2-1.8 g pro/Kg)  Justification: Repletion  Estimated Fluid Needs: >1 mL/Kcal  Justification: maintenance      NEW FINDINGS:   - Palliative continues to follow: goals of care remain restorative with full code.  Plans for TCU at d/c, placement pending.   - Medications and labs reviewed.  - Wt relatively stable and slight fluctuations as expected d/t scale inaccuracy, 40.5 kg from yesterday.  - Last recorded BM 2/2.      Previous Goals:   Patient to consume >/= 75% of meals TID   Evaluation: Not met consistently   Weight >41.7 kg during review period  Evaluation: Not met though likely not true wt loss    Previous Nutrition Diagnosis:   Malnutrition related to inadequate intake and increased metabolic demands 2/2 COPD as evidenced by fat and muscle wasting, BMI of 16.3 and chronically poor PO likely meeting <75% of needs for >1 month  Evaluation: No change, continued below      MALNUTRITION: (1/30/2018)  % Weight Loss:Weight loss does not meet criteria for malnutrition, chronically underweight  % Intake:</= 75% for >/= 1 month (severe malnutrition)  Subcutaneous Fat Loss: Moderate to severe, as noted above  Muscle Loss: Severe, as noted above  Fluid Retention:None noted     Malnutrition Diagnosis: Severe malnutrition  In Context of:  Chronic illness or disease    CURRENT NUTRITION DIAGNOSIS  Malnutrition related to inadequate intake and increased metabolic demands 2/2  COPD as evidenced by fat and muscle wasting, BMI of 16.3 and chronically poor PO likely meeting <75% of needs for >1 month    INTERVENTIONS  Recommendations / Nutrition Prescription  Continue regular diet.     Ok for Ensure prn, would recommend at d/c.      Implementation  Medical Food Supplement: As above.     Collaboration and Referral of Nutrition care: Discussed POC with team during rounds.     Nutrition Education: Discussed addition of Ensure supplement with patient and also option of containing protein snack daily.  Reviewed protein sources and importance of pairing with meals.     Goals  Patient to consume at least 75% of meals BID or the equivalent with supplement use.   HBV protein source at each meal.      MONITORING AND EVALUATION:  Progress towards goals will be monitored and evaluated per protocol and Practice Guidelines      Brissa Perez RD, LD  Clinical Dietitian  3rd floor/ICU: [phone]  All other floors: [phone]  Weekend/holiday: [phone]

## 2018-02-05 NOTE — DISCHARGE INSTRUCTIONS
Continue oral nutrition supplements (see recipe book) at d/c until oral intake returns to baseline    Oxygen Provider:  Arranged through [facility], contact number [phone]. If you have any questions or concerns please call the oxygen company directly.

## 2018-02-05 NOTE — PLAN OF CARE
Problem: Patient Care Overview  Goal: Plan of Care/Patient Progress Review  Outcome: Improving  Pt up SBA1 with walker, ambulated in hallway and in room. Disoriented to time and situation. Up in chair family at bed side. No chest pain or difficulty breathing. Dyspneic on exertion. On 2L NC O2 Sat 94%. VSS, LS expiratory and inspiratory wheezes. Plan to discharge home with pending home care arrangement, SW coordinating.

## 2018-02-06 NOTE — PHARMACY - DISCHARGE MEDICATION RECONCILIATION AND EDUCATION
Late note for teaching done 2/5/2018 at 1400.    Kelly Nunez     1950      female    [id number]                                [id number]    Allergy: Penicillins    RX: Discharge Medication Consult by Pharmacist  EDUCATION:   Discharge Medication List   Kelly Nunez   Home Medication Instructions TRINIDAD:[id number]    Printed on:02/06/18 6487   Medication Information                      ASPIRIN PO  Take 81 mg by mouth daily             diltiazem 180 MG 24 hr capsule  Take 1 capsule (180 mg) by mouth daily             DULOXETINE HCL PO  Take 120 mg by mouth daily             ipratropium - albuterol 0.5 mg/2.5 mg/3 mL (DUONEB) 0.5-2.5 (3) MG/3ML neb solution  Take 1 vial (3 mLs) by nebulization every 6 hours as needed for shortness of breath / dyspnea or wheezing             memantine XR (NAMENDA XR) 28 MG 24 hr capsule  Take 28 mg by mouth daily             metoprolol tartrate (LOPRESSOR) 50 MG tablet  Take 1 tablet (50 mg) by mouth 2 times daily             nicotine (NICODERM CQ) 14 MG/24HR 24 hr patch  Place 1 patch onto the skin every 24 hours             OLANZAPINE PO  Take 10 mg by mouth At Bedtime             order for DME  Equipment being ordered: Nebulizer             predniSONE (DELTASONE) 10 MG tablet  Take 20mg (2 tablets) for 3 days then 10mg (1tablet) for 3 days                 Patient was informed to STOP taking the following HOME medications:   nifedipine    Patient was informed to start taking the following NEW medications:   Diltiazem, Duoneb, metoprolol, nicotine patch, prednisone    Patient was educated on the following for each discharge medication:  Rationale for therapy  Duration of treatment  Dosing and or monitoring drug levels  Common side effects  Importance of compliance  Drug/food interactions  Missed doses  Self monitoring parameters    OUTCOMES:  Patient unable to express complete understanding. Per RN, patient's  is expected come later in the afternoon.   Left  written materials and instructions (Clinical notes from EPIC): diltiazem, metoprolol.  IMPORTANT FOLLOW UP NOTES:   Follow up with primary care provider within 7 days for hospital follow- up.

## 2018-02-07 NOTE — PROGRESS NOTES
Hand-off for Care Transitions to Next Level of Care Provider  Name: Kelly Nunez  : 1950  MRN #: [id number]  Reason for Hospitalization:  COPD exacerbation (H) [J44.1]  Atrial fibrillation, unspecified type (H) [I48.91]  Admit Date/Time: 2018  3:05 PM  Discharge Date: 2018    Reason for Communication Hand-off Referral: Admission diagnoses: PN  Admission diagnoses: COPD    Discharge Plan:  Discharged to: Home with homecare                   Patient agreeable to post-hospital support suggestions:  Yes  Discharge Plan:  Home with [facility].            Patient is on new medications:   Yes. See After Visit Summary (AVS).            MTM follow up recommended: No           Tel-Assurance program:  Ineligible           Patient will receive  HOME CARE  at:  Discharge through [facility].   Follow-up specialty is recommended: No.     Follow-up plan:  No future appointments.    Any outstanding tests or procedures:    Procedures     Future Labs/Procedures    Oxygen Adult     Comments:    New Home Oxygen Order 2 liter(s) by nasal cannula continuously with use of portable tank. Expected treatment length is indefinite (99 months).. Test on conserving device as applicable.    Patients who qualify for home O2 coverage under the CMS guidelines require ABG tests or O2 sat readings obtained closest to, but no earlier than 2 days prior to the discharge, as evidence of the need for home oxygen therapy. Testing must be performed while patient is in the chronic stable state. See notes for O2 sats.    I certify that this patient, Kelly Nunez has been under my care and that I, or a nurse practitioner or physician's assistant working with me, had a face-to-face encounter that meets the face-to-face encounter requirements with this patient on 2018. The patient, Kelly Nunez was evaluated or treated in whole, or in part, for the following medical condition, which necessitates the use of the  ordered oxygen. Treatment Diagnosis: moderate to severe COPD with acute exacerbation    Attending Provider: Kyle Chung  Physician signature: See electronic signature associated with these discharge orders  Date of Order: February 5, 2018          Key Recommendations:  Pt does have some cognitive deficits (from dementia) but her immediate needs are physical deconditioning. TCU has been discussed. She did not qualify for TCU by the end of her hospitalization. Her family arranged private pay home health aid for while her spouse is at work. She will be discharging on new home oxygen as her CXR showed emphysema. Family is actively looking into memory care options.     Communicated handoff via [employer] to (PCP's CC) at (fax number)     Yasmin Arnold RN, BSN, CTS  [facility]  [phone]

## 2018-03-07 ENCOUNTER — RECORDS - HEALTHEAST (OUTPATIENT)
Dept: LAB | Facility: CLINIC | Age: 68
End: 2018-03-07

## 2018-03-07 LAB
ALBUMIN SERPL-MCNC: 3.4 G/DL (ref 3.5–5)
ALP SERPL-CCNC: 56 U/L (ref 45–120)
ALT SERPL W P-5'-P-CCNC: 21 U/L (ref 0–45)
ALT SERPL-CCNC: 21 U/L (ref 0–45)
ANION GAP SERPL CALCULATED.3IONS-SCNC: 10 MMOL/L (ref 5–18)
AST SERPL W P-5'-P-CCNC: 20 U/L (ref 0–40)
AST SERPL-CCNC: 20 U/L (ref 0–40)
BILIRUB SERPL-MCNC: 0.3 MG/DL (ref 0–1)
BUN SERPL-MCNC: 21 MG/DL (ref 8–22)
CALCIUM SERPL-MCNC: 9 MG/DL (ref 8.5–10.5)
CHLORIDE BLD-SCNC: 106 MMOL/L (ref 98–107)
CHOLEST SERPL-MCNC: 219 MG/DL
CHOLEST SERPL-MCNC: 219 MG/DL
CO2 SERPL-SCNC: 26 MMOL/L (ref 22–31)
CREAT SERPL-MCNC: 0.69 MG/DL (ref 0.6–1.1)
CREAT SERPL-MCNC: 0.69 MG/DL (ref 0.6–1.1)
FASTING STATUS PATIENT QL REPORTED: YES
GFR SERPL CREATININE-BSD FRML MDRD: >60 ML/MIN/1.73M2
GFR SERPL CREATININE-BSD FRML MDRD: >60 ML/MIN/1.73M2
GLUCOSE BLD-MCNC: 99 MG/DL (ref 70–125)
GLUCOSE SERPL-MCNC: 99 MG/DL (ref 70–125)
HDLC SERPL-MCNC: 92 MG/DL
HDLC SERPL-MCNC: 92 MG/DL
LDLC SERPL CALC-MCNC: 115 MG/DL
LDLC SERPL CALC-MCNC: 115 MG/DL
POTASSIUM BLD-SCNC: 4.6 MMOL/L (ref 3.5–5)
POTASSIUM SERPL-SCNC: 4.6 MMOL/L (ref 3.5–5)
PROT SERPL-MCNC: 6.5 G/DL (ref 6–8)
SODIUM SERPL-SCNC: 142 MMOL/L (ref 136–145)
TRIGL SERPL-MCNC: 61 MG/DL
TRIGL SERPL-MCNC: 61 MG/DL

## 2018-03-08 LAB — HBA1C MFR BLD: 6.1 % (ref 4.2–6.1)

## 2018-07-08 NOTE — PROGRESS NOTES
[facility]  Hospitalist Progress Note  Kyle Chung MD 02/04/18    Reason for Stay (Diagnosis): CAP and COPD         Assessment and Plan:      Summary of Stay: Kelly Nunez is a 67 year old female w/ hx of dementia DM2, tobacco dependence, HTN, and COPD who was admitted on 1/23/2018 with hypoxemic respiratory failure likely due to CAP and COPD exacerbation.  CTPA showed extensive emphysema and consolidation in L lingula.  Started on ceftriaxone and azithromycin and solumedrol.  Also presented with paroxysmal afib with RVR while here, now back in NSR.  Seen by cardiology who recommended to hold on AC due to falls.  Completed 10 days abx.  PT and OT recommending TCU due to cognitive deficits and gait instability.  Family thinking about 24 hr supervision at home.    Problem List/Assessment and Plan:   Acute hypoxemic respiratory failure:  CTPA showing now PE, but did have L lingula consolidation so likely CAP.  Also extensive emphysematous changes and given her ongoing smoking suspected COPD exacerbation.  Patient had elevated BNP on admission, but otherwise no convincing signs of CHD, no vascular congestion on imaging, no edema, ECHO showed normal IVC.  Unable to fully wean off oxygen.  -was on IV rocephin and azithromycin then switched to po Ceftin. Completed 10 days abx now stopped.  -decrease prednisone to 20mg daily tomorrow  -nebs  -wean O2 if able, likely will need home O2 and may actually be a chronic need given undiagnosed COPD with extensive emphysematous changes on CT.  Goal O2 sats of 90-92%      Paroxysmal afib with RVR: Converted spontaneously to NSR soon after admission.   -Cardiology consulted and plan to hold AC due to concern for falls.   -Changed from procardia to diltiazem 180mg q 24 hrs  -metoprolol 50mg bid  -30 day event monitor on discharge, if afib returns would need to consider AC    Severe pulmonary HTN and chronic diastolic CHF:  TTE showed grade 2 DD and moderate to  "severe pulm HTN.  Does not look volume overloaded now.  Suspect in part severe pulmonary HTN is from her underlying emphysema.  -metoprolol 50mg bid      Dementia with superimposed acute encephalopathy:  Likely infectious encephalopathy due to pneumonia. Mentation improved now.  Family informed that the patient has had memory issues going on for some time.  She has also not been seeking regular medical care.  She likely has chronic dementia with worsening due to the acute illness and possible delirium which has unmasked underling cognitive issues.  I discussed with them that we hope for some improvement as her medical condition improves, although not sure what her new baseline will be like.  Family is quite aware of that and looking at options including memory care down the line.  -continue namenda and zyprexa  -OT recommending 24 hr supervision on discharge      HTN urgency:  Now improved control with med adjustment.  -nifedipine stopped  -now on diltiazem 180mg q 24 hr and metoprolol 50mg bid     Nicotine dependence: complete smoking cessation, patient in agreement     DVT Prophylaxis: Heparin SQ  Code Status: Full Code  FEN: regular diet  Discharge Dispo: likely home with 24 hr supervision vs TCU  Estimated Disch Date / # of Days until Disch: discharge Monday to home likely after family meeting with a home care provider        Interval History (Subjective):      Patient feels ok.  No significant sob or cough.  Still on some supplemental O2.  No fevers or chills.                  Physical Exam:      Last Vital Signs:  /74 (BP Location: Right arm)  Pulse 66  Temp 96.7  F (35.9  C) (Oral)  Resp 18  Ht 1.6 m (5' 2.99\")  Wt 40.5 kg (89 lb 3.2 oz)  SpO2 92%  BMI 15.81 kg/m2      Intake/Output Summary (Last 24 hours) at 02/03/18 1537  Last data filed at 02/03/18 1407   Gross per 24 hour   Intake              590 ml   Output             1150 ml   Net             -560 ml       Constitutional: Awake, NAD "   Eyes: sclera white   HEENT:   MMM  Respiratory:   lungs cta bilaterally, no crackles or wheeze  Cardiovascular: RRR.  No murmur   GI: non-tender, not distended, bowel sounds present  Skin: no rash  Musculoskeletal/extremities:   No edema  Neurologic: Alert, oriented to being in the hospital  Psychiatric: calm, cooperative          Medications:      All current medications were reviewed with changes reflected in problem list.         Data:      No new labs or imaging today      Kyle Chung MD         [place]

## 2019-05-31 ENCOUNTER — TRANSFERRED RECORDS (OUTPATIENT)
Dept: HEALTH INFORMATION MANAGEMENT | Facility: CLINIC | Age: 69
End: 2019-05-31

## 2019-05-31 ENCOUNTER — RECORDS - HEALTHEAST (OUTPATIENT)
Dept: LAB | Facility: CLINIC | Age: 69
End: 2019-05-31

## 2019-05-31 LAB
ALBUMIN SERPL-MCNC: 4 G/DL (ref 3.5–5)
ALP SERPL-CCNC: 49 U/L (ref 45–120)
ALT SERPL W P-5'-P-CCNC: 15 U/L (ref 0–45)
ALT SERPL-CCNC: 15 U/L (ref 0–45)
ANION GAP SERPL CALCULATED.3IONS-SCNC: 11 MMOL/L (ref 5–18)
AST SERPL W P-5'-P-CCNC: 18 U/L (ref 0–40)
AST SERPL-CCNC: 18 U/L (ref 0–40)
BILIRUB SERPL-MCNC: 0.4 MG/DL (ref 0–1)
BUN SERPL-MCNC: 15 MG/DL (ref 8–22)
CALCIUM SERPL-MCNC: 9.5 MG/DL (ref 8.5–10.5)
CHLORIDE BLD-SCNC: 104 MMOL/L (ref 98–107)
CHOLEST SERPL-MCNC: 219 MG/DL
CHOLEST SERPL-MCNC: 219 MG/DL
CO2 SERPL-SCNC: 25 MMOL/L (ref 22–31)
CREAT SERPL-MCNC: 0.84 MG/DL (ref 0.6–1.1)
CREAT SERPL-MCNC: 0.84 MG/DL (ref 0.6–1.1)
FASTING STATUS PATIENT QL REPORTED: YES
GFR SERPL CREATININE-BSD FRML MDRD: >60 ML/MIN/1.73M2
GFR SERPL CREATININE-BSD FRML MDRD: >60 ML/MIN/1.73M2
GLUCOSE BLD-MCNC: 88 MG/DL (ref 70–125)
GLUCOSE SERPL-MCNC: 88 MG/DL (ref 70–125)
HDLC SERPL-MCNC: 87 MG/DL
HDLC SERPL-MCNC: 87 MG/DL
LDLC SERPL CALC-MCNC: 115 MG/DL
LDLC SERPL CALC-MCNC: 115 MG/DL
POTASSIUM BLD-SCNC: 4.4 MMOL/L (ref 3.5–5)
POTASSIUM SERPL-SCNC: 4.4 MMOL/L (ref 3.5–5)
PROT SERPL-MCNC: 6.4 G/DL (ref 6–8)
SODIUM SERPL-SCNC: 140 MMOL/L (ref 136–145)
TRIGL SERPL-MCNC: 85 MG/DL
TRIGL SERPL-MCNC: 85 MG/DL

## 2019-06-02 LAB — HBA1C MFR BLD: 5.8 % (ref 4.2–6.1)

## 2019-09-17 NOTE — ED PROVIDER NOTES
"  History     Chief Complaint:  Shortness of breath    HPI   Kelly Nunez is a 67 year old female who presents with shortness of breath. The patient comes to the ED from her family practice clinic, where Dr. Brennan boyd evaluated her, and was concerned by her low oxygen saturations. Her  reports that the patient was breathing heavily last night, and woke up every 30-60 minutes acting confused.  reports that the patient has also been coughing, though she is a smoker and the coughing seems somewhat typical for her. He also notes that she felt warm last night, though she did not have a fever this morning.    Of note, the patient was also diagnosed with a UTI at clinic yesterday.     Allergies:  Penicillins     Medications:    Aspirin  Duloxetine HCl  Olanzapine  Namenda  Nifedipine    Past Medical History:    DM  Dementia    Past Surgical History:    Heart cath, angioplasty    Family History:    History reviewed.  No significant family history.      Social History:  Relationship status:   Tobacco use: Positive  Alcohol use: Positive  The patient presents with her .     Review of Systems   Constitutional: Negative for fever.   Respiratory: Positive for shortness of breath.    Psychiatric/Behavioral: Positive for confusion.   All other systems reviewed and are negative.      Physical Exam   First Vitals:  BP: (!) 172/128  Pulse: 170  Heart Rate: 170  Resp: (!) 36  Height: 160 cm (5' 3\")  Weight: 41.7 kg (92 lb)  SpO2: (!) 70 %    Physical Exam   Constitutional: She appears cachectic. She is cooperative.   HENT:   Right Ear: Tympanic membrane normal.   Left Ear: Tympanic membrane normal.   Mouth/Throat: Oropharynx is clear and moist and mucous membranes are normal.   Eyes: Conjunctivae are normal.   Neck: Normal range of motion.   Cardiovascular: Regular rhythm and normal heart sounds.    Pulmonary/Chest: She is in respiratory distress. She has decreased breath sounds. She has wheezes. " Per Dr. Potter:  Eunice Laguerre and follow up as scheduled 09/18 at 0730.    Patient informed of recommendations and verbalized understanding.     Abdominal: Soft. Normal appearance and bowel sounds are normal. There is no rebound and no guarding.   Musculoskeletal: Normal range of motion.   Lymphadenopathy:     She has no cervical adenopathy.   Neurological: She is alert.   Skin: Skin is warm and dry.   Psychiatric: She has a normal mood and affect.       Emergency Department Course   ECG (15:12:08):  Indication: Shortness of breath.   Rate 168 bpm. DE interval *. QRS duration 76. QT/QTc 262/437. P-R-T axes 54.   Interpretation: Atrial fibrillation with rapid ventricular response. Marked ST abnormality, possible inferior subendocardial injury.  Agree with computer interpretation.  Interpreted at 1523 by Dr. Mistry.    Imaging:  Radiographic findings were communicated with the patient who voiced understanding of the findings.    Portable Chest XR, per radiology:   Pulmonary vasculature is indistinct suggestive of vascular congestion. Micronodule airspace opacities at the left lung base concerning for pneumonia versus atelectasis. Small bilateral pleural effusions. Right apical pleural thickening. No definite pneumothorax. Cardiac silhouette within normal limits.    Laboratory:  Blood gas venous and oxyhgb: pH 7.16 (LL), pCO2 74 (H), pO2 28, HCO3 26  CBC: WBC 7.8, HGB 16.3 (H),   CMP: Na 130 (L), Chloride 91 (L), Glucose 195 (H), BUN 32 (H), GFR 54 (L), ALT 70 (H),  (H), o/w WNL (Creatinine 1.03)  1524: Troponin I: 0.030  BNP: 7282 (H)  TSH: 2.00    Interventions:  1555: Cardizem, 10 mg, IV  1600: Cardizem, 10 mg/hr, IV  1602: Duoneb, 3 mL, Nebulization  1627: Cardizem, 10 mg, IV  1635: Solu-medrol, 40 mg, IV  1635: Rocephin, 1 g, IV injection  Heparin, IV     Emergency Department Course:  Nursing notes and vitals reviewed.  I performed an exam of the patient as documented above.  The above workup was undertaken.   I rechecked the patient and discussed results.  1722: I discussed the patient with Dr. Beard of the hospitalist service.      Findings and plan explained to the Patient who consents to admission. Discussed the patient with Dr. Beard, who will admit the patient to a Cardiac Telemetry bed for further monitoring, evaluation, and treatment.       Impression & Plan      Medical Decision Making:  Kelly Nunez is a 67 year old female referred from clinic for further evaluation of multiple concerns. She is obviously short of breath and hypoxic, as well as hypercarbic on arrival. As far as I can tell, she does not have a formal history of COPD, though history and exam strongly suggest this diagnosis. I treated aggressively with nebs and steroids, as well as supplemental oxygen, with significant improvement. She was diagnosed with UTI at clinic prior to transfer. Here, there is suggestion of possible pneumonia. We treated aggressively with IV antibiotics. Patient arrives in atrial fibrillation, with rapid ventricular response. This is apparently somewhat new, though the patient is unable to give any idea of when this may have started.  is unaware of this. She is obviously not a candidate for cardioversion. Rate is now controlled with diltiazem. We will initiate heparin. There was suggestion of cardiac ischemia. Troponin is not elevated. BNP is moderately elevated, which may reflect underlying cardiac dysfunction or rate-related compromise. I considered PE. Initially I felt patient was too tachypneic to get an effective CT scan. I discussed this with Dr. Beard. No that she is improved, CT PE angiogram will be obtained. She will be admitted to telemetry for further evaluation and treatment.     Diagnosis:    ICD-10-CM   1. COPD exacerbation (H) J44.1   2. Atrial fibrillation, unspecified type (H) I48.91     Disposition:  Admit to a Cardiac Telemetry bed under the care of Dr. Beard.    I, Clay Del Valle, am serving as a scribe on 1/23/2018 at 3:15 PM to personally document services performed by Madeline Mistry MD, based on my  observations and the provider's statements to me.    [facility] EMERGENCY DEPARTMENT       Madeline Mistry MD  01/23/18 6388

## 2020-01-26 ENCOUNTER — APPOINTMENT (OUTPATIENT)
Dept: CT IMAGING | Facility: CLINIC | Age: 70
End: 2020-01-26
Attending: EMERGENCY MEDICINE
Payer: COMMERCIAL

## 2020-01-26 ENCOUNTER — HOSPITAL ENCOUNTER (EMERGENCY)
Facility: CLINIC | Age: 70
Discharge: HOME | End: 2020-01-26
Attending: EMERGENCY MEDICINE | Admitting: EMERGENCY MEDICINE
Payer: COMMERCIAL

## 2020-01-26 VITALS
HEART RATE: 73 BPM | SYSTOLIC BLOOD PRESSURE: 148 MMHG | OXYGEN SATURATION: 91 % | TEMPERATURE: 98.4 F | RESPIRATION RATE: 19 BRPM | DIASTOLIC BLOOD PRESSURE: 70 MMHG

## 2020-01-26 DIAGNOSIS — R11.2 NAUSEA VOMITING AND DIARRHEA: ICD-10-CM

## 2020-01-26 DIAGNOSIS — R19.7 NAUSEA VOMITING AND DIARRHEA: ICD-10-CM

## 2020-01-26 DIAGNOSIS — K55.1 SMA STENOSIS: ICD-10-CM

## 2020-01-26 LAB
ALBUMIN SERPL-MCNC: 3.9 G/DL (ref 3.4–5)
ALBUMIN UR-MCNC: NEGATIVE MG/DL
ALP SERPL-CCNC: 68 U/L (ref 40–150)
ALT SERPL W P-5'-P-CCNC: 25 U/L (ref 0–50)
AMORPH CRY #/AREA URNS HPF: ABNORMAL /HPF
ANION GAP SERPL CALCULATED.3IONS-SCNC: 4 MMOL/L (ref 3–14)
APPEARANCE UR: CLEAR
AST SERPL W P-5'-P-CCNC: 20 U/L (ref 0–45)
BASOPHILS # BLD AUTO: 0.1 10E9/L (ref 0–0.2)
BASOPHILS NFR BLD AUTO: 0.9 %
BILIRUB SERPL-MCNC: 0.2 MG/DL (ref 0.2–1.3)
BILIRUB UR QL STRIP: NEGATIVE
BUN SERPL-MCNC: 20 MG/DL (ref 7–30)
CALCIUM SERPL-MCNC: 9 MG/DL (ref 8.5–10.1)
CHLORIDE SERPL-SCNC: 111 MMOL/L (ref 94–109)
CO2 SERPL-SCNC: 27 MMOL/L (ref 20–32)
COLOR UR AUTO: YELLOW
CREAT SERPL-MCNC: 0.83 MG/DL (ref 0.52–1.04)
DIFFERENTIAL METHOD BLD: NORMAL
EOSINOPHIL # BLD AUTO: 0.4 10E9/L (ref 0–0.7)
EOSINOPHIL NFR BLD AUTO: 4 %
ERYTHROCYTE [DISTWIDTH] IN BLOOD BY AUTOMATED COUNT: 11.9 % (ref 10–15)
GFR SERPL CREATININE-BSD FRML MDRD: 73 ML/MIN/{1.73_M2}
GLUCOSE SERPL-MCNC: 111 MG/DL (ref 70–99)
GLUCOSE UR STRIP-MCNC: NEGATIVE MG/DL
HCT VFR BLD AUTO: 41.9 % (ref 35–47)
HGB BLD-MCNC: 13.4 G/DL (ref 11.7–15.7)
HGB UR QL STRIP: NEGATIVE
IMM GRANULOCYTES # BLD: 0 10E9/L (ref 0–0.4)
IMM GRANULOCYTES NFR BLD: 0.3 %
INTERPRETATION ECG - MUSE: NORMAL
KETONES UR STRIP-MCNC: NEGATIVE MG/DL
LEUKOCYTE ESTERASE UR QL STRIP: NEGATIVE
LIPASE SERPL-CCNC: 185 U/L (ref 73–393)
LYMPHOCYTES # BLD AUTO: 1.6 10E9/L (ref 0.8–5.3)
LYMPHOCYTES NFR BLD AUTO: 16 %
MCH RBC QN AUTO: 30.6 PG (ref 26.5–33)
MCHC RBC AUTO-ENTMCNC: 32 G/DL (ref 31.5–36.5)
MCV RBC AUTO: 96 FL (ref 78–100)
MONOCYTES # BLD AUTO: 0.8 10E9/L (ref 0–1.3)
MONOCYTES NFR BLD AUTO: 7.5 %
NEUTROPHILS # BLD AUTO: 7.2 10E9/L (ref 1.6–8.3)
NEUTROPHILS NFR BLD AUTO: 71.3 %
NITRATE UR QL: NEGATIVE
NRBC # BLD AUTO: 0 10*3/UL
NRBC BLD AUTO-RTO: 0 /100
PH UR STRIP: 5 PH (ref 5–7)
PLATELET # BLD AUTO: 341 10E9/L (ref 150–450)
POTASSIUM SERPL-SCNC: 4.3 MMOL/L (ref 3.4–5.3)
PROT SERPL-MCNC: 7.5 G/DL (ref 6.8–8.8)
RBC # BLD AUTO: 4.38 10E12/L (ref 3.8–5.2)
RBC #/AREA URNS AUTO: 0 /HPF (ref 0–2)
SODIUM SERPL-SCNC: 142 MMOL/L (ref 133–144)
SOURCE: ABNORMAL
SP GR UR STRIP: 1.02 (ref 1–1.03)
SQUAMOUS #/AREA URNS AUTO: <1 /HPF (ref 0–1)
UROBILINOGEN UR STRIP-MCNC: NORMAL MG/DL (ref 0–2)
WBC # BLD AUTO: 10.1 10E9/L (ref 4–11)
WBC #/AREA URNS AUTO: 0 /HPF (ref 0–5)

## 2020-01-26 PROCEDURE — 25800030 ZZH RX IP 258 OP 636: Performed by: EMERGENCY MEDICINE

## 2020-01-26 PROCEDURE — 25000128 H RX IP 250 OP 636: Performed by: EMERGENCY MEDICINE

## 2020-01-26 PROCEDURE — 25000125 ZZHC RX 250: Performed by: EMERGENCY MEDICINE

## 2020-01-26 PROCEDURE — 85025 COMPLETE CBC W/AUTO DIFF WBC: CPT | Performed by: EMERGENCY MEDICINE

## 2020-01-26 PROCEDURE — 74177 CT ABD & PELVIS W/CONTRAST: CPT

## 2020-01-26 PROCEDURE — 80053 COMPREHEN METABOLIC PANEL: CPT | Performed by: EMERGENCY MEDICINE

## 2020-01-26 PROCEDURE — 81001 URINALYSIS AUTO W/SCOPE: CPT | Performed by: EMERGENCY MEDICINE

## 2020-01-26 PROCEDURE — 99285 EMERGENCY DEPT VISIT HI MDM: CPT | Mod: 25

## 2020-01-26 PROCEDURE — 83690 ASSAY OF LIPASE: CPT | Performed by: EMERGENCY MEDICINE

## 2020-01-26 PROCEDURE — 96374 THER/PROPH/DIAG INJ IV PUSH: CPT | Mod: 59

## 2020-01-26 PROCEDURE — 96361 HYDRATE IV INFUSION ADD-ON: CPT

## 2020-01-26 PROCEDURE — 93005 ELECTROCARDIOGRAM TRACING: CPT

## 2020-01-26 RX ORDER — ONDANSETRON 2 MG/ML
4 INJECTION INTRAMUSCULAR; INTRAVENOUS
Status: COMPLETED | OUTPATIENT
Start: 2020-01-26 | End: 2020-01-26

## 2020-01-26 RX ORDER — ONDANSETRON 4 MG/1
4 TABLET, ORALLY DISINTEGRATING ORAL EVERY 8 HOURS PRN
Qty: 6 TABLET | Refills: 0 | Status: SHIPPED | OUTPATIENT
Start: 2020-01-26 | End: 2020-01-29

## 2020-01-26 RX ORDER — IOPAMIDOL 755 MG/ML
500 INJECTION, SOLUTION INTRAVASCULAR ONCE
Status: COMPLETED | OUTPATIENT
Start: 2020-01-26 | End: 2020-01-26

## 2020-01-26 RX ADMIN — SODIUM CHLORIDE 52 ML: 9 INJECTION, SOLUTION INTRAVENOUS at 05:09

## 2020-01-26 RX ADMIN — SODIUM CHLORIDE 1000 ML: 9 INJECTION, SOLUTION INTRAVENOUS at 04:12

## 2020-01-26 RX ADMIN — ONDANSETRON HYDROCHLORIDE 4 MG: 2 INJECTION, SOLUTION INTRAMUSCULAR; INTRAVENOUS at 03:48

## 2020-01-26 RX ADMIN — IOPAMIDOL 50 ML: 755 INJECTION, SOLUTION INTRAVENOUS at 05:08

## 2020-01-26 ASSESSMENT — ENCOUNTER SYMPTOMS
NAUSEA: 1
ABDOMINAL PAIN: 1
BLOOD IN STOOL: 0
DIARRHEA: 1

## 2020-01-26 NOTE — ED TRIAGE NOTES
Pt in with C/O diarrhea and upper abdominal pain onset this morning. Pt has hx of dementia and  state that she was feeling okay yesterday, however has been waking up in the mornings and complaining of an upset stomach.

## 2020-01-26 NOTE — ED AVS SNAPSHOT
[facility] Emergency Department  [address]  [facility] [address]  Phone:  [phone]  Fax:  [phone]                                    Kelly Nunez   MRN: [id number]    Department:  [facility] Emergency Department   Date of Visit:  1/26/2020           After Visit Summary Signature Page    I have received my discharge instructions, and my questions have been answered. I have discussed any challenges I see with this plan with the nurse or doctor.    ..........................................................................................................................................  Patient/Patient Representative Signature      ..........................................................................................................................................  Patient Representative Print Name and Relationship to Patient    ..................................................               ................................................  Date                                   Time    ..........................................................................................................................................  Reviewed by Signature/Title    ...................................................              ..............................................  Date                                               Time          22EPIC Rev 08/18

## 2020-01-26 NOTE — ED PROVIDER NOTES
History     Chief Complaint:  Diarrhea    The history is provided by the patient and the spouse. The history is limited by the condition of the patient (dementia).   Her hushand assists with history.     Kelly Nunez is a 69 year old female, with a history of diabetes, atrial fibrillation, and dementia, who presents with her  to the emergency department for evaluation of diarrhea. The patient's  reports the patient's woke him up around 0200 because she was having constant diarrhea and upper abdominal pain. Patient took an antidiarrheal with minimal relief. Patient denies any current abdominal pain or discomfort. Patient's  states the patient's abdomen seems distended. Patient denies any blood in stool.    Allergies:  Penicillins     Medications:    Aspirin  Diltiazem  Duloxetine  DuoNeb  Metoprolol  Olanzapine  Wellbutrin    Past Medical History:    Atrial fibrillation  Diabetes  Dementia    Past Surgical History:    Heart angioplasty    Family History:    History reviewed. No pertinent family history.     Social History:  Smoking status: Current every day smoker of 1.0 ppd.  Alcohol use: Yes  Drug use: No  The patient presents to the emergency department with her .  PCP: Kenneth Amin  Marital Status:   [2]     Review of Systems   Gastrointestinal: Positive for abdominal pain, diarrhea and nausea. Negative for blood in stool.   All other systems reviewed and are negative.   neg dysuria, urgency frequency  Neg bloody stools  Neg fevers  Neg cough  Neg CP  Neg SOB      Physical Exam   Patient Vitals for the past 24 hrs:   BP Temp Temp src Pulse Heart Rate Resp SpO2   01/26/20 0500 (!) 148/70 -- -- 73 74 -- 91 %   01/26/20 0455 138/78 -- -- 67 71 19 97 %   01/26/20 0321 (!) 147/76 98.4  F (36.9  C) Temporal 65 -- 16 95 %     Physical Exam    Gen: alert  HEENT: PERRL, oropharynx clear  Neck: normal ROM  CV: RRR, no murmurs  Pulm: breath sounds equal, lungs clear  Abd: Soft,  mild diffuse lower abdominal tenderness  Back: no evidence of injury, no cva tenderness  MSK: no deformity, moves all extremities  Skin: no rash  Neuro: alert, appropriate conversation and interaction    Emergency Department Course   ECG (4:13:13):  Rate 61 bpm. MD interval 132. QRS duration 78. QT/QTc 394/396. P-R-T axes 46 31 8. Normal sinus rhythm. Septal infarct, age undetermined. Abnormal ECG. Interpreted at 0435 by Mica Santana MD.    Imaging:  Radiology findings were communicated with the patient and family who voiced understanding of the findings.    CT Abdomen Pelvis w Contrast  IMPRESSION:   1.  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 There is also a 3 cm long segment of the proximal superior mesenteric artery that is hypoenhancing, suggestive of occlusion or near occlusion. This is most likely a chronic   finding, but an acute occlusion is also possible. Recommend correlation with the patient's symptoms.  2.  Fluid is present in the colonic lumen. This is nonspecific, but could relate to gastroenteritis.  3.  No other cause of acute pain identified in the abdomen or pelvis. Normal appendix.  As read by Radiology.    Laboratory:  Laboratory findings were communicated with the patient and family who voiced understanding of the findings.    UA: Amorphous Crystals (Few), o/w Negative    CBC: AWNL (WBC 10.1, HGB 13.4, )  CMP: Chloride 111 (H), Glucose 111 (H) o/w WNL (Creatinine 0.83)  Lipase: 185     Interventions:  0348 Zofran 4 mg IV  0412 NS 1L IV Bolus    Emergency Department Course:  Past medical records, nursing notes, and vitals reviewed.  0440: I performed an exam of the patient and obtained history, as documented above.     IV inserted and blood drawn.    EKG was obtained and reviewed in the emergency department.    The patient was sent for an abdomen pelvis CT while in the emergency department, results above.     0601: I rechecked the  patient.  Her abdominal pain is much improved and abdominal exam is nontender. I reviewed the results with the Patient and spouse and answered all related questions prior to discharge.     Findings and plan explained to the Patient and spouse. Patient discharged home with instructions regarding supportive care, medications, and reasons to return. The importance of close follow-up was reviewed. The patient was prescribed Zofran.  Impression & Plan   Medical Decision Making:  The patient presents for evaluation of diarrhea with a nonfocal abdominal exam. The patient has not had any blood in the emesis or stool.   The differential diagnosis of vomiting and diarrhea is broad and includes such etiologies as viral gastroenteritis, bacterial infection of the large intestine (salmonella, shigella, campylobacter, e coli, etc), bowel obstruction, intra-abdominal infection such as colitis, cholecystitis, UTI, pyelonephritis, appendicitis, etc.  There are no signs of worrisome intra-abdominal pathologies detected during the visit today.    The patient has a completely benign abdominal exam without rebound, guarding, or marked tenderness to palpation.  Laboratory studies were unremarkable. CT scan shows findings consistent with gastroenteritis and no sings of appendicitis/diverticulitis. The patient improved with iv fluids and anti-emetics. Supportive outpatient management is therefore indicated.  Abdominal pain precautions are given for home.   No indication for stool studies at this time.   It was discussed with the patient to return to the ED for blood in stool, increasing pain, or fevers.  I noted the finding of SMA stenosis on the CT report.  Here, after the above interventions patient has only very minimal abdominal pain and is feeling better.  DIscussed the finding.  Discussed to follow up with PCP, especially if pain with eating. Return to ED for progressive abdominal pain.  Clinically my opinion is this is chronic  finding and not cause of presentation today.   Feels much improved after interventions in ED.       Diagnosis:    ICD-10-CM   1. Nausea vomiting and diarrhea R11.2    R19.7   2. SMA stenosis (H) I77.1     Disposition:  Discharged to home.    Discharge Medications:  New Prescriptions    ONDANSETRON (ZOFRAN ODT) 4 MG ODT TAB    Take 1 tablet (4 mg) by mouth every 8 hours as needed for nausea or vomiting     Beverley Cabello  1/26/2020   [facility] EMERGENCY DEPARTMENT  Scribe Disclosure:  I, Beverley Cabello, am serving as a scribe at 4:40 AM on 1/26/2020 to document services personally performed by Mica Santana MD based on my observations and the provider's statements to me.      Mica Santana MD  01/26/20 6482

## 2020-01-27 ENCOUNTER — TRANSFERRED RECORDS (OUTPATIENT)
Dept: HEALTH INFORMATION MANAGEMENT | Facility: CLINIC | Age: 70
End: 2020-01-27

## 2020-01-27 LAB — RETINOPATHY: NEGATIVE

## 2020-03-17 ENCOUNTER — TRANSFERRED RECORDS (OUTPATIENT)
Dept: HEALTH INFORMATION MANAGEMENT | Facility: CLINIC | Age: 70
End: 2020-03-17

## 2020-04-15 ENCOUNTER — TRANSFERRED RECORDS (OUTPATIENT)
Dept: HEALTH INFORMATION MANAGEMENT | Facility: CLINIC | Age: 70
End: 2020-04-15

## 2020-07-10 ENCOUNTER — VIRTUAL VISIT (OUTPATIENT)
Dept: FAMILY MEDICINE | Facility: CLINIC | Age: 70
End: 2020-07-10
Payer: COMMERCIAL

## 2020-07-10 DIAGNOSIS — J44.1 CHRONIC OBSTRUCTIVE PULMONARY DISEASE WITH (ACUTE) EXACERBATION (H): ICD-10-CM

## 2020-07-10 DIAGNOSIS — F03.90 DEMENTIA WITHOUT BEHAVIORAL DISTURBANCE, UNSPECIFIED DEMENTIA TYPE: Primary | ICD-10-CM

## 2020-07-10 DIAGNOSIS — I48.91 ATRIAL FIBRILLATION, UNSPECIFIED TYPE (H): ICD-10-CM

## 2020-07-10 PROCEDURE — 99204 OFFICE O/P NEW MOD 45 MIN: CPT | Mod: 95 | Performed by: PHYSICIAN ASSISTANT

## 2020-07-10 RX ORDER — OLANZAPINE 10 MG/1
10 TABLET ORAL AT BEDTIME
COMMUNITY
Start: 2020-06-14

## 2020-07-10 RX ORDER — METOPROLOL TARTRATE 50 MG
50 TABLET ORAL 2 TIMES DAILY
Qty: 60 TABLET | Refills: 0 | Status: CANCELLED | OUTPATIENT
Start: 2020-07-10

## 2020-07-10 RX ORDER — BUPROPION HYDROCHLORIDE 300 MG/1
TABLET ORAL
Status: CANCELLED | OUTPATIENT
Start: 2020-07-10

## 2020-07-10 RX ORDER — METOPROLOL TARTRATE 50 MG
50 TABLET ORAL 2 TIMES DAILY
Qty: 180 TABLET | Refills: 1 | Status: SHIPPED | OUTPATIENT
Start: 2020-07-10 | End: 2021-01-19

## 2020-07-10 RX ORDER — MEMANTINE HYDROCHLORIDE 10 MG/1
TABLET ORAL
Status: CANCELLED | OUTPATIENT
Start: 2020-07-10

## 2020-07-10 RX ORDER — BUPROPION HYDROCHLORIDE 300 MG/1
300 TABLET ORAL EVERY MORNING
COMMUNITY
Start: 2020-06-21 | End: 2021-02-12

## 2020-07-10 RX ORDER — DILTIAZEM HYDROCHLORIDE 180 MG/1
180 CAPSULE, COATED, EXTENDED RELEASE ORAL DAILY
Qty: 90 CAPSULE | Refills: 1 | Status: SHIPPED | OUTPATIENT
Start: 2020-07-10 | End: 2021-01-07

## 2020-07-10 RX ORDER — DILTIAZEM HYDROCHLORIDE 180 MG/1
180 CAPSULE, COATED, EXTENDED RELEASE ORAL DAILY
Qty: 30 CAPSULE | Refills: 0 | Status: CANCELLED | OUTPATIENT
Start: 2020-07-10

## 2020-07-10 RX ORDER — MEMANTINE HYDROCHLORIDE 10 MG/1
10 TABLET ORAL 2 TIMES DAILY
COMMUNITY
Start: 2020-06-14

## 2020-07-10 RX ORDER — LORAZEPAM 0.5 MG/1
0.5 TABLET ORAL EVERY 6 HOURS PRN
COMMUNITY
End: 2021-01-14 | Stop reason: DRUGHIGH

## 2020-07-10 NOTE — PROGRESS NOTES
"Kelly Nunez is a 70 year old female who is being evaluated via a billable video visit.      The patient has been notified of following:     \"This video visit will be conducted via a call between you and your physician/provider. We have found that certain health care needs can be provided without the need for an in-person physical exam.  This service lets us provide the care you need with a video conversation.  If a prescription is necessary we can send it directly to your pharmacy.  If lab work is needed we can place an order for that and you can then stop by our lab to have the test done at a later time.    Video visits are billed at different rates depending on your insurance coverage.  Please reach out to your insurance provider with any questions.    If during the course of the call the physician/provider feels a video visit is not appropriate, you will not be charged for this service.\"    Patient has given verbal consent for Video visit? Yes  How would you like to obtain your AVS? Mail a copy  Patient would like the video invitation sent by: Text to cell phone: [phone]  Will anyone else be joining your video visit? No    Subjective     Kelly Nunez is a 70 year old female who presents today via video visit for the following health issues:    HPI    New Patient/Transfer of Care  Was previously seen at [facility] which is a private practice.  Doctor retired so she needs a new PCP.      How many servings of fruits and vegetables do you eat daily?  0-1    On average, how many sweetened beverages do you drink each day (Examples: soda, juice, sweet tea, etc.  Do NOT count diet or artificially sweetened beverages)?   0    How many days per week do you exercise enough to make your heart beat faster? 3 or less    How many minutes a day do you exercise enough to make your heart beat faster? 9 or less    How many days per week do you miss taking your medication? 0      Video Start Time: 2:07 " PM    Kelly is calling in today via video with her  Kendall to establish care.    Hypertension Follow-up      Do you check your blood pressure regularly outside of the clinic? Yes     Are you following a low salt diet? No    Are your blood pressures ever more than 140 on the top number (systolic) OR more   than 90 on the bottom number (diastolic), for example 140/90? No     Kendall says he checks daily vitals.  And she has a PCA that comes daily to be with her.  Kelly suffers from dementia as well.    She has a Psychiatrist who manages Psych meds for her.  Takes Namenda, Olanzepine, Wellbutrin and Ativan when needed.    120/70 today (patient reported)  Checks 02 and pulse daily also.    COPD Follow-Up    Overall, how are your COPD symptoms since your last clinic visit?   Same    How much fatigue or shortness of breath do you have when you are walking?  Same as usual    How much shortness of breath do you have when you are resting?  Same as usual    How often do you cough? Rarely    Have you noticed any change in your sputum/phlegm?  No    Have you experienced a recent fever? No    Please describe how far you can walk without stopping to rest:  The length of 3-5 rooms    How many flights of stairs are you able to walk up without stopping?  None    Have you had any Emergency Room Visits, Urgent Care Visits, or Hospital Admissions because of your COPD since your last office visit?  No    History   Smoking Status     Former Smoker     Packs/day: 1.00   Smokeless Tobacco     Never Used     No results found for: FEV1, SIE4EYO  She was hospitalized for pneumonia in 2018 and Kendall says her breathing has never been the same.  Has a long hx of smoking but has since quit.    Reviewed and updated as needed this visit by Provider         Review of Systems   Constitutional, HEENT, cardiovascular, pulmonary, gi and gu systems are negative, except as otherwise noted.      Objective             Physical Exam     GENERAL:  Healthy, alert and no distress  EYES: Eyes grossly normal to inspection.  No discharge or erythema, or obvious scleral/conjunctival abnormalities.  RESP: No audible wheeze, cough, or visible cyanosis.  No visible retractions or increased work of breathing.    SKIN: Visible skin clear. No significant rash, abnormal pigmentation or lesions.  NEURO: Cranial nerves grossly intact.  Mentation and speech appropriate for age.  PSYCH: mentation appears normal, affect normal but affect flat- interactive with me      Diagnostic Test Results:  Labs reviewed in Epic        Assessment & Plan     1. Dementia without behavioral disturbance, unspecified dementia type (H)  Follows with Psych.    2. Atrial fibrillation, unspecified type (H)  Refills given today.  It seems that her  takes good care of checking her vitals.  We will plan for follow up in 6 months.  If they feel more comfortable with 3 month follow up in clinic for heart/lung exam they will schedule sooner.  - metoprolol tartrate (LOPRESSOR) 50 MG tablet; Take 1 tablet (50 mg) by mouth 2 times daily  Dispense: 180 tablet; Refill: 1  - diltiazem ER COATED BEADS (CARDIZEM CD/CARTIA XT) 180 MG 24 hr capsule; Take 1 capsule (180 mg) by mouth daily  Dispense: 90 capsule; Refill: 1    3. Chronic obstructive pulmonary disease with (acute) exacerbation (H)  Will need to get spirometry copy- they are working on getting records transferred.  It does not appear she has any inhalers for this right now, just home neb.             Return in about 6 months (around 1/10/2021) for med check BP/a fib and possible labs.    Kolby Noonan PA-C  [facility]      Video-Visit Details    Type of service:  Video Visit    Video End Time:2:30PM    Originating Location (pt. Location): Home    Distant Location (provider location):  [facility]     Platform used for Video Visit: Doximity    No follow-ups on file.       Kolby Noonan PA-C

## 2020-07-13 DIAGNOSIS — I48.91 ATRIAL FIBRILLATION, UNSPECIFIED TYPE (H): ICD-10-CM

## 2020-07-13 NOTE — TELEPHONE ENCOUNTER
Routing refill request to provider for review/approval because:  Labs out of range:     Blood pressure under 140/90 in past 12 months         BP Readings from Last 3 Encounters:   01/26/20 (!) 148/70   02/05/18 130/84        Kylah Hernandez RN Flex

## 2020-07-14 RX ORDER — DILTIAZEM HYDROCHLORIDE 180 MG/1
CAPSULE, COATED, EXTENDED RELEASE ORAL
Qty: 90 CAPSULE | Refills: 3 | OUTPATIENT
Start: 2020-07-14

## 2020-07-15 ENCOUNTER — TELEPHONE (OUTPATIENT)
Dept: FAMILY MEDICINE | Facility: CLINIC | Age: 70
End: 2020-07-15

## 2020-07-15 NOTE — TELEPHONE ENCOUNTER
Patient calls,    Requesting to have prescription sent from [facility] to [facility] on [address] in [place], called pharmacy and initiated the transfer    Zachariah Cat RN

## 2020-07-16 ENCOUNTER — MEDICAL CORRESPONDENCE (OUTPATIENT)
Dept: HEALTH INFORMATION MANAGEMENT | Facility: CLINIC | Age: 70
End: 2020-07-16

## 2020-07-16 VITALS — SYSTOLIC BLOOD PRESSURE: 120 MMHG | DIASTOLIC BLOOD PRESSURE: 70 MMHG

## 2020-07-16 PROBLEM — J96.01 ACUTE RESPIRATORY FAILURE WITH HYPOXIA (H): Status: ACTIVE | Noted: 2020-07-16

## 2020-07-16 PROBLEM — J44.1 CHRONIC OBSTRUCTIVE PULMONARY DISEASE WITH (ACUTE) EXACERBATION (H): Status: ACTIVE | Noted: 2020-07-16

## 2020-07-16 PROBLEM — I27.0 PRIMARY PULMONARY HYPERTENSION (H): Status: ACTIVE | Noted: 2020-07-16

## 2020-07-20 DIAGNOSIS — I48.91 ATRIAL FIBRILLATION, UNSPECIFIED TYPE (H): ICD-10-CM

## 2020-07-21 RX ORDER — METOPROLOL TARTRATE 50 MG
TABLET ORAL
Qty: 180 TABLET | Refills: 3 | OUTPATIENT
Start: 2020-07-21

## 2020-07-21 NOTE — TELEPHONE ENCOUNTER
Refilled 7/10/2020 for #180 with 1 refill to [facility] pharmacy.    Esther Guerrero RN

## 2020-09-03 ENCOUNTER — OFFICE VISIT (OUTPATIENT)
Dept: PODIATRY | Facility: CLINIC | Age: 70
End: 2020-09-03
Payer: COMMERCIAL

## 2020-09-03 VITALS — SYSTOLIC BLOOD PRESSURE: 140 MMHG | BODY MASS INDEX: 18.6 KG/M2 | WEIGHT: 105 LBS | DIASTOLIC BLOOD PRESSURE: 80 MMHG

## 2020-09-03 DIAGNOSIS — L60.8 NAIL DEFORMITY: ICD-10-CM

## 2020-09-03 DIAGNOSIS — L60.8 CHANGE IN NAIL APPEARANCE: Primary | ICD-10-CM

## 2020-09-03 PROCEDURE — 99203 OFFICE O/P NEW LOW 30 MIN: CPT | Performed by: PODIATRIST

## 2020-09-03 NOTE — LETTER
"    9/3/2020         RE: Kelly [place]une[place]  [id number] [id number]        Dear Colleague,    Thank you for referring your patient, Kelly Nunez, to the [facility]. Please see a copy of my visit note below.      ASSESSMENT/PLAN:  Encounter Diagnoses   Name Primary?     Change in nail appearance Yes     Nail deformity      No acute findings.     I explained that toenail changes are often from injury to a nail unit, rather than from fungus.  Injury might be a one-time event or from repetitive irritation in foot wear and with certain types of activities.  Injured nails are likely more susceptible to fungal infection.  Change seen in multiple nails is more likely from fungus.  Treatment options are limited.     It was explained that many people opt to simply keep the involved nails trimmed and filed.  Another option is a trial of a topical and/or oral antifungal.  Many times these are not successful nor provide a cure.  These medications do not correct a nail deformity.      Using a nail nippers, I trimmed the hallux nails back. They were both fairly long and the incurvation of the left hallux nail might lead to an ingrown nail problem. The edges were cut at a slant to help prevent this. I did not charge for this service today and explained that we do not typically offer it.     She will follow up at her nail salon for future trimming.  I requested to return to clinic, if any nail-related pain develops.     Body mass index is 18.6 kg/m .          Adam Gastelum DPM, FACFAS, [facility] Department of Podiatry/Foot & Ankle Surgery      ____________________________________________________________________    HPI:         Chief Complaint: \"probably fungus\" (toenail)  She had a nurse trim her nails. She suggested that there might be some nail fungus.  Onset of problem: months  No pain  Previous treatment: none  Her  reported that she has some PAD and history of what sounded like " an angioplasty.  *  Patient Active Problem List   Diagnosis     A-fib (H)     Encounter for palliative care     Dementia without behavioral disturbance, unspecified dementia type (H)     Chronic obstructive pulmonary disease with (acute) exacerbation (H)     Primary pulmonary hypertension (H)     Acute respiratory failure with hypoxia (H)   *  *  Past Surgical History:   Procedure Laterality Date     HEART CATH, ANGIOPLASTY     *  *  Current Outpatient Medications   Medication Sig Dispense Refill     ASPIRIN PO Take 81 mg by mouth daily       buPROPion (WELLBUTRIN XL) 300 MG 24 hr tablet        diltiazem ER COATED BEADS (CARDIZEM CD/CARTIA XT) 180 MG 24 hr capsule Take 1 capsule (180 mg) by mouth daily 90 capsule 1     LORazepam (ATIVAN) 0.5 MG tablet Take 0.5 mg by mouth every 6 hours as needed for anxiety       memantine (NAMENDA) 10 MG tablet        metoprolol tartrate (LOPRESSOR) 50 MG tablet Take 1 tablet (50 mg) by mouth 2 times daily 180 tablet 1     OLANZapine (ZYPREXA) 10 MG tablet        ipratropium - albuterol 0.5 mg/2.5 mg/3 mL (DUONEB) 0.5-2.5 (3) MG/3ML neb solution Take 1 vial (3 mLs) by nebulization every 6 hours as needed for shortness of breath / dyspnea or wheezing (Patient not taking: Reported on 9/3/2020) 90 vial 1     OLANZAPINE PO Take 10 mg by mouth At Bedtime       order for DME Equipment being ordered: Nebulizer (Patient not taking: Reported on 7/10/2020) 1 each 0       ROS:     A 10-point review of systems was performed and is positive for that noted above in the HPI and as seen below.  All other areas are negative.     Numbness in feet?  no   Calf pain with walking? no  Recent foot/ankle injury? no  Weight change over past 12 months? no  Self perception as overweight? no  Recent flu-like symptoms? no  Joint pain other than feet ? yes    Social History: Employment:  no;  Exercise/Physical activity:  no;  Tobacco use:  no  Social History     Socioeconomic History     Marital status:       Spouse name: Not on file     Number of children: Not on file     Years of education: Not on file     Highest education level: Not on file   Occupational History     Not on file   Social Needs     Financial resource strain: Not on file     Food insecurity     Worry: Not on file     Inability: Not on file     Transportation needs     Medical: Not on file     Non-medical: Not on file   Tobacco Use     Smoking status: Former Smoker     Packs/day: 1.00     Smokeless tobacco: Never Used   Substance and Sexual Activity     Alcohol use: Yes     Comment: 1/week     Drug use: No     Sexual activity: Not Currently     Partners: Male   Lifestyle     Physical activity     Days per week: Not on file     Minutes per session: Not on file     Stress: Not on file   Relationships     Social connections     Talks on phone: Not on file     Gets together: Not on file     Attends Tenriism service: Not on file     Active member of club or organization: Not on file     Attends meetings of clubs or organizations: Not on file     Relationship status: Not on file     Intimate partner violence     Fear of current or ex partner: Not on file     Emotionally abused: Not on file     Physically abused: Not on file     Forced sexual activity: Not on file   Other Topics Concern     Not on file   Social History Narrative     Not on file       Family history:  No family history on file.    Rheumatoid arthritis:  yes  Foot Problems: parent with toenail fungus  Diabetes: grandparent      EXAM:    Vitals: BP (!) 140/80   Wt 47.6 kg (105 lb)   BMI 18.60 kg/m    BMI: Body mass index is 18.6 kg/m .  Height: Data Unavailable    Constitutional/ general:  Pt is in no apparent distress, appears well-nourished.  Cooperative with history and physical exam.     Vascular:  Pedal pulses are nor readily palpable bilaterally for both the DP and PT No edema.      Neuro:  Alert and oriented x 3. Coordinated gait.  Light touch sensation is intact to the L4, L5, S1  distributions. No obvious deficits.  No evidence of neurological-based weakness, spasticity, or contracture in the lower extremities.     Derm: Normal texture and turgor.  No erythema, ecchymosis, or cyanosis.  No open lesions.     Nails are painted.  The hallux nails are incurvated, left > right. They are mildly thickened.    Musculoskeletal:    Lower extremity muscle strength is normal.  Patient is ambulatory without an assistive device or brace .  No gross deformities.            Again, thank you for allowing me to participate in the care of your patient.        Sincerely,        Adam Gastelum DPM

## 2020-09-03 NOTE — PROGRESS NOTES
"  ASSESSMENT/PLAN:  Encounter Diagnoses   Name Primary?     Change in nail appearance Yes     Nail deformity      No acute findings.     I explained that toenail changes are often from injury to a nail unit, rather than from fungus.  Injury might be a one-time event or from repetitive irritation in foot wear and with certain types of activities.  Injured nails are likely more susceptible to fungal infection.  Change seen in multiple nails is more likely from fungus.  Treatment options are limited.     It was explained that many people opt to simply keep the involved nails trimmed and filed.  Another option is a trial of a topical and/or oral antifungal.  Many times these are not successful nor provide a cure.  These medications do not correct a nail deformity.      Using a nail nippers, I trimmed the hallux nails back. They were both fairly long and the incurvation of the left hallux nail might lead to an ingrown nail problem. The edges were cut at a slant to help prevent this. I did not charge for this service today and explained that we do not typically offer it.     She will follow up at her nail salon for future trimming.  I requested to return to clinic, if any nail-related pain develops.     Body mass index is 18.6 kg/m .          Adam Gastelum DPM, FACFAS, MS    [place] [facility]      ____________________________________________________________________    HPI:         Chief Complaint: \"probably fungus\" (toenail)  She had a nurse trim her nails. She suggested that there might be some nail fungus.  Onset of problem: months  No pain  Previous treatment: none  Her  reported that she has some PAD and history of what sounded like an angioplasty.  *  Patient Active Problem List   Diagnosis     A-fib (H)     Encounter for palliative care     Dementia without behavioral disturbance, unspecified dementia type (H)     Chronic obstructive pulmonary disease with (acute) " exacerbation (H)     Primary pulmonary hypertension (H)     Acute respiratory failure with hypoxia (H)   *  *  Past Surgical History:   Procedure Laterality Date     HEART CATH, ANGIOPLASTY     *  *  Current Outpatient Medications   Medication Sig Dispense Refill     ASPIRIN PO Take 81 mg by mouth daily       buPROPion (WELLBUTRIN XL) 300 MG 24 hr tablet        diltiazem ER COATED BEADS (CARDIZEM CD/CARTIA XT) 180 MG 24 hr capsule Take 1 capsule (180 mg) by mouth daily 90 capsule 1     LORazepam (ATIVAN) 0.5 MG tablet Take 0.5 mg by mouth every 6 hours as needed for anxiety       memantine (NAMENDA) 10 MG tablet        metoprolol tartrate (LOPRESSOR) 50 MG tablet Take 1 tablet (50 mg) by mouth 2 times daily 180 tablet 1     OLANZapine (ZYPREXA) 10 MG tablet        ipratropium - albuterol 0.5 mg/2.5 mg/3 mL (DUONEB) 0.5-2.5 (3) MG/3ML neb solution Take 1 vial (3 mLs) by nebulization every 6 hours as needed for shortness of breath / dyspnea or wheezing (Patient not taking: Reported on 9/3/2020) 90 vial 1     OLANZAPINE PO Take 10 mg by mouth At Bedtime       order for DME Equipment being ordered: Nebulizer (Patient not taking: Reported on 7/10/2020) 1 each 0       ROS:     A 10-point review of systems was performed and is positive for that noted above in the HPI and as seen below.  All other areas are negative.     Numbness in feet?  no   Calf pain with walking? no  Recent foot/ankle injury? no  Weight change over past 12 months? no  Self perception as overweight? no  Recent flu-like symptoms? no  Joint pain other than feet ? yes    Social History: Employment:  no;  Exercise/Physical activity:  no;  Tobacco use:  no  Social History     Socioeconomic History     Marital status:      Spouse name: Not on file     Number of children: Not on file     Years of education: Not on file     Highest education level: Not on file   Occupational History     Not on file   Social Needs     Financial resource strain: Not on  file     Food insecurity     Worry: Not on file     Inability: Not on file     Transportation needs     Medical: Not on file     Non-medical: Not on file   Tobacco Use     Smoking status: Former Smoker     Packs/day: 1.00     Smokeless tobacco: Never Used   Substance and Sexual Activity     Alcohol use: Yes     Comment: 1/week     Drug use: No     Sexual activity: Not Currently     Partners: Male   Lifestyle     Physical activity     Days per week: Not on file     Minutes per session: Not on file     Stress: Not on file   Relationships     Social connections     Talks on phone: Not on file     Gets together: Not on file     Attends Restorationism service: Not on file     Active member of club or organization: Not on file     Attends meetings of clubs or organizations: Not on file     Relationship status: Not on file     Intimate partner violence     Fear of current or ex partner: Not on file     Emotionally abused: Not on file     Physically abused: Not on file     Forced sexual activity: Not on file   Other Topics Concern     Not on file   Social History Narrative     Not on file       Family history:  No family history on file.    Rheumatoid arthritis:  yes  Foot Problems: parent with toenail fungus  Diabetes: grandparent      EXAM:    Vitals: BP (!) 140/80   Wt 47.6 kg (105 lb)   BMI 18.60 kg/m    BMI: Body mass index is 18.6 kg/m .  Height: Data Unavailable    Constitutional/ general:  Pt is in no apparent distress, appears well-nourished.  Cooperative with history and physical exam.     Vascular:  Pedal pulses are nor readily palpable bilaterally for both the DP and PT No edema.      Neuro:  Alert and oriented x 3. Coordinated gait.  Light touch sensation is intact to the L4, L5, S1 distributions. No obvious deficits.  No evidence of neurological-based weakness, spasticity, or contracture in the lower extremities.     Derm: Normal texture and turgor.  No erythema, ecchymosis, or cyanosis.  No open lesions.      Nails are painted.  The hallux nails are incurvated, left > right. They are mildly thickened.    Musculoskeletal:    Lower extremity muscle strength is normal.  Patient is ambulatory without an assistive device or brace .  No gross deformities.

## 2020-09-03 NOTE — PATIENT INSTRUCTIONS
NAIL FUNGUS / ONYCHOMYCOSIS   Nail fungus is not a hygiene problem and will likely not lead to significant medical problems. The nails may get thick causing pain and possibly local skin infection. Treatments include debridement (trimming), oral antifungals, topical antifungals and complete removal of the nail. Most fungal nails are not treated.     Topicals such as tea tree oil can be helpful for surface fungus and may, at best, limit progression. Over the counter creams (such as Lamisil) can also be used however, their effectiveness is also quite low.  Topical treatment with Pen lac is expensive and often not covered by insurance. Pen lac has an approximate 8% success rate. Topical therapy recommendations is to apply twice a day for at least 3-4 months as it takes 9 months for new nail to grow out.     Experts suggest soaking your feet for 15 to 20 minutes in a mixture of 1 cup vinegar to 4 cups warm water. Be sure to rinse well and pat your feet dry when you're done. You can soak your feet like this daily. But if your skin becomes irritated, try soaking only two to three times a week. Vicks VapoRub, as with vinegar, there have been no controlled clinical trials to assess the effectiveness of Vicks VapoRub on nail fungus, but there have been numerous anecdotal reports that it works. There's no consensus on how often to apply this product, so check with your doctor before using it on your nails.      Oral therapies include Sporanox and Lamisil. Oral therapies are also expensive and not very effective. Side effects such as liver disease are the main concern. Return of fungus is common even if the treatment worked.      Other Tips:  - Penlac nail medication apply daily x 4 months; remove old polish first day of each week  - Antifungal cream/powder (Zeasorb) - apply daily to feet and shoes x 2 months  - Clean shoes with Lysol or in washing machine every few weeks  - Rotate shoe gear; give them 24 hours to dry out  Patient is ambulatory with a steady gait. between days wearing them  - Clean pair of socks in morning, clean pair in afternoon if your feet sweat  - Shower shoes used in public showers/pools

## 2020-10-11 ENCOUNTER — HOSPITAL ENCOUNTER (EMERGENCY)
Facility: CLINIC | Age: 70
Discharge: ANOTHER HOSPITAL | End: 2020-10-11
Attending: EMERGENCY MEDICINE | Admitting: EMERGENCY MEDICINE
Payer: COMMERCIAL

## 2020-10-11 ENCOUNTER — HOSPITAL ENCOUNTER (INPATIENT)
Facility: CLINIC | Age: 70
LOS: 2 days | Discharge: HOME | DRG: 243 | End: 2020-10-13
Attending: HOSPITALIST | Admitting: INTERNAL MEDICINE
Payer: COMMERCIAL

## 2020-10-11 ENCOUNTER — APPOINTMENT (OUTPATIENT)
Dept: GENERAL RADIOLOGY | Facility: CLINIC | Age: 70
End: 2020-10-11
Attending: EMERGENCY MEDICINE
Payer: COMMERCIAL

## 2020-10-11 VITALS
DIASTOLIC BLOOD PRESSURE: 63 MMHG | SYSTOLIC BLOOD PRESSURE: 103 MMHG | HEART RATE: 104 BPM | OXYGEN SATURATION: 99 % | TEMPERATURE: 97.1 F | RESPIRATION RATE: 27 BRPM

## 2020-10-11 DIAGNOSIS — R00.1 BRADYCARDIA: Primary | ICD-10-CM

## 2020-10-11 DIAGNOSIS — E87.5 HYPERKALEMIA: ICD-10-CM

## 2020-10-11 DIAGNOSIS — N30.00 ACUTE CYSTITIS WITHOUT HEMATURIA: ICD-10-CM

## 2020-10-11 DIAGNOSIS — I48.91 ATRIAL FIBRILLATION, UNSPECIFIED TYPE (H): ICD-10-CM

## 2020-10-11 DIAGNOSIS — I95.9 HYPOTENSION, UNSPECIFIED HYPOTENSION TYPE: ICD-10-CM

## 2020-10-11 DIAGNOSIS — N17.9 ACUTE KIDNEY INJURY (H): ICD-10-CM

## 2020-10-11 DIAGNOSIS — R00.1 BRADYCARDIA: ICD-10-CM

## 2020-10-11 LAB
ALBUMIN UR-MCNC: 10 MG/DL
ANION GAP SERPL CALCULATED.3IONS-SCNC: 4 MMOL/L (ref 3–14)
ANION GAP SERPL CALCULATED.3IONS-SCNC: 5 MMOL/L (ref 3–14)
APPEARANCE UR: ABNORMAL
BASOPHILS # BLD AUTO: 0.1 10E9/L (ref 0–0.2)
BASOPHILS NFR BLD AUTO: 1.2 %
BILIRUB UR QL STRIP: NEGATIVE
BUN SERPL-MCNC: 16 MG/DL (ref 7–30)
BUN SERPL-MCNC: 16 MG/DL (ref 7–30)
CALCIUM SERPL-MCNC: 8.4 MG/DL (ref 8.5–10.1)
CALCIUM SERPL-MCNC: 8.9 MG/DL (ref 8.5–10.1)
CHLORIDE SERPL-SCNC: 109 MMOL/L (ref 94–109)
CHLORIDE SERPL-SCNC: 109 MMOL/L (ref 94–109)
CO2 SERPL-SCNC: 26 MMOL/L (ref 20–32)
CO2 SERPL-SCNC: 27 MMOL/L (ref 20–32)
COLOR UR AUTO: YELLOW
CREAT SERPL-MCNC: 1.15 MG/DL (ref 0.52–1.04)
CREAT SERPL-MCNC: 1.34 MG/DL (ref 0.52–1.04)
DIFFERENTIAL METHOD BLD: ABNORMAL
EOSINOPHIL # BLD AUTO: 0.3 10E9/L (ref 0–0.7)
EOSINOPHIL NFR BLD AUTO: 3.4 %
ERYTHROCYTE [DISTWIDTH] IN BLOOD BY AUTOMATED COUNT: 13 % (ref 10–15)
GFR SERPL CREATININE-BSD FRML MDRD: 40 ML/MIN/{1.73_M2}
GFR SERPL CREATININE-BSD FRML MDRD: 49 ML/MIN/{1.73_M2}
GLUCOSE SERPL-MCNC: 140 MG/DL (ref 70–99)
GLUCOSE SERPL-MCNC: 151 MG/DL (ref 70–99)
GLUCOSE UR STRIP-MCNC: NEGATIVE MG/DL
HCT VFR BLD AUTO: 36.2 % (ref 35–47)
HGB BLD-MCNC: 11.3 G/DL (ref 11.7–15.7)
HGB UR QL STRIP: ABNORMAL
HYALINE CASTS #/AREA URNS LPF: 1 /LPF (ref 0–2)
IMM GRANULOCYTES # BLD: 0 10E9/L (ref 0–0.4)
IMM GRANULOCYTES NFR BLD: 0.2 %
KETONES UR STRIP-MCNC: NEGATIVE MG/DL
LABORATORY COMMENT REPORT: NORMAL
LEUKOCYTE ESTERASE UR QL STRIP: ABNORMAL
LYMPHOCYTES # BLD AUTO: 1.5 10E9/L (ref 0.8–5.3)
LYMPHOCYTES NFR BLD AUTO: 18.4 %
MAGNESIUM SERPL-MCNC: 2 MG/DL (ref 1.6–2.3)
MAGNESIUM SERPL-MCNC: 2.4 MG/DL (ref 1.6–2.3)
MCH RBC QN AUTO: 30.9 PG (ref 26.5–33)
MCHC RBC AUTO-ENTMCNC: 31.2 G/DL (ref 31.5–36.5)
MCV RBC AUTO: 99 FL (ref 78–100)
MONOCYTES # BLD AUTO: 0.8 10E9/L (ref 0–1.3)
MONOCYTES NFR BLD AUTO: 10.3 %
NEUTROPHILS # BLD AUTO: 5.4 10E9/L (ref 1.6–8.3)
NEUTROPHILS NFR BLD AUTO: 66.5 %
NITRATE UR QL: NEGATIVE
NRBC # BLD AUTO: 0 10*3/UL
NRBC BLD AUTO-RTO: 0 /100
NT-PROBNP SERPL-MCNC: 481 PG/ML (ref 0–900)
PH UR STRIP: 5.5 PH (ref 5–7)
PLATELET # BLD AUTO: 340 10E9/L (ref 150–450)
POTASSIUM SERPL-SCNC: 4.3 MMOL/L (ref 3.4–5.3)
POTASSIUM SERPL-SCNC: 5.7 MMOL/L (ref 3.4–5.3)
RBC # BLD AUTO: 3.66 10E12/L (ref 3.8–5.2)
RBC #/AREA URNS AUTO: 50 /HPF (ref 0–2)
SARS-COV-2 RNA SPEC QL NAA+PROBE: NEGATIVE
SARS-COV-2 RNA SPEC QL NAA+PROBE: NORMAL
SODIUM SERPL-SCNC: 140 MMOL/L (ref 133–144)
SODIUM SERPL-SCNC: 140 MMOL/L (ref 133–144)
SOURCE: ABNORMAL
SP GR UR STRIP: 1.01 (ref 1–1.03)
SPECIMEN SOURCE: NORMAL
SPECIMEN SOURCE: NORMAL
TROPONIN I SERPL-MCNC: <0.015 UG/L (ref 0–0.04)
TSH SERPL DL<=0.005 MIU/L-ACNC: 2.28 MU/L (ref 0.4–4)
UROBILINOGEN UR STRIP-MCNC: NORMAL MG/DL (ref 0–2)
WBC # BLD AUTO: 8.2 10E9/L (ref 4–11)
WBC #/AREA URNS AUTO: 162 /HPF (ref 0–5)
WBC CLUMPS #/AREA URNS HPF: PRESENT /HPF

## 2020-10-11 PROCEDURE — 81001 URINALYSIS AUTO W/SCOPE: CPT | Performed by: PHYSICIAN ASSISTANT

## 2020-10-11 PROCEDURE — 85025 COMPLETE CBC W/AUTO DIFF WBC: CPT | Performed by: EMERGENCY MEDICINE

## 2020-10-11 PROCEDURE — 250N000009 HC RX 250

## 2020-10-11 PROCEDURE — 999N000065 XR CHEST PORT 1 VW

## 2020-10-11 PROCEDURE — 71045 X-RAY EXAM CHEST 1 VIEW: CPT

## 2020-10-11 PROCEDURE — 71045 X-RAY EXAM CHEST 1 VIEW: CPT | Mod: 77

## 2020-10-11 PROCEDURE — 36556 INSERT NON-TUNNEL CV CATH: CPT | Mod: 59

## 2020-10-11 PROCEDURE — 210N000002 HC R&B HEART CARE

## 2020-10-11 PROCEDURE — 84484 ASSAY OF TROPONIN QUANT: CPT | Performed by: EMERGENCY MEDICINE

## 2020-10-11 PROCEDURE — 83735 ASSAY OF MAGNESIUM: CPT | Performed by: PHYSICIAN ASSISTANT

## 2020-10-11 PROCEDURE — 258N000003 HC RX IP 258 OP 636: Performed by: PHYSICIAN ASSISTANT

## 2020-10-11 PROCEDURE — 87186 SC STD MICRODIL/AGAR DIL: CPT | Performed by: INTERNAL MEDICINE

## 2020-10-11 PROCEDURE — C9803 HOPD COVID-19 SPEC COLLECT: HCPCS

## 2020-10-11 PROCEDURE — 258N000003 HC RX IP 258 OP 636: Performed by: EMERGENCY MEDICINE

## 2020-10-11 PROCEDURE — 33210 INSERT ELECTRD/PM CATH SNGL: CPT

## 2020-10-11 PROCEDURE — 99222 1ST HOSP IP/OBS MODERATE 55: CPT | Performed by: INTERNAL MEDICINE

## 2020-10-11 PROCEDURE — 80048 BASIC METABOLIC PNL TOTAL CA: CPT | Performed by: EMERGENCY MEDICINE

## 2020-10-11 PROCEDURE — 99223 1ST HOSP IP/OBS HIGH 75: CPT | Mod: AI | Performed by: PHYSICIAN ASSISTANT

## 2020-10-11 PROCEDURE — 96374 THER/PROPH/DIAG INJ IV PUSH: CPT

## 2020-10-11 PROCEDURE — 99291 CRITICAL CARE FIRST HOUR: CPT | Mod: 25

## 2020-10-11 PROCEDURE — 250N000011 HC RX IP 250 OP 636: Performed by: PHYSICIAN ASSISTANT

## 2020-10-11 PROCEDURE — 99292 CRITICAL CARE ADDL 30 MIN: CPT | Mod: 25

## 2020-10-11 PROCEDURE — 87086 URINE CULTURE/COLONY COUNT: CPT | Performed by: INTERNAL MEDICINE

## 2020-10-11 PROCEDURE — 80048 BASIC METABOLIC PNL TOTAL CA: CPT | Performed by: PHYSICIAN ASSISTANT

## 2020-10-11 PROCEDURE — 33274 TCAT INSJ/RPL PERM LDLS PM: CPT

## 2020-10-11 PROCEDURE — 83735 ASSAY OF MAGNESIUM: CPT | Performed by: EMERGENCY MEDICINE

## 2020-10-11 PROCEDURE — 250N000011 HC RX IP 250 OP 636: Performed by: EMERGENCY MEDICINE

## 2020-10-11 PROCEDURE — 36415 COLL VENOUS BLD VENIPUNCTURE: CPT | Performed by: PHYSICIAN ASSISTANT

## 2020-10-11 PROCEDURE — 250N000013 HC RX MED GY IP 250 OP 250 PS 637: Performed by: PHYSICIAN ASSISTANT

## 2020-10-11 PROCEDURE — 83880 ASSAY OF NATRIURETIC PEPTIDE: CPT | Mod: GZ | Performed by: EMERGENCY MEDICINE

## 2020-10-11 PROCEDURE — 87088 URINE BACTERIA CULTURE: CPT | Performed by: INTERNAL MEDICINE

## 2020-10-11 PROCEDURE — 93005 ELECTROCARDIOGRAM TRACING: CPT | Mod: 76

## 2020-10-11 PROCEDURE — 84443 ASSAY THYROID STIM HORMONE: CPT | Performed by: PHYSICIAN ASSISTANT

## 2020-10-11 PROCEDURE — U0003 INFECTIOUS AGENT DETECTION BY NUCLEIC ACID (DNA OR RNA); SEVERE ACUTE RESPIRATORY SYNDROME CORONAVIRUS 2 (SARS-COV-2) (CORONAVIRUS DISEASE [COVID-19]), AMPLIFIED PROBE TECHNIQUE, MAKING USE OF HIGH THROUGHPUT TECHNOLOGIES AS DESCRIBED BY CMS-2020-01-R: HCPCS | Performed by: EMERGENCY MEDICINE

## 2020-10-11 PROCEDURE — 93005 ELECTROCARDIOGRAM TRACING: CPT

## 2020-10-11 PROCEDURE — 96361 HYDRATE IV INFUSION ADD-ON: CPT

## 2020-10-11 PROCEDURE — 96375 TX/PRO/DX INJ NEW DRUG ADDON: CPT | Mod: 59

## 2020-10-11 RX ORDER — ASPIRIN 81 MG/1
81 TABLET, CHEWABLE ORAL EVERY EVENING
COMMUNITY

## 2020-10-11 RX ORDER — AMOXICILLIN 250 MG
2 CAPSULE ORAL 2 TIMES DAILY PRN
Status: DISCONTINUED | OUTPATIENT
Start: 2020-10-11 | End: 2020-10-13 | Stop reason: HOSPADM

## 2020-10-11 RX ORDER — ONDANSETRON 4 MG/1
4 TABLET, ORALLY DISINTEGRATING ORAL EVERY 6 HOURS PRN
Status: DISCONTINUED | OUTPATIENT
Start: 2020-10-11 | End: 2020-10-13 | Stop reason: HOSPADM

## 2020-10-11 RX ORDER — NOREPINEPHRINE BITARTRATE 0.06 MG/ML
0.03-0.4 INJECTION, SOLUTION INTRAVENOUS CONTINUOUS
Status: DISCONTINUED | OUTPATIENT
Start: 2020-10-11 | End: 2020-10-11 | Stop reason: HOSPADM

## 2020-10-11 RX ORDER — LIDOCAINE 40 MG/G
CREAM TOPICAL
Status: DISCONTINUED | OUTPATIENT
Start: 2020-10-11 | End: 2020-10-13 | Stop reason: HOSPADM

## 2020-10-11 RX ORDER — PROCHLORPERAZINE MALEATE 5 MG
5 TABLET ORAL EVERY 6 HOURS PRN
Status: DISCONTINUED | OUTPATIENT
Start: 2020-10-11 | End: 2020-10-13 | Stop reason: HOSPADM

## 2020-10-11 RX ORDER — SODIUM CHLORIDE 9 MG/ML
INJECTION, SOLUTION INTRAVENOUS CONTINUOUS
Status: ACTIVE | OUTPATIENT
Start: 2020-10-11 | End: 2020-10-12

## 2020-10-11 RX ORDER — ACETAMINOPHEN 650 MG/1
650 SUPPOSITORY RECTAL EVERY 4 HOURS PRN
Status: DISCONTINUED | OUTPATIENT
Start: 2020-10-11 | End: 2020-10-13 | Stop reason: HOSPADM

## 2020-10-11 RX ORDER — MEMANTINE HYDROCHLORIDE 10 MG/1
10 TABLET ORAL 2 TIMES DAILY
Status: DISCONTINUED | OUTPATIENT
Start: 2020-10-11 | End: 2020-10-13 | Stop reason: HOSPADM

## 2020-10-11 RX ORDER — ACETAMINOPHEN 325 MG/1
650 TABLET ORAL EVERY 4 HOURS PRN
Status: DISCONTINUED | OUTPATIENT
Start: 2020-10-11 | End: 2020-10-12

## 2020-10-11 RX ORDER — OLANZAPINE 10 MG/1
10 TABLET ORAL AT BEDTIME
Status: DISCONTINUED | OUTPATIENT
Start: 2020-10-11 | End: 2020-10-13 | Stop reason: HOSPADM

## 2020-10-11 RX ORDER — BUPROPION HYDROCHLORIDE 150 MG/1
300 TABLET ORAL EVERY MORNING
Status: DISCONTINUED | OUTPATIENT
Start: 2020-10-12 | End: 2020-10-13 | Stop reason: HOSPADM

## 2020-10-11 RX ORDER — NALOXONE HYDROCHLORIDE 0.4 MG/ML
.1-.4 INJECTION, SOLUTION INTRAMUSCULAR; INTRAVENOUS; SUBCUTANEOUS
Status: DISCONTINUED | OUTPATIENT
Start: 2020-10-11 | End: 2020-10-13 | Stop reason: HOSPADM

## 2020-10-11 RX ORDER — PROCHLORPERAZINE 25 MG
12.5 SUPPOSITORY, RECTAL RECTAL EVERY 12 HOURS PRN
Status: DISCONTINUED | OUTPATIENT
Start: 2020-10-11 | End: 2020-10-13 | Stop reason: HOSPADM

## 2020-10-11 RX ORDER — ONDANSETRON 2 MG/ML
4 INJECTION INTRAMUSCULAR; INTRAVENOUS EVERY 6 HOURS PRN
Status: DISCONTINUED | OUTPATIENT
Start: 2020-10-11 | End: 2020-10-13 | Stop reason: HOSPADM

## 2020-10-11 RX ORDER — CEFTRIAXONE 1 G/1
1 INJECTION, POWDER, FOR SOLUTION INTRAMUSCULAR; INTRAVENOUS EVERY 24 HOURS
Status: DISCONTINUED | OUTPATIENT
Start: 2020-10-11 | End: 2020-10-13 | Stop reason: HOSPADM

## 2020-10-11 RX ORDER — DOPAMINE HYDROCHLORIDE 160 MG/100ML
INJECTION, SOLUTION INTRAVENOUS
Status: DISCONTINUED
Start: 2020-10-11 | End: 2020-10-11 | Stop reason: HOSPADM

## 2020-10-11 RX ORDER — AMOXICILLIN 250 MG
1 CAPSULE ORAL 2 TIMES DAILY PRN
Status: DISCONTINUED | OUTPATIENT
Start: 2020-10-11 | End: 2020-10-13 | Stop reason: HOSPADM

## 2020-10-11 RX ORDER — POLYETHYLENE GLYCOL 3350 17 G/17G
17 POWDER, FOR SOLUTION ORAL DAILY PRN
Status: DISCONTINUED | OUTPATIENT
Start: 2020-10-11 | End: 2020-10-13 | Stop reason: HOSPADM

## 2020-10-11 RX ORDER — MULTIPLE VITAMINS W/ MINERALS TAB 9MG-400MCG
1 TAB ORAL DAILY
COMMUNITY
End: 2021-01-14 | Stop reason: ALTCHOICE

## 2020-10-11 RX ORDER — NOREPINEPHRINE BITARTRATE 0.06 MG/ML
INJECTION, SOLUTION INTRAVENOUS
Status: COMPLETED
Start: 2020-10-11 | End: 2020-10-11

## 2020-10-11 RX ADMIN — SODIUM CHLORIDE 500 ML: 9 INJECTION, SOLUTION INTRAVENOUS at 14:34

## 2020-10-11 RX ADMIN — OLANZAPINE 10 MG: 10 TABLET, FILM COATED ORAL at 21:55

## 2020-10-11 RX ADMIN — SODIUM CHLORIDE 500 ML: 9 INJECTION, SOLUTION INTRAVENOUS at 14:00

## 2020-10-11 RX ADMIN — SODIUM CHLORIDE: 9 INJECTION, SOLUTION INTRAVENOUS at 19:05

## 2020-10-11 RX ADMIN — NOREPINEPHRINE BITARTRATE 0.03 MCG/KG/MIN: 0.06 INJECTION, SOLUTION INTRAVENOUS at 13:55

## 2020-10-11 RX ADMIN — MEMANTINE 10 MG: 10 TABLET ORAL at 20:18

## 2020-10-11 RX ADMIN — GLUCAGON HYDROCHLORIDE 0.5 MG: KIT at 14:55

## 2020-10-11 RX ADMIN — CEFTRIAXONE SODIUM 1 G: 1 INJECTION, POWDER, FOR SOLUTION INTRAMUSCULAR; INTRAVENOUS at 20:19

## 2020-10-11 RX ADMIN — Medication 0.03 MCG/KG/MIN: at 13:55

## 2020-10-11 NOTE — ED NOTES
Asked to assist Dr. Moralez with placement of a introducer in the right neck due to significant bradycardia and concerns for sinus pauses.  Transvenous catheter and transvenous pacer was performed by me.      Central Line Placement     Procedure:  Central Line Placement with Ultrasound Guidance.    Indications: temporary pacemsaker    Consent:  Risks (including but not limited to: pneumothorax,bleeding, infection or artery puncture), benefits and alternatives were discussed with  unable to obtain due to emergency conditions and consent for procedure was obtained.    Timeout:  Universal protocol was followed. TIME OUT conducted just prior to starting procedure confirmed patient identity, site/side, procedure, patient position, and availability of correct equipment and implants.?  Yes    Procedure note:  Right Internal Jugular approach was selected and the right anterior neck was prepped, cleansed and draped in a sterile fashion.  Mask, gown and gloves were used per sterile protocol.  Patient was then placed into Trendelenburg position and lidocaine was used for local anesthesia.  Vascular probe with ultrasound was used in a sterile fashion for guidence.  Introducer needle was then used to gain access to the central venous circulation.  Using Seldinger technique the  Single lumen catheter was placed.  Catheter port(s) were aspirated and flushed.  Central line was sutured in place and sterile dressing applied.   Appropriate placement was confirmed by x-ray and no pneumothorax was visualized .    Patient Status:  Patient tolerated the procedure well.  There were no complications      Transvenous pacing: Right IJ was accessed using a single-lumen introducer catheter.  This was placed as a procedure note above.  Using transducer transducer wire wire was fed into a internal jugular and IVC position.  Balloon was inflated and the catheter was fed into the right atrium.  Pacer was connected to 80 bpm at low voltage.  Capture  was seen at the right atrium.  Balloon was inflated and catheter was advanced further into the ventricle.  Wide-complex capture was noted on the telemetry strip.  This was continued using the Medtronic transvenous pacer.  Patient tolerated procedure well and hemodynamically improved.    Disposition will be up to Dr. Bob suspect transfer to [facility] for definitive permanent pacemaker treatment placement.             Reynaldo Apodaca MD  10/11/20 1526

## 2020-10-11 NOTE — ED NOTES
Bed: ED03  Expected date: 10/11/20  Expected time: 1:28 PM  Means of arrival: Ambulance  Comments:  [id number]

## 2020-10-11 NOTE — ED NOTES
Noted Pt's transvenous pacemaker not capturing on ventricular. MD replaced transvenous pacemaker catheter. Pt's HR back to 80s as it is paced on the transvenous pacemaker. Will continue to monitor and assess.

## 2020-10-11 NOTE — H&P
[facility]    History and Physical - Hospitalist Service       Date of Admission: 10/11/2020    Assessment & Plan   Kelly Nunez is a 70 year old female with a past medical history of hypertension, dementia, COPD, and A. fib not chronic anticoagulation who presented to the emergency department at [facility] emergency department for evaluation of generalized weakness and profound bradycardia ultimately requiring transvenous pacing.  Patient is admitted under inpatient status.    Junctional bradycardia s/p transvenous pacing  Presented with generalized weakness, identified to have bradycardia with HRs 30s-40s via EMS. EKG on arrival with junctional rhythm. Pt initially received norepinephrine infusion, was ultimately transitioned to transvenous pacing by ED physician. PTA is on metoprolol, had not received AM dose. Troponin nml.  - Holding PTA metoprolol, diltiazem  - Cardiology consult, paged upon arrival  - Continue transvenous pacing  - NPO  - Tele  - TTE    Hyperkalemia  Potassium 5.7. With associated ZACHARIAH.   - Repeat BMP on arrival & in AM  - Add-on Mg    ZACHARIAH  Cr 1.34. Baseline 0.6-0.8. Likely 2/2 hypoperfusion given above.  - UA  - NS @ 100cc/h  - BMP in AM    Abnormal UA  UA suspicious for infection with positive nitrites, leukocyte esterase and 162 WBC on microanalysis.  Unclear if pt has been symptomatic given mental state. Note allergy to PCN in chart, has tolerated cephalosporins in recent past without issue.  - Empiric treatment with IV Rocephin 1gm q24h  - Follow UCx    Paroxysmal Afib  Unclear if paroxysmal or chronic. On daily baby ASA for CVA prophylaxis, AC held due to concern of falls. Rate controlled as outpatient with metoprolol, diltiazem.  - Holding PTA metoprolol, diltiazem    COPD  Chronic, stable. Does not appear in acute exacerbation.  - PTA inhalers    Dementia  Oriented to self at baseline. Lives with  who is primary caregiver.  - PTA  namenda, bupropion, lorazepam, olanzapine when verified by pharmacy    COVID-19 status: NEGATIVE  PCR performed 10/11/2020. Asymptomatic.     Diet: NPO per Anesthesia Guidelines for Procedure/Surgery Except for: Meds NPO  DVT Prophylaxis: Pneumatic Compression Devices  Cabral Catheter: not present  Code Status: Full Code This was confirmed with  via telephone by myself on admission at [facility].         Disposition Plan   Expected discharge: 2 - 3 days, recommended to prior living arrangement once hemodynamics stable.  Entered: Lane Jones PA-C 10/11/2020, 7:20 PM     The patient's care was discussed with the Attending Physician, Dr. Belle, Bedside Nurse and Patient.    Lane Jones PA-C  [facility]  Contact information available via [facility] Paging/Directory      ______________________________________________________________________    Chief Complaint   bradycardia    History is obtained from the patient, EMS, discussion with     History of Present Illness   Kelly Nunez is a 70 year old female with a past medical history of hypertension, dementia, COPD, and A. fib not chronic anticoagulation who presented to the emergency department at [facility] emergency department for evaluation of generalized weakness. Patient woke this morning and was noted by  to complain of generalized weakness and lethargy.  indicates he checks her vital signs daily, and noted her heart rate to be quite low in the 30s this morning. He did not give her any morning medications, patient declined breakfast and wanted to rest. After talking with daughter,  elected to call EMS for evaluation.    On EMS arrival, pt was noted to have bradycardia with HRs in 30s. She was given atropine with improvement to a heart rate in high 40s. On arrival in ED, pt was also noted to be hypotensive. EKG obtained revealed junctional rhythm with rate 45. Patient was trialed on  norepinephrine drip with limited improvement, therefore decision was made to place transvenous pacer in ED. This was successfully performed with immediate improvement in rates to 70s-80s. Following stabilization, pt was discussed with hospitalist at [facility] for transfer and definitive care.    Patient is presently evaluated in hospital room with RN as well as cardiology fellow at bedside.  She is presently at her baseline mentation, oriented to self.  Denies pain at this time.  I personally called and discussed patient's presentation as well as plan of care with her  who is also medical POA.   reiterates story noted above, indicates patient was in her otherwise normal state of health over the past week without any fever, chills, cough, URI-like symptoms, abdominal discomfort, change in appetite, urinary symptoms, lower extremity edema.  He ensures that she is compliant with all of her prior to admission medications.  He does indicate that he is the primary caregiver for her, however he does continue to work full-time therefore they have PCA services Monday through Friday and on the weekends daughter is typically available and visits on a regular basis to assist with care.    Review of Systems    The 10 point Review of Systems is negative other than noted in the HPI or here.     Past Medical History    I have reviewed this patient's medical history and updated it with pertinent information if needed.   Past Medical History:   Diagnosis Date     Dementia (H)      Diabetes (H)        Past Surgical History   I have reviewed this patient's surgical history and updated it with pertinent information if needed.  Past Surgical History:   Procedure Laterality Date     HEART CATH, ANGIOPLASTY         Social History   I have reviewed this patient's social history and updated it with pertinent information if needed.  Social History     Tobacco Use     Smoking status: Former Smoker     Packs/day: 1.00      Smokeless tobacco: Never Used   Substance Use Topics     Alcohol use: Yes     Comment: 1/week     Drug use: No       Family History     No significant family history, including no known family history of CAD, CVA, DMII.    Prior to Admission Medications   Prior to Admission Medications   Prescriptions Last Dose Informant Patient Reported? Taking?   LORazepam (ATIVAN) 0.5 MG tablet  at PRN Spouse/Significant Other Yes Yes   Sig: Take 0.5 mg by mouth every 6 hours as needed for anxiety   OLANZapine (ZYPREXA) 10 MG tablet 10/10/2020 at HS Spouse/Significant Other Yes Yes   Sig: Take 10 mg by mouth At Bedtime    aspirin (ASA) 81 MG chewable tablet 10/10/2020 at PM Spouse/Significant Other Yes Yes   Sig: Take 81 mg by mouth every evening   buPROPion (WELLBUTRIN XL) 300 MG 24 hr tablet 10/10/2020 at AM Spouse/Significant Other Yes Yes   Sig: Take 300 mg by mouth every morning    diltiazem ER COATED BEADS (CARDIZEM CD/CARTIA XT) 180 MG 24 hr capsule 10/10/2020 at AM Spouse/Significant Other No Yes   Sig: Take 1 capsule (180 mg) by mouth daily   memantine (NAMENDA) 10 MG tablet 10/10/2020 at PM Spouse/Significant Other Yes Yes   Sig: Take 10 mg by mouth 2 times daily    metoprolol tartrate (LOPRESSOR) 50 MG tablet 10/10/2020 at PM Spouse/Significant Other No Yes   Sig: Take 1 tablet (50 mg) by mouth 2 times daily   multivitamin w/minerals (THERA-VIT-M) tablet 10/10/2020 at AM Spouse/Significant Other Yes Yes   Sig: Take 1 tablet by mouth daily CENTRUM      Facility-Administered Medications: None     Allergies   Allergies   Allergen Reactions     Penicillins Swelling     Pt states she has tolerated cephalosporins in the past.        Physical Exam   Vital Signs: Temp: 97.4  F (36.3  C) Temp src: Temporal BP: 109/56 Pulse: 80   Resp: 20 SpO2: 97 % O2 Device: None (Room air)    Weight: 0 lbs 0 oz    GEN: well-developed, well-nourished, appears comfortable  HEENT: NCAT, EOM intact bilaterally, sclera clear, conjunctiva normal,  nose & mouth patent, mucous membranes moist; transvenous pacer wire in right neck  CHEST: lungs CTA bilaterally, no increased work of breathing, no wheeze, crackles, rhonchi  HEART: regular rate, S1 & S2  ABD: soft, nontender, nondistended, no guarding or rigidity, +BS in all 4 quadrants  MSK: AROM bilateral UE/LE, pedal & radial pulses 2+ bilaterally  NEURO: awake, alert, oriented to name. CN II-XII grossly intact. Sensation grossly intact to light touch.   SKIN: warm & dry without rash, no pedal edema    Data   Data reviewed today: I reviewed all medications, new labs and imaging results over the last 24 hours. I personally reviewed the EKG tracing showing junctional bradycardia, subsequent repeat with pacing.    Recent Labs   Lab 10/11/20  1727 10/11/20  1436 10/11/20  1358   WBC  --  8.2  --    HGB  --  11.3*  --    MCV  --  99  --    PLT  --  340  --      --  140   POTASSIUM 4.3  --  5.7*   CHLORIDE 109  --  109   CO2 27  --  26   BUN 16  --  16   CR 1.15*  --  1.34*   ANIONGAP 4  --  5   ATTILA 8.9  --  8.4*   *  --  151*   TROPI  --   --  <0.015     Recent Results (from the past 24 hour(s))   XR Chest Port 1 View    Narrative    CHEST ONE VIEW PORTABLE October 11, 2020 2:26 PM     HISTORY: Generalized weakness, bradycardia.    COMPARISON: 2/2/2018.      Impression    IMPRESSION: Transvenous pacing device tip is projected over the upper  right ventricle. A defibrillator pad is noted. No pneumothorax or  pleural effusion. Hazy opacity in the right upper lobe is likely  scarring and similar to the prior study.    MEGA LEYVA MD   XR Chest Port 1 View    Narrative    XR CHEST PORT 1 VW 10/11/2020 3:39 PM    HISTORY: Bradycardia    COMPARISON: Chest x-ray 10/11/2020      Impression    IMPRESSION: Right IJ transvenous pacing device overlying the MPA. Left  chest wall defibrillator pad. No pneumothorax. The cardiac silhouette  and pulmonary vasculature are within normal limits. Right upper  lung  scarring.    ZAHEER GARCIA MD   XR Chest Port 1 View    Narrative    CHEST ONE VIEW PORTABLE   10/11/2020 3:41 PM     HISTORY: Bradycardia    COMPARISON: 10/11/2020      Impression    IMPRESSION: Transvenous pacer device with the tip projected over the  lower right ventricle. Defibrillator pad projects over the chest. No  airspace consolidation, pneumothorax, or pleural effusion.    MEGA LEYVA MD

## 2020-10-11 NOTE — ED TRIAGE NOTES
Pt is alert. Pt did not felt well this morning. HR initially 38. Ems administered 0.5 atropine at 1318 which brought HR up to 47.

## 2020-10-11 NOTE — PHARMACY-ADMISSION MEDICATION HISTORY
Pharmacy Medication History  Admission medication history interview status for the 10/11/2020  admission is complete. See EPIC admission navigator for prior to admission medications       Medication history sources: Patient's family/friend (Spouse Kendall)  Location of interview: Phone  Medication history source reliability: Good  Adherence assessment: Good    Significant changes made to the medication list:  Delete: RAMIRO  Added: Multivitamin          Medication reconciliation completed by provider prior to medication history? No    Time spent in this activity: 10 minutes      Prior to Admission medications    Medication Sig Last Dose Taking? Auth Provider   aspirin (ASA) 81 MG chewable tablet Take 81 mg by mouth every evening 10/10/2020 at PM Yes Unknown, Entered By History   buPROPion (WELLBUTRIN XL) 300 MG 24 hr tablet Take 300 mg by mouth every morning  10/10/2020 at AM Yes Reported, Patient   diltiazem ER COATED BEADS (CARDIZEM CD/CARTIA XT) 180 MG 24 hr capsule Take 1 capsule (180 mg) by mouth daily 10/10/2020 at AM Yes Kolby Noonan PA-C   LORazepam (ATIVAN) 0.5 MG tablet Take 0.5 mg by mouth every 6 hours as needed for anxiety  at PRN Yes Reported, Patient   memantine (NAMENDA) 10 MG tablet Take 10 mg by mouth 2 times daily  10/10/2020 at PM Yes Reported, Patient   metoprolol tartrate (LOPRESSOR) 50 MG tablet Take 1 tablet (50 mg) by mouth 2 times daily 10/10/2020 at PM Yes Kolby Noonan PA-C   multivitamin w/minerals (THERA-VIT-M) tablet Take 1 tablet by mouth daily CENTRUM 10/10/2020 at AM Yes Unknown, Entered By History   OLANZapine (ZYPREXA) 10 MG tablet Take 10 mg by mouth At Bedtime  10/10/2020 at HS Yes Reported, Patient

## 2020-10-11 NOTE — ED PROVIDER NOTES
History     Chief Complaint:  Bradycardia and Generalized Weakness      HPI  Kelly Nunez is a 70 year old female with a history of diabetes, HTN, COPD, and A. Fib who presents to the emergency department via EMS for evaluation of bradycardia and generalized weakness.  Per EMS, patient was not feeling well this morning and eventually called EMS.  On EMS arrival patient was found to be bradycardic with a heart rate of 38.  She was given 0.5 atropine at 1318 which brought her heart rate up to 47.  She was subsequently brought to the emergency department.    Allergies:  Penicillins    Medications:    Aspirin  Bupropion  Diltiazem  Duoneb  Ativan  Memantine  Metoprolol  Olanzapine     Past Medical History:    Dementia  COPD  HTN  A fib  Diabetes    Past Surgical History:    Heart cath, angioplasty    Family History:    History reviewed. No pertinent family history.     Social History:  Smoking Status: Former Smoker  Alcohol Use: Yes  Drug Use: No  PCP: Kolby Noonan   The patient presents to the emergency department via EMS  Marital Status:     Review of Systems   Unable to perform ROS: Acuity of condition     Physical Exam     Patient Vitals for the past 24 hrs:   BP Pulse Resp SpO2   10/11/20 1411 -- 80 23 100 %   10/11/20 1410 -- 80 9 100 %   10/11/20 1409 118/66 79 -- --   10/11/20 1357 98/46 (!) 45 11 100 %   10/11/20 1342 (!) 86/52 (!) 46 25 100 %   10/11/20 1339 (!) 89/48 (!) 47 20 95 %         Physical Exam    General: Patient is alert and interactive when I enter the room  Head:  The scalp, face, and head appear normal  Eyes:  The pupils are equal, round, and reactive to light    Conjunctivae and sclerae are normal  ENT:    External acoustic canals are normal    The oropharynx is normal without erythema.     Uvula is in the midline  Neck:  Normal range of motion  CV:  Bradycardic.  S1/S2. No murmurs.   Resp:  Lungs are clear without wheezes or rales. No distress  GI:  Abdomen is soft, no  rigidity, guarding, or rebound    No distension. No tenderness to palpation in any quadrant.     MS:  Normal tone. Joints grossly normal without effusions.     No asymmetric leg swelling, calf or thigh tenderness.      Normal motor assessment of all extremities.  Skin:  No rash or lesions noted. Normal capillary refill noted  Neuro: Speech is normal and fluent. Confused but at baseline. Face is symmetric.     Moving all extremities well.   Psych:  Awake. Alert.  Normal affect.  Appropriate interactions.  Lymph: No anterior cervical lymphadenopathy noted      Emergency Department Course   EKG  Indication: Bradycardic  Time: 13:46:55  Rate 45 bpm. MI interval *. QRS duration 72. QT/QTc 422/365. P-R-T axes * 71 70  Junctional rhythm. Nonspecific ST abnormality. Abnormal ECG.   Interpreted at 1347 by Sal Bob MD.    EKG  Indication: Bradycardic  Time: 14:12:35  Rate 80 bpm. MI interval *. QRS duration 158. QT/QTc 454/523. P-R-T axes * 101 -82  Ventricular-paced rhythm. Abnormal ECG.  Interpreted at 1413 by Sal Bob MD.      Imaging:  Radiographic findings were communicated with the patient who voiced understanding of the findings.  XR Chest 1 view, portable:   IMPRESSION: Transvenous pacing device tip is projected over the upper   right ventricle. A defibrillator pad is noted. No pneumothorax or   pleural effusion. Hazy opacity in the right upper lobe is likely   scarring and similar to the prior study.  as per radiology.    Laboratory:  CBC: WBC: 8.2, HGB: 11.3 (L), PLT: 340  BMP: Glucose 151 (H), Potassium: 5.7 (H), GFR: 40 (L), Calcium: 6.4 (L), o/w WNL (Creatinine: 1.34 (H))  Troponin (1358): <0.015  BNP: 4814  Magnesium: pending      Procedures   Transvenous pacemaker re-placement.   Physician: Sal Moralez MD  Description of procedure: Previous transvenous pacemaker was placed by Dr. Apodaca.  We lost capture.  I attempted to change the wire by pulling it back but I could not get any  capture.  Is unclear if the wire was not working and so I removed the wire.  A new wire was placed to the introducer.  I could see atrial capture with a narrow QRS and then going to the tricuspid valve a wide QRS complex.  The patient's blood pressure was much improved and a heart rate of 80 was noted.  She tolerated the procedure well there is no complications.    Interventions:  1355 norepinephrine 0.03 mcg/kg/min  1400  mL IV  1434  mL IV  1455 glucagon 0.5 mg IV    Emergency Department Course:  Nursing notes and vitals reviewed. (1337) I performed an exam of the patient as documented above.     IV inserted. Medicine administered as documented above. Blood drawn. COVID swab obtained. This was sent to the lab for further testing, results above.     The patient was sent for a xray while in the emergency department, findings above.     An EKG was recorded. Results as noted above.     1341 I performed the Central line procedure. Please see Dr. Apodaca's note for details.    1415 I spoke to the patient's family and updated them on the patient's condition.    1424 I spoke with Dr. Lilyl of the hospitalist services at [facility], who is in agreement to accept the patient for transfer for further monitoring, evaluation, and treatment. Findings and plan explained to the Patient and family who consents to Transfer.      1455 I spoke with Dr. Gonzalez of Cardiology and discussed the case.      Impression & Plan    Medical Decision Makin-year-old female presents for the evaluation of critical illness including bradycardia and hypotension.  She is critically ill upon arrival and was seen by myself.  Primary and secondary survey were done.  She had hypotension with systolics in the 80s with a heart rate anywhere from 30-40 with 6-second pauses periodically.  She was mentating throughout this and I did not feel I needed to intubate her emergently.  We started norepinephrine peripherally while introducer then a  transvenous pacemaker was placed.  The transvenous pacer worked well initially but then we lost capture.  The transvenous pacemaker was replaced and now I have good capture with good heart rate currently 107/63.  Patient is hemodynamically stable.  She did not take her morning metoprolol.  So it is unclear the etiology of her rhythm disturbance but likely she has electrical disease of the heart and may need a pacemaker tomorrow.  She is stable but critically ill upon transfer.  We will send her to [facility] as we do not do pacemaker placement here at this hospital.  She has mild hyperkalemia and was given fluids.  Her initial rhythm was a narrow complex escape rhythm not consistent with a hyperkalemic crisis.  Her potassium will correct nicely with just fluids.    Critical Care time:  was 45 minutes for this patient excluding procedures.    Diagnosis:    ICD-10-CM    1. Bradycardia  R00.1 Basic metabolic panel     Troponin I     BNP     CBC with platelets differential     CBC with platelets differential     CANCELED: CBC with platelets differential   2. Hypotension, unspecified hypotension type  I95.9        Disposition:  Transferred to [facility]    Baldemar Castañeda  10/11/2020   EMERGENCY DEPARTMENT  Scribe Disclosure:  Baldemar FELIX, am serving as a scribe at 1:37 PM on 10/11/2020 to document services personally performed by Sal Bob MD based on my observations and the provider's statements to me.          Sal Bob MD  10/11/20 2231

## 2020-10-12 ENCOUNTER — SURGERY (OUTPATIENT)
Age: 70
End: 2020-10-12
Payer: COMMERCIAL

## 2020-10-12 ENCOUNTER — APPOINTMENT (OUTPATIENT)
Dept: CARDIOLOGY | Facility: CLINIC | Age: 70
DRG: 243 | End: 2020-10-12
Attending: PHYSICIAN ASSISTANT
Payer: COMMERCIAL

## 2020-10-12 LAB
ANION GAP SERPL CALCULATED.3IONS-SCNC: 5 MMOL/L (ref 3–14)
BACTERIA SPEC CULT: ABNORMAL
BUN SERPL-MCNC: 11 MG/DL (ref 7–30)
CALCIUM SERPL-MCNC: 8.1 MG/DL (ref 8.5–10.1)
CHLORIDE SERPL-SCNC: 111 MMOL/L (ref 94–109)
CO2 SERPL-SCNC: 25 MMOL/L (ref 20–32)
CREAT SERPL-MCNC: 0.78 MG/DL (ref 0.52–1.04)
ERYTHROCYTE [DISTWIDTH] IN BLOOD BY AUTOMATED COUNT: 13.2 % (ref 10–15)
GFR SERPL CREATININE-BSD FRML MDRD: 78 ML/MIN/{1.73_M2}
GLUCOSE SERPL-MCNC: 111 MG/DL (ref 70–99)
HCT VFR BLD AUTO: 37.5 % (ref 35–47)
HGB BLD-MCNC: 12 G/DL (ref 11.7–15.7)
INR PPP: 1.13 (ref 0.86–1.14)
INTERPRETATION ECG - MUSE: NORMAL
INTERPRETATION ECG - MUSE: NORMAL
Lab: ABNORMAL
MCH RBC QN AUTO: 30.4 PG (ref 26.5–33)
MCHC RBC AUTO-ENTMCNC: 32 G/DL (ref 31.5–36.5)
MCV RBC AUTO: 95 FL (ref 78–100)
PLATELET # BLD AUTO: 354 10E9/L (ref 150–450)
POTASSIUM SERPL-SCNC: 3.9 MMOL/L (ref 3.4–5.3)
RBC # BLD AUTO: 3.95 10E12/L (ref 3.8–5.2)
SODIUM SERPL-SCNC: 141 MMOL/L (ref 133–144)
SPECIMEN SOURCE: ABNORMAL
WBC # BLD AUTO: 7.3 10E9/L (ref 4–11)

## 2020-10-12 PROCEDURE — C1785 PMKR, DUAL, RATE-RESP: HCPCS | Performed by: INTERNAL MEDICINE

## 2020-10-12 PROCEDURE — 250N000013 HC RX MED GY IP 250 OP 250 PS 637: Performed by: PHYSICIAN ASSISTANT

## 2020-10-12 PROCEDURE — 250N000011 HC RX IP 250 OP 636: Performed by: PHYSICIAN ASSISTANT

## 2020-10-12 PROCEDURE — 99153 MOD SED SAME PHYS/QHP EA: CPT | Performed by: INTERNAL MEDICINE

## 2020-10-12 PROCEDURE — 250N000011 HC RX IP 250 OP 636: Performed by: INTERNAL MEDICINE

## 2020-10-12 PROCEDURE — 250N000013 HC RX MED GY IP 250 OP 250 PS 637: Performed by: INTERNAL MEDICINE

## 2020-10-12 PROCEDURE — 0JH606Z INSERTION OF PACEMAKER, DUAL CHAMBER INTO CHEST SUBCUTANEOUS TISSUE AND FASCIA, OPEN APPROACH: ICD-10-PCS | Performed by: INTERNAL MEDICINE

## 2020-10-12 PROCEDURE — 258N000003 HC RX IP 258 OP 636: Performed by: PHYSICIAN ASSISTANT

## 2020-10-12 PROCEDURE — 255N000002 HC RX 255 OP 636: Performed by: HOSPITALIST

## 2020-10-12 PROCEDURE — 80048 BASIC METABOLIC PNL TOTAL CA: CPT | Performed by: PHYSICIAN ASSISTANT

## 2020-10-12 PROCEDURE — 85610 PROTHROMBIN TIME: CPT | Performed by: PHYSICIAN ASSISTANT

## 2020-10-12 PROCEDURE — 210N000002 HC R&B HEART CARE

## 2020-10-12 PROCEDURE — 33208 INSRT HEART PM ATRIAL & VENT: CPT | Mod: KX | Performed by: INTERNAL MEDICINE

## 2020-10-12 PROCEDURE — 250N000009 HC RX 250: Performed by: INTERNAL MEDICINE

## 2020-10-12 PROCEDURE — 36415 COLL VENOUS BLD VENIPUNCTURE: CPT | Performed by: PHYSICIAN ASSISTANT

## 2020-10-12 PROCEDURE — 272N000001 HC OR GENERAL SUPPLY STERILE: Performed by: INTERNAL MEDICINE

## 2020-10-12 PROCEDURE — 85027 COMPLETE CBC AUTOMATED: CPT | Performed by: PHYSICIAN ASSISTANT

## 2020-10-12 PROCEDURE — C1894 INTRO/SHEATH, NON-LASER: HCPCS | Performed by: INTERNAL MEDICINE

## 2020-10-12 PROCEDURE — 33208 INSRT HEART PM ATRIAL & VENT: CPT | Performed by: INTERNAL MEDICINE

## 2020-10-12 PROCEDURE — 02HK3JZ INSERTION OF PACEMAKER LEAD INTO RIGHT VENTRICLE, PERCUTANEOUS APPROACH: ICD-10-PCS | Performed by: INTERNAL MEDICINE

## 2020-10-12 PROCEDURE — 99152 MOD SED SAME PHYS/QHP 5/>YRS: CPT | Mod: 59 | Performed by: INTERNAL MEDICINE

## 2020-10-12 PROCEDURE — 99232 SBSQ HOSP IP/OBS MODERATE 35: CPT | Performed by: STUDENT IN AN ORGANIZED HEALTH CARE EDUCATION/TRAINING PROGRAM

## 2020-10-12 PROCEDURE — 02H63JZ INSERTION OF PACEMAKER LEAD INTO RIGHT ATRIUM, PERCUTANEOUS APPROACH: ICD-10-PCS | Performed by: INTERNAL MEDICINE

## 2020-10-12 PROCEDURE — 99233 SBSQ HOSP IP/OBS HIGH 50: CPT | Mod: 25 | Performed by: INTERNAL MEDICINE

## 2020-10-12 PROCEDURE — C1898 LEAD, PMKR, OTHER THAN TRANS: HCPCS | Performed by: INTERNAL MEDICINE

## 2020-10-12 PROCEDURE — 99152 MOD SED SAME PHYS/QHP 5/>YRS: CPT | Performed by: INTERNAL MEDICINE

## 2020-10-12 PROCEDURE — 999N000208 ECHOCARDIOGRAM COMPLETE

## 2020-10-12 PROCEDURE — 93306 TTE W/DOPPLER COMPLETE: CPT | Mod: 26 | Performed by: INTERNAL MEDICINE

## 2020-10-12 PROCEDURE — 271N000001 HC OR GENERAL SUPPLY NON-STERILE: Performed by: INTERNAL MEDICINE

## 2020-10-12 DEVICE — LEAD PACING SOLIA S 53 PROMRI: Type: IMPLANTABLE DEVICE | Status: FUNCTIONAL

## 2020-10-12 DEVICE — PACEMAKER PROMRI DUAL CHAMBER: Type: IMPLANTABLE DEVICE | Status: FUNCTIONAL

## 2020-10-12 DEVICE — LEAD PACING SOLIA S 45 PROMRI: Type: IMPLANTABLE DEVICE | Status: FUNCTIONAL

## 2020-10-12 RX ORDER — HEPARIN SODIUM 200 [USP'U]/100ML
100-600 INJECTION, SOLUTION INTRAVENOUS CONTINUOUS PRN
Status: DISCONTINUED | OUTPATIENT
Start: 2020-10-12 | End: 2020-10-12 | Stop reason: HOSPADM

## 2020-10-12 RX ORDER — FENTANYL CITRATE 50 UG/ML
INJECTION, SOLUTION INTRAMUSCULAR; INTRAVENOUS
Status: DISCONTINUED | OUTPATIENT
Start: 2020-10-12 | End: 2020-10-12 | Stop reason: HOSPADM

## 2020-10-12 RX ORDER — HEPARIN SODIUM 200 [USP'U]/100ML
100-500 INJECTION, SOLUTION INTRAVENOUS CONTINUOUS PRN
Status: DISCONTINUED | OUTPATIENT
Start: 2020-10-12 | End: 2020-10-12 | Stop reason: HOSPADM

## 2020-10-12 RX ORDER — LIDOCAINE 40 MG/G
CREAM TOPICAL
Status: DISCONTINUED | OUTPATIENT
Start: 2020-10-12 | End: 2020-10-12 | Stop reason: HOSPADM

## 2020-10-12 RX ORDER — BUPIVACAINE HYDROCHLORIDE 2.5 MG/ML
INJECTION, SOLUTION EPIDURAL; INFILTRATION; INTRACAUDAL
Status: DISCONTINUED | OUTPATIENT
Start: 2020-10-12 | End: 2020-10-12 | Stop reason: HOSPADM

## 2020-10-12 RX ORDER — ACETAMINOPHEN 325 MG/1
650 TABLET ORAL EVERY 4 HOURS PRN
Status: DISCONTINUED | OUTPATIENT
Start: 2020-10-12 | End: 2020-10-13 | Stop reason: HOSPADM

## 2020-10-12 RX ORDER — SODIUM CHLORIDE 450 MG/100ML
INJECTION, SOLUTION INTRAVENOUS CONTINUOUS
Status: DISCONTINUED | OUTPATIENT
Start: 2020-10-12 | End: 2020-10-12 | Stop reason: HOSPADM

## 2020-10-12 RX ORDER — CEFAZOLIN SODIUM 1 G/3ML
1 INJECTION, POWDER, FOR SOLUTION INTRAMUSCULAR; INTRAVENOUS
Status: DISCONTINUED | OUTPATIENT
Start: 2020-10-12 | End: 2020-10-12 | Stop reason: HOSPADM

## 2020-10-12 RX ORDER — LORAZEPAM 0.5 MG/1
0.5 TABLET ORAL EVERY 6 HOURS PRN
Status: DISCONTINUED | OUTPATIENT
Start: 2020-10-12 | End: 2020-10-13 | Stop reason: HOSPADM

## 2020-10-12 RX ORDER — VANCOMYCIN HYDROCHLORIDE 1 G/200ML
1000 INJECTION, SOLUTION INTRAVENOUS
Status: COMPLETED | OUTPATIENT
Start: 2020-10-12 | End: 2020-10-12

## 2020-10-12 RX ADMIN — LIDOCAINE HYDROCHLORIDE 10 ML: 10 INJECTION, SOLUTION EPIDURAL; INFILTRATION; INTRACAUDAL; PERINEURAL at 11:40

## 2020-10-12 RX ADMIN — OLANZAPINE 10 MG: 10 TABLET, FILM COATED ORAL at 22:03

## 2020-10-12 RX ADMIN — VANCOMYCIN HYDROCHLORIDE 1000 MG: 1 INJECTION, SOLUTION INTRAVENOUS at 10:58

## 2020-10-12 RX ADMIN — CEFTRIAXONE SODIUM 1 G: 1 INJECTION, POWDER, FOR SOLUTION INTRAMUSCULAR; INTRAVENOUS at 19:14

## 2020-10-12 RX ADMIN — MEMANTINE 10 MG: 10 TABLET ORAL at 22:03

## 2020-10-12 RX ADMIN — HUMAN ALBUMIN MICROSPHERES AND PERFLUTREN 9 ML: 10; .22 INJECTION, SOLUTION INTRAVENOUS at 10:30

## 2020-10-12 RX ADMIN — MEMANTINE 10 MG: 10 TABLET ORAL at 08:36

## 2020-10-12 RX ADMIN — SODIUM CHLORIDE: 9 INJECTION, SOLUTION INTRAVENOUS at 09:59

## 2020-10-12 RX ADMIN — FENTANYL CITRATE 25 MCG: 50 INJECTION, SOLUTION INTRAMUSCULAR; INTRAVENOUS at 11:33

## 2020-10-12 RX ADMIN — ACETAMINOPHEN 650 MG: 325 TABLET, FILM COATED ORAL at 22:03

## 2020-10-12 RX ADMIN — MIDAZOLAM 0.5 MG: 1 INJECTION INTRAMUSCULAR; INTRAVENOUS at 11:27

## 2020-10-12 RX ADMIN — BUPROPION HYDROCHLORIDE 300 MG: 150 TABLET, FILM COATED, EXTENDED RELEASE ORAL at 08:36

## 2020-10-12 RX ADMIN — MIDAZOLAM 1 MG: 1 INJECTION INTRAMUSCULAR; INTRAVENOUS at 11:33

## 2020-10-12 RX ADMIN — BUPIVACAINE HYDROCHLORIDE 10 ML: 2.5 INJECTION, SOLUTION EPIDURAL; INFILTRATION; INTRACAUDAL; PERINEURAL at 11:40

## 2020-10-12 RX ADMIN — FENTANYL CITRATE 25 MCG: 50 INJECTION, SOLUTION INTRAMUSCULAR; INTRAVENOUS at 11:27

## 2020-10-12 NOTE — PLAN OF CARE
Neuro- oriented to self only  Most Recent Vitals- Temp: 98.4  F (36.9  C) Temp src: Oral BP: 112/57 Pulse: 73   Resp: 24 SpO2: 92 % O2 Device: None (Room air)    Tele/Cardiac- temp pacer, pacer tuned off due to over sensing  Resp- LS clear on RA  Activity- bedrest  Pain- denies  Drips- PIV  Drains/Tubes- temp pacer  Skin- WNL  GI/- crawford  Aggression Color- Green  COVID status- Negative  Plan- EP study  Misc-     Genie Garrett, RN

## 2020-10-12 NOTE — PROGRESS NOTES
[facility]    Medicine Progress Note - Hospitalist Service       Date of Admission:  10/11/2020  Assessment & Plan       Kelly Nunez is a 70 year old female with a past medical history of hypertension, dementia, COPD, and A. fib (not anticoagulated) who presented to the emergency department at [facility] emergency department for evaluation of generalized weakness and profound bradycardia ultimately requiring transvenous pacing.  Cardiology consulted and planning for permanent pacemaker placement.     Junctional bradycardia s/p transvenous pacing  Hx of Paroxysmal Afib with RVR  Presented with generalized weakness, identified to have bradycardia with HRs 30s-40s via EMS. EKG on arrival with junctional rhythm. Pt initially received norepinephrine infusion, was ultimately transitioned to transvenous pacing by ED physician. PTA is on metoprolol and diltiazem for hx of atrial fibrillation with RVR. 10/12 pt in stable sinus rhythm without need for pacing.   - Cardiology consulted   - Continue transvenous pacing  - Given risk of recurrent A fib with RVR and potential need for rate controlling agents in the future, Pacemaker implantation is recommended   - Holding PTA metoprolol, diltiazem for now  - NPO  - Tele  - TTE pending     ZACHARIAH - resolved   Hyperkalemia - resolved   Cr 1.34. Baseline 0.6-0.8. Likely 2/2 hypoperfusion given above. Potassium 5.7. With associated ZACHARIAH. No resolved with resolution of bradycardia.   - Monitor BMP    E. Coli UTI  UA suspicious for infection with positive nitrites, leukocyte esterase and 162 WBC on microanalysis.  Unclear if pt has been symptomatic given mental state. Note allergy to PCN in chart, has tolerated cephalosporins in recent past without issue. Urine culture with >100,000 colonies of E. Coli.  - Empiric treatment with IV Rocephin 1gm q24h (day 2)   - Follow UCx    COPD  Chronic, stable. Does not appear in acute exacerbation.  - PTA  inhalers    Dementia  Oriented to self at baseline. Lives with  who is primary caregiver.  - Resume PTA namenda, bupropion, olanzapine, lorazepam     COVID-19 status: NEGATIVE  PCR performed 10/11/2020. Asymptomatic.       Diet: NPO per Anesthesia Guidelines for Procedure/Surgery Except for: Meds  NPO for Medical/Clinical Reasons Except for: Other; Specify: May take medications with a drink of water prior to Electrophysiology study    DVT Prophylaxis: Pneumatic Compression Devices  Cabral Catheter: in place, indication: Other (Comment)(bedrest)  Code Status: Full Code           Disposition Plan   Expected discharge: 2 - 3 days, recommended to prior living arrangement once Pacemaker placement .  Entered: Leila Blake MD 10/12/2020, 9:57 AM       The patient's care was discussed with the Bedside Nurse and Patient.    Leila Blake MD  Hospitalist Service  [facility]  Contact information available via [facility] Paging/Directory    ______________________________________________________________________    Interval History   History is limited due to dementia. She denies any pain. Does say she was feeling dizzy, but unclear if this was from when she was admitted or currently. She has not gotten out of bed today. No chest pain or shortness of breath.     Data reviewed today: I reviewed all medications, new labs and imaging results over the last 24 hours. I personally reviewed no images or EKG's today.    Physical Exam   Vital Signs: Temp: 98.4  F (36.9  C) Temp src: Oral BP: 112/57 Pulse: 74   Resp: 16 SpO2: 95 % O2 Device: None (Room air)    Weight: 106 lbs 9.6 oz  General Appearance: Awake, alert, no acute distress   Respiratory: No increased work of breathing, ronchi bilaterally, no crackles or wheezes   Cardiovascular: RRR, no murmurs, transvenous pacemaker in place, no lower extremity edema   GI: Soft, non-distended, non-tender, normal bowel sounds   Skin: no rashes or lesions  noted   Other: Oriented to person only     Data   Recent Labs   Lab 10/12/20  0528 10/11/20  1727 10/11/20  1436 10/11/20  1358   WBC 7.3  --  8.2  --    HGB 12.0  --  11.3*  --    MCV 95  --  99  --      --  340  --    INR 1.13  --   --   --     140  --  140   POTASSIUM 3.9 4.3  --  5.7*   CHLORIDE 111* 109  --  109   CO2 25 27  --  26   BUN 11 16  --  16   CR 0.78 1.15*  --  1.34*   ANIONGAP 5 4  --  5   ATTILA 8.1* 8.9  --  8.4*   * 140*  --  151*   TROPI  --   --   --  <0.015     Recent Results (from the past 24 hour(s))   XR Chest Port 1 View    Narrative    CHEST ONE VIEW PORTABLE October 11, 2020 2:26 PM     HISTORY: Generalized weakness, bradycardia.    COMPARISON: 2/2/2018.      Impression    IMPRESSION: Transvenous pacing device tip is projected over the upper  right ventricle. A defibrillator pad is noted. No pneumothorax or  pleural effusion. Hazy opacity in the right upper lobe is likely  scarring and similar to the prior study.    MEGA LEYVA MD   XR Chest Port 1 View    Narrative    XR CHEST PORT 1 VW 10/11/2020 3:39 PM    HISTORY: Bradycardia    COMPARISON: Chest x-ray 10/11/2020      Impression    IMPRESSION: Right IJ transvenous pacing device overlying the MPA. Left  chest wall defibrillator pad. No pneumothorax. The cardiac silhouette  and pulmonary vasculature are within normal limits. Right upper lung  scarring.    ZAHEER GARCIA MD   XR Chest Port 1 View    Narrative    CHEST ONE VIEW PORTABLE   10/11/2020 3:41 PM     HISTORY: Bradycardia    COMPARISON: 10/11/2020      Impression    IMPRESSION: Transvenous pacer device with the tip projected over the  lower right ventricle. Defibrillator pad projects over the chest. No  airspace consolidation, pneumothorax, or pleural effusion.    MEGA LEYVA MD     Medications     NaCl       sodium chloride 100 mL/hr at 10/12/20 0959       buPROPion  300 mg Oral QAM     cefTRIAXone  1 g Intravenous Q24H     memantine  10 mg Oral BID      OLANZapine  10 mg Oral At Bedtime     sodium chloride (PF)  3 mL Intracatheter Q8H     sodium chloride (PF)  3 mL Intracatheter Q8H     vancomycin (VANCOCIN) IV  1,000 mg Intravenous Pre-Op/Pre-procedure x 1 dose

## 2020-10-12 NOTE — CONSULTS
[facility]    Cardiology Consultation     Date of Admission:  10/11/2020    Assessment & Plan   Kelly Nunez is a 70 year old female who was admitted on 10/11/2020. I was asked to see the patient for junctional bradycardia.  Patient was fatigue and presented to the emergency department after the  noted the heart rate was low.    Per report of the [facility] ER doctor the patient was in a junctional bradycardia.  There is only a single EKG for interview with a large amount of baseline noise.  The QRS complexes are narrow and it appears this could be complete heart block.  I do not have copies of the telemetry strips from the outside emergency department.  An emergent temporary internal jugular pacemaker was placed.  There was difficulty with capture for some period of time and a reposition with a new pacing catheter was undertaken and there is ER.  The patient also seemed to respond briefly to atropine per report of the ER doctor.    Patient is also on metoprolol and this dose this morning was held by the .  Patient is also on diltiazem 180 mg long-acting.    Currently the patient has good capture on her temporary pacemaker.  I would await washout of both diltiazem and metoprolol and sent the patient.  Is possible she may not need any additional therapy such as a permanent pacemaker pending the results of her medication washout.  If she does not improve she will need to have a permanent pacemaker and this will need to be discussed with the family.    Plan:  Avoid all AV hermelinda blocking agents this includes do not give metoprolol or diltiazem  Continue temporary pacemaker  Electrophysiology consult in the morning  Avoid hyperkalemia hypokalemic   Continue to check daily electrolytes  I will add on a thyroid hormone study    Anthony Gonzalez MD     Code Status    Full Code    Reason for Consult   Reason for consult: Bradycardia    Primary Care Physician   Kolby Wallace  Saran    Chief Complaint   Fatigue    Unable to obtain a history from the patient due to confusion    History of Present Illness   Kelly Nunez is a 70 year old female who presents with bradycardia and fatigue.  Patient has a history of hypertension, dementia, COPD, A. fib not on chronic anticoagulation.    Patient is currently living with her  who is her primary caretaker.  He checks her vitals regularly.  The patient was fatigued this morning.  The  checked her vitals and noted that her pulse was quite low.    The patient was ultimately taken to [facility] emergency department.  At that time the patient was thought to be in a junctional cardia.  A emergent temporary pacemaker was placed via an internal jugular approach.    Patient had a good response in terms of improvement of blood pressure and energy.  There was intermittent loss of capture and a repeat pacing catheter was placed.    Currently the patient is without complaints and resting comfortably in bed.  Has a pacemaker has not had any additional history.    It should be noted the patient is on diltiazem as well as metoprolol but her metoprolol dose was held this morning.    Past Medical History   I have reviewed this patient's medical history and updated it with pertinent information if needed.   Past Medical History:   Diagnosis Date     Dementia (H)      Diabetes (H)        Past Surgical History   I have reviewed this patient's surgical history and updated it with pertinent information if needed.  Past Surgical History:   Procedure Laterality Date     HEART CATH, ANGIOPLASTY         Prior to Admission Medications   Prior to Admission Medications   Prescriptions Last Dose Informant Patient Reported? Taking?   LORazepam (ATIVAN) 0.5 MG tablet  at PRN Spouse/Significant Other Yes Yes   Sig: Take 0.5 mg by mouth every 6 hours as needed for anxiety   OLANZapine (ZYPREXA) 10 MG tablet 10/10/2020 at HS Spouse/Significant Other Yes Yes   Sig:  Take 10 mg by mouth At Bedtime    aspirin (ASA) 81 MG chewable tablet 10/10/2020 at PM Spouse/Significant Other Yes Yes   Sig: Take 81 mg by mouth every evening   buPROPion (WELLBUTRIN XL) 300 MG 24 hr tablet 10/10/2020 at AM Spouse/Significant Other Yes Yes   Sig: Take 300 mg by mouth every morning    diltiazem ER COATED BEADS (CARDIZEM CD/CARTIA XT) 180 MG 24 hr capsule 10/10/2020 at AM Spouse/Significant Other No Yes   Sig: Take 1 capsule (180 mg) by mouth daily   memantine (NAMENDA) 10 MG tablet 10/10/2020 at PM Spouse/Significant Other Yes Yes   Sig: Take 10 mg by mouth 2 times daily    metoprolol tartrate (LOPRESSOR) 50 MG tablet 10/10/2020 at PM Spouse/Significant Other No Yes   Sig: Take 1 tablet (50 mg) by mouth 2 times daily   multivitamin w/minerals (THERA-VIT-M) tablet 10/10/2020 at AM Spouse/Significant Other Yes Yes   Sig: Take 1 tablet by mouth daily CENTRUM      Facility-Administered Medications: None     Allergies   Allergies   Allergen Reactions     Penicillins Swelling     Pt states she has tolerated cephalosporins in the past.        Social History   I have reviewed this patient's social history and updated it with pertinent information if needed. Kelly WHITE Mayra  reports that she has quit smoking. She smoked 1.00 pack per day. She has never used smokeless tobacco. She reports current alcohol use. She reports that she does not use drugs.    Family History   I have reviewed this patient's family history and updated it with pertinent information if needed.   No family history on file.    Review of Systems   Review of systems not obtained due to patient factors - confusion    Physical Exam   Temp: 98  F (36.7  C) Temp src: Oral BP: 109/56 Pulse: 80   Resp: 20 SpO2: 97 % O2 Device: None (Room air)    Vital Signs with Ranges  Temp:  [97.1  F (36.2  C)-98  F (36.7  C)] 98  F (36.7  C)  Pulse:  [] 80  Resp:  [9-27] 20  BP: ()/(46-66) 109/56  SpO2:  [95 %-100 %] 97 %  0 lbs 0 oz    GENERAL  APPEARANCE: Alert and oriented x1. No acute distress.  SKIN: Inspection of the skin reveals no rashes, ulcerations, warm, dry  NECK: Supple and symmetric.   Unable to assess on the right side JVP, left-sided repeat pulse normal  LUNGS: Clear breath sounds throughout bilaterally, no wheezes, no rhonchi  CARDIOVASCULAR: S1, S2, regular rate and rhythm without any murmurs, gallops, rubs. The carotid pulses were normal and 2+ bilaterally without bruits. Peripheral pulses were 2+ and symmetric.  ABDOMEN: Soft, non-tender, non-distended with normal bowel sounds.  No ascites noted.  EXTREMITIES: No edema.  NEUROLOGIC:  Pleasant, advanced dementia      Data   Results for orders placed or performed during the hospital encounter of 10/11/20 (from the past 24 hour(s))   Basic metabolic panel   Result Value Ref Range    Sodium 140 133 - 144 mmol/L    Potassium 4.3 3.4 - 5.3 mmol/L    Chloride 109 94 - 109 mmol/L    Carbon Dioxide 27 20 - 32 mmol/L    Anion Gap 4 3 - 14 mmol/L    Glucose 140 (H) 70 - 99 mg/dL    Urea Nitrogen 16 7 - 30 mg/dL    Creatinine 1.15 (H) 0.52 - 1.04 mg/dL    GFR Estimate 49 (L) >60 mL/min/[1.73_m2]    GFR Estimate If Black 56 (L) >60 mL/min/[1.73_m2]    Calcium 8.9 8.5 - 10.1 mg/dL   Magnesium   Result Value Ref Range    Magnesium 2.4 (H) 1.6 - 2.3 mg/dL   UA with Microscopic reflex to Culture    Specimen: Urine catheter; Midstream Urine   Result Value Ref Range    Color Urine Yellow     Appearance Urine Slightly Cloudy     Glucose Urine Negative NEG^Negative mg/dL    Bilirubin Urine Negative NEG^Negative    Ketones Urine Negative NEG^Negative mg/dL    Specific Gravity Urine 1.008 1.003 - 1.035    Blood Urine Large (A) NEG^Negative    pH Urine 5.5 5.0 - 7.0 pH    Protein Albumin Urine 10 (A) NEG^Negative mg/dL    Urobilinogen mg/dL Normal 0.0 - 2.0 mg/dL    Nitrite Urine Negative NEG^Negative    Leukocyte Esterase Urine Large (A) NEG^Negative    Source Midstream Urine     WBC Urine 162 (H) 0 - 5 /HPF     RBC Urine 50 (H) 0 - 2 /HPF    WBC Clumps Present (A) NEG^Negative /HPF    Hyaline Casts 1 0 - 2 /LPF

## 2020-10-12 NOTE — PROGRESS NOTES
Cardiology Progress Note          Assessment and Plan:   Pt was brought in for symptomatic bradycardia that required temporary pacing wire insertion. Now in stable sinus rhythm.   She had documented AF with RVR up to 178 bpm in 2018. She was on metoprolol and diltiazem before this admission. Both drugs are on hold now.  Recurrent UTI. Afebrile.  COVID-19 negative.  Echo EF normal in 2018. Moderate biatrial enlargement.  Severe dementia.    Pt will likely to have recurrent AF with RVR. Pacemaker implantation is recommended in order to resume rate control medications.  The risk and benefit of pacemaker implantation were explained to her  who has given the consent. A long conversation with her .  Not on anticoagulation for now. May consider anticoagulation if the pacemaker detects AF.  Will let the hospitalist manage UTI.  Mirlande Rodriguez MD  Cardiology   [phone]                  Physical Exam:   Blood pressure 112/57, pulse 73, temperature 98.4  F (36.9  C), temperature source Oral, resp. rate 24, weight 48.4 kg (106 lb 9.6 oz), SpO2 92 %.  Wt Readings from Last 4 Encounters:   10/12/20 48.4 kg (106 lb 9.6 oz)   09/03/20 47.6 kg (105 lb)   02/03/18 40.5 kg (89 lb 3.2 oz)      I/O last 3 completed shifts:  In: 100 [P.O.:100]  Out: 1750 [Urine:1750]    Constitutional: Alert, no apparent distress,      Lungs: No crackles or wheezing,    Cardiovascular: Regular rate and rhythm, normal S1 and S2, and no murmur,    Lymphm node  Neck  ENT  Neurologic  Abdomen: No enlargement  No jugular vein extension or carotid bruit  No apparent abnormality  No focal deficit  Normal bowel sounds, soft, no distension, no tender   Skin: No rashes, no cyanosis   Extremity: No edema          Medications:     Current Facility-Administered Medications:      acetaminophen (TYLENOL) Suppository 650 mg, 650 mg, Rectal, Q4H PRN, Lane Jones PA-C     acetaminophen (TYLENOL) tablet 650 mg, 650 mg, Oral, Q4H PRN, Lane Jones PA-C      buPROPion (WELLBUTRIN XL) 24 hr tablet 300 mg, 300 mg, Oral, QAM, Lane Jones PA-C, 300 mg at 10/12/20 0836     cefTRIAXone (ROCEPHIN) 1 g vial to attach to  mL bag for ADULTS or NS 50 mL bag for PEDS, 1 g, Intravenous, Q24H, Lane Jones PA-C, Last Rate: 200 mL/hr at 10/11/20 2019, 1 g at 10/11/20 2019     lidocaine (LMX4) cream, , Topical, Q1H PRN, Lane Jones PA-C     lidocaine 1 % 0.1-1 mL, 0.1-1 mL, Other, Q1H PRN, Lane Jnoes PA-C     melatonin tablet 1 mg, 1 mg, Oral, At Bedtime PRN, Lane Jones PA-C     memantine (NAMENDA) tablet 10 mg, 10 mg, Oral, BID, Lane Jones PA-C, 10 mg at 10/12/20 0836     naloxone (NARCAN) injection 0.1-0.4 mg, 0.1-0.4 mg, Intravenous, Q2 Min PRN, Lane Jones PA-C     OLANZapine (zyPREXA) tablet 10 mg, 10 mg, Oral, At Bedtime, Lane Jones PA-C, 10 mg at 10/11/20 2155     ondansetron (ZOFRAN-ODT) ODT tab 4 mg, 4 mg, Oral, Q6H PRN **OR** ondansetron (ZOFRAN) injection 4 mg, 4 mg, Intravenous, Q6H PRN, Lane Jones PA-C     polyethylene glycol (MIRALAX) Packet 17 g, 17 g, Oral, Daily PRN, Lane Jones PA-C     prochlorperazine (COMPAZINE) injection 5 mg, 5 mg, Intravenous, Q6H PRN **OR** prochlorperazine (COMPAZINE) tablet 5 mg, 5 mg, Oral, Q6H PRN **OR** prochlorperazine (COMPAZINE) suppository 12.5 mg, 12.5 mg, Rectal, Q12H PRN, Lane Jones PA-C     senna-docusate (SENOKOT-S/PERICOLACE) 8.6-50 MG per tablet 1 tablet, 1 tablet, Oral, BID PRN **OR** senna-docusate (SENOKOT-S/PERICOLACE) 8.6-50 MG per tablet 2 tablet, 2 tablet, Oral, BID PRN, Lane Jones PA-C     sodium chloride (PF) 0.9% PF flush 3 mL, 3 mL, Intracatheter, q1 min prn, Lane Jones PA-C     sodium chloride (PF) 0.9% PF flush 3 mL, 3 mL, Intracatheter, Q8H, Lane Jones PA-C, 3 mL at 10/12/20 0749     sodium chloride 0.9% infusion, , Intravenous, Continuous, Lane Jones PA-C, Last Rate: 100 mL/hr at 10/12/20 0748           Lab results:        Recent Labs   Lab Test 10/12/20  0528 10/11/20  6421  10/11/20  1358    140 140   POTASSIUM 3.9 4.3 5.7*   CHLORIDE 111* 109 109   ATTILA 8.1* 8.9 8.4*   CO2 25 27 26   BUN 11 16 16   CR 0.78 1.15* 1.34*   * 140* 151*     Recent Labs   Lab Test 10/11/20  1436 01/26/20  0344 02/03/18  0608   HGB 11.3* 13.4 13.8   WBC 8.2 10.1 7.1    341 322     No lab results found.  Recent Labs   Lab Test 10/11/20  1358 01/23/18  1524   TROPI <0.015 0.030

## 2020-10-12 NOTE — PROGRESS NOTES
Dictated.    Successful dual-chamber pacemaker implantation (Biotronik).    Programmed DDDR 60/130 ppm  No apparent complication.    Temp PM was removed.    EBL = 15 cc.      Plan:  - CXR and device interrogation in the am

## 2020-10-13 ENCOUNTER — TELEPHONE (OUTPATIENT)
Dept: CARDIOLOGY | Facility: CLINIC | Age: 70
End: 2020-10-13

## 2020-10-13 ENCOUNTER — APPOINTMENT (OUTPATIENT)
Dept: GENERAL RADIOLOGY | Facility: CLINIC | Age: 70
DRG: 243 | End: 2020-10-13
Attending: INTERNAL MEDICINE
Payer: COMMERCIAL

## 2020-10-13 ENCOUNTER — TELEPHONE (OUTPATIENT)
Dept: FAMILY MEDICINE | Facility: CLINIC | Age: 70
End: 2020-10-13

## 2020-10-13 VITALS
TEMPERATURE: 97.4 F | DIASTOLIC BLOOD PRESSURE: 84 MMHG | SYSTOLIC BLOOD PRESSURE: 134 MMHG | HEART RATE: 96 BPM | BODY MASS INDEX: 18.6 KG/M2 | OXYGEN SATURATION: 95 % | WEIGHT: 105 LBS | RESPIRATION RATE: 18 BRPM

## 2020-10-13 DIAGNOSIS — Z95.0 CARDIAC PACEMAKER IN SITU: ICD-10-CM

## 2020-10-13 DIAGNOSIS — I49.5 SSS (SICK SINUS SYNDROME) (H): Primary | ICD-10-CM

## 2020-10-13 PROCEDURE — 93010 ELECTROCARDIOGRAM REPORT: CPT | Performed by: INTERNAL MEDICINE

## 2020-10-13 PROCEDURE — 99239 HOSP IP/OBS DSCHRG MGMT >30: CPT | Performed by: STUDENT IN AN ORGANIZED HEALTH CARE EDUCATION/TRAINING PROGRAM

## 2020-10-13 PROCEDURE — 250N000013 HC RX MED GY IP 250 OP 250 PS 637: Performed by: INTERNAL MEDICINE

## 2020-10-13 PROCEDURE — 250N000013 HC RX MED GY IP 250 OP 250 PS 637: Performed by: PHYSICIAN ASSISTANT

## 2020-10-13 PROCEDURE — 93005 ELECTROCARDIOGRAM TRACING: CPT

## 2020-10-13 PROCEDURE — 99232 SBSQ HOSP IP/OBS MODERATE 35: CPT | Performed by: INTERNAL MEDICINE

## 2020-10-13 PROCEDURE — 999N000065 XR CHEST 2 VW

## 2020-10-13 RX ORDER — METOPROLOL TARTRATE 50 MG
50 TABLET ORAL 2 TIMES DAILY
Status: DISCONTINUED | OUTPATIENT
Start: 2020-10-13 | End: 2020-10-13 | Stop reason: HOSPADM

## 2020-10-13 RX ORDER — DILTIAZEM HYDROCHLORIDE 180 MG/1
180 CAPSULE, COATED, EXTENDED RELEASE ORAL DAILY
Status: DISCONTINUED | OUTPATIENT
Start: 2020-10-13 | End: 2020-10-13 | Stop reason: HOSPADM

## 2020-10-13 RX ORDER — CEFPODOXIME PROXETIL 200 MG/1
200 TABLET, FILM COATED ORAL 2 TIMES DAILY
Qty: 10 TABLET | Refills: 0 | Status: SHIPPED | OUTPATIENT
Start: 2020-10-13 | End: 2020-10-18

## 2020-10-13 RX ADMIN — METOPROLOL TARTRATE 50 MG: 50 TABLET, FILM COATED ORAL at 09:04

## 2020-10-13 RX ADMIN — MEMANTINE 10 MG: 10 TABLET ORAL at 08:10

## 2020-10-13 RX ADMIN — DILTIAZEM HYDROCHLORIDE 180 MG: 180 CAPSULE, COATED, EXTENDED RELEASE ORAL at 09:04

## 2020-10-13 RX ADMIN — BUPROPION HYDROCHLORIDE 300 MG: 150 TABLET, FILM COATED, EXTENDED RELEASE ORAL at 08:10

## 2020-10-13 NOTE — TELEPHONE ENCOUNTER
Pt had PPM implanted yesterday. She was discharged from the hospital this morning.     Post device implant discharge phone call. Spoke with pt's  because pt has dementia.     Reviewed the following:  No raising arm above shoulder on the side of implant for 3 weeks  Remove outer dressing 3 days after implant. May shower after outer dressing removed. Leave steri-strips in place, will be removed at 1 week device check  Limit driving for: 1 week (PPM)  Watch for redness, drainage, warmth, or fever. Call device clinic if any signs of infection.     1 week device check scheduled: Thursday 10/22/2020, 9:45am, Ritu    Pt's  states understanding of all instructions.

## 2020-10-13 NOTE — PLAN OF CARE
Neuro- Alert to self only, impulsive at times but easily redirectable.   Most Recent Vitals- Temp: 98.2  F (36.8  C) Temp src: Oral BP: 139/71 Pulse: 73   Resp: 16 SpO2: 96 % O2 Device: None (Room air) Oxygen Delivery: 2 LPM  Tele - 100% A-paced  Cardiac- Denies CP, dizziness, and SOB   Resp- WNL  O2- RA  Activity- SBA  Pain- denies  Drips- none  Drains/Tubes- PIV  Aggression Color- Green  Plan- C-xray, interrogation.   [facility] [place]- PPM site CDI.    Mario Fu RN

## 2020-10-13 NOTE — PLAN OF CARE
Pt A&O to self, confused and very forgetful, on tele 100% paced and on RA, LS diminished at bases and up with assist of 1, L chest PPM site C/D/I and report given to 1:1 Summer SOTELO. Plan to continue to monitor.

## 2020-10-13 NOTE — PLAN OF CARE
Alert to self.  VSS.  Up with 1 and belt to bathroom.  Pacemaker site c/d/I.  Tylenol given at bedtime per  request.  Tele 100% paced.

## 2020-10-13 NOTE — PROGRESS NOTES
Cardiology Progress Note          Assessment and Plan:   Post pacemaker implantation. The incision site looks fine. Chest x-ray reviewed, food.  No complaints.  Vitals stable.  Ok for discharge today from the cardiology perspective.    Pt was brought in for symptomatic bradycardia that required temporary pacing wire insertion.    She had documented AF with RVR up to 178 bpm in 2018. She was on metoprolol 50 mg bid and diltiazem 180 mg daily before this admission.   Recurrent UTI. Afebrile.  COVID-19 negative.  Echo EF normal in 2018. Moderate biatrial enlargement.  Severe dementia.     Pt will likely to have recurrent AF with RVR. Resumed rate control medications.  Not on anticoagulation for now. May consider anticoagulation if the pacemaker detects AF.  Will let the hospitalist manage UTI.  Sign off  Mirlande Rodriguez MD  Cardiology   [phone]                  Physical Exam:   Blood pressure 134/84, pulse 96, temperature 97.4  F (36.3  C), temperature source Oral, resp. rate 18, weight 47.6 kg (105 lb), SpO2 95 %.  Wt Readings from Last 4 Encounters:   10/13/20 47.6 kg (105 lb)   09/03/20 47.6 kg (105 lb)   02/03/18 40.5 kg (89 lb 3.2 oz)      I/O last 3 completed shifts:  In: 100 [P.O.:100]  Out: 600 [Urine:600]    Constitutional: Alert, no apparent distress,      Lungs: No crackles or wheezing,    Cardiovascular: Regular rate and rhythm, normal S1 and S2, and no murmur,    Lymphm node  Neck  ENT  Neurologic  Abdomen: No enlargement  No jugular vein extension or carotid bruit  No apparent abnormality  No focal deficit  Normal bowel sounds, soft, no distension, no tender   Skin: No rashes, no cyanosis   Extremity: No edema          Medications:     Current Facility-Administered Medications:      acetaminophen (TYLENOL) Suppository 650 mg, 650 mg, Rectal, Q4H PRN, Lane Jones PA-C     acetaminophen (TYLENOL) tablet 650 mg, 650 mg, Oral, Q4H PRN, Luis Onofre MD, 650 mg at 10/12/20 2411     buPROPion  (WELLBUTRIN XL) 24 hr tablet 300 mg, 300 mg, Oral, QAM, Lane Jones PA-C, 300 mg at 10/13/20 0810     cefTRIAXone (ROCEPHIN) 1 g vial to attach to  mL bag for ADULTS or NS 50 mL bag for PEDS, 1 g, Intravenous, Q24H, Lane Jones PA-C, Last Rate: 200 mL/hr at 10/11/20 2019, 1 g at 10/12/20 1914     diltiazem ER COATED BEADS (CARDIZEM CD/CARTIA XT) 24 hr capsule 180 mg, 180 mg, Oral, Daily, Mirlande Rodriguez MD     HOLD: enoxaparin (LOVENOX) Post Procedure, , Does not apply, HOLD, Luis Onofre MD     HOLD: heparin (IV or Subcutaneous) Post Procedure, , Does not apply, HOLD, Luis Onofre MD     HOLD: metformin and metformin containing medications on day of procedure and 48 hours after IV contrast given, , Does not apply, HOLD, Luis Onofre MD     lidocaine (LMX4) cream, , Topical, Q1H PRN, Lane Jones PA-C     lidocaine 1 % 0.1-1 mL, 0.1-1 mL, Other, Q1H PRN, Lane Jones PA-C     LORazepam (ATIVAN) tablet 0.5 mg, 0.5 mg, Oral, Q6H PRN, Leila Blake MD     melatonin tablet 1 mg, 1 mg, Oral, At Bedtime PRN, Lane Jones PA-C     memantine (NAMENDA) tablet 10 mg, 10 mg, Oral, BID, Lane Jones PA-C, 10 mg at 10/13/20 0810     metoprolol tartrate (LOPRESSOR) tablet 50 mg, 50 mg, Oral, BID, Mirlande Rodriguez MD     naloxone (NARCAN) injection 0.1-0.4 mg, 0.1-0.4 mg, Intravenous, Q2 Min PRN, Lane Jones PA-C     OLANZapine (zyPREXA) tablet 10 mg, 10 mg, Oral, At Bedtime, Lane Jones PA-C, 10 mg at 10/12/20 2203     ondansetron (ZOFRAN-ODT) ODT tab 4 mg, 4 mg, Oral, Q6H PRN **OR** ondansetron (ZOFRAN) injection 4 mg, 4 mg, Intravenous, Q6H PRN, Lane Jones PA-C     polyethylene glycol (MIRALAX) Packet 17 g, 17 g, Oral, Daily PRN, Lane Jones PA-C     prochlorperazine (COMPAZINE) injection 5 mg, 5 mg, Intravenous, Q6H PRN **OR** prochlorperazine (COMPAZINE) tablet 5 mg, 5 mg, Oral, Q6H PRN **OR** prochlorperazine (COMPAZINE) suppository 12.5 mg, 12.5 mg, Rectal, Q12H PRN,  Lane Jones PA-C     senna-docusate (SENOKOT-S/PERICOLACE) 8.6-50 MG per tablet 1 tablet, 1 tablet, Oral, BID PRN **OR** senna-docusate (SENOKOT-S/PERICOLACE) 8.6-50 MG per tablet 2 tablet, 2 tablet, Oral, BID PRN, Lane Jones PA-C     sodium chloride (PF) 0.9% PF flush 3 mL, 3 mL, Intracatheter, q1 min prn, Lane Jones PA-C     sodium chloride (PF) 0.9% PF flush 3 mL, 3 mL, Intracatheter, Q8H, Lane Jones PA-C, 3 mL at 10/13/20 0811           Lab results:        Recent Labs   Lab Test 10/12/20  0528 10/11/20  1727 10/11/20  1358    140 140   POTASSIUM 3.9 4.3 5.7*   CHLORIDE 111* 109 109   ATTILA 8.1* 8.9 8.4*   CO2 25 27 26   BUN 11 16 16   CR 0.78 1.15* 1.34*   * 140* 151*     Recent Labs   Lab Test 10/12/20  0528 10/11/20  1436 01/26/20  0344   HGB 12.0 11.3* 13.4   WBC 7.3 8.2 10.1    340 341     Recent Labs   Lab Test 10/12/20  0528   INR 1.13     Recent Labs   Lab Test 10/11/20  1358 01/23/18  1524   TROPI <0.015 0.030

## 2020-10-13 NOTE — TELEPHONE ENCOUNTER
"ED/Discharge Protocol    \"Hi, my name is Kelsea Jang RN, a registered nurse, and I am calling on behalf of Kolby Noonan PA-C's office at [facility].  I am calling to follow up and see how things are going for you after your recent visit.\"     \"I see that you were in [facility] on 10/11/2020-10/13/2020 for Bradycardia,Hypotension, unspecified hypotension type, Acute kidney injury (H),Hyperkalemia &  Junctional bradycardia s/p transvenous pacing & had a  permanent pacemaker placed on 10/12 by  Luis Onofre MD @  [facility].DX:Bradycardia.        .    How are you doing now that you are home?\" Fantastic! But she has a current UTI    Is patient experiencing symptoms that may require a hospital visit?  No    Discharge Instructions    \"Let's review your discharge instructions.  What is/are the follow-up recommendations?  Pt. Response: PER AVS:     -Cefodoxime (Vantin) 200 mg 1 tablet by mouth two tomes daily x 5 days (LAST DOSE 10/17/2020 PM)  -DIET;regular  -Activity;as tolerated    \"Were you instructed to make a follow-up appointment?\"  Pt. Response: Yes.  Has appointment been made?   Yes-         JAN 12, 2021  Video Visit with Kolby Noonan PA-C  Tuesday Jan 12, 2021 3:30 PM  [facility]     -F/U with Cardiology  In 2 weeks @ [facility] in [place] [phone]    \"When you see the provider, I would recommend that you bring your discharge instructions with you.    Medications    \"How many new medications are you on since your hospitalization/ED visit?\"    1  \"How many of your current medicines changed (dose, timing, name, etc.) while you were in the hospital/ED visit?\"  1  \"Do you have questions about your medications?\"   No  \"Were you newly diagnosed with heart failure, COPD, diabetes or did you have a heart attack?\"   No  For patients on insulin: \"Did you start on insulin in the hospital or did you have your insulin dose " "changed?\"   No  Post Discharge Medication Reconciliation Status: discharge medications reconciled, continue medications without change.    Was MTM referral placed (*Make sure to put transitions as reason for referral)?   No    Call Summary    \"Do you have any questions or concerns about your condition or care plan at the moment?\"    No  Triage nurse advice given: N/A    Patient was in ER x 2 in the past year (assess appropriateness of ER visits.)      \"If you have questions or things don't continue to improve, we encourage you contact us through the main clinic number,  [phone].  Even if the clinic is not open, triage nurses are available 24/7 to help you.     We would like you to know that our clinic has extended hours (provide information).  We also have urgent care (provide details on closest location and hours/contact info)\"      \"Thank you for your time and take care!\"     Kelsea Jang, Registered Nurse, [facility]  [phone]        "

## 2020-10-13 NOTE — DISCHARGE INSTRUCTIONS
Discharge Instructions for Pacemaker Implantation  You have had a procedure to insert a pacemaker. Once inside your body, this small electronic device helps keep your heart from beating too slowly. A pacemaker can t fix existing heart problems. But it can help you feel better and have more energy. As you recover, follow all of the instructions you are given, including those below.  Activity    Follow the instructions you are given about limiting your activity.    Do not raise your arm on the incision side above shoulder level for 3 weeks. This gives the device lead wires time to attach securely inside your heart.    Ask your doctor when you can expect to return to work.    You can still exercise. It s good for your body and your heart. Talk with your doctor about an exercise plan.  Other Precautions    Follow your doctor's directions carefully for wound care. You may remove the outer dressing in 3 - 4 days. Leave the steri-strips in place; these will be removed at your 1 week follow-up. Never put any creams, lotions, or products like peroxide on an incision unless your doctor tells you to.     You may shower once the outer dressing has been removed.     Before you receive any treatment, tell all health care providers (including your dentist) that you have a pacemaker.    You will be given an ID card that contains information about your pacemaker. Always carry this card with you. You can show this card if your pacemaker sets off a metal detector. You should also show it to avoid screening with a hand-held security wand.    Keep your cell phone away from your pacemaker. Don t carry the phone in your shirt pocket, even when it s turned off.    Avoid strong magnets. Examples are those used in MRIs or in hand-held security wands.    Avoid strong electrical fields. Examples are those made by radio transmitting towers,  ham  radios, and heavy-duty electrical equipment.    Avoid leaning over the open munoz of a running  car. A running engine creates an electrical field. Most household and yard appliances will not cause any problems. If you use any large power tools, such as an industrial , talk with your doctor.   Follow-Up    Follow up in the device clinic as scheduled. We will call you to schedule your 1 week pacemaker check appointment.     Make regular follow-up appointments with your device clinic. They will check the pacemaker to make sure it s working properly.  When to Call Your Device Clinic [phone]  Call your doctor immediately if you have any of the following:    Dizziness    Chest pain    Lack of energy    Fainting spells    Rapid pulse or pounding heartbeat    Shortness of breath    Increased pain around your pacemaker    Fever above 100.4 F (38 C) or other signs of infection (redness, swelling, drainage, or warmth at the incision site)       8393-6208 [employer]. [address] [place] [address]. All rights reserved. This information is not intended as a substitute for professional medical care. Always follow your healthcare professional's instructions.    [facility] in [place]: [phone]  Device Clinic (Monday to Friday, 8am-4pm): [phone]  *The device clinic is closed on weekends and holidays.  Any calls received during this time will be answered on the next business  day. For any urgent questions after hours, please call the main clinic number and you will be put in contact with the cardiologist on call.

## 2020-10-13 NOTE — DISCHARGE SUMMARY
[facility]  Hospitalist Discharge Summary      Date of Admission:  10/11/2020  Date of Discharge:  10/13/2020  Discharging Provider: Leila Blake MD      Discharge Diagnoses   Junctional bradycardia s/p transvenous pacing  Hx of Paroxysmal Afib with RVR  ZACHARIAH - resolved   Hyperkalemia - resolved   E. Coli UTI  COPD  Dementia    Follow-ups Needed After Discharge   Follow-up Appointments     Follow-up and recommended labs and tests       Follow up with primary care provider, Kolby Noonan, within 7 days   for hospital follow- up.  No follow up labs or test are needed.    Follow-up with cardiology in about 2 weeks.         Follow-up and recommended labs and tests       Follow up with primary care provider, Kolby Noonan, within 7 days   for hospital follow- up.  No follow up labs or test are needed.      Follow-up with cardiology in about 2 weeks.             Unresulted Labs Ordered in the Past 30 Days of this Admission     No orders found from 9/11/2020 to 10/12/2020.      These results will be followed up by N/A    Discharge Disposition   Discharged to home  Condition at discharge: Stable      Hospital Course         Kelly Nunez is a 70 year old female with a past medical history of hypertension, dementia, COPD, and A. fib (not anticoagulated) who presented to the emergency department at [facility] emergency department for evaluation of generalized weakness and profound bradycardia ultimately requiring transvenous pacing.  Cardiology consulted placed permanent pacemaker on 10/12. At the time of discharge the patient was asymptomatic and vitally stable.      Junctional bradycardia s/p transvenous pacing, now s/p permanent pacemaker placement   Hx of Paroxysmal Afib with RVR  Presented with generalized weakness, identified to have bradycardia with HRs 30s-40s via EMS. EKG on arrival with junctional rhythm. Pt initially received norepinephrine infusion,  was ultimately transitioned to transvenous pacing by ED physician. PTA is on metoprolol and diltiazem for hx of atrial fibrillation with RVR. 10/12 pt underwent permanent pacemaker placement.  - Resume PTA metoprolol, diltiazem at discharge to prevent recurrent atrial fibrillation   - Currently not anticoagulation, could consider initiation of anticoagulation if pacemaker reveals high burden of a-fib.   - Referral to cardiology placed    ZACHARIAH - resolved   Hyperkalemia - resolved   Cr 1.34. Baseline 0.6-0.8. Likely 2/2 hypoperfusion given above. Potassium 5.7. With associated ZACHARIAH. No resolved with resolution of bradycardia.     E. Coli UTI  UA suspicious for infection with positive nitrites, leukocyte esterase and 162 WBC on microanalysis.  Unclear if pt has been symptomatic given mental state. Note allergy to PCN in chart, has tolerated cephalosporins in recent past without issue. Urine culture with >100,000 colonies of E. Coli.  - Given IV Rocephin 1gm q24h in the hospital  - Cefpodoxime prescribed on discharge for 5 more days.     COPD  Chronic, stable. Does not appear in acute exacerbation.    Dementia  Oriented to self at baseline. Lives with  who is primary caregiver.  - Resume PTA namenda, bupropion, olanzapine, lorazepam     COVID-19 status: NEGATIVE  PCR performed 10/11/2020. Asymptomatic.      Consultations This Hospital Stay   CARDIOLOGY IP CONSULT  CARE MANAGEMENT / SOCIAL WORK IP CONSULT  ELECTROPHYSIOLOGY IP CONSULT    Code Status   Full Code    Time Spent on this Encounter   I, Leila Blake MD, personally saw the patient today and spent greater than 30 minutes discharging this patient.       Leila Blake MD  [facility] CORONARY CARE UN[facility]  [address] [place] [address] [address], [address]  [facility] [address]  Phone: [phone]  ______________________________________________________________________    Physical Exam   Vital Signs: Temp: 97.4  F (36.3  C) Temp src: Oral BP: 134/84 Pulse:  96   Resp: 18 SpO2: 95 % O2 Device: None (Room air) Oxygen Delivery: 2 LPM  Weight: 105 lbs 0 oz  General Appearance: Awake, alert, no acute distress   Respiratory: no increased work of breathing, CTAB  Cardiovascular: Regular rate and rhythm, no murmurs, no edema. Pacemaker site appears clean  Skin: No rashes or lesions noted   Neruo/Psych: Oriented to self only, does not know she is in a hospital or why she is here.        Primary Care Physician   Kolby Noonan    Discharge Orders      Cardiology Eval Adult Referral      Reason for your hospital stay    You were hospitalized or symptomatic bradycardia. You had a pacemaker placed.     Follow-up and recommended labs and tests     Follow up with primary care provider, Kolby Noonan, within 7 days for hospital follow- up.  No follow up labs or test are needed.    Follow-up with cardiology in about 2 weeks.     Activity    Your activity upon discharge: activity as tolerated     Reason for your hospital stay    You were in the hospital for bradycardia (slow heart rate). You had a pacemaker placed.     Follow-up and recommended labs and tests     Follow up with primary care provider, Kolby Noonan, within 7 days for hospital follow- up.  No follow up labs or test are needed.      Follow-up with cardiology in about 2 weeks.     Activity    Your activity upon discharge: activity as tolerated     Full Code     Diet    Follow this diet upon discharge: Orders Placed This Encounter      Regular Diet Adult     Diet    Follow this diet upon discharge: Orders Placed This Encounter      Regular Diet Adult       Significant Results and Procedures   Most Recent 3 CBC's:  Recent Labs   Lab Test 10/12/20  0528 10/11/20  1436 01/26/20  0344   WBC 7.3 8.2 10.1   HGB 12.0 11.3* 13.4   MCV 95 99 96    340 341     Most Recent 3 BMP's:  Recent Labs   Lab Test 10/12/20  0528 10/11/20  1727 10/11/20  1358    140 140   POTASSIUM 3.9 4.3 5.7*   CHLORIDE 111*  109 109   CO2 25 27 26   BUN 11 16 16   CR 0.78 1.15* 1.34*   ANIONGAP 5 4 5   ATTILA 8.1* 8.9 8.4*   * 140* 151*     Most Recent 6 Bacteria Isolates From Any Culture (See EPIC Reports for Culture Details):  Recent Labs   Lab Test 10/11/20  1810   CULT >100,000 colonies/mL  Escherichia coli  *       Discharge Medications   Current Discharge Medication List      START taking these medications    Details   cefpodoxime (VANTIN) 200 MG tablet Take 1 tablet (200 mg) by mouth 2 times daily for 5 days  Qty: 10 tablet, Refills: 0    Associated Diagnoses: Acute cystitis without hematuria         CONTINUE these medications which have NOT CHANGED    Details   aspirin (ASA) 81 MG chewable tablet Take 81 mg by mouth every evening      buPROPion (WELLBUTRIN XL) 300 MG 24 hr tablet Take 300 mg by mouth every morning       diltiazem ER COATED BEADS (CARDIZEM CD/CARTIA XT) 180 MG 24 hr capsule Take 1 capsule (180 mg) by mouth daily  Qty: 90 capsule, Refills: 1    Associated Diagnoses: Atrial fibrillation, unspecified type (H)      LORazepam (ATIVAN) 0.5 MG tablet Take 0.5 mg by mouth every 6 hours as needed for anxiety      memantine (NAMENDA) 10 MG tablet Take 10 mg by mouth 2 times daily       metoprolol tartrate (LOPRESSOR) 50 MG tablet Take 1 tablet (50 mg) by mouth 2 times daily  Qty: 180 tablet, Refills: 1    Associated Diagnoses: Atrial fibrillation, unspecified type (H)      multivitamin w/minerals (THERA-VIT-M) tablet Take 1 tablet by mouth daily CENTRUM      OLANZapine (ZYPREXA) 10 MG tablet Take 10 mg by mouth At Bedtime            Allergies   Allergies   Allergen Reactions     Penicillins Swelling     Pt states she has tolerated cephalosporins in the past.

## 2020-10-13 NOTE — PLAN OF CARE
VSS. Pt denies any CP or SOB. Discharge instructions, medication indications/side effects, and follow up instructions discussed w/ pt and . Handouts given. All questions answered. All pt belongings are accounted for and sent w/ pt. Pt left floor via wheelchair and was driven home by .

## 2020-10-14 LAB — INTERPRETATION ECG - MUSE: NORMAL

## 2020-10-22 ENCOUNTER — ANCILLARY PROCEDURE (OUTPATIENT)
Dept: CARDIOLOGY | Facility: CLINIC | Age: 70
End: 2020-10-22
Attending: INTERNAL MEDICINE
Payer: COMMERCIAL

## 2020-10-22 DIAGNOSIS — I49.5 SSS (SICK SINUS SYNDROME) (H): ICD-10-CM

## 2020-10-22 DIAGNOSIS — Z95.0 CARDIAC PACEMAKER IN SITU: ICD-10-CM

## 2020-10-22 PROCEDURE — 93280 PM DEVICE PROGR EVAL DUAL: CPT | Performed by: INTERNAL MEDICINE

## 2020-10-31 LAB
MDC_IDC_LEAD_IMPLANT_DT: NORMAL
MDC_IDC_LEAD_IMPLANT_DT: NORMAL
MDC_IDC_LEAD_LOCATION: NORMAL
MDC_IDC_LEAD_LOCATION: NORMAL
MDC_IDC_LEAD_LOCATION_DETAIL_1: NORMAL
MDC_IDC_LEAD_LOCATION_DETAIL_1: NORMAL
MDC_IDC_LEAD_MFG: NORMAL
MDC_IDC_LEAD_MFG: NORMAL
MDC_IDC_LEAD_MODEL: NORMAL
MDC_IDC_LEAD_MODEL: NORMAL
MDC_IDC_LEAD_POLARITY_TYPE: NORMAL
MDC_IDC_LEAD_POLARITY_TYPE: NORMAL
MDC_IDC_LEAD_SERIAL: NORMAL
MDC_IDC_LEAD_SERIAL: NORMAL
MDC_IDC_MSMT_BATTERY_STATUS: NORMAL
MDC_IDC_MSMT_LEADCHNL_RA_IMPEDANCE_VALUE: 526 OHM
MDC_IDC_MSMT_LEADCHNL_RA_PACING_THRESHOLD_AMPLITUDE: 0.9 V
MDC_IDC_MSMT_LEADCHNL_RA_PACING_THRESHOLD_AMPLITUDE: 0.9 V
MDC_IDC_MSMT_LEADCHNL_RA_PACING_THRESHOLD_AMPLITUDE: 1.1 V
MDC_IDC_MSMT_LEADCHNL_RA_PACING_THRESHOLD_PULSEWIDTH: 0.4 MS
MDC_IDC_MSMT_LEADCHNL_RA_SENSING_INTR_AMPL: 6.9 MV
MDC_IDC_MSMT_LEADCHNL_RA_SENSING_INTR_AMPL: 7 MV
MDC_IDC_MSMT_LEADCHNL_RV_IMPEDANCE_VALUE: 585 OHM
MDC_IDC_MSMT_LEADCHNL_RV_PACING_THRESHOLD_AMPLITUDE: 0.6 V
MDC_IDC_MSMT_LEADCHNL_RV_PACING_THRESHOLD_AMPLITUDE: 0.6 V
MDC_IDC_MSMT_LEADCHNL_RV_PACING_THRESHOLD_AMPLITUDE: 0.7 V
MDC_IDC_MSMT_LEADCHNL_RV_PACING_THRESHOLD_PULSEWIDTH: 0.4 MS
MDC_IDC_MSMT_LEADCHNL_RV_SENSING_INTR_AMPL: 10.3 MV
MDC_IDC_MSMT_LEADCHNL_RV_SENSING_INTR_AMPL: 10.5 MV
MDC_IDC_PG_IMPLANT_DTM: NORMAL
MDC_IDC_PG_MFG: NORMAL
MDC_IDC_PG_MODEL: NORMAL
MDC_IDC_PG_SERIAL: NORMAL
MDC_IDC_PG_TYPE: NORMAL
MDC_IDC_SESS_CLINIC_NAME: NORMAL
MDC_IDC_SESS_DTM: NORMAL
MDC_IDC_SESS_REPROGRAMMED: NORMAL
MDC_IDC_SESS_TYPE: NORMAL
MDC_IDC_SET_BRADY_AT_MODE_SWITCH_MODE: NORMAL
MDC_IDC_SET_BRADY_AT_MODE_SWITCH_RATE: 180 {BEATS}/MIN
MDC_IDC_SET_BRADY_HYSTRATE: 60 {BEATS}/MIN
MDC_IDC_SET_BRADY_LOWRATE: 60 {BEATS}/MIN
MDC_IDC_SET_BRADY_MAX_SENSOR_RATE: 120 {BEATS}/MIN
MDC_IDC_SET_BRADY_MAX_TRACKING_RATE: 130 {BEATS}/MIN
MDC_IDC_SET_BRADY_MODE: NORMAL
MDC_IDC_SET_BRADY_NIGHT_RATE: 60 {BEATS}/MIN
MDC_IDC_SET_BRADY_PAV_DELAY_HIGH: 160 MS
MDC_IDC_SET_BRADY_PAV_DELAY_LOW: 200 MS
MDC_IDC_SET_BRADY_SAV_DELAY_HIGH: 130 MS
MDC_IDC_SET_BRADY_SAV_DELAY_LOW: 170 MS
MDC_IDC_SET_CRT_PACED_CHAMBERS: NORMAL
MDC_IDC_SET_LEADCHNL_LV_PACING_CATHODE_ELECTRODE_1: NORMAL
MDC_IDC_SET_LEADCHNL_LV_PACING_CATHODE_LOCATION_1: NORMAL
MDC_IDC_SET_LEADCHNL_LV_PACING_POLARITY: NORMAL
MDC_IDC_SET_LEADCHNL_LV_SENSING_CATHODE_ELECTRODE_1: NORMAL
MDC_IDC_SET_LEADCHNL_LV_SENSING_CATHODE_LOCATION_1: NORMAL
MDC_IDC_SET_LEADCHNL_LV_SENSING_POLARITY: NORMAL
MDC_IDC_SET_LEADCHNL_RA_PACING_AMPLITUDE: 2.1 V
MDC_IDC_SET_LEADCHNL_RA_PACING_POLARITY: NORMAL
MDC_IDC_SET_LEADCHNL_RA_PACING_PULSEWIDTH: 0.4 MS
MDC_IDC_SET_LEADCHNL_RA_SENSING_ADAPTATION_MODE: NORMAL
MDC_IDC_SET_LEADCHNL_RA_SENSING_POLARITY: NORMAL
MDC_IDC_SET_LEADCHNL_RA_SENSING_SENSITIVITY: NORMAL
MDC_IDC_SET_LEADCHNL_RV_PACING_AMPLITUDE: 1.2 V
MDC_IDC_SET_LEADCHNL_RV_PACING_POLARITY: NORMAL
MDC_IDC_SET_LEADCHNL_RV_PACING_PULSEWIDTH: 0.4 MS
MDC_IDC_SET_LEADCHNL_RV_SENSING_ADAPTATION_MODE: NORMAL
MDC_IDC_SET_LEADCHNL_RV_SENSING_POLARITY: NORMAL
MDC_IDC_SET_LEADCHNL_RV_SENSING_SENSITIVITY: NORMAL

## 2020-11-14 ENCOUNTER — HEALTH MAINTENANCE LETTER (OUTPATIENT)
Age: 70
End: 2020-11-14

## 2020-12-03 ENCOUNTER — ANCILLARY PROCEDURE (OUTPATIENT)
Dept: CARDIOLOGY | Facility: CLINIC | Age: 70
End: 2020-12-03
Attending: INTERNAL MEDICINE
Payer: COMMERCIAL

## 2020-12-03 DIAGNOSIS — I49.5 SSS (SICK SINUS SYNDROME) (H): ICD-10-CM

## 2020-12-03 DIAGNOSIS — Z95.0 CARDIAC PACEMAKER IN SITU: ICD-10-CM

## 2020-12-03 DIAGNOSIS — I49.5 SSS (SICK SINUS SYNDROME) (H): Primary | ICD-10-CM

## 2020-12-03 PROCEDURE — 93280 PM DEVICE PROGR EVAL DUAL: CPT | Performed by: INTERNAL MEDICINE

## 2020-12-08 LAB
MDC_IDC_LEAD_IMPLANT_DT: NORMAL
MDC_IDC_LEAD_IMPLANT_DT: NORMAL
MDC_IDC_LEAD_LOCATION: NORMAL
MDC_IDC_LEAD_LOCATION: NORMAL
MDC_IDC_LEAD_LOCATION_DETAIL_1: NORMAL
MDC_IDC_LEAD_LOCATION_DETAIL_1: NORMAL
MDC_IDC_LEAD_MFG: NORMAL
MDC_IDC_LEAD_MFG: NORMAL
MDC_IDC_LEAD_MODEL: NORMAL
MDC_IDC_LEAD_MODEL: NORMAL
MDC_IDC_LEAD_POLARITY_TYPE: NORMAL
MDC_IDC_LEAD_POLARITY_TYPE: NORMAL
MDC_IDC_LEAD_SERIAL: NORMAL
MDC_IDC_LEAD_SERIAL: NORMAL
MDC_IDC_MSMT_BATTERY_STATUS: NORMAL
MDC_IDC_MSMT_LEADCHNL_RA_IMPEDANCE_VALUE: 565 OHM
MDC_IDC_MSMT_LEADCHNL_RA_PACING_THRESHOLD_AMPLITUDE: 1 V
MDC_IDC_MSMT_LEADCHNL_RA_PACING_THRESHOLD_AMPLITUDE: 1.1 V
MDC_IDC_MSMT_LEADCHNL_RA_PACING_THRESHOLD_AMPLITUDE: 1.1 V
MDC_IDC_MSMT_LEADCHNL_RA_PACING_THRESHOLD_PULSEWIDTH: 0.4 MS
MDC_IDC_MSMT_LEADCHNL_RA_SENSING_INTR_AMPL: 5.9 MV
MDC_IDC_MSMT_LEADCHNL_RA_SENSING_INTR_AMPL: 6.1 MV
MDC_IDC_MSMT_LEADCHNL_RV_IMPEDANCE_VALUE: 546 OHM
MDC_IDC_MSMT_LEADCHNL_RV_PACING_THRESHOLD_AMPLITUDE: 0.6 V
MDC_IDC_MSMT_LEADCHNL_RV_PACING_THRESHOLD_AMPLITUDE: 0.7 V
MDC_IDC_MSMT_LEADCHNL_RV_PACING_THRESHOLD_AMPLITUDE: 0.7 V
MDC_IDC_MSMT_LEADCHNL_RV_PACING_THRESHOLD_PULSEWIDTH: 0.4 MS
MDC_IDC_MSMT_LEADCHNL_RV_SENSING_INTR_AMPL: 9.2 MV
MDC_IDC_MSMT_LEADCHNL_RV_SENSING_INTR_AMPL: 9.3 MV
MDC_IDC_PG_IMPLANT_DTM: NORMAL
MDC_IDC_PG_MFG: NORMAL
MDC_IDC_PG_MODEL: NORMAL
MDC_IDC_PG_SERIAL: NORMAL
MDC_IDC_PG_TYPE: NORMAL
MDC_IDC_SESS_CLINIC_NAME: NORMAL
MDC_IDC_SESS_DTM: NORMAL
MDC_IDC_SESS_REPROGRAMMED: NORMAL
MDC_IDC_SESS_TYPE: NORMAL
MDC_IDC_SET_BRADY_AT_MODE_SWITCH_MODE: NORMAL
MDC_IDC_SET_BRADY_AT_MODE_SWITCH_RATE: 180 {BEATS}/MIN
MDC_IDC_SET_BRADY_HYSTRATE: 60 {BEATS}/MIN
MDC_IDC_SET_BRADY_LOWRATE: 60 {BEATS}/MIN
MDC_IDC_SET_BRADY_MAX_SENSOR_RATE: 120 {BEATS}/MIN
MDC_IDC_SET_BRADY_MAX_TRACKING_RATE: 130 {BEATS}/MIN
MDC_IDC_SET_BRADY_MODE: NORMAL
MDC_IDC_SET_BRADY_NIGHT_RATE: 60 {BEATS}/MIN
MDC_IDC_SET_BRADY_PAV_DELAY_HIGH: 160 MS
MDC_IDC_SET_BRADY_PAV_DELAY_LOW: 200 MS
MDC_IDC_SET_BRADY_SAV_DELAY_HIGH: 130 MS
MDC_IDC_SET_BRADY_SAV_DELAY_LOW: 170 MS
MDC_IDC_SET_CRT_PACED_CHAMBERS: NORMAL
MDC_IDC_SET_LEADCHNL_LV_PACING_CATHODE_ELECTRODE_1: NORMAL
MDC_IDC_SET_LEADCHNL_LV_PACING_CATHODE_LOCATION_1: NORMAL
MDC_IDC_SET_LEADCHNL_LV_PACING_POLARITY: NORMAL
MDC_IDC_SET_LEADCHNL_LV_SENSING_CATHODE_ELECTRODE_1: NORMAL
MDC_IDC_SET_LEADCHNL_LV_SENSING_CATHODE_LOCATION_1: NORMAL
MDC_IDC_SET_LEADCHNL_LV_SENSING_POLARITY: NORMAL
MDC_IDC_SET_LEADCHNL_RA_PACING_AMPLITUDE: 2 V
MDC_IDC_SET_LEADCHNL_RA_PACING_POLARITY: NORMAL
MDC_IDC_SET_LEADCHNL_RA_PACING_PULSEWIDTH: 0.4 MS
MDC_IDC_SET_LEADCHNL_RA_SENSING_ADAPTATION_MODE: NORMAL
MDC_IDC_SET_LEADCHNL_RA_SENSING_POLARITY: NORMAL
MDC_IDC_SET_LEADCHNL_RA_SENSING_SENSITIVITY: NORMAL
MDC_IDC_SET_LEADCHNL_RV_PACING_AMPLITUDE: 1.1 V
MDC_IDC_SET_LEADCHNL_RV_PACING_POLARITY: NORMAL
MDC_IDC_SET_LEADCHNL_RV_PACING_PULSEWIDTH: 0.4 MS
MDC_IDC_SET_LEADCHNL_RV_SENSING_ADAPTATION_MODE: NORMAL
MDC_IDC_SET_LEADCHNL_RV_SENSING_POLARITY: NORMAL
MDC_IDC_SET_LEADCHNL_RV_SENSING_SENSITIVITY: NORMAL

## 2021-01-04 DIAGNOSIS — I48.91 ATRIAL FIBRILLATION, UNSPECIFIED TYPE (H): ICD-10-CM

## 2021-01-07 PROBLEM — I10 HTN (HYPERTENSION): Status: ACTIVE | Noted: 2021-01-07

## 2021-01-07 PROBLEM — Z95.0 CARDIAC PACEMAKER IN SITU: Status: ACTIVE | Noted: 2021-01-07

## 2021-01-07 RX ORDER — DILTIAZEM HYDROCHLORIDE 180 MG/1
180 CAPSULE, COATED, EXTENDED RELEASE ORAL DAILY
Qty: 90 CAPSULE | Refills: 0 | Status: SHIPPED | OUTPATIENT
Start: 2021-01-07 | End: 2021-02-12

## 2021-01-07 NOTE — TELEPHONE ENCOUNTER
Medication is being filled for 1 time refill only due to:  Patient needs labs A1c. Patient needs to be seen because diabetic visit..     Zara Sndier RN

## 2021-01-13 NOTE — PROGRESS NOTES
"Kelly is a 70 year old who is being evaluated via a billable telephone visit.      What phone number would you like to be contacted at? [phone]  How would you like to obtain your AVS? Angi     Vitals - Patient Reported  Systolic (Patient Reported): 135  Diastolic (Patient Reported): 88  Weight (Patient Reported): 47.6 kg (105 lb)  Height (Patient Reported): 157.5 cm (5' 2\")  BMI (Based on Pt Reported Ht/Wt): 19.2  Pulse (Patient Reported): 68    LIZZIE Russell    Phone call duration: 8 minutes    [facility]      Assessment & Plan   Problem List Items Addressed This Visit     Cardiac pacemaker in situ      Other Visit Diagnoses     SSS (sick sinus syndrome) (H)    -  Primary           RILEY [facility] SAW    I had the pleasure of seeing Kelly when she came for follow up of PPM implantation.  This 70 year old sees Dr. Rodriguez for her history of:    1. Junctional Bradycardia - noted to be fatigued and presented to ER with low HR 10/2020. Temp pacer placed, then underwent placement of dual chamber Biotronik PPM 10/12/2020  2. Paroxysmal AFib; nl EF - noted this in 2018.   3. Severe dementia   4. HTN  5. COPD       Kelly was hospitalized 10/11-13/2020 when she c/o fatigue and  noted low HR. She'd been on Diltiazem 180 and metoprolol tartrate 50 mg BID for pAFib with rates to 178 bpm). Temp pacer was placed as she was noted to be in CHB vs junctional bradycardia (significant baseline artifact) while meds washed out. She underwent placement of dual chamber Biotronik PPM 10/12/2020. Meds were restarted and AC was to be considered if she had recurrent AFib on PPM interrogations.     Interval History:  Kelly has significant memory issues but Kendall is there today noting that she's really done well since hospitalization and PPM implant.    No complaints of chest pain, pressure or tightness.  No orthopnea, PND or edema. No change in exercise " "tolerance or breathing. Overall, she is feeling well.     VITALS:  Vitals: /88   Pulse 68   Ht 1.575 m (5' 2\")   Wt 48.1 kg (106 lb)   BMI 19.39 kg/m      Diagnostic Testing:  Device interrogation 12/3/2020, showed 59% AP and 0%  in DDD.CLS 60/130. Histogram with rates  bpm. No arrhythmias noted.   EKG 10/13/2020 with SR 83 bpmm. Inferolateral STTW changes  Echocardiogram 10/2020 - EF 55-60%. Nl RV. Nl sig valve abnls    Plan:  Annual follow-up with PPM check    Assessment/Plan:    1. Junctional Bradycardia    Now s/p dual chamber Biotronik PPM 10/2020    Device interrogation as above    PLAN:    Annual follow-up with PPM check (Fall 2021)      Isabel Barry PA-C, MSPAS      No orders of the defined types were placed in this encounter.    Orders Placed This Encounter   Medications     LORazepam (ATIVAN) 1 MG tablet     Sig: As of 01/14/21 patinet taking 1 tablet at bedtime and one half tablet as needed.     Multiple Vitamins-Minerals (CENTRUM WOMEN PO)     Sig: Take by mouth daily     Medications Discontinued During This Encounter   Medication Reason     LORazepam (ATIVAN) 0.5 MG tablet Dose adjustment     multivitamin w/minerals (THERA-VIT-M) tablet Alternate therapy         Encounter Diagnoses   Name Primary?     SSS (sick sinus syndrome) (H) Yes     Cardiac pacemaker in situ        CURRENT MEDICATIONS:  Current Outpatient Medications   Medication Sig Dispense Refill     aspirin (ASA) 81 MG chewable tablet Take 81 mg by mouth every evening       diltiazem ER COATED BEADS (CARDIZEM CD/CARTIA XT) 180 MG 24 hr capsule Take 1 capsule (180 mg) by mouth daily 90 capsule 0     LORazepam (ATIVAN) 1 MG tablet As of 01/14/21 patinet taking 1 tablet at bedtime and one half tablet as needed.       memantine (NAMENDA) 10 MG tablet Take 10 mg by mouth 2 times daily        metoprolol tartrate (LOPRESSOR) 50 MG tablet Take 1 tablet (50 mg) by mouth 2 times daily 180 tablet 1     Multiple Vitamins-Minerals (CENTRUM " "WOMEN PO) Take by mouth daily       OLANZapine (ZYPREXA) 10 MG tablet Take 10 mg by mouth At Bedtime        buPROPion (WELLBUTRIN XL) 300 MG 24 hr tablet Take 300 mg by mouth every morning          ALLERGIES     Allergies   Allergen Reactions     Penicillins Swelling     Pt states she has tolerated cephalosporins in the past.          Review of Systems:  Negative with exceptions as noted above    Physical Exam:  Vitals: /88   Pulse 68   Ht 1.575 m (5' 2\")   Wt 48.1 kg (106 lb)   BMI 19.39 kg/m        DEFERRED, telephone    PAST MEDICAL HISTORY:  Past Medical History:   Diagnosis Date     Dementia (H)      Diabetes (H)      Paroxysmal atrial fibrillation (H)      Sinus node dysfunction (H)        PAST SURGICAL HISTORY:  Past Surgical History:   Procedure Laterality Date     EP PACEMAKER N/A 10/12/2020    Procedure: EP Pacemaker;  Surgeon: Luis Onofre MD;  Location:  HEART CARDIAC CATH LAB     HEART CATH, ANGIOPLASTY         FAMILY HISTORY:  History reviewed. No pertinent family history.    SOCIAL HISTORY:  Social History     Socioeconomic History     Marital status:      Spouse name: None     Number of children: None     Years of education: None     Highest education level: None   Occupational History     None   Social Needs     Financial resource strain: None     Food insecurity     Worry: None     Inability: None     Transportation needs     Medical: None     Non-medical: None   Tobacco Use     Smoking status: Former Smoker     Packs/day: 1.00     Types: Cigarettes     Quit date: 2017     Years since quittin.0     Smokeless tobacco: Never Used   Substance and Sexual Activity     Alcohol use: Not Currently     Drug use: No     Sexual activity: Not Currently     Partners: Male   Lifestyle     Physical activity     Days per week: None     Minutes per session: None     Stress: None   Relationships     Social connections     Talks on phone: None     Gets together: None     Attends " Denominational service: None     Active member of club or organization: None     Attends meetings of clubs or organizations: None     Relationship status: None     Intimate partner violence     Fear of current or ex partner: None     Emotionally abused: None     Physically abused: None     Forced sexual activity: None   Other Topics Concern     Parent/sibling w/ CABG, MI or angioplasty before 65F 55M? Not Asked   Social History Narrative     None

## 2021-01-14 ENCOUNTER — VIRTUAL VISIT (OUTPATIENT)
Dept: CARDIOLOGY | Facility: CLINIC | Age: 71
End: 2021-01-14
Attending: INTERNAL MEDICINE
Payer: COMMERCIAL

## 2021-01-14 VITALS
SYSTOLIC BLOOD PRESSURE: 135 MMHG | BODY MASS INDEX: 19.51 KG/M2 | HEIGHT: 62 IN | DIASTOLIC BLOOD PRESSURE: 88 MMHG | WEIGHT: 106 LBS | HEART RATE: 68 BPM

## 2021-01-14 DIAGNOSIS — I49.5 SSS (SICK SINUS SYNDROME) (H): Primary | ICD-10-CM

## 2021-01-14 DIAGNOSIS — Z95.0 CARDIAC PACEMAKER IN SITU: ICD-10-CM

## 2021-01-14 PROCEDURE — 99213 OFFICE O/P EST LOW 20 MIN: CPT | Mod: 95 | Performed by: PHYSICIAN ASSISTANT

## 2021-01-14 RX ORDER — LORAZEPAM 1 MG/1
0.5 TABLET ORAL
COMMUNITY
Start: 2020-12-21 | End: 2022-01-01

## 2021-01-14 ASSESSMENT — MIFFLIN-ST. JEOR: SCORE: 964.06

## 2021-01-14 NOTE — PATIENT INSTRUCTIONS
Olivia - it was nice to speak with you today!    1. Reviewed that from a heart perspective things are going well!  2. Reviewed pacer check from 12/2020 showing normal pacer function    PLAN:  1. No changes  2. Continue routine remote pacer checks every quarter  3. See me in person when you come in for your next in-person device check (Fall/Winter 2021). CALL if issues prior!  [phone]

## 2021-01-14 NOTE — LETTER
"1/14/2021    Kolby Noonan PA-C  [id number]  [address] [id number]    RE: Kelly Nunez       Dear Colleague,    I had the pleasure of seeing Kelly Nunez in the [facility].    Kelly is a 70 year old who is being evaluated via a billable telephone visit.      What phone number would you like to be contacted at? [phone]  How would you like to obtain your AVS? MyChart     Vitals - Patient Reported  Systolic (Patient Reported): 135  Diastolic (Patient Reported): 88  Weight (Patient Reported): 47.6 kg (105 lb)  Height (Patient Reported): 157.5 cm (5' 2\")  BMI (Based on Pt Reported Ht/Wt): 19.2  Pulse (Patient Reported): 68    LIZZIE Russell    Phone call duration: 8 minutes    [facility]ART [facility]      Assessment & Plan   Problem List Items Addressed This Visit     Cardiac pacemaker in situ      Other Visit Diagnoses     SSS (sick sinus syndrome) (H)    -  Primary           Elana Barry PA-C  [facility] HEART [facility] SAW    I had the pleasure of seeing Kelly when she came for follow up of PPM implantation.  This 70 year old sees Dr. Rodriguez for her history of:    1. Junctional Bradycardia - noted to be fatigued and presented to ER with low HR 10/2020. Temp pacer placed, then underwent placement of dual chamber Biotronik PPM 10/12/2020  2. Paroxysmal AFib; nl EF - noted this in 2018.   3. Severe dementia   4. HTN  5. COPD       Kelly was hospitalized 10/11-13/2020 when she c/o fatigue and  noted low HR. She'd been on Diltiazem 180 and metoprolol tartrate 50 mg BID for pAFib with rates to 178 bpm). Temp pacer was placed as she was noted to be in CHB vs junctional bradycardia (significant baseline artifact) while meds washed out. She underwent placement of dual chamber Biotronik PPM 10/12/2020. Meds were restarted and AC was to be considered if she had recurrent AFib on PPM interrogations.     Interval History:  Kelly has " "significant memory issues but Kendall is there today noting that she's really done well since hospitalization and PPM implant.    No complaints of chest pain, pressure or tightness.  No orthopnea, PND or edema. No change in exercise tolerance or breathing. Overall, she is feeling well.     VITALS:  Vitals: /88   Pulse 68   Ht 1.575 m (5' 2\")   Wt 48.1 kg (106 lb)   BMI 19.39 kg/m      Diagnostic Testing:  Device interrogation 12/3/2020, showed 59% AP and 0%  in DDD.CLS 60/130. Histogram with rates  bpm. No arrhythmias noted.   EKG 10/13/2020 with SR 83 bpmm. Inferolateral STTW changes  Echocardiogram 10/2020 - EF 55-60%. Nl RV. Nl sig valve abnls    Plan:  Annual follow-up with PPM check    Assessment/Plan:    1. Junctional Bradycardia    Now s/p dual chamber Biotronik PPM 10/2020    Device interrogation as above    PLAN:    Annual follow-up with PPM check (Fall 2021)      Isabel Barry PA-C, MSPAS      No orders of the defined types were placed in this encounter.    Orders Placed This Encounter   Medications     LORazepam (ATIVAN) 1 MG tablet     Sig: As of 01/14/21 patinet taking 1 tablet at bedtime and one half tablet as needed.     Multiple Vitamins-Minerals (CENTRUM WOMEN PO)     Sig: Take by mouth daily     Medications Discontinued During This Encounter   Medication Reason     LORazepam (ATIVAN) 0.5 MG tablet Dose adjustment     multivitamin w/minerals (THERA-VIT-M) tablet Alternate therapy         Encounter Diagnoses   Name Primary?     SSS (sick sinus syndrome) (H) Yes     Cardiac pacemaker in situ        CURRENT MEDICATIONS:  Current Outpatient Medications   Medication Sig Dispense Refill     aspirin (ASA) 81 MG chewable tablet Take 81 mg by mouth every evening       diltiazem ER COATED BEADS (CARDIZEM CD/CARTIA XT) 180 MG 24 hr capsule Take 1 capsule (180 mg) by mouth daily 90 capsule 0     LORazepam (ATIVAN) 1 MG tablet As of 01/14/21 patinet taking 1 tablet at bedtime and one half tablet as " "needed.       memantine (NAMENDA) 10 MG tablet Take 10 mg by mouth 2 times daily        metoprolol tartrate (LOPRESSOR) 50 MG tablet Take 1 tablet (50 mg) by mouth 2 times daily 180 tablet 1     Multiple Vitamins-Minerals (CENTRUM WOMEN PO) Take by mouth daily       OLANZapine (ZYPREXA) 10 MG tablet Take 10 mg by mouth At Bedtime        buPROPion (WELLBUTRIN XL) 300 MG 24 hr tablet Take 300 mg by mouth every morning          ALLERGIES     Allergies   Allergen Reactions     Penicillins Swelling     Pt states she has tolerated cephalosporins in the past.          Review of Systems:  Negative with exceptions as noted above    Physical Exam:  Vitals: /88   Pulse 68   Ht 1.575 m (5' 2\")   Wt 48.1 kg (106 lb)   BMI 19.39 kg/m        DEFERRED, telephone    PAST MEDICAL HISTORY:  Past Medical History:   Diagnosis Date     Dementia (H)      Diabetes (H)      Paroxysmal atrial fibrillation (H)      Sinus node dysfunction (H)        PAST SURGICAL HISTORY:  Past Surgical History:   Procedure Laterality Date     EP PACEMAKER N/A 10/12/2020    Procedure: EP Pacemaker;  Surgeon: Luis Onofre MD;  Location:  HEART CARDIAC CATH LAB     HEART CATH, ANGIOPLASTY         FAMILY HISTORY:  History reviewed. No pertinent family history.    SOCIAL HISTORY:  Social History     Socioeconomic History     Marital status:      Spouse name: None     Number of children: None     Years of education: None     Highest education level: None   Occupational History     None   Social Needs     Financial resource strain: None     Food insecurity     Worry: None     Inability: None     Transportation needs     Medical: None     Non-medical: None   Tobacco Use     Smoking status: Former Smoker     Packs/day: 1.00     Types: Cigarettes     Quit date: 2017     Years since quittin.0     Smokeless tobacco: Never Used   Substance and Sexual Activity     Alcohol use: Not Currently     Drug use: No     Sexual activity: Not " Currently     Partners: Male   Lifestyle     Physical activity     Days per week: None     Minutes per session: None     Stress: None   Relationships     Social connections     Talks on phone: None     Gets together: None     Attends Protestant service: None     Active member of club or organization: None     Attends meetings of clubs or organizations: None     Relationship status: None     Intimate partner violence     Fear of current or ex partner: None     Emotionally abused: None     Physically abused: None     Forced sexual activity: None   Other Topics Concern     Parent/sibling w/ CABG, MI or angioplasty before 65F 55M? Not Asked   Social History Narrative     None         Thank you for allowing me to participate in the care of your patient.    Sincerely,     Elana Barry PA-C     [facility]

## 2021-01-18 DIAGNOSIS — I48.91 ATRIAL FIBRILLATION, UNSPECIFIED TYPE (H): ICD-10-CM

## 2021-01-19 ENCOUNTER — HOSPITAL ENCOUNTER (EMERGENCY)
Facility: CLINIC | Age: 71
Discharge: HOME | End: 2021-01-19
Attending: INTERNAL MEDICINE | Admitting: INTERNAL MEDICINE
Payer: COMMERCIAL

## 2021-01-19 ENCOUNTER — APPOINTMENT (OUTPATIENT)
Dept: GENERAL RADIOLOGY | Facility: CLINIC | Age: 71
End: 2021-01-19
Attending: INTERNAL MEDICINE
Payer: COMMERCIAL

## 2021-01-19 VITALS
HEART RATE: 77 BPM | SYSTOLIC BLOOD PRESSURE: 160 MMHG | DIASTOLIC BLOOD PRESSURE: 82 MMHG | RESPIRATION RATE: 16 BRPM | OXYGEN SATURATION: 95 % | TEMPERATURE: 98 F

## 2021-01-19 DIAGNOSIS — K59.00 CONSTIPATION, UNSPECIFIED CONSTIPATION TYPE: ICD-10-CM

## 2021-01-19 DIAGNOSIS — R10.84 ABDOMINAL PAIN, GENERALIZED: ICD-10-CM

## 2021-01-19 LAB
ALBUMIN SERPL-MCNC: 3.4 G/DL (ref 3.4–5)
ALP SERPL-CCNC: 78 U/L (ref 40–150)
ALT SERPL W P-5'-P-CCNC: 20 U/L (ref 0–50)
ANION GAP SERPL CALCULATED.3IONS-SCNC: 3 MMOL/L (ref 3–14)
AST SERPL W P-5'-P-CCNC: 16 U/L (ref 0–45)
BASOPHILS # BLD AUTO: 0.1 10E9/L (ref 0–0.2)
BASOPHILS NFR BLD AUTO: 1.8 %
BILIRUB SERPL-MCNC: 0.2 MG/DL (ref 0.2–1.3)
BUN SERPL-MCNC: 20 MG/DL (ref 7–30)
CALCIUM SERPL-MCNC: 9.2 MG/DL (ref 8.5–10.1)
CHLORIDE SERPL-SCNC: 108 MMOL/L (ref 94–109)
CO2 SERPL-SCNC: 31 MMOL/L (ref 20–32)
CREAT SERPL-MCNC: 0.74 MG/DL (ref 0.52–1.04)
DIFFERENTIAL METHOD BLD: NORMAL
EOSINOPHIL # BLD AUTO: 0.6 10E9/L (ref 0–0.7)
EOSINOPHIL NFR BLD AUTO: 10 %
ERYTHROCYTE [DISTWIDTH] IN BLOOD BY AUTOMATED COUNT: 12.3 % (ref 10–15)
GFR SERPL CREATININE-BSD FRML MDRD: 83 ML/MIN/{1.73_M2}
GLUCOSE SERPL-MCNC: 125 MG/DL (ref 70–99)
HCT VFR BLD AUTO: 38.9 % (ref 35–47)
HGB BLD-MCNC: 12.6 G/DL (ref 11.7–15.7)
IMM GRANULOCYTES # BLD: 0 10E9/L (ref 0–0.4)
IMM GRANULOCYTES NFR BLD: 0.2 %
LYMPHOCYTES # BLD AUTO: 1.1 10E9/L (ref 0.8–5.3)
LYMPHOCYTES NFR BLD AUTO: 20 %
MCH RBC QN AUTO: 30.7 PG (ref 26.5–33)
MCHC RBC AUTO-ENTMCNC: 32.4 G/DL (ref 31.5–36.5)
MCV RBC AUTO: 95 FL (ref 78–100)
MONOCYTES # BLD AUTO: 0.4 10E9/L (ref 0–1.3)
MONOCYTES NFR BLD AUTO: 6.7 %
NEUTROPHILS # BLD AUTO: 3.5 10E9/L (ref 1.6–8.3)
NEUTROPHILS NFR BLD AUTO: 61.3 %
NRBC # BLD AUTO: 0 10*3/UL
NRBC BLD AUTO-RTO: 0 /100
PLATELET # BLD AUTO: 305 10E9/L (ref 150–450)
POTASSIUM SERPL-SCNC: 4 MMOL/L (ref 3.4–5.3)
PROT SERPL-MCNC: 7 G/DL (ref 6.8–8.8)
RBC # BLD AUTO: 4.11 10E12/L (ref 3.8–5.2)
SODIUM SERPL-SCNC: 142 MMOL/L (ref 133–144)
WBC # BLD AUTO: 5.7 10E9/L (ref 4–11)

## 2021-01-19 PROCEDURE — 80053 COMPREHEN METABOLIC PANEL: CPT | Performed by: INTERNAL MEDICINE

## 2021-01-19 PROCEDURE — 74019 RADEX ABDOMEN 2 VIEWS: CPT

## 2021-01-19 PROCEDURE — 250N000013 HC RX MED GY IP 250 OP 250 PS 637: Performed by: INTERNAL MEDICINE

## 2021-01-19 PROCEDURE — 85025 COMPLETE CBC W/AUTO DIFF WBC: CPT | Performed by: INTERNAL MEDICINE

## 2021-01-19 PROCEDURE — 99284 EMERGENCY DEPT VISIT MOD MDM: CPT

## 2021-01-19 RX ORDER — POLYETHYLENE GLYCOL 3350 17 G/17G
1 POWDER, FOR SOLUTION ORAL 2 TIMES DAILY
Qty: 1020 G | Refills: 0 | Status: SHIPPED | OUTPATIENT
Start: 2021-01-19 | End: 2021-02-18

## 2021-01-19 RX ORDER — METOPROLOL TARTRATE 50 MG
50 TABLET ORAL 2 TIMES DAILY
Qty: 180 TABLET | Refills: 1 | Status: SHIPPED | OUTPATIENT
Start: 2021-01-19 | End: 2021-02-12

## 2021-01-19 RX ADMIN — DOCUSATE SODIUM 286 ML: 50 LIQUID ORAL at 12:37

## 2021-01-19 NOTE — ED TRIAGE NOTES
Patient presents with bilateral back pain that started this morning. Patient has a history of dementia,  states she is more bloated than usual. No nausea, vomiting, diarrhea. Unsure of when her last BM was.

## 2021-01-19 NOTE — ED PROVIDER NOTES
History   Chief Complaint:  Abdominal Pain     History limited due to patient's dementia. History given by .    TEA Nunez is a 70 year old female with history of dementia, paroxysmal atrial fibrillation, diabetes, and COPD who presents with abdominal pain. The patient's  states that this morning the patient was awake and was able to get dressed and eat breakfast when she reported to him that she was experiencing abdominal pain and back pain. They are also unsure of when her last bowel movement was. The  says that the patient had a large breakfast this morning, so her appetite has been normal. He notes that she has had trouble sitting properly because of the pain. He denies fever, history of abdominal surgeries, or recent falls.      Review of Systems   Unable to perform ROS: Dementia       Allergies:  Penicillins      Medications:  Aspirin 81  Wellbutrin  Diltiazem  Ativan  Namenda  Metoprolol tartrate  Zyprexa      Past Medical History:    Dementia  Diabetes  Paroxysmal atrial fibrillation  Sinus node dysfunction  Cardiac pacemaker in situ  Hypertension  COPD       Past Surgical History:    EP pacemaker  Heart cath, angioplasty       Family History:    The spouse denies any past family history      Social History:  The patient was accompanied to the ED by .  The patient arrives from home.    Physical Exam     Patient Vitals for the past 24 hrs:   BP Temp Temp src Pulse Resp SpO2   01/19/21 0939 (!) 160/82 98  F (36.7  C) Temporal 77 16 95 %       Physical Exam  Constitutional:       Comments: No verbal response   HENT:      Right Ear: Tympanic membrane normal.      Left Ear: Tympanic membrane normal.      Mouth/Throat:      Pharynx: No posterior oropharyngeal erythema.   Eyes:      Conjunctiva/sclera: Conjunctivae normal.   Neck:      Musculoskeletal: Neck supple.   Cardiovascular:      Rate and Rhythm: Normal rate and regular rhythm.      Heart sounds: Normal heart  sounds.   Pulmonary:      Effort: Pulmonary effort is normal.      Breath sounds: Normal breath sounds.   Abdominal:      General: Bowel sounds are normal. There is no distension.      Palpations: Abdomen is soft.      Tenderness: There is no abdominal tenderness. There is no guarding or rebound.   Musculoskeletal: Normal range of motion.   Skin:     General: Skin is warm and dry.   Neurological:      Mental Status: She is alert.         Emergency Department Course     Imaging:  Abdomen XR, 2 vw, flat and upright  Moderate to large amount of stool in the sigmoid colon and  rectum. There is also a moderate amount of stool in the ascending  colon. Bowel gas pattern is otherwise within normal limits. No  convincing radiographic evidence for bowel obstruction. No free  intraperitoneal air. A right common iliac stent is again noted.  Reading per radiology      Laboratory:  CBC: WBC 5.7, HGB 12.6,    CMP: Glucose 125 (H) (Creatinine: 0.74) o/w WNL      Emergency Department Course:    Reviewed:  I reviewed nursing notes, vitals and past medical history    Assessments:  0949 I obtained history and examined the patient as noted above.   1150 I rechecked the patient and explained findings to patient and .   1334 I rechecked the patient. She was on the commode but having some difficulty with her bowel movement.  I performed rectal exam.  There was only a small piece of stool at the tip of my finger.    Interventions:  1237 Pink lady enema 286 mL Rectal    Disposition:  The patient was discharged to home.       Impression & Plan     Medical Decision Making:    Kelly Nunez is a 70 year old female with significant underlying dementia brought to the emergency department by her  having had a spell of severe abdominal pain earlier today.  He suspected constipation.  Evaluation here is consistent with that diagnosis.  She does not have any tenderness or peritoneal findings.  Laboratory tests are reassuring.   X-ray shows a large volume of stool.  We have given an initial enema here with good results.  I will discharge her with continued MiraLAX, return if increased pain or other problems, she has a follow-up next Monday at her clinic which she should keep.      Diagnosis:    ICD-10-CM    1. Constipation, unspecified constipation type  K59.00    2. Abdominal pain, generalized  R10.84        Discharge Medications:  New Prescriptions    POLYETHYLENE GLYCOL (MIRALAX) 17 GM/DOSE POWDER    Take 17 g (1 capful) by mouth 2 times daily       Scribe Disclosure:  Clotilde FELIX, am serving as a scribe at 9:45 AM on 1/19/2021 to document services personally performed by Madeline Mistry MD based on my observations and the provider's statements to me.        Madeline Mistry MD  01/19/21 0706

## 2021-01-19 NOTE — ED NOTES
Patient able to have large bowel movement after enema. Reports resolution of abdominal pain. Spouse supportive at bedside. Discussed constipation prevention and management. MD in to see patient and discuss diagnosis, test results, and discharge plan. Patient meets discharge criteria. Discussed AVS with patient and spouse. Questions answered. Spouse and patient verbalized understanding. Spouse reports being ready to go home. Patient discharged home with family by car with all necessary information.

## 2021-01-19 NOTE — TELEPHONE ENCOUNTER
Prescription approved per [facility] [place].  Esther Guerrero RN

## 2021-01-19 NOTE — DISCHARGE INSTRUCTIONS
Discharge Instructions  Constipation  Your doctor has diagnosed you with constipation. Constipation can cause severe cramping pain and your physician thinks this is the cause of your abdominal pain today.  People usually recognize that they are constipated because they have difficulty having bowel movements, are not having bowel movements frequently enough, or are not having large enough bowel movements. Sometimes, especially in children or older people, you do not recognize that you are constipated until it becomes severe. The most common cause of constipation is a combination of lack of exercise and not eating enough fruits, vegetables and whole grains. Constipation can also be a side effect of medications, such as narcotics, or may be caused by a disease of the digestive system.    Return to the Emergency Department if:  Your abdominal pain worsens or does not improve after a bowel movement.  You become very weak.  You get an oral temperature above 102oF or as directed by your doctor.  You have blood in your stools (bright red or black, tarry stools).  You keep throwing up or can t drink liquids.  Your see blood when you throw up.  Your stomach gets bloated or bigger.  You have new symptoms or anything that worries you.    What can I do to help myself?  If your doctor gave you a cathartic medication, like magnesium citrate or GoLytely  (polyethylene glycol), you can expect to have cramps and gas pains after taking it. You can expect to have a number of bowel movements and even diarrhea in the course of clearing your bowels.  You will know your bowels have been cleaned out after you pass clear liquid. The cramps and gas should let up after you have emptied your bowels. You may want to wait until morning to take this type of medication so you aren t up in the night.   Sometimes instead of cathartics, we recommend laxatives like milk of magnesia to move your bowels more slowly, or an enema to help the bowels to  move. Read and follow the package directions, or follow your physician s instructions.  Once you have become very constipated, it takes time for your bowels to return to normal and you need to be very careful to prevent becoming constipated again. Take a laxative if you don t move your bowels at least every two days.     Eat foods that have a lot of fiber. Good choices are fruits, vegetables, prune juice, apple juice and high fiber cereal. Limit dairy products such as milk and cheese, since these can make constipation worse.   Drink plenty of water and other fluids.   When you feel the need to go to the bathroom, go to the bathroom. Don t hold it.  Miralax , Metamucil , Colace , Senna or fiber supplements can be used daily.  Miralax  daily is often the best choice for children.    FOLLOW UP WITH YOUR REGULAR DOCTOR IF YOUR CONSTIPATION IS NOT IMPROVING.  Sometimes, chronic constipation requires further testing to determine the cause. If you are over 50 years old, you may need a colonoscopy if you have not had one before.   If you were given a prescription for medicine here today, be sure to read all of the information (including the package insert) that comes with your prescription.  This will include important information about the medicine, its side effects, and any warnings that you need to know about.  The pharmacist who fills the prescription can provide more information and answer questions you may have about the medicine.  If you have questions or concerns that the pharmacist cannot address, please call or return to the Emergency Department.   Opioid Medication Information    Pain medications are among the most commonly prescribed medicines, so we are including this information for all our patients. If you did not receive pain medication or get a prescription for pain medicine, you can ignore it.     You may have been given a prescription for an opioid (narcotic) pain medicine and/or have received a pain  medicine while here in the Emergency Department. These medicines can make you drowsy or impaired. You must not drive, operate dangerous equipment, or engage in any other dangerous activities while taking these medications. If you drive while taking these medications, you could be arrested for DUI, or driving under the influence. Do not drink any alcohol while you are taking these medications.     Opioid pain medications can cause addiction. If you have a history of chemical dependency of any type, you are at a higher risk of becoming addicted to pain medications.  Only take these prescribed medications to treat your pain when all other options have been tried. Take it for as short a time and as few doses as possible. Store your pain pills in a secure place, as they are frequently stolen and provide a dangerous opportunity for children or visitors in your house to start abusing these powerful medications. We will not replace any lost or stolen medicine.  As soon as your pain is better, you should flush all your remaining medication.     Many prescription pain medications contain Tylenol  (acetaminophen), including Vicodin , Tylenol #3 , Norco , Lortab , and Percocet .  You should not take any extra pills of Tylenol  if you are using these prescription medications or you can get very sick.  Do not ever take more than 3000 mg of acetaminophen in any 24 hour period.    All opioids tend to cause constipation. Drink plenty of water and eat foods that have a lot of fiber, such as fruits, vegetables, prune juice, apple juice and high fiber cereal.  Take a laxative if you don t move your bowels at least every other day. Miralax , Milk of Magnesia, Colace , or Senna  can be used to keep you regular.      Remember that you can always come back to the Emergency Department if you are not able to see your regular doctor in the amount of time listed above, if you get any new symptoms, or if there is anything that worries you.

## 2021-02-12 ENCOUNTER — OFFICE VISIT (OUTPATIENT)
Dept: FAMILY MEDICINE | Facility: CLINIC | Age: 71
End: 2021-02-12
Payer: COMMERCIAL

## 2021-02-12 VITALS
TEMPERATURE: 97.9 F | HEIGHT: 62 IN | RESPIRATION RATE: 20 BRPM | HEART RATE: 76 BPM | SYSTOLIC BLOOD PRESSURE: 122 MMHG | DIASTOLIC BLOOD PRESSURE: 64 MMHG | BODY MASS INDEX: 20.43 KG/M2 | WEIGHT: 111 LBS | OXYGEN SATURATION: 97 %

## 2021-02-12 DIAGNOSIS — L98.9 NON-HEALING SKIN LESION: Primary | ICD-10-CM

## 2021-02-12 DIAGNOSIS — I48.91 ATRIAL FIBRILLATION, UNSPECIFIED TYPE (H): ICD-10-CM

## 2021-02-12 DIAGNOSIS — Z23 NEED FOR VACCINATION: ICD-10-CM

## 2021-02-12 PROCEDURE — 90662 IIV NO PRSV INCREASED AG IM: CPT | Performed by: NURSE PRACTITIONER

## 2021-02-12 PROCEDURE — G0008 ADMIN INFLUENZA VIRUS VAC: HCPCS | Performed by: NURSE PRACTITIONER

## 2021-02-12 PROCEDURE — 99213 OFFICE O/P EST LOW 20 MIN: CPT | Mod: 25 | Performed by: NURSE PRACTITIONER

## 2021-02-12 RX ORDER — DILTIAZEM HYDROCHLORIDE 180 MG/1
180 CAPSULE, COATED, EXTENDED RELEASE ORAL DAILY
Qty: 90 CAPSULE | Refills: 1 | Status: SHIPPED | OUTPATIENT
Start: 2021-02-12 | End: 2021-09-14

## 2021-02-12 RX ORDER — METOPROLOL TARTRATE 50 MG
50 TABLET ORAL 2 TIMES DAILY
Qty: 180 TABLET | Refills: 1 | Status: SHIPPED | OUTPATIENT
Start: 2021-02-12 | End: 2021-09-14

## 2021-02-12 ASSESSMENT — MIFFLIN-ST. JEOR: SCORE: 978.8

## 2021-02-12 NOTE — PROGRESS NOTES
"    Assessment & Plan     Atrial fibrillation, unspecified type (H)  Had cardiology follow-up last month.  Continue medications.   - diltiazem ER COATED BEADS (CARDIZEM CD/CARTIA XT) 180 MG 24 hr capsule; Take 1 capsule (180 mg) by mouth daily  - metoprolol tartrate (LOPRESSOR) 50 MG tablet; Take 1 tablet (50 mg) by mouth 2 times daily    Non-healing skin lesion  - DERMATOLOGY ADULT REFERRAL; Future    Need for vaccination  Administered today  - ME FLU VACCINE, INCREASED ANTIGEN, PRESV FREE ([id number])         Declines mammograms    Return in about 6 months (around 8/12/2021) for A fib fu , Preventive Visit.    Page Jacobs, MARYCHUY CNP  [facility] [place]    Cee Mendoza is a 70 year old who presents for the following health issues  accompanied by her spouse:    HPI       Patient here today to review medication and get refills as needed.   And a sore on the her back to be checked.     Hx of dementia.  Hx of afib on metoprolol and diltiazem.    Last fall had PPM inserted.  Spouse reports she has been doing well.    Today Kelly denies SOB, CP and edema.    Psych is managing mental health/demenitia medications.     Review of Systems   Constitutional, HEENT, cardiovascular, pulmonary, gi and gu and psych systems are negative, except as otherwise noted.      Objective    /64 (BP Location: Right arm, Patient Position: Sitting, Cuff Size: Adult Regular)   Pulse 76   Temp 97.9  F (36.6  C) (Oral)   Resp 20   Ht 1.562 m (5' 1.5\")   Wt 50.3 kg (111 lb)   SpO2 97%   BMI 20.63 kg/m    Body mass index is 20.63 kg/m .  Physical Exam   GENERAL: healthy, alert and no distress, cooperative  EYES: Eyes grossly normal to inspection and conjunctivae and sclerae normal  NECK: no adenopathy, no asymmetry, masses, or scars and thyroid normal to palpation  RESP: lungs clear to auscultation - no rales, rhonchi or wheezes  CV: regular rate and rhythm, normal S1 S2, no murmur, no peripheral edema and " peripheral pulses strong  ABDOMEN: soft, nontender  MS: no gross musculoskeletal defects noted  SKIN: mid upper back moist scab, no surrounding redness or warmth  NEURO: speech is normal, confused (hx of dementia), answering some questions appropriately  PSYCH: interacts appropriately, flat affect     No results found for this or any previous visit (from the past 24 hour(s)).

## 2021-03-09 ENCOUNTER — OFFICE VISIT (OUTPATIENT)
Dept: DERMATOLOGY | Facility: CLINIC | Age: 71
End: 2021-03-09
Payer: COMMERCIAL

## 2021-03-09 DIAGNOSIS — D48.5 NEOPLASM OF UNCERTAIN BEHAVIOR OF SKIN: Primary | ICD-10-CM

## 2021-03-09 DIAGNOSIS — L98.9 NON-HEALING SKIN LESION: ICD-10-CM

## 2021-03-09 DIAGNOSIS — Z85.828 HISTORY OF BASAL CELL CARCINOMA: ICD-10-CM

## 2021-03-09 DIAGNOSIS — L82.1 SEBORRHEIC KERATOSIS: ICD-10-CM

## 2021-03-09 PROCEDURE — 11102 TANGNTL BX SKIN SINGLE LES: CPT | Performed by: DERMATOLOGY

## 2021-03-09 PROCEDURE — 88305 TISSUE EXAM BY PATHOLOGIST: CPT | Performed by: DERMATOLOGY

## 2021-03-09 PROCEDURE — 99203 OFFICE O/P NEW LOW 30 MIN: CPT | Mod: 25 | Performed by: DERMATOLOGY

## 2021-03-09 RX ORDER — LIDOCAINE HYDROCHLORIDE AND EPINEPHRINE 10; 10 MG/ML; UG/ML
3 INJECTION, SOLUTION INFILTRATION; PERINEURAL ONCE
Status: DISCONTINUED | OUTPATIENT
Start: 2021-03-09 | End: 2022-01-01

## 2021-03-09 NOTE — PROGRESS NOTES
Dermatology Clinic Note    Dermatology Problem List:  1. Basal cell carcinoma, L cheek      Assessment and Plan:    1. Neoplasm of uncertain behavior on the L posterior shoulder. Suspect basal cell carcinoma. Biopsy with intent to remove. Wound care info provided.     2. History of Basal cell carcinoma, no signs of recurrence on the L cheek    3. Seborrheic keratoses: Benign hyperkeratotic papules and plaques. No treatment advised unless irritation occurs.        RTC as needed depending dementia course and biopsy result.        Thank you for involving me in this patient's care.     Kriss Genao MD   of Dermatology  [facility]    CC: Page Jacobs, APRN CNP  [address] [place] [address]    Kolby Noonan PA-C    ____________________________________________________________________________________________________________________________________________    CC: Patient presents with:  Consult: new pt, Referring PCP Page Jacobs start many years sx mid back-not healing, scabbing and bleeding tx none       HPI: Kelly Nunez is a 70 year old female presenting for initial evaluation of non-healing sore on the L back present for many months. No treatment. Covering with bandage. Has history of a similar lesion on the L cheek, basal cell carcinoma s/p MMS.       Patient Active Problem List   Diagnosis     A-fib (H)     Encounter for palliative care     Dementia without behavioral disturbance, unspecified dementia type (H)     Chronic obstructive pulmonary disease with (acute) exacerbation (H)     Primary pulmonary hypertension (H)     Acute respiratory failure with hypoxia (H)     Bradycardia     Cardiac pacemaker in situ     HTN (hypertension)       Allergies   Allergen Reactions     Penicillins Swelling     Pt states she has tolerated cephalosporins in the past.          Current Outpatient Medications   Medication     aspirin (ASA) 81 MG chewable tablet      diltiazem ER COATED BEADS (CARDIZEM CD/CARTIA XT) 180 MG 24 hr capsule     LORazepam (ATIVAN) 1 MG tablet     memantine (NAMENDA) 10 MG tablet     metoprolol tartrate (LOPRESSOR) 50 MG tablet     Multiple Vitamins-Minerals (CENTRUM WOMEN PO)     OLANZapine (ZYPREXA) 10 MG tablet     No current facility-administered medications for this visit.        History reviewed. No pertinent family history.    Social History     Tobacco Use     Smoking status: Former Smoker     Packs/day: 1.00     Types: Cigarettes     Quit date:      Years since quittin.1     Smokeless tobacco: Never Used   Substance Use Topics     Alcohol use: Not Currently     Drug use: No       ROS: Patient in normal state of health and is feeling well on complete ROS.     EXAM:  There were no vitals taken for this visit.  GEN: Alert, no distress  HEENT: Conjunctiva clear.   PULM: Breathing comfortably on RA  CV: Extrem warm and well perfused  ABD: No distension  MS: No joint deformity  Psych: Normal mood/affect  SKIN:   --exam of the face, back, chest, face, scalp  --Approx 1.5 cm friable pink plaque on the L posterior shoulder  --waxy papules and plaques on the back, flanks  --Linear scar on the L cheek    Shave biopsy:  After discussion of benefits and risks including but not limited to bleeding/bruising, pain/swelling, infection, scar, incomplete removal, nerve damage/numbness, recurrence, and non-diagnostic biopsy, written consent, verbal consent and photographs were obtained. Time-out was performed. The area was cleaned with isopropyl alcohol. 1 mL of 1% lidocaine with epinephrine was injected to obtain adequate anesthesia of the lesion on the L posterior shoulder. A shave biopsy was performed. Hemostasis was achieved with aluminium chloride. Vaseline and a sterile dressing were applied. The patient tolerated the procedure and no complications were noted. The patient was provided with verbal and written post care instructions.

## 2021-03-09 NOTE — LETTER
3/9/2021      RE: Kelly Nunez  [address] [facility] [address]           Dermatology Clinic Note    Dermatology Problem List:  1. Basal cell carcinoma, L cheek      Assessment and Plan:    1. Neoplasm of uncertain behavior on the L posterior shoulder. Suspect basal cell carcinoma. Biopsy with intent to remove. Wound care info provided.     2. History of Basal cell carcinoma, no signs of recurrence on the L cheek    3. Seborrheic keratoses: Benign hyperkeratotic papules and plaques. No treatment advised unless irritation occurs.        RTC as needed depending dementia course and biopsy result.        Thank you for involving me in this patient's care.     Kriss Genao MD   of Dermatology  [facility]    CC: Page Jacobs, APRN CNP  [address] [place] [address]    Kolby Noonan PA-C    ____________________________________________________________________________________________________________________________________________    CC: Patient presents with:  Consult: new pt, Referring PCP Page Jacobs start many years sx mid back-not healing, scabbing and bleeding tx none       HPI: Kelly Nunez is a 70 year old female presenting for initial evaluation of non-healing sore on the L back present for many months. No treatment. Covering with bandage. Has history of a similar lesion on the L cheek, basal cell carcinoma s/p MMS.       Patient Active Problem List   Diagnosis     A-fib (H)     Encounter for palliative care     Dementia without behavioral disturbance, unspecified dementia type (H)     Chronic obstructive pulmonary disease with (acute) exacerbation (H)     Primary pulmonary hypertension (H)     Acute respiratory failure with hypoxia (H)     Bradycardia     Cardiac pacemaker in situ     HTN (hypertension)       Allergies   Allergen Reactions     Penicillins Swelling     Pt states she has tolerated cephalosporins in the past.           Current Outpatient Medications   Medication     aspirin (ASA) 81 MG chewable tablet     diltiazem ER COATED BEADS (CARDIZEM CD/CARTIA XT) 180 MG 24 hr capsule     LORazepam (ATIVAN) 1 MG tablet     memantine (NAMENDA) 10 MG tablet     metoprolol tartrate (LOPRESSOR) 50 MG tablet     Multiple Vitamins-Minerals (CENTRUM WOMEN PO)     OLANZapine (ZYPREXA) 10 MG tablet     No current facility-administered medications for this visit.        History reviewed. No pertinent family history.    Social History     Tobacco Use     Smoking status: Former Smoker     Packs/day: 1.00     Types: Cigarettes     Quit date:      Years since quittin.1     Smokeless tobacco: Never Used   Substance Use Topics     Alcohol use: Not Currently     Drug use: No       ROS: Patient in normal state of health and is feeling well on complete ROS.     EXAM:  There were no vitals taken for this visit.  GEN: Alert, no distress  HEENT: Conjunctiva clear.   PULM: Breathing comfortably on RA  CV: Extrem warm and well perfused  ABD: No distension  MS: No joint deformity  Psych: Normal mood/affect  SKIN:   --exam of the face, back, chest, face, scalp  --Approx 1.5 cm friable pink plaque on the L posterior shoulder  --waxy papules and plaques on the back, flanks  --Linear scar on the L cheek    Shave biopsy:  After discussion of benefits and risks including but not limited to bleeding/bruising, pain/swelling, infection, scar, incomplete removal, nerve damage/numbness, recurrence, and non-diagnostic biopsy, written consent, verbal consent and photographs were obtained. Time-out was performed. The area was cleaned with isopropyl alcohol. 1 mL of 1% lidocaine with epinephrine was injected to obtain adequate anesthesia of the lesion on the L posterior shoulder. A shave biopsy was performed. Hemostasis was achieved with aluminium chloride. Vaseline and a sterile dressing were applied. The patient tolerated the procedure and no complications were  noted. The patient was provided with verbal and written post care instructions.       Kriss Genao MD

## 2021-03-09 NOTE — PATIENT INSTRUCTIONS
Pediatric Dermatology  [facility]  [address]  1st Floor Pediatric [place]  [address]  Phone: [phone]    Pediatric & Adult Dermatology  [facility]  [address] [facility]   2nd Floor  [facility] [address]  [phone]                  General information: Dr. Kriss Genao is a board-certified dermatologist with subspecialty certification in pediatric dermatology.     Scheduling and Nurse Triage: Dr. Genao sees pediatric patients on Mondays in [place] and adult and pediatric patients on Tuesdays in [place]. The remainder of the week she practices at the [facility]. Please call the above phone numbers to schedule or to talk to a nurse.     -For scheduling at the [place] or [place] locations, or to talk to the triage nurse please call the above phone number at the clinic where you were seen.     -For medication refills, please call your pharmacy.

## 2021-03-10 ENCOUNTER — ANCILLARY PROCEDURE (OUTPATIENT)
Dept: CARDIOLOGY | Facility: CLINIC | Age: 71
End: 2021-03-10
Attending: INTERNAL MEDICINE
Payer: COMMERCIAL

## 2021-03-10 DIAGNOSIS — I49.5 SSS (SICK SINUS SYNDROME) (H): ICD-10-CM

## 2021-03-10 DIAGNOSIS — Z95.0 CARDIAC PACEMAKER IN SITU: ICD-10-CM

## 2021-03-10 LAB
MDC_IDC_LEAD_IMPLANT_DT: NORMAL
MDC_IDC_LEAD_IMPLANT_DT: NORMAL
MDC_IDC_LEAD_LOCATION: NORMAL
MDC_IDC_LEAD_LOCATION: NORMAL
MDC_IDC_LEAD_LOCATION_DETAIL_1: NORMAL
MDC_IDC_LEAD_LOCATION_DETAIL_1: NORMAL
MDC_IDC_LEAD_MFG: NORMAL
MDC_IDC_LEAD_MFG: NORMAL
MDC_IDC_LEAD_MODEL: NORMAL
MDC_IDC_LEAD_MODEL: NORMAL
MDC_IDC_LEAD_POLARITY_TYPE: NORMAL
MDC_IDC_LEAD_POLARITY_TYPE: NORMAL
MDC_IDC_LEAD_SERIAL: NORMAL
MDC_IDC_LEAD_SERIAL: NORMAL
MDC_IDC_MSMT_BATTERY_REMAINING_PERCENTAGE: 100 %
MDC_IDC_MSMT_BATTERY_STATUS: NORMAL
MDC_IDC_MSMT_LEADCHNL_RA_IMPEDANCE_VALUE: 546 OHM
MDC_IDC_MSMT_LEADCHNL_RA_LEAD_CHANNEL_STATUS: NORMAL
MDC_IDC_MSMT_LEADCHNL_RA_PACING_THRESHOLD_AMPLITUDE: 1 V
MDC_IDC_MSMT_LEADCHNL_RA_PACING_THRESHOLD_PULSEWIDTH: 0.4 MS
MDC_IDC_MSMT_LEADCHNL_RV_IMPEDANCE_VALUE: 620 OHM
MDC_IDC_MSMT_LEADCHNL_RV_LEAD_CHANNEL_STATUS: NORMAL
MDC_IDC_MSMT_LEADCHNL_RV_PACING_THRESHOLD_AMPLITUDE: 0.9 V
MDC_IDC_MSMT_LEADCHNL_RV_PACING_THRESHOLD_PULSEWIDTH: 0.4 MS
MDC_IDC_PG_IMPLANT_DTM: NORMAL
MDC_IDC_PG_MFG: NORMAL
MDC_IDC_PG_MODEL: NORMAL
MDC_IDC_PG_SERIAL: NORMAL
MDC_IDC_PG_TYPE: NORMAL
MDC_IDC_SESS_CLINIC_NAME: NORMAL
MDC_IDC_SESS_DTM: NORMAL
MDC_IDC_SESS_REPROGRAMMED: NO
MDC_IDC_SESS_TYPE: NORMAL
MDC_IDC_SET_BRADY_AT_MODE_SWITCH_MODE: NORMAL
MDC_IDC_SET_BRADY_AT_MODE_SWITCH_RATE: 180 {BEATS}/MIN
MDC_IDC_SET_BRADY_LOWRATE: 60 {BEATS}/MIN
MDC_IDC_SET_BRADY_MAX_SENSOR_RATE: 120 {BEATS}/MIN
MDC_IDC_SET_BRADY_MAX_TRACKING_RATE: 130 {BEATS}/MIN
MDC_IDC_SET_BRADY_MODE: NORMAL
MDC_IDC_SET_BRADY_PAV_DELAY_HIGH: 160 MS
MDC_IDC_SET_BRADY_PAV_DELAY_LOW: 200 MS
MDC_IDC_SET_BRADY_SAV_DELAY_HIGH: 130 MS
MDC_IDC_SET_BRADY_SAV_DELAY_LOW: 170 MS
MDC_IDC_SET_LEADCHNL_RA_PACING_AMPLITUDE: 2 V
MDC_IDC_SET_LEADCHNL_RA_PACING_POLARITY: NORMAL
MDC_IDC_SET_LEADCHNL_RA_PACING_PULSEWIDTH: 0.4 MS
MDC_IDC_SET_LEADCHNL_RA_SENSING_ADAPTATION_MODE: NORMAL
MDC_IDC_SET_LEADCHNL_RA_SENSING_POLARITY: NORMAL
MDC_IDC_SET_LEADCHNL_RV_PACING_AMPLITUDE: 1.7 V
MDC_IDC_SET_LEADCHNL_RV_PACING_POLARITY: NORMAL
MDC_IDC_SET_LEADCHNL_RV_PACING_PULSEWIDTH: 0.4 MS
MDC_IDC_SET_LEADCHNL_RV_SENSING_ADAPTATION_MODE: NORMAL
MDC_IDC_SET_LEADCHNL_RV_SENSING_POLARITY: NORMAL
MDC_IDC_STAT_AT_BURDEN_PERCENT: 0 %
MDC_IDC_STAT_AT_DTM_END: NORMAL
MDC_IDC_STAT_AT_DTM_START: NORMAL
MDC_IDC_STAT_AT_MODE_SW_COUNT_PER_DAY: 0
MDC_IDC_STAT_AT_MODE_SW_PERCENT_TIME_PER_DAY: 0 %
MDC_IDC_STAT_BRADY_AP_VP_PERCENT: 0 %
MDC_IDC_STAT_BRADY_AP_VS_PERCENT: 47 %
MDC_IDC_STAT_BRADY_AS_VP_PERCENT: 0 %
MDC_IDC_STAT_BRADY_AS_VS_PERCENT: 53 %
MDC_IDC_STAT_BRADY_DTM_END: NORMAL
MDC_IDC_STAT_BRADY_DTM_START: NORMAL
MDC_IDC_STAT_BRADY_RA_PERCENT_PACED: 47 %
MDC_IDC_STAT_BRADY_RV_PERCENT_PACED: 0 %
MDC_IDC_STAT_CRT_DTM_END: NORMAL
MDC_IDC_STAT_CRT_DTM_START: NORMAL

## 2021-03-10 PROCEDURE — 93294 REM INTERROG EVL PM/LDLS PM: CPT | Performed by: INTERNAL MEDICINE

## 2021-03-10 PROCEDURE — 93296 REM INTERROG EVL PM/IDS: CPT | Performed by: INTERNAL MEDICINE

## 2021-03-13 LAB — COPATH REPORT: NORMAL

## 2021-03-18 ENCOUNTER — IMMUNIZATION (OUTPATIENT)
Dept: NURSING | Facility: CLINIC | Age: 71
End: 2021-03-18
Payer: COMMERCIAL

## 2021-03-18 PROCEDURE — 91301 PR COVID VAC MODERNA 100 MCG/0.5 ML IM: CPT

## 2021-03-18 PROCEDURE — 0011A PR COVID VAC MODERNA 100 MCG/0.5 ML IM: CPT

## 2021-04-06 ENCOUNTER — TELEPHONE (OUTPATIENT)
Dept: DERMATOLOGY | Facility: CLINIC | Age: 71
End: 2021-04-06

## 2021-04-06 NOTE — LETTER
[address]  [address]  [phone]    4/6/2021       Kelly Nuenz  [address] [facility] [address]      Dear Kelly:    You are scheduled for Mohs Surgery on: 4/15/21 @1:00pm.    Please check in at 3rd Floor Dermatology Clinic, [address].     You don't need to arrive more than 5-10 minutes prior to your appointment time.     Be sure to eat a good breakfast and bathe and wash your hair prior to surgery.     If you are taking any anti-coagulants that are prescribed by your Doctor (such as Coumadin/Warfarin, Plavix, Aspirin, Ibuprofen), please continue taking them.     However, if you are taking anti-coagulants over the counter without a Doctor's order for a medical condition, please discontinue them 10 days prior to surgery.     Please wear loose comfortable clothing as it could possibly be 4-6 hours until your surgery is completed depending upon how many layers of tissue need to be removed.      Thank you,    MASSIMO Lee MD

## 2021-04-06 NOTE — TELEPHONE ENCOUNTER
Biopsy performed by Dr. Genao:    -BCC, left post shoulder, 1.6 x 1.3 cm    ASHLEIGH Edwards-BSN-N  [facility]  [phone]

## 2021-04-07 NOTE — TELEPHONE ENCOUNTER
Called and spoke to patients , (POA, patient has severe dementia).    Rescheduled mohs appointment- 5/13/20 @10:30am.    Kendall voiced understanding.    ASHLEIGH Edwards-BSN-N  [facility]  [phone]

## 2021-04-15 ENCOUNTER — IMMUNIZATION (OUTPATIENT)
Dept: NURSING | Facility: CLINIC | Age: 71
End: 2021-04-15
Attending: INTERNAL MEDICINE
Payer: COMMERCIAL

## 2021-04-15 PROCEDURE — 91301 PR COVID VAC MODERNA 100 MCG/0.5 ML IM: CPT

## 2021-04-15 PROCEDURE — 0012A PR COVID VAC MODERNA 100 MCG/0.5 ML IM: CPT

## 2021-05-11 NOTE — PROGRESS NOTES
Surgical Office Location:  [facility] [place]  [address] [place] [id number]

## 2021-05-13 ENCOUNTER — OFFICE VISIT (OUTPATIENT)
Dept: DERMATOLOGY | Facility: CLINIC | Age: 71
End: 2021-05-13
Payer: COMMERCIAL

## 2021-05-13 DIAGNOSIS — C44.619 BASAL CELL CARCINOMA (BCC) OF LEFT SHOULDER: ICD-10-CM

## 2021-05-13 DIAGNOSIS — D18.01 ANGIOMA OF SKIN: ICD-10-CM

## 2021-05-13 DIAGNOSIS — L81.4 LENTIGO: ICD-10-CM

## 2021-05-13 DIAGNOSIS — D23.9 DERMAL NEVUS: ICD-10-CM

## 2021-05-13 DIAGNOSIS — Z85.828 HISTORY OF SKIN CANCER: Primary | ICD-10-CM

## 2021-05-13 PROCEDURE — 99214 OFFICE O/P EST MOD 30 MIN: CPT | Performed by: DERMATOLOGY

## 2021-05-13 NOTE — LETTER
2021         RE: Kelly Nunez  [id number] [facility] [id number]        Dear Colleague,    Thank you for referring your patient, Kelly Nunez, to the [facility]. Please see a copy of my visit note below.    Surgical Office Location:  [facility]  [address] [place] [id number]      Kelly Nunez , a 71 year old year old female patient, I was asked to see by Dr. Genao for infiltrative basal cell carcinoma on left post shoulder.  Patient states this has been present for a while.  Patient reports the following symptoms:  Not healing .  Patient reports the following previous treatments none.  Patient reports the following modifying factors none.  Associated symptoms: none.  Patient has no other skin complaints today.  Remainder of the HPI, Meds, PMH, Allergies, FH, and SH was reviewed in chart.      Past Medical History:   Diagnosis Date     Dementia (H)      Diabetes (H)      Paroxysmal atrial fibrillation (H)      Sinus node dysfunction (H)        Past Surgical History:   Procedure Laterality Date     EP PACEMAKER N/A 10/12/2020    Procedure: EP Pacemaker;  Surgeon: Luis Onofre MD;  Location:  HEART CARDIAC CATH LAB     HEART CATH, ANGIOPLASTY          No family history on file.    Social History     Socioeconomic History     Marital status:      Spouse name: Not on file     Number of children: Not on file     Years of education: Not on file     Highest education level: Not on file   Occupational History     Not on file   Social Needs     Financial resource strain: Not on file     Food insecurity     Worry: Not on file     Inability: Not on file     Transportation needs     Medical: Not on file     Non-medical: Not on file   Tobacco Use     Smoking status: Former Smoker     Packs/day: 1.00     Types: Cigarettes     Quit date:      Years since quittin.3     Smokeless tobacco: Never Used   Substance  and Sexual Activity     Alcohol use: Not Currently     Drug use: No     Sexual activity: Not Currently     Partners: Male   Lifestyle     Physical activity     Days per week: Not on file     Minutes per session: Not on file     Stress: Not on file   Relationships     Social connections     Talks on phone: Not on file     Gets together: Not on file     Attends [employer] service: Not on file     Active member of club or organization: Not on file     Attends meetings of clubs or organizations: Not on file     Relationship status: Not on file     Intimate partner violence     Fear of current or ex partner: Not on file     Emotionally abused: Not on file     Physically abused: Not on file     Forced sexual activity: Not on file   Other Topics Concern     Parent/sibling w/ CABG, MI or angioplasty before 65F 55M? Not Asked   Social History Narrative     Not on file       Outpatient Encounter Medications as of 5/13/2021   Medication Sig Dispense Refill     aspirin (ASA) 81 MG chewable tablet Take 81 mg by mouth every evening       diltiazem ER COATED BEADS (CARDIZEM CD/CARTIA XT) 180 MG 24 hr capsule Take 1 capsule (180 mg) by mouth daily 90 capsule 1     LORazepam (ATIVAN) 1 MG tablet As of 01/14/21 patinet taking 1 tablet at bedtime and one half tablet as needed.       memantine (NAMENDA) 10 MG tablet Take 10 mg by mouth 2 times daily        metoprolol tartrate (LOPRESSOR) 50 MG tablet Take 1 tablet (50 mg) by mouth 2 times daily 180 tablet 1     Multiple Vitamins-Minerals (CENTRUM WOMEN PO) Take by mouth daily       OLANZapine (ZYPREXA) 10 MG tablet Take 10 mg by mouth At Bedtime        Facility-Administered Encounter Medications as of 5/13/2021   Medication Dose Route Frequency Provider Last Rate Last Admin     lidocaine 1% with EPINEPHrine 1:100,000 injection 3 mL  3 mL Intradermal Once Kriss Genao MD                 Review Of Systems  Skin: As above  Eyes: negative  Ears/Nose/Throat: negative  Respiratory:  No shortness of breath, dyspnea on exertion, cough, or hemoptysis  Cardiovascular: negative  Gastrointestinal: negative  Genitourinary: negative  Musculoskeletal: negative  Neurologic: negative  Psychiatric: negative  Hematologic/Lymphatic/Immunologic: negative  Endocrine: negative      O:   NAD, WDWN, Alert & Oriented, Mood & Affect wnl, Vitals stable   Here today     General appearance clarice ii   Vitals stable   Alert, oriented and in no acute distress      Stuck on papules and brown macules on trunk and ext    Flesh colored papules on trunk   Red paules on trunk   L post shoulder 2cm healed bx site      The remainder of expanded problem focused exam was normal; the following areas were examined:  scalp/hair, conjunctiva/lids, face, neck, , chest, digits/nails, RUE, LUE.      Eyes: Conjunctivae/lids:Normal     ENT: Lips, buccal mucosa, tongue: normal    MSK:Normal    Cardiovascular: peripheral edema none    Pulm: Breathing Normal    Neuro/Psych: Orientation:Normal; Mood/Affect:Normal      A/P:  1. Seborrheic keratosis, lentigo, angioma, dermal nevus, hx of non-melanoma skin cancer   2. Basal cell carcinoma shoulder  Excision, radiation, clinical monitoring for infltrative basal cell carcinoma discussed with patient  given her dementia  They prefer to follow clinically  It was a pleasure speaking to Kelly Nunez today.  Previous clinic  notes and pertinent laboratory tests were reviewed prior to Kelly Nunez's visit.  Nature of benign skin lesions dicussed with patient.  Signs and Symptoms of skin cancer discussed with patient.  Patient encouraged to perform monthly skin exams.  UV precautions reviewed with patient.  Risks of non-melanoma skin cancer discussed with patient   Return to clinic 6 months Dr. Genao       Again, thank you for allowing me to participate in the care of your patient.        Sincerely,        Adam Lee MD

## 2021-05-13 NOTE — PROGRESS NOTES
Kelly Nunez , a 71 year old year old female patient, I was asked to see by Dr. Genao for infiltrative basal cell carcinoma on left post shoulder.  Patient states this has been present for a while.  Patient reports the following symptoms:  Not healing .  Patient reports the following previous treatments none.  Patient reports the following modifying factors none.  Associated symptoms: none.  Patient has no other skin complaints today.  Remainder of the HPI, Meds, PMH, Allergies, FH, and SH was reviewed in chart.      Past Medical History:   Diagnosis Date     Dementia (H)      Diabetes (H)      Paroxysmal atrial fibrillation (H)      Sinus node dysfunction (H)        Past Surgical History:   Procedure Laterality Date     EP PACEMAKER N/A 10/12/2020    Procedure: EP Pacemaker;  Surgeon: Luis Onofre MD;  Location:  HEART CARDIAC CATH LAB     HEART CATH, ANGIOPLASTY          No family history on file.    Social History     Socioeconomic History     Marital status:      Spouse name: Not on file     Number of children: Not on file     Years of education: Not on file     Highest education level: Not on file   Occupational History     Not on file   Social Needs     Financial resource strain: Not on file     Food insecurity     Worry: Not on file     Inability: Not on file     Transportation needs     Medical: Not on file     Non-medical: Not on file   Tobacco Use     Smoking status: Former Smoker     Packs/day: 1.00     Types: Cigarettes     Quit date: 2017     Years since quittin.3     Smokeless tobacco: Never Used   Substance and Sexual Activity     Alcohol use: Not Currently     Drug use: No     Sexual activity: Not Currently     Partners: Male   Lifestyle     Physical activity     Days per week: Not on file     Minutes per session: Not on file     Stress: Not on file   Relationships     Social connections     Talks on phone: Not on file     Gets together: Not on file     Attends  [employer] service: Not on file     Active member of club or organization: Not on file     Attends meetings of clubs or organizations: Not on file     Relationship status: Not on file     Intimate partner violence     Fear of current or ex partner: Not on file     Emotionally abused: Not on file     Physically abused: Not on file     Forced sexual activity: Not on file   Other Topics Concern     Parent/sibling w/ CABG, MI or angioplasty before 65F 55M? Not Asked   Social History Narrative     Not on file       Outpatient Encounter Medications as of 5/13/2021   Medication Sig Dispense Refill     aspirin (ASA) 81 MG chewable tablet Take 81 mg by mouth every evening       diltiazem ER COATED BEADS (CARDIZEM CD/CARTIA XT) 180 MG 24 hr capsule Take 1 capsule (180 mg) by mouth daily 90 capsule 1     LORazepam (ATIVAN) 1 MG tablet As of 01/14/21 patinet taking 1 tablet at bedtime and one half tablet as needed.       memantine (NAMENDA) 10 MG tablet Take 10 mg by mouth 2 times daily        metoprolol tartrate (LOPRESSOR) 50 MG tablet Take 1 tablet (50 mg) by mouth 2 times daily 180 tablet 1     Multiple Vitamins-Minerals (CENTRUM WOMEN PO) Take by mouth daily       OLANZapine (ZYPREXA) 10 MG tablet Take 10 mg by mouth At Bedtime        Facility-Administered Encounter Medications as of 5/13/2021   Medication Dose Route Frequency Provider Last Rate Last Admin     lidocaine 1% with EPINEPHrine 1:100,000 injection 3 mL  3 mL Intradermal Once Kriss Genao MD                 Review Of Systems  Skin: As above  Eyes: negative  Ears/Nose/Throat: negative  Respiratory: No shortness of breath, dyspnea on exertion, cough, or hemoptysis  Cardiovascular: negative  Gastrointestinal: negative  Genitourinary: negative  Musculoskeletal: negative  Neurologic: negative  Psychiatric: negative  Hematologic/Lymphatic/Immunologic: negative  Endocrine: negative      O:   NAD, WDWN, Alert & Oriented, Mood & Affect wnl, Vitals stable   Here  today     General appearance clarice ii   Vitals stable   Alert, oriented and in no acute distress      Stuck on papules and brown macules on trunk and ext    Flesh colored papules on trunk   Red paules on trunk   L post shoulder 2cm healed bx site      The remainder of expanded problem focused exam was normal; the following areas were examined:  scalp/hair, conjunctiva/lids, face, neck, , chest, digits/nails, RUE, LUE.      Eyes: Conjunctivae/lids:Normal     ENT: Lips, buccal mucosa, tongue: normal    MSK:Normal    Cardiovascular: peripheral edema none    Pulm: Breathing Normal    Neuro/Psych: Orientation:Normal; Mood/Affect:Normal      A/P:  1. Seborrheic keratosis, lentigo, angioma, dermal nevus, hx of non-melanoma skin cancer   2. Basal cell carcinoma shoulder  Excision, radiation, clinical monitoring for infltrative basal cell carcinoma discussed with patient  given her dementia  They prefer to follow clinically  It was a pleasure speaking to Kelly Nunez today.  Previous clinic  notes and pertinent laboratory tests were reviewed prior to Kelly Nunez's visit.  Nature of benign skin lesions dicussed with patient.  Signs and Symptoms of skin cancer discussed with patient.  Patient encouraged to perform monthly skin exams.  UV precautions reviewed with patient.  Risks of non-melanoma skin cancer discussed with patient   Return to clinic 6 months Dr. Genao

## 2021-06-23 ENCOUNTER — ANCILLARY PROCEDURE (OUTPATIENT)
Dept: CARDIOLOGY | Facility: CLINIC | Age: 71
End: 2021-06-23
Attending: INTERNAL MEDICINE
Payer: COMMERCIAL

## 2021-06-23 DIAGNOSIS — Z95.0 CARDIAC PACEMAKER IN SITU: ICD-10-CM

## 2021-06-23 LAB
MDC_IDC_EPISODE_DTM: NORMAL
MDC_IDC_EPISODE_ID: 3
MDC_IDC_EPISODE_TYPE: NORMAL
MDC_IDC_LEAD_IMPLANT_DT: NORMAL
MDC_IDC_LEAD_IMPLANT_DT: NORMAL
MDC_IDC_LEAD_LOCATION: NORMAL
MDC_IDC_LEAD_LOCATION: NORMAL
MDC_IDC_LEAD_LOCATION_DETAIL_1: NORMAL
MDC_IDC_LEAD_LOCATION_DETAIL_1: NORMAL
MDC_IDC_LEAD_MFG: NORMAL
MDC_IDC_LEAD_MFG: NORMAL
MDC_IDC_LEAD_MODEL: NORMAL
MDC_IDC_LEAD_MODEL: NORMAL
MDC_IDC_LEAD_POLARITY_TYPE: NORMAL
MDC_IDC_LEAD_POLARITY_TYPE: NORMAL
MDC_IDC_LEAD_SERIAL: NORMAL
MDC_IDC_LEAD_SERIAL: NORMAL
MDC_IDC_MSMT_BATTERY_REMAINING_PERCENTAGE: 95 %
MDC_IDC_MSMT_BATTERY_STATUS: NORMAL
MDC_IDC_MSMT_LEADCHNL_RA_IMPEDANCE_VALUE: 539 OHM
MDC_IDC_MSMT_LEADCHNL_RA_LEAD_CHANNEL_STATUS: NORMAL
MDC_IDC_MSMT_LEADCHNL_RA_PACING_THRESHOLD_AMPLITUDE: 1 V
MDC_IDC_MSMT_LEADCHNL_RA_PACING_THRESHOLD_PULSEWIDTH: 0.4 MS
MDC_IDC_MSMT_LEADCHNL_RV_IMPEDANCE_VALUE: 655 OHM
MDC_IDC_MSMT_LEADCHNL_RV_LEAD_CHANNEL_STATUS: NORMAL
MDC_IDC_MSMT_LEADCHNL_RV_PACING_THRESHOLD_AMPLITUDE: 1 V
MDC_IDC_MSMT_LEADCHNL_RV_PACING_THRESHOLD_PULSEWIDTH: 0.4 MS
MDC_IDC_PG_IMPLANT_DTM: NORMAL
MDC_IDC_PG_MFG: NORMAL
MDC_IDC_PG_MODEL: NORMAL
MDC_IDC_PG_SERIAL: NORMAL
MDC_IDC_PG_TYPE: NORMAL
MDC_IDC_SESS_CLINIC_NAME: NORMAL
MDC_IDC_SESS_DTM: NORMAL
MDC_IDC_SESS_REPROGRAMMED: NO
MDC_IDC_SESS_TYPE: NORMAL
MDC_IDC_SET_BRADY_AT_MODE_SWITCH_MODE: NORMAL
MDC_IDC_SET_BRADY_AT_MODE_SWITCH_RATE: 180 {BEATS}/MIN
MDC_IDC_SET_BRADY_LOWRATE: 60 {BEATS}/MIN
MDC_IDC_SET_BRADY_MAX_SENSOR_RATE: 120 {BEATS}/MIN
MDC_IDC_SET_BRADY_MAX_TRACKING_RATE: 130 {BEATS}/MIN
MDC_IDC_SET_BRADY_MODE: NORMAL
MDC_IDC_SET_BRADY_PAV_DELAY_HIGH: 160 MS
MDC_IDC_SET_BRADY_PAV_DELAY_LOW: 200 MS
MDC_IDC_SET_BRADY_SAV_DELAY_HIGH: 130 MS
MDC_IDC_SET_BRADY_SAV_DELAY_LOW: 170 MS
MDC_IDC_SET_LEADCHNL_RA_PACING_AMPLITUDE: 1.9 V
MDC_IDC_SET_LEADCHNL_RA_PACING_POLARITY: NORMAL
MDC_IDC_SET_LEADCHNL_RA_PACING_PULSEWIDTH: 0.4 MS
MDC_IDC_SET_LEADCHNL_RA_SENSING_ADAPTATION_MODE: NORMAL
MDC_IDC_SET_LEADCHNL_RA_SENSING_POLARITY: NORMAL
MDC_IDC_SET_LEADCHNL_RV_PACING_AMPLITUDE: 1.3 V
MDC_IDC_SET_LEADCHNL_RV_PACING_POLARITY: NORMAL
MDC_IDC_SET_LEADCHNL_RV_PACING_PULSEWIDTH: 0.4 MS
MDC_IDC_SET_LEADCHNL_RV_SENSING_ADAPTATION_MODE: NORMAL
MDC_IDC_SET_LEADCHNL_RV_SENSING_POLARITY: NORMAL
MDC_IDC_STAT_AT_BURDEN_PERCENT: 0 %
MDC_IDC_STAT_AT_DTM_END: NORMAL
MDC_IDC_STAT_AT_DTM_START: NORMAL
MDC_IDC_STAT_AT_MODE_SW_COUNT_PER_DAY: 0
MDC_IDC_STAT_AT_MODE_SW_PERCENT_TIME_PER_DAY: 0 %
MDC_IDC_STAT_BRADY_AP_VP_PERCENT: 0 %
MDC_IDC_STAT_BRADY_AP_VS_PERCENT: 39 %
MDC_IDC_STAT_BRADY_AS_VP_PERCENT: 0 %
MDC_IDC_STAT_BRADY_AS_VS_PERCENT: 60 %
MDC_IDC_STAT_BRADY_DTM_END: NORMAL
MDC_IDC_STAT_BRADY_DTM_START: NORMAL
MDC_IDC_STAT_BRADY_RA_PERCENT_PACED: 40 %
MDC_IDC_STAT_BRADY_RV_PERCENT_PACED: 0 %
MDC_IDC_STAT_CRT_DTM_END: NORMAL
MDC_IDC_STAT_CRT_DTM_START: NORMAL

## 2021-06-23 PROCEDURE — 93296 REM INTERROG EVL PM/IDS: CPT | Performed by: INTERNAL MEDICINE

## 2021-06-23 PROCEDURE — 93294 REM INTERROG EVL PM/LDLS PM: CPT | Performed by: INTERNAL MEDICINE

## 2021-08-05 ENCOUNTER — LAB (OUTPATIENT)
Dept: LAB | Facility: CLINIC | Age: 71
End: 2021-08-05
Payer: COMMERCIAL

## 2021-08-05 ENCOUNTER — OFFICE VISIT (OUTPATIENT)
Dept: CARDIOLOGY | Facility: CLINIC | Age: 71
End: 2021-08-05
Payer: COMMERCIAL

## 2021-08-05 VITALS
DIASTOLIC BLOOD PRESSURE: 56 MMHG | HEIGHT: 62 IN | OXYGEN SATURATION: 93 % | WEIGHT: 119.4 LBS | SYSTOLIC BLOOD PRESSURE: 116 MMHG | BODY MASS INDEX: 21.97 KG/M2 | HEART RATE: 69 BPM

## 2021-08-05 DIAGNOSIS — R06.09 DYSPNEA ON EXERTION: ICD-10-CM

## 2021-08-05 DIAGNOSIS — J43.9 PULMONARY EMPHYSEMA, UNSPECIFIED EMPHYSEMA TYPE (H): Primary | ICD-10-CM

## 2021-08-05 LAB — NT-PROBNP SERPL-MCNC: 83 PG/ML (ref 0–125)

## 2021-08-05 PROCEDURE — 83880 ASSAY OF NATRIURETIC PEPTIDE: CPT | Performed by: INTERNAL MEDICINE

## 2021-08-05 PROCEDURE — 36415 COLL VENOUS BLD VENIPUNCTURE: CPT | Performed by: INTERNAL MEDICINE

## 2021-08-05 PROCEDURE — 99214 OFFICE O/P EST MOD 30 MIN: CPT | Performed by: INTERNAL MEDICINE

## 2021-08-05 RX ORDER — TRAZODONE HYDROCHLORIDE 100 MG/1
50-100 TABLET ORAL
COMMUNITY
End: 2022-01-01

## 2021-08-05 ASSESSMENT — MIFFLIN-ST. JEOR: SCORE: 1011.9

## 2021-08-05 NOTE — PROGRESS NOTES
Cardiology Clinic Progress Note:    August 5, 2021   Patient Name: Kelly Nunez  Patient MRN: [id number]     Consult indication: dyspnea    HPI:    I had the opportunity to see patient Kelly Nunez in cardiology clinic for a follow up visit. Patient is followed by our colleague Kolby Noonan PA-C with Primary Care.     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    Patient has severe dementia/memory impairment and so history is obtained from her , whom I also care for in clinic.  Patient was recently seen by my colleague AILEEN Gatica for follow-up of junctional bradycardia status post pacemaker therapy.  She is also followed by Dr. Rodriguez.  Per EP notes, the plan was to restart anticoagulation if she has recurrent atrial fibrillation on pacemaker interrogations.    Patient's  reports that the patient has had dyspnea as well as coughing for quite some time, and there was concern regarding possible congestive heart failure.  Patient denies any chest pain, chest pressure.  She denies any recent change in weight, no significant lower extremity swelling.    Prior cardiac evaluation includes a TTE from 10/12/2020 that demonstrated normal biventricular function.  Last device interrogation 6/23/2021 demonstrated 60% atrial pacing, no evidence of atrial fibrillation.    Blood pressure today in clinic 116/56 mmHg, heart rate 69 bpm.    Assessment and Plan/Recommendations:    # Dyspnea.  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  Overall clinical presentation does not seem consistent with myocardial ischemia or decompensated heart failure.  # HTN. BP well controlled.   # Atrial fibrillation, junctional tachycardia s/p PPM, will  defer anticoagulation/management to Dr. Rodriguez with cardiology EP  # Severe dementia.     -Discussed options for further evaluation and management  -Reassess cardiac function with TTE  -NT proBNP  -Referral to Pulmonology regarding COPD/emphysema  -Follow-up with cardiology pending the results of the aforementioned studies    Thank you for allowing our team to participate in the care of Kelly Nunez.  Please do not hesitate to call or page me with any questions or concerns.    Sincerely,     Reji Moser MD, [facility]  Cardiology  Text Page   August 5, 2021    Voice recognition software utilized. Although reviewed after completion, some word and grammatical errors may be present.    Total time spent on this encounter: 40 minutes, providing care in this encounter including, but not limited to, reviewing prior medical records, laboratory data, imaging studies, diagnostic studies, procedure notes, formulating an assessment and plan, recommendations.    Past Medical History:     Past Medical History:   Diagnosis Date     Dementia (H)      Diabetes (H)      Paroxysmal atrial fibrillation (H)      Sinus node dysfunction (H)         Past Surgical History:   Past Surgical History:   Procedure Laterality Date     EP PACEMAKER N/A 10/12/2020    Procedure: EP Pacemaker;  Surgeon: Luis Onofre MD;  Location:  HEART CARDIAC CATH LAB     HEART CATH, ANGIOPLASTY       IR ABDOMINAL AORTOGRAM  5/10/2013     IR MISCELLANEOUS PROCEDURE  5/10/2013       Medications (outpatient):  Current Outpatient Medications   Medication Sig Dispense Refill     aspirin (ASA) 81 MG chewable tablet Take 81 mg by mouth every evening       diltiazem ER COATED BEADS (CARDIZEM CD/CARTIA XT) 180 MG 24 hr capsule Take 1 capsule (180 mg) by mouth daily 90 capsule 1     LORazepam (ATIVAN) 1 MG tablet 0.5 mg As of 01/14/21 patinet taking 1 tablet at bedtime and one half tablet as needed.  As of 05/05/2021 Pt taking 0.5 mg 3x daily  "as needed       memantine (NAMENDA) 10 MG tablet Take 10 mg by mouth 2 times daily        metoprolol tartrate (LOPRESSOR) 50 MG tablet Take 1 tablet (50 mg) by mouth 2 times daily 180 tablet 1     Multiple Vitamins-Minerals (CENTRUM WOMEN PO) Take by mouth daily       OLANZapine (ZYPREXA) 10 MG tablet Take 10 mg by mouth At Bedtime        traZODone (DESYREL) 100 MG tablet Take 100 mg by mouth At Bedtime Take 1/2 to one tablet at bedtime         Allergies:  Allergies   Allergen Reactions     Penicillins Swelling     Pt states she has tolerated cephalosporins in the past.        Social History:   History   Drug Use No      History   Smoking Status     Former Smoker     Packs/day: 1.00     Types: Cigarettes     Quit date: 2017   Smokeless Tobacco     Never Used     Social History    Substance and Sexual Activity      Alcohol use: Not Currently       Family History:  No family history on file.    Review of Systems:   A complete review of systems was negative except as mentioned in the History of Present Illness.     Objective & Physical Exam:  /56 (BP Location: Right arm, Patient Position: Sitting, Cuff Size: Adult Regular)   Pulse 69   Ht 1.562 m (5' 1.5\")   Wt 54.2 kg (119 lb 6.4 oz)   SpO2 93%   BMI 22.20 kg/m    Wt Readings from Last 2 Encounters:   08/05/21 54.2 kg (119 lb 6.4 oz)   02/12/21 50.3 kg (111 lb)     Body mass index is 22.2 kg/m .   Body surface area is 1.53 meters squared.    Constitutional: appears stated age, in no apparent distress, appears to be well nourished  Eyes: sclera anicteric, conjunctiva normal  ENT: normocephalic, without obvious abnormality, atraumatic  Pulmonary: mild end expiratory wheezing  Cardiovascular: JVP normal, regular rate, regular rhythm, normal S1 and S2, no S3, S4, no murmur appreciated, no lower extremity edema  Gastrointestinal: abdominal exam benign  Neurologic: awake, alert, face symmetrical, moves all extremities  Skin: no jaundice    Data reviewed:  Lab " Results   Component Value Date    WBC 5.7 01/19/2021    RBC 4.11 01/19/2021    HGB 12.6 01/19/2021    HCT 38.9 01/19/2021    MCV 95 01/19/2021    MCH 30.7 01/19/2021    MCHC 32.4 01/19/2021    RDW 12.3 01/19/2021     01/19/2021     Sodium   Date Value Ref Range Status   01/19/2021 142 133 - 144 mmol/L Final     Potassium   Date Value Ref Range Status   01/19/2021 4.0 3.4 - 5.3 mmol/L Final     Chloride   Date Value Ref Range Status   01/19/2021 108 94 - 109 mmol/L Final     Carbon Dioxide   Date Value Ref Range Status   01/19/2021 31 20 - 32 mmol/L Final     Anion Gap   Date Value Ref Range Status   01/19/2021 3 3 - 14 mmol/L Final     Glucose   Date Value Ref Range Status   01/19/2021 125 (H) 70 - 99 mg/dL Final     Urea Nitrogen   Date Value Ref Range Status   01/19/2021 20 7 - 30 mg/dL Final     Creatinine   Date Value Ref Range Status   01/19/2021 0.74 0.52 - 1.04 mg/dL Final     GFR Estimate   Date Value Ref Range Status   01/19/2021 83 >60 mL/min/[1.73_m2] Final     Comment:     Non  GFR Calc  Starting 12/18/2018, serum creatinine based estimated GFR (eGFR) will be   calculated using the Chronic Kidney Disease Epidemiology Collaboration   (CKD-EPI) equation.       Calcium   Date Value Ref Range Status   01/19/2021 9.2 8.5 - 10.1 mg/dL Final     Bilirubin Total   Date Value Ref Range Status   01/19/2021 0.2 0.2 - 1.3 mg/dL Final     Alkaline Phosphatase   Date Value Ref Range Status   01/19/2021 78 40 - 150 U/L Final     ALT   Date Value Ref Range Status   01/19/2021 20 0 - 50 U/L Final     AST   Date Value Ref Range Status   01/19/2021 16 0 - 45 U/L Final     Recent Labs   Lab Test 05/31/19  0944 05/31/19  0000   CHOL 219* 219*   HDL 87 87    115   TRIG 85 85      Lab Results   Component Value Date    A1C 5.8 05/31/2019    A1C 6.1 03/07/2018    A1C 5.6 01/23/2018    A1C 5.8 01/31/2017    A1C 6.1 01/17/2017    A1C 5.4 06/01/2015

## 2021-08-05 NOTE — PATIENT INSTRUCTIONS
August 5, 2021    Thank you for allowing our Cardiology team to participate in your care.     Please note the following changes to your heart treatment plan:     Medication changes:   - none    Tests to be done:  - lab draw today  - TTE (heart ultrasound)    Follow up:  - Referral to Pulmonology      Please contact our team at [phone] or [phone] for any questions or concerns.   If you are having a medical emergency, please call 911.       Sincerely,    Reji Moser MD, FACC  Cardiology    [facility] [place]

## 2021-08-05 NOTE — LETTER
8/5/2021    Kolby Noonan PA-C  [id number]  [facility] [address]    RE: Kelly Nunez       Dear Colleague,    I had the pleasure of seeing Kelly Nunez in the [facility].        Cardiology Clinic Progress Note:    August 5, 2021   Patient Name: Kelly Nunez  Patient MRN: [id number]     Consult indication: dyspnea    HPI:    I had the opportunity to see patient Kelly Nunez in cardiology clinic for a follow up visit. Patient is followed by our colleague Kolby Noonan PA-C with Primary Care.     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    Patient has severe dementia/memory impairment and so history is obtained from her , whom I also care for in clinic.  Patient was recently seen by my colleague AILEEN Gatica for follow-up of junctional bradycardia status post pacemaker therapy.  She is also followed by Dr. Rodriguez.  Per EP notes, the plan was to restart anticoagulation if she has recurrent atrial fibrillation on pacemaker interrogations.    Patient's  reports that the patient has had dyspnea as well as coughing for quite some time, and there was concern regarding possible congestive heart failure.  Patient denies any chest pain, chest pressure.  She denies any recent change in weight, no significant lower extremity swelling.    Prior cardiac evaluation includes a TTE from 10/12/2020 that demonstrated normal biventricular function.  Last device interrogation 6/23/2021 demonstrated 60% atrial pacing, no evidence of atrial fibrillation.    Blood pressure today in clinic 116/56 mmHg, heart rate 69 bpm.    Assessment and Plan/Recommendations:    # Dyspnea.   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  Overall clinical presentation does not seem consistent with myocardial ischemia or decompensated heart failure.  # HTN. BP well controlled.   # Atrial fibrillation, junctional tachycardia s/p PPM, will defer anticoagulation/management to Dr. Rodriguez with cardiology EP  # Severe dementia.     -Discussed options for further evaluation and management  -Reassess cardiac function with TTE  -NT proBNP  -Referral to Pulmonology regarding COPD/emphysema  -Follow-up with cardiology pending the results of the aforementioned studies    Thank you for allowing our team to participate in the care of Kelly Nunez.  Please do not hesitate to call or page me with any questions or concerns.    Sincerely,     Reji Moser MD, [facility]  Cardiology  Text Page   August 5, 2021    Voice recognition software utilized. Although reviewed after completion, some word and grammatical errors may be present.    Total time spent on this encounter: 40 minutes, providing care in this encounter including, but not limited to, reviewing prior medical records, laboratory data, imaging studies, diagnostic studies, procedure notes, formulating an assessment and plan, recommendations.    Past Medical History:     Past Medical History:   Diagnosis Date     Dementia (H)      Diabetes (H)      Paroxysmal atrial fibrillation (H)      Sinus node dysfunction (H)         Past Surgical History:   Past Surgical History:   Procedure Laterality Date     EP PACEMAKER N/A 10/12/2020    Procedure: EP Pacemaker;  Surgeon: Luis Onofre MD;  Location:  HEART CARDIAC CATH LAB     HEART CATH, ANGIOPLASTY       IR ABDOMINAL AORTOGRAM  5/10/2013     IR MISCELLANEOUS PROCEDURE  5/10/2013       Medications (outpatient):  Current Outpatient Medications   Medication Sig Dispense Refill     aspirin (ASA) 81 MG chewable tablet Take 81 mg by mouth every evening       diltiazem ER COATED BEADS  "(CARDIZEM CD/CARTIA XT) 180 MG 24 hr capsule Take 1 capsule (180 mg) by mouth daily 90 capsule 1     LORazepam (ATIVAN) 1 MG tablet 0.5 mg As of 01/14/21 patinet taking 1 tablet at bedtime and one half tablet as needed.  As of 05/05/2021 Pt taking 0.5 mg 3x daily as needed       memantine (NAMENDA) 10 MG tablet Take 10 mg by mouth 2 times daily        metoprolol tartrate (LOPRESSOR) 50 MG tablet Take 1 tablet (50 mg) by mouth 2 times daily 180 tablet 1     Multiple Vitamins-Minerals (CENTRUM WOMEN PO) Take by mouth daily       OLANZapine (ZYPREXA) 10 MG tablet Take 10 mg by mouth At Bedtime        traZODone (DESYREL) 100 MG tablet Take 100 mg by mouth At Bedtime Take 1/2 to one tablet at bedtime         Allergies:  Allergies   Allergen Reactions     Penicillins Swelling     Pt states she has tolerated cephalosporins in the past.        Social History:   History   Drug Use No      History   Smoking Status     Former Smoker     Packs/day: 1.00     Types: Cigarettes     Quit date: 2017   Smokeless Tobacco     Never Used     Social History    Substance and Sexual Activity      Alcohol use: Not Currently       Family History:  No family history on file.    Review of Systems:   A complete review of systems was negative except as mentioned in the History of Present Illness.     Objective & Physical Exam:  /56 (BP Location: Right arm, Patient Position: Sitting, Cuff Size: Adult Regular)   Pulse 69   Ht 1.562 m (5' 1.5\")   Wt 54.2 kg (119 lb 6.4 oz)   SpO2 93%   BMI 22.20 kg/m    Wt Readings from Last 2 Encounters:   08/05/21 54.2 kg (119 lb 6.4 oz)   02/12/21 50.3 kg (111 lb)     Body mass index is 22.2 kg/m .   Body surface area is 1.53 meters squared.    Constitutional: appears stated age, in no apparent distress, appears to be well nourished  Eyes: sclera anicteric, conjunctiva normal  ENT: normocephalic, without obvious abnormality, atraumatic  Pulmonary: mild end expiratory wheezing  Cardiovascular: JVP " normal, regular rate, regular rhythm, normal S1 and S2, no S3, S4, no murmur appreciated, no lower extremity edema  Gastrointestinal: abdominal exam benign  Neurologic: awake, alert, face symmetrical, moves all extremities  Skin: no jaundice    Data reviewed:  Lab Results   Component Value Date    WBC 5.7 01/19/2021    RBC 4.11 01/19/2021    HGB 12.6 01/19/2021    HCT 38.9 01/19/2021    MCV 95 01/19/2021    MCH 30.7 01/19/2021    MCHC 32.4 01/19/2021    RDW 12.3 01/19/2021     01/19/2021     Sodium   Date Value Ref Range Status   01/19/2021 142 133 - 144 mmol/L Final     Potassium   Date Value Ref Range Status   01/19/2021 4.0 3.4 - 5.3 mmol/L Final     Chloride   Date Value Ref Range Status   01/19/2021 108 94 - 109 mmol/L Final     Carbon Dioxide   Date Value Ref Range Status   01/19/2021 31 20 - 32 mmol/L Final     Anion Gap   Date Value Ref Range Status   01/19/2021 3 3 - 14 mmol/L Final     Glucose   Date Value Ref Range Status   01/19/2021 125 (H) 70 - 99 mg/dL Final     Urea Nitrogen   Date Value Ref Range Status   01/19/2021 20 7 - 30 mg/dL Final     Creatinine   Date Value Ref Range Status   01/19/2021 0.74 0.52 - 1.04 mg/dL Final     GFR Estimate   Date Value Ref Range Status   01/19/2021 83 >60 mL/min/[1.73_m2] Final     Comment:     Non  GFR Calc  Starting 12/18/2018, serum creatinine based estimated GFR (eGFR) will be   calculated using the Chronic Kidney Disease Epidemiology Collaboration   (CKD-EPI) equation.       Calcium   Date Value Ref Range Status   01/19/2021 9.2 8.5 - 10.1 mg/dL Final     Bilirubin Total   Date Value Ref Range Status   01/19/2021 0.2 0.2 - 1.3 mg/dL Final     Alkaline Phosphatase   Date Value Ref Range Status   01/19/2021 78 40 - 150 U/L Final     ALT   Date Value Ref Range Status   01/19/2021 20 0 - 50 U/L Final     AST   Date Value Ref Range Status   01/19/2021 16 0 - 45 U/L Final     Recent Labs   Lab Test 05/31/19  0944 05/31/19  0000   CHOL 219* 219*    HDL 87 87    115   TRIG 85 85      Lab Results   Component Value Date    A1C 5.8 05/31/2019    A1C 6.1 03/07/2018    A1C 5.6 01/23/2018    A1C 5.8 01/31/2017    A1C 6.1 01/17/2017    A1C 5.4 06/01/2015          Thank you for allowing me to participate in the care of your patient.      Sincerely,     Reji Moser MD     [facility]  cc:   No referring provider defined for this encounter.

## 2021-08-20 ENCOUNTER — TELEPHONE (OUTPATIENT)
Dept: FAMILY MEDICINE | Facility: CLINIC | Age: 71
End: 2021-08-20

## 2021-09-12 ENCOUNTER — HEALTH MAINTENANCE LETTER (OUTPATIENT)
Age: 71
End: 2021-09-12

## 2021-09-14 ENCOUNTER — OFFICE VISIT (OUTPATIENT)
Dept: FAMILY MEDICINE | Facility: CLINIC | Age: 71
End: 2021-09-14
Payer: COMMERCIAL

## 2021-09-14 VITALS
WEIGHT: 119.4 LBS | TEMPERATURE: 97.7 F | HEART RATE: 72 BPM | BODY MASS INDEX: 21.97 KG/M2 | DIASTOLIC BLOOD PRESSURE: 60 MMHG | HEIGHT: 62 IN | OXYGEN SATURATION: 95 % | RESPIRATION RATE: 20 BRPM | SYSTOLIC BLOOD PRESSURE: 100 MMHG

## 2021-09-14 DIAGNOSIS — Z00.00 ENCOUNTER FOR MEDICARE ANNUAL WELLNESS EXAM: Primary | ICD-10-CM

## 2021-09-14 DIAGNOSIS — R73.09 ELEVATED GLUCOSE: ICD-10-CM

## 2021-09-14 DIAGNOSIS — Z12.11 SCREEN FOR COLON CANCER: ICD-10-CM

## 2021-09-14 DIAGNOSIS — I48.91 ATRIAL FIBRILLATION, UNSPECIFIED TYPE (H): ICD-10-CM

## 2021-09-14 DIAGNOSIS — Z11.59 NEED FOR HEPATITIS C SCREENING TEST: ICD-10-CM

## 2021-09-14 PROBLEM — K55.1 SUPERIOR MESENTERIC ARTERY STENOSIS (H): Status: ACTIVE | Noted: 2021-09-14

## 2021-09-14 PROBLEM — J96.01 ACUTE RESPIRATORY FAILURE WITH HYPOXIA (H): Status: RESOLVED | Noted: 2020-07-16 | Resolved: 2021-09-14

## 2021-09-14 PROCEDURE — 36415 COLL VENOUS BLD VENIPUNCTURE: CPT | Performed by: NURSE PRACTITIONER

## 2021-09-14 PROCEDURE — G0438 PPPS, INITIAL VISIT: HCPCS | Performed by: NURSE PRACTITIONER

## 2021-09-14 PROCEDURE — G0008 ADMIN INFLUENZA VIRUS VAC: HCPCS | Mod: 59 | Performed by: NURSE PRACTITIONER

## 2021-09-14 PROCEDURE — 90471 IMMUNIZATION ADMIN: CPT | Performed by: NURSE PRACTITIONER

## 2021-09-14 PROCEDURE — 80048 BASIC METABOLIC PNL TOTAL CA: CPT | Performed by: NURSE PRACTITIONER

## 2021-09-14 PROCEDURE — 90715 TDAP VACCINE 7 YRS/> IM: CPT | Performed by: NURSE PRACTITIONER

## 2021-09-14 PROCEDURE — 90662 IIV NO PRSV INCREASED AG IM: CPT | Performed by: NURSE PRACTITIONER

## 2021-09-14 RX ORDER — METOPROLOL TARTRATE 50 MG
50 TABLET ORAL 2 TIMES DAILY
Qty: 180 TABLET | Refills: 1 | Status: SHIPPED | OUTPATIENT
Start: 2021-09-14 | End: 2022-01-01

## 2021-09-14 RX ORDER — DILTIAZEM HYDROCHLORIDE 180 MG/1
180 CAPSULE, COATED, EXTENDED RELEASE ORAL DAILY
Qty: 90 CAPSULE | Refills: 1 | Status: SHIPPED | OUTPATIENT
Start: 2021-09-14 | End: 2022-01-01

## 2021-09-14 ASSESSMENT — ACTIVITIES OF DAILY LIVING (ADL)
CURRENT_FUNCTION: MONEY MANAGEMENT REQUIRES ASSISTANCE
CURRENT_FUNCTION: MEDICATION ADMINISTRATION REQUIRES ASSISTANCE
CURRENT_FUNCTION: PREPARING MEALS REQUIRES ASSISTANCE
CURRENT_FUNCTION: HOUSEWORK REQUIRES ASSISTANCE
CURRENT_FUNCTION: BATHING REQUIRES ASSISTANCE
CURRENT_FUNCTION: LAUNDRY REQUIRES ASSISTANCE
CURRENT_FUNCTION: TRANSPORTATION REQUIRES ASSISTANCE

## 2021-09-14 ASSESSMENT — ENCOUNTER SYMPTOMS
DYSURIA: 0
SHORTNESS OF BREATH: 1
ABDOMINAL PAIN: 0
HEARTBURN: 0
DIZZINESS: 1
HEMATOCHEZIA: 0
JOINT SWELLING: 1
ARTHRALGIAS: 1
PALPITATIONS: 0
NAUSEA: 0
BREAST MASS: 0
HEMATURIA: 0
SORE THROAT: 0
CHILLS: 0
COUGH: 1
FEVER: 0
CONSTIPATION: 1
MYALGIAS: 1
HEADACHES: 0
NERVOUS/ANXIOUS: 1
PARESTHESIAS: 1
WEAKNESS: 1
FREQUENCY: 0

## 2021-09-14 ASSESSMENT — MIFFLIN-ST. JEOR: SCORE: 1011.9

## 2021-09-14 ASSESSMENT — PATIENT HEALTH QUESTIONNAIRE - PHQ9
SUM OF ALL RESPONSES TO PHQ QUESTIONS 1-9: 8
10. IF YOU CHECKED OFF ANY PROBLEMS, HOW DIFFICULT HAVE THESE PROBLEMS MADE IT FOR YOU TO DO YOUR WORK, TAKE CARE OF THINGS AT HOME, OR GET ALONG WITH OTHER PEOPLE: VERY DIFFICULT
SUM OF ALL RESPONSES TO PHQ QUESTIONS 1-9: 8

## 2021-09-14 ASSESSMENT — PAIN SCALES - GENERAL: PAINLEVEL: NO PAIN (0)

## 2021-09-14 NOTE — PROGRESS NOTES
"SUBJECTIVE:   Kelly Nunez is a 71 year old female who presents for Preventive Visit.      Patient has been advised of split billing requirements and indicates understanding: Yes   Are you in the first 12 months of your Medicare coverage?  No    Healthy Habits:     In general, how would you rate your overall health?  Fair    Frequency of exercise:  None    Do you usually eat at least 4 servings of fruit and vegetables a day, include whole grains    & fiber and avoid regularly eating high fat or \"junk\" foods?  Yes    Taking medications regularly:  Yes    Barriers to taking medications:  Other    Medication side effects:  Not applicable    Ability to successfully perform activities of daily living:  Transportation requires assistance, preparing meals requires assistance, housework requires assistance, bathing requires assistance, laundry requires assistance, medication administration requires assistance and money management requires assistance    Home Safety:  Lack of grab bars in the bathroom    Hearing Impairment:  No hearing concerns    In the past 6 months, have you been bothered by leaking of urine? Yes    In general, how would you rate your overall mental or emotional health?  Fair      PHQ-2 Total Score: 3    Do you feel safe in your environment? Yes    Have you ever done Advance Care Planning? (For example, a Health Directive, POLST, or a discussion with a medical provider or your loved ones about your wishes): Yes, patient states has an Advance Care Planning document and will bring a copy to the clinic.       Fall risk  Fallen 2 or more times in the past year?: No  Any fall with injury in the past year?: No    Cognitive Screening Not appropriate due to known dementia    Do you have sleep apnea, excessive snoring or daytime drowsiness?: yes    Reviewed and updated as needed this visit by clinical staff  Tobacco  Allergies  Meds   Med Hx  Surg Hx  Fam Hx  Soc Hx        Reviewed and updated as needed " this visit by Provider                Social History     Tobacco Use     Smoking status: Former Smoker     Packs/day: 1.00     Types: Cigarettes     Quit date: 2017     Years since quittin.7     Smokeless tobacco: Never Used   Substance Use Topics     Alcohol use: Not Currently     If you drink alcohol do you typically have >3 drinks per day or >7 drinks per week? No    Alcohol Use 2021   Prescreen: >3 drinks/day or >7 drinks/week? No               Current providers sharing in care for this patient include:   Patient Care Team:  Page Jacobs APRN CNP as PCP - General (Family Medicine)  Adam Gastelum DPM as Assigned Musculoskeletal Provider  Adam Lee MD as Assigned Surgical Provider  Page Jacobs APRN CNP as Assigned PCP  Reji Moser MD as Assigned Heart and Vascular Provider    The following health maintenance items are reviewed in Epic and correct as of today:  Health Maintenance Due   Topic Date Due     DEXA  Never done     SPIROMETRY  Never done     ANNUAL REVIEW OF HM ORDERS  Never done     ADVANCE CARE PLANNING  Never done     COPD ACTION PLAN  Never done     COLORECTAL CANCER SCREENING  Never done     HEPATITIS C SCREENING  Never done     DTAP/TDAP/TD IMMUNIZATION (1 - Tdap) Never done     ZOSTER IMMUNIZATION (2 of 3) 12/10/2012     FALL RISK ASSESSMENT  Never done     MAMMO SCREENING  2019     INFLUENZA VACCINE (1) 2021     Labs reviewed in EPIC  BP Readings from Last 3 Encounters:   21 100/60   21 116/56   21 122/64    Wt Readings from Last 3 Encounters:   21 54.2 kg (119 lb 6.4 oz)   21 54.2 kg (119 lb 6.4 oz)   21 50.3 kg (111 lb)                  Current Outpatient Medications   Medication Sig Dispense Refill     aspirin (ASA) 81 MG chewable tablet Take 81 mg by mouth every evening       diltiazem ER COATED BEADS (CARDIZEM CD/CARTIA XT) 180 MG 24 hr capsule Take 1 capsule (180 mg) by mouth daily 90 capsule  1     LORazepam (ATIVAN) 1 MG tablet 0.5 mg As of 01/14/21 patinet taking 1 tablet at bedtime and one half tablet as needed.  As of 05/05/2021 Pt taking 0.5 mg 3x daily as needed       memantine (NAMENDA) 10 MG tablet Take 10 mg by mouth 2 times daily        metoprolol tartrate (LOPRESSOR) 50 MG tablet Take 1 tablet (50 mg) by mouth 2 times daily 180 tablet 1     Multiple Vitamins-Minerals (CENTRUM WOMEN PO) Take by mouth daily       OLANZapine (ZYPREXA) 10 MG tablet Take 10 mg by mouth At Bedtime        traZODone (DESYREL) 100 MG tablet Take 100 mg by mouth At Bedtime Take 1/2 to one tablet at bedtime       Allergies   Allergen Reactions     Penicillins Swelling     Pt states she has tolerated cephalosporins in the past.      Mammogram Screening: Mammogram Screening: Recommended mammography every 1-2 years with patient discussion and risk factor consideration.  Hx of dementia; elects to discontinue screening.     Breast CA Risk Assessment (FHS-7) 9/14/2021   Do you have a family history of breast, colon, or ovarian cancer? No / Unknown         Mammogram Screening - Mammography discussed and declined  Pertinent mammograms are reviewed under the imaging tab.    Review of Systems   Constitutional: Negative for chills and fever.   HENT: Positive for congestion. Negative for hearing loss and sore throat.    Eyes: Negative for visual disturbance.   Respiratory: Positive for cough and shortness of breath.    Cardiovascular: Positive for peripheral edema. Negative for chest pain and palpitations.   Gastrointestinal: Positive for constipation. Negative for abdominal pain, heartburn, hematochezia and nausea.   Breasts:  Negative for tenderness, breast mass and discharge.   Genitourinary: Negative for dysuria, frequency, genital sores, hematuria, pelvic pain, urgency, vaginal bleeding and vaginal discharge.   Musculoskeletal: Positive for arthralgias, joint swelling and myalgias.   Skin: Negative for rash.   Neurological:  "Positive for dizziness, weakness and paresthesias. Negative for headaches.   Psychiatric/Behavioral: Positive for mood changes. The patient is nervous/anxious.      Spouse concerned she is coughing frequently.  Saw cardiology in august who felt cough, dyspnea were not related to her heart and gave her a referral to pulmonology.  Has upcoming appt at [facility]. She is currently not treated with inhalers for COPD.     No known hx of covid-19 infection.     She c/o feet hurt all the time.  Does routine pedicures.  Saw podiatry in the past and they did nail trimming.  Nails are curved.     She was struggling a little bit with anxiety and psych increased lorazepam to 3x/day, but usually only needing this 2x/day.  Psych is managing meds for mood, anxiety, dementia. They have home care coming in during the week to provide assistance.  Daughter comes every Sunday. Living with spouse.     OBJECTIVE:   /60 (BP Location: Right arm, Patient Position: Sitting, Cuff Size: Adult Regular)   Pulse 72   Temp 97.7  F (36.5  C) (Oral)   Resp 20   Ht 1.562 m (5' 1.5\")   Wt 54.2 kg (119 lb 6.4 oz)   SpO2 95%   BMI 22.20 kg/m   Estimated body mass index is 22.2 kg/m  as calculated from the following:    Height as of this encounter: 1.562 m (5' 1.5\").    Weight as of this encounter: 54.2 kg (119 lb 6.4 oz).  Physical Exam  GENERAL: alert and no distress  EYES: Eyes grossly normal to inspection, PERRL and conjunctivae and sclerae normal  HENT: ear canals and TM's normal, nose and mouth without ulcers or lesions  NECK: no adenopathy, no asymmetry, masses, or scars and thyroid normal to palpation  RESP: lungs clear to auscultation - no rales, rhonchi or wheezes, normal effort  CV: regular rate and rhythm, normal S1 S2,  no murmur, click or rub, no peripheral edema and peripheral pulses strong  ABDOMEN: soft, nontender, no hepatosplenomegaly, no masses and bowel sounds normal  MS: no gross musculoskeletal defects noted, no " "edema  SKIN: no suspicious lesions or rashes, toe nails grow out into neighboring toes  NEURO: Normal strength and tone, alert, cognitive deficits consistent with dementia   PSYCH: affect flat, poor eye contact    Diagnostic Test Results:  Labs reviewed in Epic    ASSESSMENT / PLAN:   1. Encounter for Medicare annual wellness exam    2. Screen for colon cancer  Elects to discontinue screening.  Updated HM.     3. Atrial fibrillation, unspecified type (H)  S/p PPM, EP following.  Continue medications.   - diltiazem ER COATED BEADS (CARDIZEM CD/CARTIA XT) 180 MG 24 hr capsule; Take 1 capsule (180 mg) by mouth daily  Dispense: 90 capsule; Refill: 1  - metoprolol tartrate (LOPRESSOR) 50 MG tablet; Take 1 tablet (50 mg) by mouth 2 times daily  Dispense: 180 tablet; Refill: 1    4. Elevated glucose  Spouse reports in the past was diagnosed with diabetes.  Numbers improved with weight loss, diet.  Checking lab today.   - Basic metabolic panel  (Ca, Cl, CO2, Creat, Gluc, K, Na, BUN); Future  - Basic metabolic panel  (Ca, Cl, CO2, Creat, Gluc, K, Na, BUN)    COUNSELING:  Reviewed preventive health counseling, as reflected in patient instructions       Healthy diet/nutrition       Fall risk prevention       Immunizations    Vaccinated for: Influenza and TDAP             Colon cancer screening    Estimated body mass index is 22.2 kg/m  as calculated from the following:    Height as of this encounter: 1.562 m (5' 1.5\").    Weight as of this encounter: 54.2 kg (119 lb 6.4 oz).        She reports that she quit smoking about 4 years ago. Her smoking use included cigarettes. She smoked 1.00 pack per day. She has never used smokeless tobacco.      Appropriate preventive services were discussed with this patient, including applicable screening as appropriate for cardiovascular disease, diabetes, osteopenia/osteoporosis, and glaucoma.  As appropriate for age/gender, discussed screening for colorectal cancer, prostate cancer, breast " cancer, and cervical cancer. Checklist reviewing preventive services available has been given to the patient.    Reviewed patients plan of care and provided an AVS. The Basic Care Plan (routine screening as documented in Health Maintenance) for Kelly meets the Care Plan requirement. This Care Plan has been established and reviewed with the Patient and spouse.    Counseling Resources:  ATP IV Guidelines  Pooled Cohorts Equation Calculator  Breast Cancer Risk Calculator  Breast Cancer: Medication to Reduce Risk  FRAX Risk Assessment  ICSI Preventive Guidelines  Dietary Guidelines for Americans, 2010  [employer]'s MyPlate  ASA Prophylaxis  Lung CA Screening    MARYCHUY Mcke[facility]    Identified Health Risks:  Answers for HPI/ROS submitted by the patient on 9/14/2021  If you checked off any problems, how difficult have these problems made it for you to do your work, take care of things at home, or get along with other people?: Very difficult  PHQ9 TOTAL SCORE: 8        The patient was provided with suggestions to help her develop a healthy physical lifestyle.  She is at risk for lack of exercise and has been provided with information to increase physical activity for the benefit of her well-being.  Information on urinary incontinence and treatment options given to patient.  The patient was provided with suggestions to help her develop a healthy emotional lifestyle.

## 2021-09-14 NOTE — PATIENT INSTRUCTIONS
Patient Education   Personalized Prevention Plan  You are due for the preventive services outlined below.  Your care team is available to assist you in scheduling these services.  If you have already completed any of these items, please share that information with your care team to update in your medical record.  Health Maintenance Due   Topic Date Due     Osteoporosis Screening  Never done     Breathing Capacity Test  Never done     ANNUAL REVIEW OF HM ORDERS  Never done     Discuss Advance Care Planning  Never done     COPD Action Plan  Never done     Colorectal Cancer Screening  Never done     Hepatitis C Screening  Never done     Diptheria Tetanus Pertussis (DTAP/TDAP/TD) Vaccine (1 - Tdap) Never done     Zoster (Shingles) Vaccine (2 of 3) 12/10/2012     FALL RISK ASSESSMENT  Never done     Mammogram  02/03/2019     Flu Vaccine (1) 09/01/2021     Your Health Risk Assessment indicates you feel you are not in good health    A healthy lifestyle helps keep the body fit and the mind alert. It helps protect you from disease, helps you fight disease, and helps prevent chronic disease (disease that doesn't go away) from getting worse. This is important as you get older and begin to notice twinges in muscles and joints and a decline in the strength and stamina you once took for granted. A healthy lifestyle includes good healthcare, good nutrition, weight control, recreation, and regular exercise. Avoid harmful substances and do what you can to keep safe. Another part of a healthy lifestyle is stay mentally active and socially involved.    Good healthcare     Have a wellness visit every year.     If you have new symptoms, let us know right away. Don't wait until the next checkup.     Take medicines exactly as prescribed and keep your medicines in a safe place. Tell us if your medicine causes problems.   Healthy diet and weight control     Eat 3 or 4 small, nutritious, low-fat, high-fiber meals a day. Include a variety  of fruits, vegetables, and whole-grain foods.     Make sure you get enough calcium in your diet. Calcium, vitamin D, and exercise help prevent osteoporosis (bone thinning).     If you live alone, try eating with others when you can. That way you get a good meal and have company while you eat it.     Try to keep a healthy weight. If you eat more calories than your body uses for energy, it will be stored as fat and you will gain weight.     Recreation   Recreation is not limited to sports and team events. It includes any activity that provides relaxation, interest, enjoyment, and exercise. Recreation provides an outlet for physical, mental, and social energy. It can give a sense of worth and achievement. It can help you stay healthy.    Mental Exercise and Social Involvement  Mental and emotional health is as important as physical health. Keep in touch with friends and family. Stay as active as possible. Continue to learn and challenge yourself.   Things you can do to stay mentally active are:    Learn something new, like a foreign language or musical instrument.     Play SCRABBLE or do crossword puzzles. If you cannot find people to play these games with you at home, you can play them with others on your computer through the Internet.     Join a games club--anything from card games to chess or checkers or lawn bowling.     Start a new hobby.     Go back to school.     Volunteer.     Read.   Keep up with world events.    Exercise for a Healthier Heart  You may wonder how you can improve the health of your heart. If you re thinking about exercise, you re on the right track. You don t need to become an athlete. But you do need a certain amount of brisk exercise to help strengthen your heart. If you have been diagnosed with a heart condition, your healthcare provider may advise exercise to help stabilize your condition. To help make exercise a habit, choose safe, fun activities.      Exercise with a friend. When activity  is fun, you're more likely to stick with it.   Before you start  Check with your healthcare provider before starting an exercise program. This is especially important if you have not been active for a while. It's also important if you have a long-term (chronic) health problem such as heart disease, diabetes, or obesity. Or if you are at high risk for having these problems.   Why exercise?  Exercising regularly offers many healthy rewards. It can help you do all of the following:     Improve your blood cholesterol level to help prevent further heart trouble    Lower your blood pressure to help prevent a stroke or heart attack    Control diabetes, or reduce your risk of getting this disease    Improve your heart and lung function    Reach and stay at a healthy weight    Make your muscles stronger so you can stay active    Prevent falls and fractures by slowing the loss of bone mass (osteoporosis)    Manage stress better    Reduce your blood pressure    Improve your sense of self and your body image  Exercise tips      Ease into your routine. Set small goals. Then build on them. If you are not sure what your activity level should be, talk with your healthcare provider first before starting an exercise routine.    Exercise on most days. Aim for a total of 150 minutes (2 hours and 30 minutes) or more of moderate-intensity aerobic activity each week. Or 75 minutes (1 hour and 15 minutes) or more of vigorous-intensity aerobic activity each week. Or try for a combination of both. Moderate activity means that you breathe heavier and your heart rate increases but you can still talk. Think about doing 40 minutes of moderate exercise, 3 to 4 times a week. For best results, activity should last for about 40 minutes to lower blood pressure and cholesterol. It's OK to work up to the 40-minute period over time. Examples of moderate-intensity activity are walking 1 mile in 15 minutes. Or doing 30 to 45 minutes of yard work.    Step  up your daily activity level.  Along with your exercise program, try being more active the whole day. Walk instead of drive. Or park further away so that you take more steps each day. Do more household tasks or yard work. You may not be able to meet the advised mount of physical activity. But doing some moderate- or vigorous-intensity aerobic activity can help reduce your risk for heart disease. Your healthcare provider can help you figure out what is best for you.    Choose 1 or more activities you enjoy.  Walking is one of the easiest things you can do. You can also try swimming, riding a bike, dancing, or taking an exercise class.    When to call your healthcare provider  Call your healthcare provider if you have any of these:     Chest pain or feel dizzy or lightheaded    Burning, tightness, pressure, or heaviness in your chest, neck, shoulders, back, or arms    Abnormal shortness of breath    More joint or muscle pain    A very fast or irregular heartbeat (palpitations)  [employer] last reviewed this educational content on 7/1/2019 2000-2021 [employer]. All rights reserved. This information is not intended as a substitute for professional medical care. Always follow your healthcare professional's instructions.        Activities of Daily Living    Your Health Risk Assessment indicates you have difficulties with activities of daily living such as housework, bathing, preparing meals, taking medication, etc. Please make a follow up appointment for us to address this issue in more detail.    Urinary Incontinence, Female (Adult)   Urinary incontinence means loss of bladder control. This problem affects many women, especially as they get older. If you have incontinence, you may be embarrassed to ask for help. But know that this problem can be treated.   Types of Incontinence  There are different types of incontinence. Two of the main types are described here. You can have more than one type.     Stress  incontinence. With this type, urine leaks when pressure (stress) is put on the bladder. This may happen when you cough, sneeze, or laugh. Stress incontinence most often occurs because the pelvic floor muscles that support the bladder and urethra are weak. This can happen after pregnancy and vaginal childbirth or a hysterectomy. It can also be due to excess body weight or hormone changes.    Urge incontinence (also called overactive bladder). With this type, a sudden urge to urinate is felt often. This may happen even though there may not be much urine in the bladder. The need to urinate often during the night is common. Urge incontinence most often occurs because of bladder spasms. This may be due to bladder irritation or infection. Damage to bladder nerves or pelvic muscles, constipation, and certain medicines can also lead to urge incontinence.  Treatment depends on the cause. Further evaluation is needed to find the type you have. This will likely include an exam and certain tests. Based on the results, you and your healthcare provider can then plan treatment. Until a diagnosis is made, the home care tips below can help ease symptoms.   Home care    Do pelvic floor muscle exercises, if they are prescribed. The pelvic floor muscles help support the bladder and urethra. Many women find that their symptoms improve when doing special exercises that strengthen these muscles. To do the exercises, contract the muscles you would use to stop your stream of urine. But do this when you re not urinating. Hold for 10 seconds, then relax. Repeat 10 to 20 times in a row, at least 3 times a day. Your healthcare provider may give you other instructions for how to do the exercises and how often.    Keep a bladder diary. This helps track how often and how much you urinate over a set period of time. Bring this diary with you to your next visit with the provider. The information can help your provider learn more about your bladder  problem.    Lose weight, if advised to by your provider. Extra weight puts pressure on the bladder. Your provider can help you create a weight-loss plan that s right for you. This may include exercising more and making certain diet changes.    Don't have foods and drinks that may irritate the bladder. These can include alcohol and caffeinated drinks.    Quit smoking. Smoking and other tobacco use can lead to a long-term (chronic) cough that strains the pelvic floor muscles. Smoking may also damage the bladder and urethra. Talk with your provider about treatments or methods you can use to quit smoking.    If drinking large amounts of fluid makes you have symptoms, you may be advised to limit your fluid intake. You may also be advised to drink most of your fluids during the day and to limit fluids at night.    If you re worried about urine leakage or accidents, you may wear absorbent pads to catch urine. Change the pads often. This helps reduce discomfort. It may also reduce the risk of skin or bladder infections.    Follow-up care  Follow up with your healthcare provider, or as directed. It may take some to find the right treatment for your problem. But healthy lifestyle changes can be made right away. These include such things as exercising on a regular basis, eating a healthy diet, losing weight (if needed), and quitting smoking. Your treatment plan may include special therapies or medicines. Certain procedures or surgery may also be options. Talk about any questions you have with your provider.   When to seek medical advice  Call the healthcare provider right away if any of these occur:    Fever of 100.4 F (38 C) or higher, or as directed by your provider    Bladder pain or fullness    Belly swelling    Nausea or vomiting    Back pain    Weakness, dizziness, or fainting  [employer] last reviewed this educational content on 1/1/2020 2000-2021 [employer]. All rights reserved. This information is not  intended as a substitute for professional medical care. Always follow your healthcare professional's instructions.        Your Health Risk Assessment indicates you feel you are not in good emotional health.    Recreation   Recreation is not limited to sports and team events. It includes any activity that provides relaxation, interest, enjoyment, and exercise. Recreation provides an outlet for physical, mental, and social energy. It can give a sense of worth and achievement. It can help you stay healthy.    Mental Exercise and Social Involvement  Mental and emotional health is as important as physical health. Keep in touch with friends and family. Stay as active as possible. Continue to learn and challenge yourself.   Things you can do to stay mentally active are:    Learn something new, like a foreign language or musical instrument.     Play SCRABBLE or do crossword puzzles. If you cannot find people to play these games with you at home, you can play them with others on your computer through the Internet.     Join a games club--anything from card games to chess or checkers or lawn bowling.     Start a new hobby.     Go back to school.     Volunteer.     Read.   Keep up with world events.

## 2021-09-15 LAB
ANION GAP SERPL CALCULATED.3IONS-SCNC: 1 MMOL/L (ref 3–14)
BUN SERPL-MCNC: 19 MG/DL (ref 7–30)
CALCIUM SERPL-MCNC: 8.5 MG/DL (ref 8.5–10.1)
CHLORIDE BLD-SCNC: 110 MMOL/L (ref 94–109)
CO2 SERPL-SCNC: 30 MMOL/L (ref 20–32)
CREAT SERPL-MCNC: 0.8 MG/DL (ref 0.52–1.04)
GFR SERPL CREATININE-BSD FRML MDRD: 75 ML/MIN/1.73M2
GLUCOSE BLD-MCNC: 93 MG/DL (ref 70–99)
POTASSIUM BLD-SCNC: 4.1 MMOL/L (ref 3.4–5.3)
SODIUM SERPL-SCNC: 141 MMOL/L (ref 133–144)

## 2021-09-15 ASSESSMENT — PATIENT HEALTH QUESTIONNAIRE - PHQ9: SUM OF ALL RESPONSES TO PHQ QUESTIONS 1-9: 8

## 2021-09-16 ENCOUNTER — HOSPITAL ENCOUNTER (OUTPATIENT)
Dept: CARDIOLOGY | Facility: CLINIC | Age: 71
Discharge: HOME | End: 2021-09-16
Attending: INTERNAL MEDICINE | Admitting: INTERNAL MEDICINE
Payer: COMMERCIAL

## 2021-09-16 DIAGNOSIS — R06.09 DYSPNEA ON EXERTION: ICD-10-CM

## 2021-09-16 LAB — LVEF ECHO: NORMAL

## 2021-09-16 PROCEDURE — 93306 TTE W/DOPPLER COMPLETE: CPT

## 2021-09-16 PROCEDURE — 93306 TTE W/DOPPLER COMPLETE: CPT | Mod: 26 | Performed by: INTERNAL MEDICINE

## 2021-09-22 ENCOUNTER — ANCILLARY PROCEDURE (OUTPATIENT)
Dept: CARDIOLOGY | Facility: CLINIC | Age: 71
End: 2021-09-22
Attending: INTERNAL MEDICINE
Payer: COMMERCIAL

## 2021-09-22 DIAGNOSIS — Z95.0 CARDIAC PACEMAKER IN SITU: ICD-10-CM

## 2021-09-22 PROCEDURE — 93294 REM INTERROG EVL PM/LDLS PM: CPT | Performed by: INTERNAL MEDICINE

## 2021-09-22 PROCEDURE — 93296 REM INTERROG EVL PM/IDS: CPT | Performed by: INTERNAL MEDICINE

## 2021-09-23 LAB
MDC_IDC_LEAD_IMPLANT_DT: NORMAL
MDC_IDC_LEAD_IMPLANT_DT: NORMAL
MDC_IDC_LEAD_LOCATION: NORMAL
MDC_IDC_LEAD_LOCATION: NORMAL
MDC_IDC_LEAD_LOCATION_DETAIL_1: NORMAL
MDC_IDC_LEAD_LOCATION_DETAIL_1: NORMAL
MDC_IDC_LEAD_MFG: NORMAL
MDC_IDC_LEAD_MFG: NORMAL
MDC_IDC_LEAD_MODEL: NORMAL
MDC_IDC_LEAD_MODEL: NORMAL
MDC_IDC_LEAD_POLARITY_TYPE: NORMAL
MDC_IDC_LEAD_POLARITY_TYPE: NORMAL
MDC_IDC_LEAD_SERIAL: NORMAL
MDC_IDC_LEAD_SERIAL: NORMAL
MDC_IDC_MSMT_BATTERY_REMAINING_PERCENTAGE: 90 %
MDC_IDC_MSMT_BATTERY_STATUS: NORMAL
MDC_IDC_MSMT_LEADCHNL_RA_IMPEDANCE_VALUE: 539 OHM
MDC_IDC_MSMT_LEADCHNL_RA_LEAD_CHANNEL_STATUS: NORMAL
MDC_IDC_MSMT_LEADCHNL_RA_PACING_THRESHOLD_AMPLITUDE: 0.9 V
MDC_IDC_MSMT_LEADCHNL_RA_PACING_THRESHOLD_PULSEWIDTH: 0.4 MS
MDC_IDC_MSMT_LEADCHNL_RV_IMPEDANCE_VALUE: 666 OHM
MDC_IDC_MSMT_LEADCHNL_RV_LEAD_CHANNEL_STATUS: NORMAL
MDC_IDC_MSMT_LEADCHNL_RV_PACING_THRESHOLD_AMPLITUDE: 1 V
MDC_IDC_MSMT_LEADCHNL_RV_PACING_THRESHOLD_PULSEWIDTH: 0.4 MS
MDC_IDC_PG_IMPLANT_DTM: NORMAL
MDC_IDC_PG_MFG: NORMAL
MDC_IDC_PG_MODEL: NORMAL
MDC_IDC_PG_SERIAL: NORMAL
MDC_IDC_PG_TYPE: NORMAL
MDC_IDC_SESS_CLINIC_NAME: NORMAL
MDC_IDC_SESS_DTM: NORMAL
MDC_IDC_SESS_REPROGRAMMED: NO
MDC_IDC_SESS_TYPE: NORMAL
MDC_IDC_SET_BRADY_AT_MODE_SWITCH_MODE: NORMAL
MDC_IDC_SET_BRADY_AT_MODE_SWITCH_RATE: 180 {BEATS}/MIN
MDC_IDC_SET_BRADY_LOWRATE: 60 {BEATS}/MIN
MDC_IDC_SET_BRADY_MAX_SENSOR_RATE: 120 {BEATS}/MIN
MDC_IDC_SET_BRADY_MAX_TRACKING_RATE: 130 {BEATS}/MIN
MDC_IDC_SET_BRADY_MODE: NORMAL
MDC_IDC_SET_BRADY_PAV_DELAY_HIGH: 160 MS
MDC_IDC_SET_BRADY_PAV_DELAY_LOW: 200 MS
MDC_IDC_SET_BRADY_SAV_DELAY_HIGH: 130 MS
MDC_IDC_SET_BRADY_SAV_DELAY_LOW: 170 MS
MDC_IDC_SET_LEADCHNL_RA_PACING_AMPLITUDE: 1.9 V
MDC_IDC_SET_LEADCHNL_RA_PACING_POLARITY: NORMAL
MDC_IDC_SET_LEADCHNL_RA_PACING_PULSEWIDTH: 0.4 MS
MDC_IDC_SET_LEADCHNL_RA_SENSING_ADAPTATION_MODE: NORMAL
MDC_IDC_SET_LEADCHNL_RA_SENSING_POLARITY: NORMAL
MDC_IDC_SET_LEADCHNL_RV_PACING_AMPLITUDE: 1.4 V
MDC_IDC_SET_LEADCHNL_RV_PACING_POLARITY: NORMAL
MDC_IDC_SET_LEADCHNL_RV_PACING_PULSEWIDTH: 0.4 MS
MDC_IDC_SET_LEADCHNL_RV_SENSING_ADAPTATION_MODE: NORMAL
MDC_IDC_SET_LEADCHNL_RV_SENSING_POLARITY: NORMAL
MDC_IDC_STAT_AT_BURDEN_PERCENT: 0 %
MDC_IDC_STAT_AT_DTM_END: NORMAL
MDC_IDC_STAT_AT_DTM_START: NORMAL
MDC_IDC_STAT_AT_MODE_SW_COUNT_PER_DAY: 0
MDC_IDC_STAT_AT_MODE_SW_PERCENT_TIME_PER_DAY: 0 %
MDC_IDC_STAT_BRADY_AP_VP_PERCENT: 0 %
MDC_IDC_STAT_BRADY_AP_VS_PERCENT: 64 %
MDC_IDC_STAT_BRADY_AS_VP_PERCENT: 0 %
MDC_IDC_STAT_BRADY_AS_VS_PERCENT: 36 %
MDC_IDC_STAT_BRADY_DTM_END: NORMAL
MDC_IDC_STAT_BRADY_DTM_START: NORMAL
MDC_IDC_STAT_BRADY_RA_PERCENT_PACED: 64 %
MDC_IDC_STAT_BRADY_RV_PERCENT_PACED: 0 %
MDC_IDC_STAT_CRT_DTM_END: NORMAL
MDC_IDC_STAT_CRT_DTM_START: NORMAL

## 2021-09-28 ENCOUNTER — OFFICE VISIT (OUTPATIENT)
Dept: DERMATOLOGY | Facility: CLINIC | Age: 71
End: 2021-09-28
Payer: COMMERCIAL

## 2021-09-28 DIAGNOSIS — L82.1 SEBORRHEIC KERATOSIS: ICD-10-CM

## 2021-09-28 DIAGNOSIS — Z85.828 HISTORY OF SKIN CANCER: Primary | ICD-10-CM

## 2021-09-28 DIAGNOSIS — C44.619 BASAL CELL CARCINOMA (BCC) OF LEFT SHOULDER: ICD-10-CM

## 2021-09-28 PROCEDURE — 99213 OFFICE O/P EST LOW 20 MIN: CPT | Performed by: DERMATOLOGY

## 2021-09-28 NOTE — PROGRESS NOTES
Dermatology Clinic Note    Dermatology Problem List:  1. Basal cell carcinoma, L cheek  2. Basal cell carcinoma L mid back - nodular and infiltrative with + deep margin. Patient declines additional treatment due to goals of care. Monitoring clinically.   3. Seborrheic keratoses      Assessment and Plan:    1. Basal cell carcinoma on the L mid back: Know focal positive deep margin, but opting for clinical monitoring due to late stage dementia. No signs of recurrence at the base.     2. History of Basal cell carcinoma, no signs of recurrence on the L cheek    3. Seborrheic keratoses: Benign hyperkeratotic papules and plaques. No treatment advised unless irritation occurs.        RTC as needed depending dementia course.        Thank you for involving me in this patient's care.     Kriss Genao MD   of Dermatology  [facility]    CC: Page Jacobs, APRN CNP  [address] [facility] [place] [address]    Kolby Noonan PA-C    ____________________________________________________________________________________________________________________________________________    CC: Patient presents with:  RECHECK: 6 month check on skin lesion and skin check       HPI: Kelly Nunez is a 72 y/o F presenting for recheck of basal cell carcinoma site on the L back. Biopsy in 3/21 and referred to [facility] for excision, but pt opted to watch clinically. No recurrence per  who provides the history.     Patient Active Problem List   Diagnosis     A-fib (H)     Encounter for palliative care     Dementia without behavioral disturbance, unspecified dementia type (H)     Chronic obstructive pulmonary disease with (acute) exacerbation (H)     Primary pulmonary hypertension (H)     Bradycardia     Cardiac pacemaker in situ     HTN (hypertension)     Superior mesenteric artery stenosis (H)       Allergies   Allergen Reactions     Penicillins Swelling     Pt states she has tolerated  cephalosporins in the past.          Current Outpatient Medications   Medication     aspirin (ASA) 81 MG chewable tablet     diltiazem ER COATED BEADS (CARDIZEM CD/CARTIA XT) 180 MG 24 hr capsule     LORazepam (ATIVAN) 1 MG tablet     memantine (NAMENDA) 10 MG tablet     metoprolol tartrate (LOPRESSOR) 50 MG tablet     Multiple Vitamins-Minerals (CENTRUM WOMEN PO)     OLANZapine (ZYPREXA) 10 MG tablet     traZODone (DESYREL) 100 MG tablet     Current Facility-Administered Medications   Medication     lidocaine 1% with EPINEPHrine 1:100,000 injection 3 mL       History reviewed. No pertinent family history.    Social History     Tobacco Use     Smoking status: Former Smoker     Packs/day: 1.00     Types: Cigarettes     Quit date:      Years since quittin.7     Smokeless tobacco: Never Used   Substance Use Topics     Alcohol use: Not Currently     Drug use: No         EXAM:  There were no vitals taken for this visit.    SKIN:   --exam of the face, back, chest, face, scalp  --Approx 1.5 cm atrophic patch on the L mid back  --waxy papules and plaques on the back, flanks  --Linear scar on the L upper cheek

## 2021-09-28 NOTE — LETTER
9/28/2021      RE: Kelly Nunez  [address] [facility] [id number]           Dermatology Clinic Note    Dermatology Problem List:  1. Basal cell carcinoma, L cheek  2. Basal cell carcinoma L mid back - nodular and infiltrative with + deep margin. Patient declines additional treatment due to goals of care. Monitoring clinically.   3. Seborrheic keratoses      Assessment and Plan:    1. Basal cell carcinoma on the L mid back: Know focal positive deep margin, but opting for clinical monitoring due to late stage dementia. No signs of recurrence at the base.     2. History of Basal cell carcinoma, no signs of recurrence on the L cheek    3. Seborrheic keratoses: Benign hyperkeratotic papules and plaques. No treatment advised unless irritation occurs.        RTC as needed depending dementia course.        Thank you for involving me in this patient's care.     Kriss Genao MD   of Dermatology  [facility]    CC: Page Jacobs, APRN CNP  [address] [place] [address]    Kolby Noonan PA-C    ____________________________________________________________________________________________________________________________________________    CC: Patient presents with:  RECHECK: 6 month check on skin lesion and skin check       HPI: Kelly Nunez is a 70 y/o F presenting for recheck of basal cell carcinoma site on the L back. Biopsy in 3/21 and referred to [facility] for excision, but pt opted to watch clinically. No recurrence per  who provides the history.     Patient Active Problem List   Diagnosis     A-fib (H)     Encounter for palliative care     Dementia without behavioral disturbance, unspecified dementia type (H)     Chronic obstructive pulmonary disease with (acute) exacerbation (H)     Primary pulmonary hypertension (H)     Bradycardia     Cardiac pacemaker in situ     HTN (hypertension)     Superior mesenteric artery stenosis (H)        Allergies   Allergen Reactions     Penicillins Swelling     Pt states she has tolerated cephalosporins in the past.          Current Outpatient Medications   Medication     aspirin (ASA) 81 MG chewable tablet     diltiazem ER COATED BEADS (CARDIZEM CD/CARTIA XT) 180 MG 24 hr capsule     LORazepam (ATIVAN) 1 MG tablet     memantine (NAMENDA) 10 MG tablet     metoprolol tartrate (LOPRESSOR) 50 MG tablet     Multiple Vitamins-Minerals (CENTRUM WOMEN PO)     OLANZapine (ZYPREXA) 10 MG tablet     traZODone (DESYREL) 100 MG tablet     Current Facility-Administered Medications   Medication     lidocaine 1% with EPINEPHrine 1:100,000 injection 3 mL       History reviewed. No pertinent family history.    Social History     Tobacco Use     Smoking status: Former Smoker     Packs/day: 1.00     Types: Cigarettes     Quit date:      Years since quittin.7     Smokeless tobacco: Never Used   Substance Use Topics     Alcohol use: Not Currently     Drug use: No         EXAM:  There were no vitals taken for this visit.    SKIN:   --exam of the face, back, chest, face, scalp  --Approx 1.5 cm atrophic patch on the L mid back  --waxy papules and plaques on the back, flanks  --Linear scar on the L upper cheek        Kriss Genao MD

## 2021-09-28 NOTE — PATIENT INSTRUCTIONS
Pediatric Dermatology  [facility]  [address]  1st [place]  [address]  Phone: [phone]    Pediatric & Adult Dermatology  [facility]  [address] [facility]   2nd Floor  [facility] [address]  [phone]                  General information: Dr. Kriss Genao is a board-certified dermatologist with subspecialty certification in pediatric dermatology.     Scheduling and Nurse Triage: Dr. Genao sees pediatric patients on Mondays in [place] and adult and pediatric patients on Tuesdays in [place]. The remainder of the week she practices at the [facility]. Please call the above phone numbers to schedule or to talk to a nurse.     -For scheduling at the [place] or [place] locations, or to talk to the triage nurse please call the above phone number at the clinic where you were seen.     -For medication refills, please call your pharmacy.

## 2021-10-01 ENCOUNTER — TRANSFERRED RECORDS (OUTPATIENT)
Dept: HEALTH INFORMATION MANAGEMENT | Facility: CLINIC | Age: 71
End: 2021-10-01

## 2021-11-23 ENCOUNTER — HOSPITAL ENCOUNTER (EMERGENCY)
Facility: CLINIC | Age: 71
Discharge: HOME | End: 2021-11-23
Attending: EMERGENCY MEDICINE | Admitting: EMERGENCY MEDICINE
Payer: COMMERCIAL

## 2021-11-23 ENCOUNTER — APPOINTMENT (OUTPATIENT)
Dept: ULTRASOUND IMAGING | Facility: CLINIC | Age: 71
End: 2021-11-23
Attending: EMERGENCY MEDICINE
Payer: COMMERCIAL

## 2021-11-23 VITALS
TEMPERATURE: 98.8 F | OXYGEN SATURATION: 95 % | SYSTOLIC BLOOD PRESSURE: 128 MMHG | DIASTOLIC BLOOD PRESSURE: 78 MMHG | HEART RATE: 91 BPM | RESPIRATION RATE: 18 BRPM

## 2021-11-23 DIAGNOSIS — M25.50 ARTHRALGIA, UNSPECIFIED JOINT: ICD-10-CM

## 2021-11-23 DIAGNOSIS — R60.0 PERIPHERAL EDEMA: ICD-10-CM

## 2021-11-23 LAB
ANION GAP SERPL CALCULATED.3IONS-SCNC: 7 MMOL/L (ref 3–14)
ATRIAL RATE - MUSE: 86 BPM
BASOPHILS # BLD AUTO: 0.1 10E3/UL (ref 0–0.2)
BASOPHILS NFR BLD AUTO: 1 %
BUN SERPL-MCNC: 28 MG/DL (ref 7–30)
CALCIUM SERPL-MCNC: 9 MG/DL (ref 8.5–10.1)
CHLORIDE BLD-SCNC: 111 MMOL/L (ref 94–109)
CO2 SERPL-SCNC: 24 MMOL/L (ref 20–32)
CREAT SERPL-MCNC: 0.71 MG/DL (ref 0.52–1.04)
DIASTOLIC BLOOD PRESSURE - MUSE: NORMAL MMHG
EOSINOPHIL # BLD AUTO: 0.3 10E3/UL (ref 0–0.7)
EOSINOPHIL NFR BLD AUTO: 6 %
ERYTHROCYTE [DISTWIDTH] IN BLOOD BY AUTOMATED COUNT: 12.6 % (ref 10–15)
GFR SERPL CREATININE-BSD FRML MDRD: 87 ML/MIN/1.73M2
GLUCOSE BLD-MCNC: 125 MG/DL (ref 70–99)
HCT VFR BLD AUTO: 40.7 % (ref 35–47)
HGB BLD-MCNC: 12.6 G/DL (ref 11.7–15.7)
IMM GRANULOCYTES # BLD: 0 10E3/UL
IMM GRANULOCYTES NFR BLD: 0 %
INTERPRETATION ECG - MUSE: NORMAL
LYMPHOCYTES # BLD AUTO: 1.1 10E3/UL (ref 0.8–5.3)
LYMPHOCYTES NFR BLD AUTO: 21 %
MCH RBC QN AUTO: 30.2 PG (ref 26.5–33)
MCHC RBC AUTO-ENTMCNC: 31 G/DL (ref 31.5–36.5)
MCV RBC AUTO: 98 FL (ref 78–100)
MONOCYTES # BLD AUTO: 0.4 10E3/UL (ref 0–1.3)
MONOCYTES NFR BLD AUTO: 7 %
NEUTROPHILS # BLD AUTO: 3.3 10E3/UL (ref 1.6–8.3)
NEUTROPHILS NFR BLD AUTO: 65 %
NRBC # BLD AUTO: 0 10E3/UL
NRBC BLD AUTO-RTO: 0 /100
P AXIS - MUSE: 66 DEGREES
PLATELET # BLD AUTO: 341 10E3/UL (ref 150–450)
POTASSIUM BLD-SCNC: 4 MMOL/L (ref 3.4–5.3)
PR INTERVAL - MUSE: 134 MS
QRS DURATION - MUSE: 90 MS
QT - MUSE: 378 MS
QTC - MUSE: 452 MS
R AXIS - MUSE: 44 DEGREES
RBC # BLD AUTO: 4.17 10E6/UL (ref 3.8–5.2)
SODIUM SERPL-SCNC: 142 MMOL/L (ref 133–144)
SYSTOLIC BLOOD PRESSURE - MUSE: NORMAL MMHG
T AXIS - MUSE: 23 DEGREES
VENTRICULAR RATE- MUSE: 86 BPM
WBC # BLD AUTO: 5.2 10E3/UL (ref 4–11)

## 2021-11-23 PROCEDURE — 85004 AUTOMATED DIFF WBC COUNT: CPT | Performed by: EMERGENCY MEDICINE

## 2021-11-23 PROCEDURE — 80048 BASIC METABOLIC PNL TOTAL CA: CPT | Performed by: EMERGENCY MEDICINE

## 2021-11-23 PROCEDURE — 36415 COLL VENOUS BLD VENIPUNCTURE: CPT | Performed by: EMERGENCY MEDICINE

## 2021-11-23 PROCEDURE — 93005 ELECTROCARDIOGRAM TRACING: CPT

## 2021-11-23 PROCEDURE — 93971 EXTREMITY STUDY: CPT | Mod: RT

## 2021-11-23 PROCEDURE — 99285 EMERGENCY DEPT VISIT HI MDM: CPT | Mod: 25

## 2021-11-23 ASSESSMENT — ENCOUNTER SYMPTOMS: MYALGIAS: 1

## 2021-11-23 NOTE — ED PROVIDER NOTES
"  History     Chief Complaint:    Leg Pain      The history is provided by the spouse. History limited by: Dementia.      Kelly Nunez is a 71 year old female who presents with leg pain.  The patient's  states that she woke her  up this morning at 0400 with generalized pain. Her legs were reportedly hanging over the bed and indicated to her  that she hurts all over.  Upon further questioning, she seemed to localize this to the bilateral lower extremities though the right seemed worse than the left to the patient's .  Eventually he called EMS due to his concern for her ongoing symptoms.  When they arrived, he states that she was able to ambulate to the EMS gurney.    Of note, the patient does have a history of a \"Roto-Rooter\" procedure in the right lower extremity for some blood flow issues several years ago.  The  denies any recent falls or trauma.  Other than aspirin, the patient is not on any blood thinning medications.    Review of Systems   Unable to perform ROS: Dementia   Cardiovascular: Positive for leg swelling.   Musculoskeletal: Positive for myalgias.     Allergies:  Penicillins      Medications:      aspirin (ASA) 81 MG chewable tablet  diltiazem ER COATED BEADS (CARDIZEM CD/CARTIA XT) 180 MG 24 hr capsule  LORazepam (ATIVAN) 1 MG tablet  memantine (NAMENDA) 10 MG tablet  metoprolol tartrate (LOPRESSOR) 50 MG tablet  Multiple Vitamins-Minerals (CENTRUM WOMEN PO)  OLANZapine (ZYPREXA) 10 MG tablet  traZODone (DESYREL) 100 MG tablet        Past Medical History:      Past Medical History:   Diagnosis Date     Dementia (H)      Diabetes (H)      Paroxysmal atrial fibrillation (H)      Sinus node dysfunction (H)      Patient Active Problem List    Diagnosis Date Noted     Superior mesenteric artery stenosis (H) 09/14/2021     Priority: Medium     Cardiac pacemaker in situ 01/07/2021     Priority: Medium     HTN (hypertension) 01/07/2021     Priority: Medium     " Bradycardia 10/11/2020     Priority: Medium     Chronic obstructive pulmonary disease with (acute) exacerbation (H) 07/16/2020     Priority: Medium     Primary pulmonary hypertension (H) 07/16/2020     Priority: Medium     Dementia without behavioral disturbance, unspecified dementia type (H) 07/10/2020     Priority: Medium     Encounter for palliative care      Priority: Medium     A-fib (H) 01/23/2018     Priority: Medium        Past Surgical History:    Past Surgical History:   Procedure Laterality Date     EP PACEMAKER N/A 10/12/2020    Procedure: EP Pacemaker;  Surgeon: Luis Onofre MD;  Location:  [facility]ATH [facility]     HEART CATH, ANGIOPLASTY       IR ABDOMINAL AORTOGRAM  5/10/2013     IR MISCELLANEOUS PROCEDURE  5/10/2013       Family History:    No family history on file.    Social History:  Presents to the ED via EMS.   arrived in the ED later.    Physical Exam     Patient Vitals for the past 24 hrs:   BP Temp Temp src Pulse Resp SpO2   11/23/21 0730 136/65 -- -- 82 -- 95 %   11/23/21 0715 128/58 -- -- 79 -- 95 %   11/23/21 0700 -- -- -- 71 -- --   11/23/21 0645 128/69 -- -- 83 -- --   11/23/21 0630 117/62 -- -- 73 -- --   11/23/21 0615 123/61 -- -- 74 -- 93 %   11/23/21 0545 (!) 160/77 -- -- 85 -- 96 %   11/23/21 0520 (!) 149/74 98.8  F (37.1  C) Oral 86 18 92 %       Physical Exam  Constitutional: Vital signs reviewed as above.   Head: No external signs of trauma noted.  Eyes: Pupils are equal, round, and reactive to light.   Neck: No JVD noted  Cardiovascular: Normal rate, regular rhythm and normal heart sounds.  No murmur heard. Equal B/L peripheral pulses in the femoral, popliteal, PT, and DP arteries B/L.  Pulmonary/Chest: Effort normal and breath sounds normal. No respiratory distress. Patient has no wheezes. Patient has no rales.   Gastrointestinal: Soft. There is no tenderness.   Musculoskeletal/Extremities: Trace to +1 pitting edema especially in the right foot. ROM  at the hips and knees appears normal. No deformities noted.  Neurological: Patient is alert and oriented to person, place, and time.   Skin: Skin is warm and dry. There is no diaphoresis noted.  Psychiatric: The patient appears calm.      Emergency Department Course   ECG:  ECG taken at 0544, ECG read at 0559  Normal sinus rhythm  Nonspecific ST abnormality  Abnormal ECG  When compared with prior ECG dated 10/13/2020, the ST changes have improved.   Rate 86 bpm. SC interval 134 ms. QRS duration 90 ms. QT/QTc 378/452 ms. P-R-T axes 66 44 23.     Imaging:  US Lower Extremity Venous Duplex Right   Final Result   IMPRESSION:   1.  No deep venous thrombosis in the right lower extremity.          Laboratory:  Labs Ordered and Resulted from Time of ED Arrival to Time of ED Departure   BASIC METABOLIC PANEL - Abnormal       Result Value    Sodium 142      Potassium 4.0      Chloride 111 (*)     Carbon Dioxide (CO2) 24      Anion Gap 7      Urea Nitrogen 28      Creatinine 0.71      Calcium 9.0      Glucose 125 (*)     GFR Estimate 87     CBC WITH PLATELETS AND DIFFERENTIAL - Abnormal    WBC Count 5.2      RBC Count 4.17      Hemoglobin 12.6      Hematocrit 40.7      MCV 98      MCH 30.2      MCHC 31.0 (*)     RDW 12.6      Platelet Count 341      % Neutrophils 65      % Lymphocytes 21      % Monocytes 7      % Eosinophils 6      % Basophils 1      % Immature Granulocytes 0      NRBCs per 100 WBC 0      Absolute Neutrophils 3.3      Absolute Lymphocytes 1.1      Absolute Monocytes 0.4      Absolute Eosinophils 0.3      Absolute Basophils 0.1      Absolute Immature Granulocytes 0.0      Absolute NRBCs 0.0         Procedures:      Emergency Department Course:    Reviewed:    I reviewed nursing notes, vitals and past history    Assessments/Consults:     ED Course as of 11/23/21 0808   e Nov 23, 2021   0637 Dr. Demarco' evaluation.   0716 Rechecked and updated.   0749 Rechecked and updated after ambulation trial.  Patient still  "seems to have normal range of motion.  We discussed possibly getting a urinalysis, but the patient does not seem to have symptoms consistent with a UTI so after shared decision-making discussion, we did not pursue that diagnostic approach.  No acute source of pain identified.   comfortable taking patient home.       Interventions:    Medications - No data to display    Disposition:  The patient was discharged to home.    Impression & Plan      CMS Diagnoses:         Medical Decision Making:  This 71-year-old female patient presents to the ED due to generalized pain but especially right lower extremity pain.  Please see the HPI and exam for specifics.  Fortunately, the patient remained well in the ED.  There was no acute source of her pain identified.  She seemed to have normal range of motion without bony pain at the time of my exam.  Ultrasound of the right lower extremity was ordered due to some mild swelling but no DVT was seen.  No other clinical signs of CHF noted the patient does have a history of needing a \"Roto-Rooter\" procedure in the past but I can palpate pulses from the femoral to the dorsalis pedis arteries on both lower extremities.  After the patient's work-up, the patient's  was comfortable taking her home and notes that there will be patient care associated with her at 9:00 today.  They were encouraged to follow-up in the outpatient setting and certainly return with any new or worsening symptoms.  Anticipatory guidance given to  prior to discharge.    Covid-19  Kelly Nunez was evaluated during a global COVID-19 pandemic, which necessitated consideration that the patient might be at risk for infection with the SARS-CoV-2 virus that causes COVID-19.   Applicable protocols for evaluation were followed during the patient's care.     Diagnosis:    ICD-10-CM    1. Arthralgia, unspecified joint  M25.50    2. Peripheral edema  R60.9           Stevenson Demarco, DO  11/23/21 " 0846

## 2021-11-23 NOTE — ED NOTES
Bed: ED12  Expected date: 11/23/21  Expected time: 5:09 AM  Means of arrival:   Comments:  A593- RLE pain/dementia

## 2021-11-23 NOTE — ED TRIAGE NOTES
"Patient presents to ER from home.  Patient has dementia and requires 24/7 care which is provided from  and a caregiver.  Patient told  that she \"hurts all over\" which prompted  to call EMS.  Patient able to localize pain to right leg. Hx of getting femoral-popliteal bypass in this leg.   told EMS he is not aware of any recent falls/trauma to this leg.   is on his way to the hospital.  "

## 2021-11-23 NOTE — ED NOTES
Gait steady upon ambulation. Pt takes small, slow, shuffling steps. Pt did not complain of any pain during ambulation trial. Pt  educated about safety in home in regards to rugs and cords as tripping hazards. MD updated.

## 2021-11-23 NOTE — DISCHARGE INSTRUCTIONS
Diagnosis: No specific cause for your symptoms was found today.  What do you do next:   Continue your home medications unless we have specifically changed them  Over-the-counter Tylenol is a reasonable pain reliever.  Follow up as indicated below    When do you return: If you have uncontrollable pain, fevers, intractable vomiting, difficulty or inability ambulating, or any other symptoms that concern you, please return to the ED for reevaluation.    Thank you for allowing us to care for you today.

## 2021-12-01 ENCOUNTER — OFFICE VISIT (OUTPATIENT)
Dept: FAMILY MEDICINE | Facility: CLINIC | Age: 71
End: 2021-12-01
Payer: COMMERCIAL

## 2021-12-01 VITALS
SYSTOLIC BLOOD PRESSURE: 118 MMHG | DIASTOLIC BLOOD PRESSURE: 64 MMHG | TEMPERATURE: 97.6 F | RESPIRATION RATE: 20 BRPM | OXYGEN SATURATION: 96 % | BODY MASS INDEX: 22.49 KG/M2 | HEIGHT: 62 IN | HEART RATE: 76 BPM | WEIGHT: 122.2 LBS

## 2021-12-01 DIAGNOSIS — I27.0 PRIMARY PULMONARY HYPERTENSION (H): ICD-10-CM

## 2021-12-01 DIAGNOSIS — F03.90 DEMENTIA WITHOUT BEHAVIORAL DISTURBANCE, UNSPECIFIED DEMENTIA TYPE: ICD-10-CM

## 2021-12-01 DIAGNOSIS — J44.1 CHRONIC OBSTRUCTIVE PULMONARY DISEASE WITH (ACUTE) EXACERBATION (H): ICD-10-CM

## 2021-12-01 DIAGNOSIS — K55.1 SUPERIOR MESENTERIC ARTERY STENOSIS (H): ICD-10-CM

## 2021-12-01 DIAGNOSIS — I10 HYPERTENSION, UNSPECIFIED TYPE: ICD-10-CM

## 2021-12-01 DIAGNOSIS — R22.41 LOCALIZED SWELLING OF RIGHT FOOT: Primary | ICD-10-CM

## 2021-12-01 LAB
ERYTHROCYTE [SEDIMENTATION RATE] IN BLOOD BY WESTERGREN METHOD: 62 MM/HR (ref 0–30)
HOLD SPECIMEN: NORMAL
HOLD SPECIMEN: NORMAL

## 2021-12-01 PROCEDURE — 36415 COLL VENOUS BLD VENIPUNCTURE: CPT | Performed by: NURSE PRACTITIONER

## 2021-12-01 PROCEDURE — 0064A COVID-19,PF,MODERNA (18+ YRS BOOSTER .25ML): CPT | Performed by: NURSE PRACTITIONER

## 2021-12-01 PROCEDURE — 91306 COVID-19,PF,MODERNA (18+ YRS BOOSTER .25ML): CPT | Performed by: NURSE PRACTITIONER

## 2021-12-01 PROCEDURE — 99214 OFFICE O/P EST MOD 30 MIN: CPT | Performed by: NURSE PRACTITIONER

## 2021-12-01 PROCEDURE — 80053 COMPREHEN METABOLIC PANEL: CPT | Performed by: NURSE PRACTITIONER

## 2021-12-01 PROCEDURE — 85652 RBC SED RATE AUTOMATED: CPT | Performed by: NURSE PRACTITIONER

## 2021-12-01 PROCEDURE — 84550 ASSAY OF BLOOD/URIC ACID: CPT | Performed by: NURSE PRACTITIONER

## 2021-12-01 RX ORDER — LORAZEPAM 0.5 MG/1
0.5 TABLET ORAL 2 TIMES DAILY
COMMUNITY
Start: 2021-10-30 | End: 2022-01-01

## 2021-12-01 RX ORDER — IPRATROPIUM BROMIDE AND ALBUTEROL SULFATE 2.5; .5 MG/3ML; MG/3ML
1 SOLUTION RESPIRATORY (INHALATION) 2 TIMES DAILY PRN
COMMUNITY
Start: 2021-10-01

## 2021-12-01 RX ORDER — NEBULIZER AND COMPRESSOR
EACH MISCELLANEOUS DAILY PRN
COMMUNITY
Start: 2021-10-04

## 2021-12-01 ASSESSMENT — ENCOUNTER SYMPTOMS
CARDIOVASCULAR NEGATIVE: 1
NERVOUS/ANXIOUS: 0
CONSTIPATION: 1
DIARRHEA: 1
UNEXPECTED WEIGHT CHANGE: 0
RESPIRATORY NEGATIVE: 1
ACTIVITY CHANGE: 0

## 2021-12-01 ASSESSMENT — PAIN SCALES - GENERAL: PAINLEVEL: NO PAIN (0)

## 2021-12-01 ASSESSMENT — MIFFLIN-ST. JEOR: SCORE: 1024.61

## 2021-12-01 NOTE — ASSESSMENT & PLAN NOTE
Noted on CT scan from 2020.  No abdominal pain recently.  Noted  That intermittent constipation and diarrhea continue since 2020, but no acute changes.

## 2021-12-01 NOTE — PROGRESS NOTES
Assessment & Plan   Problem List Items Addressed This Visit     Chronic obstructive pulmonary disease with (acute) exacerbation (H)     No exacerbation-- sat 96%.  No cough or SOB.         Relevant Medications    ipratropium - albuterol 0.5 mg/2.5 mg/3 mL (DUONEB) 0.5-2.5 (3) MG/3ML neb solution    Dementia without behavioral disturbance, unspecified dementia type (H)     No acute worsening of dementia at this time.  Is wandering in the house at night more, and spouse will work on safety issues at night.         Relevant Medications    LORazepam (ATIVAN) 0.5 MG tablet    HTN (hypertension)     Currently well-controlled         Primary pulmonary hypertension (H)     No current symptoms of SOB or significant new cough.  O2 sat is 96%         Superior mesenteric artery stenosis (H)     Noted on CT scan from 2020.  No abdominal pain recently.  Noted  That intermittent constipation and diarrhea continue since 2020, but no acute changes.             Other Visit Diagnoses     Localized swelling of right foot    -  Primary    Relevant Orders    ESR: Erythrocyte sedimentation rate (Completed)    Vascular Surgery Referral    COVID-19,PF,MODERNA (18+ YRS BOOSTER .25ML) (Completed)        Noted elevated sed rate.   Added CMP and Uric acid.  Follow-up as indicated  Called pt  is he is OK with awaiting results for further intervention.    Late entry:  Called pt  12/3/2021 again.  Foot is slightly better.  No fever or other changes.  Uric acid is normal.  Will trial antibiotic for possible cellulitis and have patient seen on Mon or Tuesday in clinic for recheck.  May have to consider also a X-ray to rule out fracture if not better.      There are no Patient Instructions on file for this visit.  Return in about 3 months (around 3/1/2022) for Routine Visit with Annette Jacobs as scheduled.    MARYCHUY [facility] JESUS ALBERTOMOIVON Mendoza is a 71 year old who presents for the  following health issues  accompanied by her spouse.    HPI       ED Follow-up Visit:    Hospital/Nursing Home/IP Rehab Facility: [facility]  Date of: 11/23/21    Reason(s) for visit: Right leg swelling and redness from lower leg into foot.  Had episode at home in which she was unable to walk or get out of bed that night.  Was found by  with her legs hanging over the side of the bed.  Paramedics were called and patient was taken to the ED.  ED workup showed normal BNP, Right leg with history of Normal US of right leg, and improvement of ambulation. Patient was discharged and asked to follow up here.      Was your hospitalization related to COVID-19? No   Problems taking medications regularly:  None  Medication changes since discharge: None  Problems adhering to non-medication therapy:  None    Summary of hospitalization:  [facility] discharge summary reviewed  Diagnostic Tests/Treatments reviewed.  Follow up needed: none  Other Healthcare Providers Involved in Patient s Care:         Cardiology, and Dermatology  Update since discharge: improved.       Post Discharge Medication Reconciliation: discharge medications reconciled, continue medications without change.  Plan of care communicated with patient and family              HTN (hypertension)  Currently well-controlled    Primary pulmonary hypertension (H)  No current symptoms of SOB or significant new cough.  O2 sat is 96%    Chronic obstructive pulmonary disease with (acute) exacerbation (H)  No exacerbation-- sat 96%.  No cough or SOB.    Dementia without behavioral disturbance, unspecified dementia type (H)  No acute worsening of dementia at this time.  Is wandering in the house at night more, and spouse will work on safety issues at night.    Superior mesenteric artery stenosis (H)  Noted on CT scan from 2020.  No abdominal pain recently.  Noted  That intermittent constipation and diarrhea continue since 2020, but  no acute changes.      Current Outpatient Medications   Medication Sig Dispense Refill     aspirin (ASA) 81 MG chewable tablet Take 81 mg by mouth every evening       diltiazem ER COATED BEADS (CARDIZEM CD/CARTIA XT) 180 MG 24 hr capsule Take 1 capsule (180 mg) by mouth daily 90 capsule 1     LORazepam (ATIVAN) 1 MG tablet 0.5 mg As of 01/14/21 patinet taking 1 tablet at bedtime and one half tablet as needed.  As of 05/05/2021 Pt taking 0.5 mg 3x daily as needed       memantine (NAMENDA) 10 MG tablet Take 10 mg by mouth 2 times daily        metoprolol tartrate (LOPRESSOR) 50 MG tablet Take 1 tablet (50 mg) by mouth 2 times daily 180 tablet 1     Multiple Vitamins-Minerals (CENTRUM WOMEN PO) Take by mouth daily       OLANZapine (ZYPREXA) 10 MG tablet Take 10 mg by mouth At Bedtime        traZODone (DESYREL) 100 MG tablet Take 100 mg by mouth At Bedtime Take 1/2 to one tablet at bedtime       ipratropium - albuterol 0.5 mg/2.5 mg/3 mL (DUONEB) 0.5-2.5 (3) MG/3ML neb solution        LORazepam (ATIVAN) 0.5 MG tablet        Nebulizers (INNOSPIRE ESSENCE NEBULIZER) MISC          Review of Systems   Constitutional: Negative for activity change and unexpected weight change.   Respiratory: Negative.    Cardiovascular: Negative.    Gastrointestinal: Positive for constipation and diarrhea.   Genitourinary: Negative.    Musculoskeletal: Negative for gait problem.   Skin:        Swelling and redness right lower leg and foot.  No history of injury to this foot.  No drainage or wound   Neurological:        Had episode of difficulty walking at home, resolved after being in ED.  Has history of falls while walking at night, but no injury to leg noted.  No change in LOC.  Severe dementia is noted at baseline   Psychiatric/Behavioral: Negative for mood changes. The patient is not nervous/anxious.             Objective    There were no vitals taken for this visit.  There is no height or weight on file to calculate BMI.  Physical  Exam  Constitutional:       Comments: Poor eye contact.  Normally dressed, normal hygiene   HENT:      Head:      Comments: Small laceration right eyebrow.  Slight bruise left eyelid.  Cardiovascular:      Rate and Rhythm: Normal rate and regular rhythm.      Heart sounds: Normal heart sounds.   Pulmonary:      Effort: Pulmonary effort is normal.      Breath sounds: Normal breath sounds.   Abdominal:      General: Bowel sounds are normal. There is no distension.      Palpations: Abdomen is soft.      Tenderness: There is no abdominal tenderness.            Results for orders placed or performed in visit on 12/01/21 (from the past 24 hour(s))   ESR: Erythrocyte sedimentation rate   Result Value Ref Range    Erythrocyte Sedimentation Rate 62 (H) 0 - 30 mm/hr   Extra Tube    Narrative    The following orders were created for panel order Extra Tube.  Procedure                               Abnormality         Status                     ---------                               -----------         ------                     Extra Serum Separator Tu...[[id number]]                      Final result               Extra Green Top (Lithium...[[id number]]                      Final result                 Please view results for these tests on the individual orders.   Extra Serum Separator Tube (SST)   Result Value Ref Range    Hold Specimen JIC    Extra Green Top (Lithium Heparin) Tube   Result Value Ref Range    Hold Specimen JIC

## 2021-12-01 NOTE — ASSESSMENT & PLAN NOTE
No acute worsening of dementia at this time.  Is wandering in the house at night more, and spouse will work on safety issues at night.

## 2021-12-02 ENCOUNTER — TELEPHONE (OUTPATIENT)
Dept: VASCULAR SURGERY | Facility: CLINIC | Age: 71
End: 2021-12-02
Payer: COMMERCIAL

## 2021-12-03 LAB
ALBUMIN SERPL-MCNC: 3.1 G/DL (ref 3.4–5)
ALP SERPL-CCNC: 87 U/L (ref 40–150)
ALT SERPL W P-5'-P-CCNC: 25 U/L (ref 0–50)
ANION GAP SERPL CALCULATED.3IONS-SCNC: 8 MMOL/L (ref 3–14)
AST SERPL W P-5'-P-CCNC: 18 U/L (ref 0–45)
BILIRUB SERPL-MCNC: 0.2 MG/DL (ref 0.2–1.3)
BUN SERPL-MCNC: 21 MG/DL (ref 7–30)
CALCIUM SERPL-MCNC: 9.2 MG/DL (ref 8.5–10.1)
CHLORIDE BLD-SCNC: 109 MMOL/L (ref 94–109)
CO2 SERPL-SCNC: 26 MMOL/L (ref 20–32)
CREAT SERPL-MCNC: 0.67 MG/DL (ref 0.52–1.04)
GFR SERPL CREATININE-BSD FRML MDRD: 90 ML/MIN/1.73M2
GLUCOSE BLD-MCNC: 112 MG/DL (ref 70–99)
POTASSIUM BLD-SCNC: 4.7 MMOL/L (ref 3.4–5.3)
PROT SERPL-MCNC: 7.2 G/DL (ref 6.8–8.8)
SODIUM SERPL-SCNC: 143 MMOL/L (ref 133–144)
URATE SERPL-MCNC: 5.4 MG/DL (ref 2.6–6)

## 2021-12-03 RX ORDER — SULFAMETHOXAZOLE/TRIMETHOPRIM 800-160 MG
1 TABLET ORAL 2 TIMES DAILY
Qty: 10 TABLET | Refills: 0 | Status: SHIPPED | OUTPATIENT
Start: 2021-12-03 | End: 2022-01-01

## 2021-12-07 NOTE — TELEPHONE ENCOUNTER
Spoke to pts  as she has severe dementia.  Pt is going to have a follow up with her primary care physician on her foot.  If after that, Kendall (spouse) will call us if they choose to pursue any more avenues.

## 2022-01-01 ENCOUNTER — ANCILLARY PROCEDURE (OUTPATIENT)
Dept: CARDIOLOGY | Facility: CLINIC | Age: 72
End: 2022-01-01
Attending: INTERNAL MEDICINE
Payer: COMMERCIAL

## 2022-01-01 ENCOUNTER — APPOINTMENT (OUTPATIENT)
Dept: GENERAL RADIOLOGY | Facility: CLINIC | Age: 72
End: 2022-01-01
Attending: EMERGENCY MEDICINE
Payer: COMMERCIAL

## 2022-01-01 ENCOUNTER — TELEPHONE (OUTPATIENT)
Dept: OTHER | Facility: CLINIC | Age: 72
End: 2022-01-01

## 2022-01-01 ENCOUNTER — MYC MEDICAL ADVICE (OUTPATIENT)
Dept: OTHER | Facility: CLINIC | Age: 72
End: 2022-01-01
Payer: COMMERCIAL

## 2022-01-01 ENCOUNTER — OFFICE VISIT (OUTPATIENT)
Dept: FAMILY MEDICINE | Facility: CLINIC | Age: 72
End: 2022-01-01
Payer: COMMERCIAL

## 2022-01-01 ENCOUNTER — VIRTUAL VISIT (OUTPATIENT)
Dept: OTHER | Facility: CLINIC | Age: 72
End: 2022-01-01
Payer: COMMERCIAL

## 2022-01-01 ENCOUNTER — ANESTHESIA (OUTPATIENT)
Dept: SURGERY | Facility: CLINIC | Age: 72
DRG: 253 | End: 2022-01-01
Payer: COMMERCIAL

## 2022-01-01 ENCOUNTER — ANESTHESIA EVENT (OUTPATIENT)
Dept: SURGERY | Facility: CLINIC | Age: 72
DRG: 253 | End: 2022-01-01
Payer: COMMERCIAL

## 2022-01-01 ENCOUNTER — APPOINTMENT (OUTPATIENT)
Dept: INTERVENTIONAL RADIOLOGY/VASCULAR | Facility: CLINIC | Age: 72
DRG: 253 | End: 2022-01-01
Attending: SURGERY
Payer: COMMERCIAL

## 2022-01-01 ENCOUNTER — HOSPITAL ENCOUNTER (INPATIENT)
Facility: CLINIC | Age: 72
LOS: 5 days | Discharge: HOME | DRG: 177 | End: 2022-08-25
Attending: EMERGENCY MEDICINE | Admitting: HOSPITALIST
Payer: COMMERCIAL

## 2022-01-01 ENCOUNTER — OFFICE VISIT (OUTPATIENT)
Dept: INTERNAL MEDICINE | Facility: CLINIC | Age: 72
End: 2022-01-01
Payer: COMMERCIAL

## 2022-01-01 ENCOUNTER — HOSPITAL ENCOUNTER (EMERGENCY)
Facility: CLINIC | Age: 72
Discharge: HOME | End: 2022-11-06
Attending: EMERGENCY MEDICINE | Admitting: EMERGENCY MEDICINE
Payer: COMMERCIAL

## 2022-01-01 ENCOUNTER — APPOINTMENT (OUTPATIENT)
Dept: CT IMAGING | Facility: CLINIC | Age: 72
End: 2022-01-01
Attending: EMERGENCY MEDICINE
Payer: COMMERCIAL

## 2022-01-01 ENCOUNTER — APPOINTMENT (OUTPATIENT)
Dept: PHYSICAL THERAPY | Facility: CLINIC | Age: 72
DRG: 253 | End: 2022-01-01
Attending: STUDENT IN AN ORGANIZED HEALTH CARE EDUCATION/TRAINING PROGRAM
Payer: COMMERCIAL

## 2022-01-01 ENCOUNTER — HOSPITAL ENCOUNTER (OUTPATIENT)
Dept: ULTRASOUND IMAGING | Facility: CLINIC | Age: 72
Discharge: HOME | End: 2022-10-20
Payer: COMMERCIAL

## 2022-01-01 ENCOUNTER — TELEPHONE (OUTPATIENT)
Dept: OTHER | Facility: CLINIC | Age: 72
End: 2022-01-01
Payer: COMMERCIAL

## 2022-01-01 ENCOUNTER — HEALTH MAINTENANCE LETTER (OUTPATIENT)
Age: 72
End: 2022-01-01

## 2022-01-01 ENCOUNTER — TELEPHONE (OUTPATIENT)
Dept: FAMILY MEDICINE | Facility: CLINIC | Age: 72
End: 2022-01-01
Payer: COMMERCIAL

## 2022-01-01 ENCOUNTER — PATIENT OUTREACH (OUTPATIENT)
Dept: NURSING | Facility: CLINIC | Age: 72
End: 2022-01-01
Payer: COMMERCIAL

## 2022-01-01 ENCOUNTER — VIRTUAL VISIT (OUTPATIENT)
Dept: OTHER | Facility: CLINIC | Age: 72
End: 2022-01-01
Attending: SURGERY
Payer: COMMERCIAL

## 2022-01-01 ENCOUNTER — HOSPITAL ENCOUNTER (OUTPATIENT)
Dept: CT IMAGING | Facility: CLINIC | Age: 72
End: 2022-01-28
Attending: SURGERY
Payer: COMMERCIAL

## 2022-01-01 ENCOUNTER — OFFICE VISIT (OUTPATIENT)
Dept: VASCULAR SURGERY | Facility: CLINIC | Age: 72
End: 2022-01-01
Attending: NURSE PRACTITIONER
Payer: COMMERCIAL

## 2022-01-01 ENCOUNTER — HOSPITAL ENCOUNTER (OUTPATIENT)
Dept: ULTRASOUND IMAGING | Facility: CLINIC | Age: 72
Discharge: HOME | End: 2022-07-15
Attending: SURGERY
Payer: COMMERCIAL

## 2022-01-01 ENCOUNTER — APPOINTMENT (OUTPATIENT)
Dept: PHYSICAL THERAPY | Facility: CLINIC | Age: 72
DRG: 253 | End: 2022-01-01
Attending: SURGERY
Payer: COMMERCIAL

## 2022-01-01 ENCOUNTER — E-VISIT (OUTPATIENT)
Dept: FAMILY MEDICINE | Facility: CLINIC | Age: 72
End: 2022-01-01
Payer: COMMERCIAL

## 2022-01-01 ENCOUNTER — TELEPHONE (OUTPATIENT)
Dept: FAMILY MEDICINE | Facility: CLINIC | Age: 72
End: 2022-01-01

## 2022-01-01 ENCOUNTER — MYC MEDICAL ADVICE (OUTPATIENT)
Dept: FAMILY MEDICINE | Facility: CLINIC | Age: 72
End: 2022-01-01

## 2022-01-01 ENCOUNTER — OFFICE VISIT (OUTPATIENT)
Dept: SURGERY | Facility: CLINIC | Age: 72
End: 2022-01-01
Payer: COMMERCIAL

## 2022-01-01 ENCOUNTER — APPOINTMENT (OUTPATIENT)
Dept: OCCUPATIONAL THERAPY | Facility: CLINIC | Age: 72
DRG: 253 | End: 2022-01-01
Attending: STUDENT IN AN ORGANIZED HEALTH CARE EDUCATION/TRAINING PROGRAM
Payer: COMMERCIAL

## 2022-01-01 ENCOUNTER — APPOINTMENT (OUTPATIENT)
Dept: GENERAL RADIOLOGY | Facility: CLINIC | Age: 72
DRG: 177 | End: 2022-01-01
Attending: EMERGENCY MEDICINE
Payer: COMMERCIAL

## 2022-01-01 ENCOUNTER — TELEPHONE (OUTPATIENT)
Dept: CARDIOLOGY | Facility: CLINIC | Age: 72
End: 2022-01-01

## 2022-01-01 ENCOUNTER — HOSPITAL ENCOUNTER (INPATIENT)
Facility: CLINIC | Age: 72
LOS: 5 days | Discharge: HOME HEALTH | DRG: 253 | End: 2022-02-23
Attending: SURGERY | Admitting: SURGERY
Payer: COMMERCIAL

## 2022-01-01 ENCOUNTER — HOSPITAL ENCOUNTER (OUTPATIENT)
Dept: ULTRASOUND IMAGING | Facility: CLINIC | Age: 72
End: 2022-01-28
Attending: SURGERY
Payer: COMMERCIAL

## 2022-01-01 ENCOUNTER — NURSE TRIAGE (OUTPATIENT)
Dept: FAMILY MEDICINE | Facility: CLINIC | Age: 72
End: 2022-01-01

## 2022-01-01 ENCOUNTER — APPOINTMENT (OUTPATIENT)
Dept: CT IMAGING | Facility: CLINIC | Age: 72
DRG: 177 | End: 2022-01-01
Attending: EMERGENCY MEDICINE
Payer: COMMERCIAL

## 2022-01-01 ENCOUNTER — LAB (OUTPATIENT)
Dept: LAB | Facility: CLINIC | Age: 72
End: 2022-01-01

## 2022-01-01 ENCOUNTER — PATIENT OUTREACH (OUTPATIENT)
Dept: ONCOLOGY | Facility: CLINIC | Age: 72
End: 2022-01-01
Payer: COMMERCIAL

## 2022-01-01 ENCOUNTER — OFFICE VISIT (OUTPATIENT)
Dept: OTHER | Facility: CLINIC | Age: 72
End: 2022-01-01
Attending: SURGERY
Payer: COMMERCIAL

## 2022-01-01 ENCOUNTER — APPOINTMENT (OUTPATIENT)
Dept: SURGERY | Facility: PHYSICIAN GROUP | Age: 72
End: 2022-01-01
Payer: COMMERCIAL

## 2022-01-01 VITALS
BODY MASS INDEX: 24.55 KG/M2 | OXYGEN SATURATION: 93 % | HEART RATE: 72 BPM | WEIGHT: 130 LBS | SYSTOLIC BLOOD PRESSURE: 102 MMHG | HEIGHT: 61 IN | RESPIRATION RATE: 16 BRPM | DIASTOLIC BLOOD PRESSURE: 64 MMHG

## 2022-01-01 VITALS
RESPIRATION RATE: 20 BRPM | OXYGEN SATURATION: 93 % | HEART RATE: 76 BPM | DIASTOLIC BLOOD PRESSURE: 65 MMHG | TEMPERATURE: 98 F | SYSTOLIC BLOOD PRESSURE: 127 MMHG

## 2022-01-01 VITALS — DIASTOLIC BLOOD PRESSURE: 69 MMHG | SYSTOLIC BLOOD PRESSURE: 111 MMHG | HEART RATE: 63 BPM

## 2022-01-01 VITALS
HEART RATE: 82 BPM | DIASTOLIC BLOOD PRESSURE: 72 MMHG | RESPIRATION RATE: 16 BRPM | TEMPERATURE: 97.4 F | SYSTOLIC BLOOD PRESSURE: 155 MMHG | OXYGEN SATURATION: 94 %

## 2022-01-01 VITALS
HEIGHT: 61 IN | WEIGHT: 130.73 LBS | SYSTOLIC BLOOD PRESSURE: 137 MMHG | DIASTOLIC BLOOD PRESSURE: 59 MMHG | OXYGEN SATURATION: 91 % | BODY MASS INDEX: 24.68 KG/M2 | HEART RATE: 77 BPM | TEMPERATURE: 97.5 F | RESPIRATION RATE: 24 BRPM

## 2022-01-01 VITALS
RESPIRATION RATE: 14 BRPM | DIASTOLIC BLOOD PRESSURE: 70 MMHG | SYSTOLIC BLOOD PRESSURE: 126 MMHG | WEIGHT: 118.1 LBS | HEIGHT: 61 IN | OXYGEN SATURATION: 95 % | BODY MASS INDEX: 22.3 KG/M2 | HEART RATE: 74 BPM | TEMPERATURE: 97.6 F

## 2022-01-01 VITALS
RESPIRATION RATE: 16 BRPM | TEMPERATURE: 97.4 F | SYSTOLIC BLOOD PRESSURE: 116 MMHG | HEIGHT: 62 IN | WEIGHT: 122.9 LBS | OXYGEN SATURATION: 98 % | HEART RATE: 74 BPM | DIASTOLIC BLOOD PRESSURE: 64 MMHG | BODY MASS INDEX: 22.62 KG/M2

## 2022-01-01 VITALS
WEIGHT: 127.3 LBS | SYSTOLIC BLOOD PRESSURE: 102 MMHG | DIASTOLIC BLOOD PRESSURE: 60 MMHG | HEART RATE: 69 BPM | TEMPERATURE: 97.6 F | HEIGHT: 61 IN | RESPIRATION RATE: 14 BRPM | OXYGEN SATURATION: 96 % | BODY MASS INDEX: 24.03 KG/M2

## 2022-01-01 VITALS
BODY MASS INDEX: 22.5 KG/M2 | WEIGHT: 122 LBS | RESPIRATION RATE: 14 BRPM | SYSTOLIC BLOOD PRESSURE: 104 MMHG | DIASTOLIC BLOOD PRESSURE: 64 MMHG | TEMPERATURE: 97.8 F | OXYGEN SATURATION: 96 % | HEART RATE: 76 BPM

## 2022-01-01 DIAGNOSIS — Z95.828 S/P FEMORAL-FEMORAL BYPASS SURGERY: ICD-10-CM

## 2022-01-01 DIAGNOSIS — I74.5 ILIAC ARTERY OCCLUSION (H): ICD-10-CM

## 2022-01-01 DIAGNOSIS — Z95.0 CARDIAC PACEMAKER IN SITU: ICD-10-CM

## 2022-01-01 DIAGNOSIS — Z01.818 PRE-OP EXAM: Primary | ICD-10-CM

## 2022-01-01 DIAGNOSIS — K55.1 SUPERIOR MESENTERIC ARTERY STENOSIS (H): ICD-10-CM

## 2022-01-01 DIAGNOSIS — I70.221 ATHEROSCLEROSIS OF NATIVE ARTERY OF RIGHT LEG WITH REST PAIN (H): Primary | ICD-10-CM

## 2022-01-01 DIAGNOSIS — R09.89 DECREASED PEDAL PULSES: ICD-10-CM

## 2022-01-01 DIAGNOSIS — R22.41 LOCALIZED SWELLING OF RIGHT FOOT: ICD-10-CM

## 2022-01-01 DIAGNOSIS — I70.221 ATHEROSCLEROSIS OF NATIVE ARTERY OF RIGHT LEG WITH REST PAIN (H): ICD-10-CM

## 2022-01-01 DIAGNOSIS — F03.90 DEMENTIA WITHOUT BEHAVIORAL DISTURBANCE, UNSPECIFIED DEMENTIA TYPE: ICD-10-CM

## 2022-01-01 DIAGNOSIS — I48.91 ATRIAL FIBRILLATION, UNSPECIFIED TYPE (H): ICD-10-CM

## 2022-01-01 DIAGNOSIS — I74.5 ILIAC ARTERY OCCLUSION, RIGHT (H): Primary | ICD-10-CM

## 2022-01-01 DIAGNOSIS — I74.5 ILIAC ARTERY OCCLUSION, RIGHT (H): ICD-10-CM

## 2022-01-01 DIAGNOSIS — J96.01 ACUTE RESPIRATORY FAILURE WITH HYPOXIA (H): ICD-10-CM

## 2022-01-01 DIAGNOSIS — U07.1 INFECTION DUE TO 2019 NOVEL CORONAVIRUS: ICD-10-CM

## 2022-01-01 DIAGNOSIS — I27.0 PRIMARY PULMONARY HYPERTENSION (H): ICD-10-CM
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
## 2022-01-01 DIAGNOSIS — J44.1 CHRONIC OBSTRUCTIVE PULMONARY DISEASE WITH (ACUTE) EXACERBATION (H): ICD-10-CM

## 2022-01-01 DIAGNOSIS — I73.9 PERIPHERAL VASCULAR DISEASE WITH PAIN AT REST (H): Primary | ICD-10-CM

## 2022-01-01 DIAGNOSIS — U07.1 INFECTION DUE TO 2019 NOVEL CORONAVIRUS: Primary | ICD-10-CM

## 2022-01-01 DIAGNOSIS — I49.5 SSS (SICK SINUS SYNDROME) (H): ICD-10-CM

## 2022-01-01 DIAGNOSIS — R09.89 DECREASED PEDAL PULSES: Primary | ICD-10-CM

## 2022-01-01 DIAGNOSIS — Z09 SURGICAL FOLLOW-UP CARE: Primary | ICD-10-CM

## 2022-01-01 DIAGNOSIS — D64.9 ANEMIA, UNSPECIFIED TYPE: Primary | ICD-10-CM

## 2022-01-01 DIAGNOSIS — D64.9 ANEMIA, UNSPECIFIED TYPE: ICD-10-CM

## 2022-01-01 DIAGNOSIS — Z11.59 ENCOUNTER FOR SCREENING FOR OTHER VIRAL DISEASES: ICD-10-CM

## 2022-01-01 DIAGNOSIS — Z79.2 NEED FOR PROPHYLACTIC ANTIBIOTIC: Primary | ICD-10-CM

## 2022-01-01 DIAGNOSIS — Z95.828 STATUS POST INSERTION OF ILIAC ARTERY STENT: Primary | ICD-10-CM

## 2022-01-01 DIAGNOSIS — R09.89 OTHER SPECIFIED SYMPTOMS AND SIGNS INVOLVING THE CIRCULATORY AND RESPIRATORY SYSTEMS: ICD-10-CM

## 2022-01-01 DIAGNOSIS — M79.661 PAIN OF RIGHT LOWER LEG: Primary | ICD-10-CM

## 2022-01-01 DIAGNOSIS — I10 HYPERTENSION, UNSPECIFIED TYPE: Primary | ICD-10-CM

## 2022-01-01 DIAGNOSIS — I49.5 SSS (SICK SINUS SYNDROME) (H): Primary | ICD-10-CM

## 2022-01-01 DIAGNOSIS — F03.90 DEMENTIA WITHOUT BEHAVIORAL DISTURBANCE, PSYCHOTIC DISTURBANCE, MOOD DISTURBANCE, OR ANXIETY, UNSPECIFIED DEMENTIA SEVERITY, UNSPECIFIED DEMENTIA TYPE (H): ICD-10-CM

## 2022-01-01 DIAGNOSIS — I10 HYPERTENSION, UNSPECIFIED TYPE: ICD-10-CM

## 2022-01-01 DIAGNOSIS — Z11.59 ENCOUNTER FOR SCREENING FOR OTHER VIRAL DISEASES: Primary | ICD-10-CM

## 2022-01-01 LAB
ABO/RH(D): NORMAL
ALBUMIN SERPL BCG-MCNC: 4 G/DL (ref 3.5–5.2)
ALP SERPL-CCNC: 91 U/L (ref 35–104)
ALT SERPL W P-5'-P-CCNC: 15 U/L (ref 10–35)
ANION GAP SERPL CALCULATED.3IONS-SCNC: 10 MMOL/L (ref 7–15)
ANION GAP SERPL CALCULATED.3IONS-SCNC: 11 MMOL/L (ref 7–15)
ANION GAP SERPL CALCULATED.3IONS-SCNC: 3 MMOL/L (ref 3–14)
ANION GAP SERPL CALCULATED.3IONS-SCNC: 4 MMOL/L (ref 3–14)
ANION GAP SERPL CALCULATED.3IONS-SCNC: 9 MMOL/L (ref 7–15)
ANION GAP SERPL CALCULATED.3IONS-SCNC: 9 MMOL/L (ref 7–15)
ANTIBODY SCREEN: NEGATIVE
AST SERPL W P-5'-P-CCNC: 21 U/L (ref 10–35)
ATRIAL RATE - MUSE: 76 BPM
ATRIAL RATE - MUSE: 88 BPM
BASOPHILS # BLD AUTO: 0 10E3/UL (ref 0–0.2)
BASOPHILS # BLD AUTO: 0 10E3/UL (ref 0–0.2)
BASOPHILS # BLD AUTO: 0.1 10E3/UL (ref 0–0.2)
BASOPHILS NFR BLD AUTO: 0 %
BASOPHILS NFR BLD AUTO: 0 %
BASOPHILS NFR BLD AUTO: 2 %
BILIRUB DIRECT SERPL-MCNC: <0.2 MG/DL (ref 0–0.3)
BILIRUB SERPL-MCNC: 0.2 MG/DL
BUN SERPL-MCNC: 15 MG/DL (ref 7–30)
BUN SERPL-MCNC: 18 MG/DL (ref 7–30)
BUN SERPL-MCNC: 20.1 MG/DL (ref 8–23)
BUN SERPL-MCNC: 23.1 MG/DL (ref 8–23)
BUN SERPL-MCNC: 23.4 MG/DL (ref 8–23)
BUN SERPL-MCNC: 23.4 MG/DL (ref 8–23)
CALCIUM SERPL-MCNC: 8.1 MG/DL (ref 8.5–10.1)
CALCIUM SERPL-MCNC: 8.3 MG/DL (ref 8.8–10.2)
CALCIUM SERPL-MCNC: 8.5 MG/DL (ref 8.5–10.1)
CALCIUM SERPL-MCNC: 8.5 MG/DL (ref 8.8–10.2)
CALCIUM SERPL-MCNC: 8.6 MG/DL (ref 8.8–10.2)
CALCIUM SERPL-MCNC: 9.1 MG/DL (ref 8.8–10.2)
CHLORIDE BLD-SCNC: 109 MMOL/L (ref 94–109)
CHLORIDE BLD-SCNC: 111 MMOL/L (ref 94–109)
CHLORIDE SERPL-SCNC: 103 MMOL/L (ref 98–107)
CHLORIDE SERPL-SCNC: 105 MMOL/L (ref 98–107)
CHLORIDE SERPL-SCNC: 106 MMOL/L (ref 98–107)
CHLORIDE SERPL-SCNC: 107 MMOL/L (ref 98–107)
CO2 SERPL-SCNC: 28 MMOL/L (ref 20–32)
CO2 SERPL-SCNC: 28 MMOL/L (ref 20–32)
CREAT SERPL-MCNC: 0.48 MG/DL (ref 0.51–0.95)
CREAT SERPL-MCNC: 0.51 MG/DL (ref 0.51–0.95)
CREAT SERPL-MCNC: 0.56 MG/DL (ref 0.52–1.04)
CREAT SERPL-MCNC: 0.59 MG/DL (ref 0.52–1.04)
CREAT SERPL-MCNC: 0.61 MG/DL (ref 0.52–1.04)
CREAT SERPL-MCNC: 0.76 MG/DL (ref 0.51–0.95)
CREAT SERPL-MCNC: 0.94 MG/DL (ref 0.51–0.95)
CRP SERPL-MCNC: 29.27 MG/L
CRP SERPL-MCNC: 3.03 MG/L
CRP SERPL-MCNC: 54.89 MG/L
CRP SERPL-MCNC: 61.11 MG/L
CRP SERPL-MCNC: 8.91 MG/L
D DIMER PPP FEU-MCNC: 0.94 UG/ML FEU (ref 0–0.5)
D DIMER PPP FEU-MCNC: 1 UG/ML FEU (ref 0–0.5)
D DIMER PPP FEU-MCNC: 1.18 UG/ML FEU (ref 0–0.5)
D DIMER PPP FEU-MCNC: 1.22 UG/ML FEU (ref 0–0.5)
D DIMER PPP FEU-MCNC: 18.08 UG/ML FEU (ref 0–0.5)
DEPRECATED HCO3 PLAS-SCNC: 22 MMOL/L (ref 22–29)
DEPRECATED HCO3 PLAS-SCNC: 24 MMOL/L (ref 22–29)
DEPRECATED HCO3 PLAS-SCNC: 26 MMOL/L (ref 22–29)
DEPRECATED HCO3 PLAS-SCNC: 28 MMOL/L (ref 22–29)
DIASTOLIC BLOOD PRESSURE - MUSE: NORMAL MMHG
DIASTOLIC BLOOD PRESSURE - MUSE: NORMAL MMHG
EOSINOPHIL # BLD AUTO: 0 10E3/UL (ref 0–0.7)
EOSINOPHIL # BLD AUTO: 0 10E3/UL (ref 0–0.7)
EOSINOPHIL # BLD AUTO: 0.5 10E3/UL (ref 0–0.7)
EOSINOPHIL NFR BLD AUTO: 0 %
EOSINOPHIL NFR BLD AUTO: 1 %
EOSINOPHIL NFR BLD AUTO: 9 %
ERYTHROCYTE [DISTWIDTH] IN BLOOD BY AUTOMATED COUNT: 12.7 % (ref 10–15)
ERYTHROCYTE [DISTWIDTH] IN BLOOD BY AUTOMATED COUNT: 12.9 % (ref 10–15)
ERYTHROCYTE [DISTWIDTH] IN BLOOD BY AUTOMATED COUNT: 13.1 % (ref 10–15)
ERYTHROCYTE [DISTWIDTH] IN BLOOD BY AUTOMATED COUNT: 13.2 % (ref 10–15)
ERYTHROCYTE [DISTWIDTH] IN BLOOD BY AUTOMATED COUNT: 13.3 % (ref 10–15)
ERYTHROCYTE [DISTWIDTH] IN BLOOD BY AUTOMATED COUNT: 13.4 % (ref 10–15)
ERYTHROCYTE [DISTWIDTH] IN BLOOD BY AUTOMATED COUNT: 13.4 % (ref 10–15)
ERYTHROCYTE [DISTWIDTH] IN BLOOD BY AUTOMATED COUNT: 13.6 % (ref 10–15)
ERYTHROCYTE [DISTWIDTH] IN BLOOD BY AUTOMATED COUNT: 13.9 % (ref 10–15)
FERRITIN SERPL-MCNC: 43 NG/ML (ref 8–252)
FLUAV RNA SPEC QL NAA+PROBE: NEGATIVE
FLUBV RNA RESP QL NAA+PROBE: NEGATIVE
GFR SERPL CREATININE-BSD FRML MDRD: 65 ML/MIN/1.73M2
GFR SERPL CREATININE-BSD FRML MDRD: 84 ML/MIN/1.73M2
GFR SERPL CREATININE-BSD FRML MDRD: >90 ML/MIN/1.73M2
GLUCOSE BLD-MCNC: 135 MG/DL (ref 70–99)
GLUCOSE BLD-MCNC: 87 MG/DL (ref 70–99)
GLUCOSE BLD-MCNC: 87 MG/DL (ref 70–99)
GLUCOSE BLD-MCNC: 99 MG/DL (ref 70–99)
GLUCOSE BLD-MCNC: 99 MG/DL (ref 70–99)
GLUCOSE BLDC GLUCOMTR-MCNC: 100 MG/DL (ref 70–99)
GLUCOSE BLDC GLUCOMTR-MCNC: 101 MG/DL (ref 70–99)
GLUCOSE BLDC GLUCOMTR-MCNC: 102 MG/DL (ref 70–99)
GLUCOSE BLDC GLUCOMTR-MCNC: 102 MG/DL (ref 70–99)
GLUCOSE BLDC GLUCOMTR-MCNC: 104 MG/DL (ref 70–99)
GLUCOSE BLDC GLUCOMTR-MCNC: 104 MG/DL (ref 70–99)
GLUCOSE BLDC GLUCOMTR-MCNC: 106 MG/DL (ref 70–99)
GLUCOSE BLDC GLUCOMTR-MCNC: 162 MG/DL (ref 70–99)
GLUCOSE BLDC GLUCOMTR-MCNC: 207 MG/DL (ref 70–99)
GLUCOSE BLDC GLUCOMTR-MCNC: 90 MG/DL (ref 70–99)
GLUCOSE BLDC GLUCOMTR-MCNC: 98 MG/DL (ref 70–99)
GLUCOSE SERPL-MCNC: 113 MG/DL (ref 70–99)
GLUCOSE SERPL-MCNC: 122 MG/DL (ref 70–99)
GLUCOSE SERPL-MCNC: 122 MG/DL (ref 70–99)
GLUCOSE SERPL-MCNC: 208 MG/DL (ref 70–99)
HBA1C MFR BLD: 5.4 % (ref 0–5.6)
HCO3 BLDV-SCNC: 28 MMOL/L (ref 21–28)
HCT VFR BLD AUTO: 29.2 % (ref 35–47)
HCT VFR BLD AUTO: 30.8 % (ref 35–47)
HCT VFR BLD AUTO: 31 % (ref 35–47)
HCT VFR BLD AUTO: 37.6 % (ref 35–47)
HCT VFR BLD AUTO: 37.8 % (ref 35–47)
HCT VFR BLD AUTO: 37.9 % (ref 35–47)
HCT VFR BLD AUTO: 40 % (ref 35–47)
HCT VFR BLD AUTO: 40 % (ref 35–47)
HCT VFR BLD AUTO: 42.8 % (ref 35–47)
HGB BLD-MCNC: 11.5 G/DL (ref 11.7–15.7)
HGB BLD-MCNC: 11.8 G/DL (ref 11.7–15.7)
HGB BLD-MCNC: 11.8 G/DL (ref 11.7–15.7)
HGB BLD-MCNC: 12.4 G/DL (ref 11.7–15.7)
HGB BLD-MCNC: 13.9 G/DL (ref 11.7–15.7)
HGB BLD-MCNC: 9.5 G/DL (ref 11.7–15.7)
HGB BLD-MCNC: 9.7 G/DL (ref 11.7–15.7)
HGB BLD-MCNC: 9.8 G/DL (ref 11.7–15.7)
IMM GRANULOCYTES # BLD: 0 10E3/UL
IMM GRANULOCYTES # BLD: 0 10E3/UL
IMM GRANULOCYTES NFR BLD: 0 %
IMM GRANULOCYTES NFR BLD: 0 %
INR PPP: 1.11 (ref 0.85–1.15)
INTERPRETATION ECG - MUSE: NORMAL
INTERPRETATION ECG - MUSE: NORMAL
IRON SATN MFR SERPL: 23 % (ref 15–46)
IRON SERPL-MCNC: 89 UG/DL (ref 35–180)
LACTATE BLD-SCNC: 0.7 MMOL/L
LYMPHOCYTES # BLD AUTO: 0.9 10E3/UL (ref 0.8–5.3)
LYMPHOCYTES # BLD AUTO: 1.2 10E3/UL (ref 0.8–5.3)
LYMPHOCYTES # BLD AUTO: 1.4 10E3/UL (ref 0.8–5.3)
LYMPHOCYTES NFR BLD AUTO: 18 %
LYMPHOCYTES NFR BLD AUTO: 29 %
LYMPHOCYTES NFR BLD AUTO: 9 %
MCH RBC QN AUTO: 29 PG (ref 26.5–33)
MCH RBC QN AUTO: 29.4 PG (ref 26.5–33)
MCH RBC QN AUTO: 29.5 PG (ref 26.5–33)
MCH RBC QN AUTO: 29.5 PG (ref 26.5–33)
MCH RBC QN AUTO: 30 PG (ref 26.5–33)
MCH RBC QN AUTO: 30 PG (ref 26.5–33)
MCH RBC QN AUTO: 30.1 PG (ref 26.5–33)
MCH RBC QN AUTO: 30.3 PG (ref 26.5–33)
MCH RBC QN AUTO: 30.4 PG (ref 26.5–33)
MCHC RBC AUTO-ENTMCNC: 30.4 G/DL (ref 31.5–36.5)
MCHC RBC AUTO-ENTMCNC: 31 G/DL (ref 31.5–36.5)
MCHC RBC AUTO-ENTMCNC: 31 G/DL (ref 31.5–36.5)
MCHC RBC AUTO-ENTMCNC: 31.1 G/DL (ref 31.5–36.5)
MCHC RBC AUTO-ENTMCNC: 31.4 G/DL (ref 31.5–36.5)
MCHC RBC AUTO-ENTMCNC: 31.5 G/DL (ref 31.5–36.5)
MCHC RBC AUTO-ENTMCNC: 31.6 G/DL (ref 31.5–36.5)
MCHC RBC AUTO-ENTMCNC: 32.5 G/DL (ref 31.5–36.5)
MCHC RBC AUTO-ENTMCNC: 32.5 G/DL (ref 31.5–36.5)
MCV RBC AUTO: 92 FL (ref 78–100)
MCV RBC AUTO: 93 FL (ref 78–100)
MCV RBC AUTO: 94 FL (ref 78–100)
MCV RBC AUTO: 95 FL (ref 78–100)
MCV RBC AUTO: 96 FL (ref 78–100)
MCV RBC AUTO: 96 FL (ref 78–100)
MCV RBC AUTO: 97 FL (ref 78–100)
MDC_IDC_LEAD_IMPLANT_DT: NORMAL
MDC_IDC_LEAD_LOCATION: NORMAL
MDC_IDC_LEAD_LOCATION_DETAIL_1: NORMAL
MDC_IDC_LEAD_MFG: NORMAL
MDC_IDC_LEAD_MODEL: NORMAL
MDC_IDC_LEAD_POLARITY_TYPE: NORMAL
MDC_IDC_LEAD_SERIAL: NORMAL
MDC_IDC_MSMT_BATTERY_REMAINING_PERCENTAGE: 85 %
MDC_IDC_MSMT_BATTERY_REMAINING_PERCENTAGE: 85 %
MDC_IDC_MSMT_BATTERY_REMAINING_PERCENTAGE: 90 %
MDC_IDC_MSMT_BATTERY_STATUS: NORMAL
MDC_IDC_MSMT_LEADCHNL_RA_IMPEDANCE_VALUE: 524 OHM
MDC_IDC_MSMT_LEADCHNL_RA_IMPEDANCE_VALUE: 526 OHM
MDC_IDC_MSMT_LEADCHNL_RA_IMPEDANCE_VALUE: 532 OHM
MDC_IDC_MSMT_LEADCHNL_RA_IMPEDANCE_VALUE: 565 OHM
MDC_IDC_MSMT_LEADCHNL_RA_LEAD_CHANNEL_STATUS: NORMAL
MDC_IDC_MSMT_LEADCHNL_RA_PACING_THRESHOLD_AMPLITUDE: 0.9 V
MDC_IDC_MSMT_LEADCHNL_RA_PACING_THRESHOLD_AMPLITUDE: 0.9 V
MDC_IDC_MSMT_LEADCHNL_RA_PACING_THRESHOLD_AMPLITUDE: 1 V
MDC_IDC_MSMT_LEADCHNL_RA_PACING_THRESHOLD_PULSEWIDTH: 0.4 MS
MDC_IDC_MSMT_LEADCHNL_RA_SENSING_INTR_AMPL: 5.1 MV
MDC_IDC_MSMT_LEADCHNL_RA_SENSING_INTR_AMPL: 5.4 MV
MDC_IDC_MSMT_LEADCHNL_RV_IMPEDANCE_VALUE: 652 OHM
MDC_IDC_MSMT_LEADCHNL_RV_IMPEDANCE_VALUE: 653 OHM
MDC_IDC_MSMT_LEADCHNL_RV_IMPEDANCE_VALUE: 656 OHM
MDC_IDC_MSMT_LEADCHNL_RV_IMPEDANCE_VALUE: 682 OHM
MDC_IDC_MSMT_LEADCHNL_RV_LEAD_CHANNEL_STATUS: NORMAL
MDC_IDC_MSMT_LEADCHNL_RV_PACING_THRESHOLD_AMPLITUDE: 0.9 V
MDC_IDC_MSMT_LEADCHNL_RV_PACING_THRESHOLD_PULSEWIDTH: 0.4 MS
MDC_IDC_MSMT_LEADCHNL_RV_SENSING_INTR_AMPL: 10 MV
MDC_IDC_MSMT_LEADCHNL_RV_SENSING_INTR_AMPL: 10.2 MV
MDC_IDC_PG_IMPLANT_DTM: NORMAL
MDC_IDC_PG_MFG: NORMAL
MDC_IDC_PG_MODEL: NORMAL
MDC_IDC_PG_SERIAL: NORMAL
MDC_IDC_PG_TYPE: NORMAL
MDC_IDC_SESS_CLINIC_NAME: NORMAL
MDC_IDC_SESS_DTM: NORMAL
MDC_IDC_SESS_REPROGRAMMED: NO
MDC_IDC_SESS_REPROGRAMMED: NORMAL
MDC_IDC_SESS_TYPE: NORMAL
MDC_IDC_SET_BRADY_AT_MODE_SWITCH_MODE: NORMAL
MDC_IDC_SET_BRADY_AT_MODE_SWITCH_RATE: 180 {BEATS}/MIN
MDC_IDC_SET_BRADY_HYSTRATE: 60 {BEATS}/MIN
MDC_IDC_SET_BRADY_LOWRATE: 60 {BEATS}/MIN
MDC_IDC_SET_BRADY_MAX_SENSOR_RATE: 120 {BEATS}/MIN
MDC_IDC_SET_BRADY_MAX_TRACKING_RATE: 130 {BEATS}/MIN
MDC_IDC_SET_BRADY_MODE: NORMAL
MDC_IDC_SET_BRADY_NIGHT_RATE: 60 {BEATS}/MIN
MDC_IDC_SET_BRADY_PAV_DELAY_HIGH: 160 MS
MDC_IDC_SET_BRADY_PAV_DELAY_LOW: 200 MS
MDC_IDC_SET_BRADY_SAV_DELAY_HIGH: 130 MS
MDC_IDC_SET_BRADY_SAV_DELAY_LOW: 170 MS
MDC_IDC_SET_CRT_PACED_CHAMBERS: NORMAL
MDC_IDC_SET_LEADCHNL_LV_PACING_CATHODE_ELECTRODE_1: NORMAL
MDC_IDC_SET_LEADCHNL_LV_PACING_CATHODE_LOCATION_1: NORMAL
MDC_IDC_SET_LEADCHNL_LV_PACING_POLARITY: NORMAL
MDC_IDC_SET_LEADCHNL_LV_SENSING_CATHODE_ELECTRODE_1: NORMAL
MDC_IDC_SET_LEADCHNL_LV_SENSING_CATHODE_LOCATION_1: NORMAL
MDC_IDC_SET_LEADCHNL_LV_SENSING_POLARITY: NORMAL
MDC_IDC_SET_LEADCHNL_RA_PACING_AMPLITUDE: 1.8 V
MDC_IDC_SET_LEADCHNL_RA_PACING_AMPLITUDE: 1.9 V
MDC_IDC_SET_LEADCHNL_RA_PACING_AMPLITUDE: 2 V
MDC_IDC_SET_LEADCHNL_RA_PACING_AMPLITUDE: 2 V
MDC_IDC_SET_LEADCHNL_RA_PACING_POLARITY: NORMAL
MDC_IDC_SET_LEADCHNL_RA_PACING_PULSEWIDTH: 0.4 MS
MDC_IDC_SET_LEADCHNL_RA_SENSING_ADAPTATION_MODE: NORMAL
MDC_IDC_SET_LEADCHNL_RA_SENSING_POLARITY: NORMAL
MDC_IDC_SET_LEADCHNL_RA_SENSING_SENSITIVITY: NORMAL
MDC_IDC_SET_LEADCHNL_RV_PACING_AMPLITUDE: 1.3 V
MDC_IDC_SET_LEADCHNL_RV_PACING_AMPLITUDE: 1.4 V
MDC_IDC_SET_LEADCHNL_RV_PACING_AMPLITUDE: 1.4 V
MDC_IDC_SET_LEADCHNL_RV_PACING_AMPLITUDE: 1.5 V
MDC_IDC_SET_LEADCHNL_RV_PACING_POLARITY: NORMAL
MDC_IDC_SET_LEADCHNL_RV_PACING_PULSEWIDTH: 0.4 MS
MDC_IDC_SET_LEADCHNL_RV_SENSING_ADAPTATION_MODE: NORMAL
MDC_IDC_SET_LEADCHNL_RV_SENSING_POLARITY: NORMAL
MDC_IDC_SET_LEADCHNL_RV_SENSING_SENSITIVITY: NORMAL
MDC_IDC_STAT_AT_BURDEN_PERCENT: 0 %
MDC_IDC_STAT_AT_DTM_END: NORMAL
MDC_IDC_STAT_AT_DTM_START: NORMAL
MDC_IDC_STAT_AT_MODE_SW_COUNT_PER_DAY: 0
MDC_IDC_STAT_AT_MODE_SW_PERCENT_TIME_PER_DAY: 0 %
MDC_IDC_STAT_BRADY_AP_VP_PERCENT: 0 %
MDC_IDC_STAT_BRADY_AP_VS_PERCENT: 43 %
MDC_IDC_STAT_BRADY_AP_VS_PERCENT: 45 %
MDC_IDC_STAT_BRADY_AP_VS_PERCENT: 65 %
MDC_IDC_STAT_BRADY_AS_VP_PERCENT: 0 %
MDC_IDC_STAT_BRADY_AS_VS_PERCENT: 34 %
MDC_IDC_STAT_BRADY_AS_VS_PERCENT: 55 %
MDC_IDC_STAT_BRADY_AS_VS_PERCENT: 57 %
MDC_IDC_STAT_BRADY_DTM_END: NORMAL
MDC_IDC_STAT_BRADY_DTM_START: NORMAL
MDC_IDC_STAT_BRADY_RA_PERCENT_PACED: 43 %
MDC_IDC_STAT_BRADY_RA_PERCENT_PACED: 45 %
MDC_IDC_STAT_BRADY_RA_PERCENT_PACED: 66 %
MDC_IDC_STAT_BRADY_RV_PERCENT_PACED: 0 %
MDC_IDC_STAT_CRT_DTM_END: NORMAL
MDC_IDC_STAT_CRT_DTM_START: NORMAL
MONOCYTES # BLD AUTO: 0.6 10E3/UL (ref 0–1.3)
MONOCYTES # BLD AUTO: 0.7 10E3/UL (ref 0–1.3)
MONOCYTES # BLD AUTO: 0.8 10E3/UL (ref 0–1.3)
MONOCYTES NFR BLD AUTO: 11 %
MONOCYTES NFR BLD AUTO: 12 %
MONOCYTES NFR BLD AUTO: 8 %
NEUTROPHILS # BLD AUTO: 2.5 10E3/UL (ref 1.6–8.3)
NEUTROPHILS # BLD AUTO: 4.6 10E3/UL (ref 1.6–8.3)
NEUTROPHILS # BLD AUTO: 7.8 10E3/UL (ref 1.6–8.3)
NEUTROPHILS NFR BLD AUTO: 49 %
NEUTROPHILS NFR BLD AUTO: 69 %
NEUTROPHILS NFR BLD AUTO: 83 %
NRBC # BLD AUTO: 0 10E3/UL
NRBC # BLD AUTO: 0 10E3/UL
NRBC BLD AUTO-RTO: 0 /100
NRBC BLD AUTO-RTO: 0 /100
P AXIS - MUSE: 47 DEGREES
P AXIS - MUSE: 56 DEGREES
PCO2 BLDV: 45 MM HG (ref 40–50)
PH BLDV: 7.41 [PH] (ref 7.32–7.43)
PLATELET # BLD AUTO: 200 10E3/UL (ref 150–450)
PLATELET # BLD AUTO: 219 10E3/UL (ref 150–450)
PLATELET # BLD AUTO: 249 10E3/UL (ref 150–450)
PLATELET # BLD AUTO: 270 10E3/UL (ref 150–450)
PLATELET # BLD AUTO: 292 10E3/UL (ref 150–450)
PLATELET # BLD AUTO: 350 10E3/UL (ref 150–450)
PLATELET # BLD AUTO: 365 10E3/UL (ref 150–450)
PLATELET # BLD AUTO: 388 10E3/UL (ref 150–450)
PLATELET # BLD AUTO: 445 10E3/UL (ref 150–450)
PO2 BLDV: 34 MM HG (ref 25–47)
POTASSIUM BLD-SCNC: 3.4 MMOL/L (ref 3.4–5.3)
POTASSIUM BLD-SCNC: 3.7 MMOL/L (ref 3.4–5.3)
POTASSIUM BLD-SCNC: 3.9 MMOL/L (ref 3.4–5.3)
POTASSIUM BLD-SCNC: 4 MMOL/L (ref 3.4–5.3)
POTASSIUM SERPL-SCNC: 4.1 MMOL/L (ref 3.4–5.3)
POTASSIUM SERPL-SCNC: 4.2 MMOL/L (ref 3.4–5.3)
POTASSIUM SERPL-SCNC: 4.3 MMOL/L (ref 3.4–5.3)
POTASSIUM SERPL-SCNC: 4.3 MMOL/L (ref 3.4–5.3)
PR INTERVAL - MUSE: 134 MS
PR INTERVAL - MUSE: 156 MS
PROT SERPL-MCNC: 7.2 G/DL (ref 6.4–8.3)
QRS DURATION - MUSE: 86 MS
QRS DURATION - MUSE: 92 MS
QT - MUSE: 356 MS
QT - MUSE: 400 MS
QTC - MUSE: 430 MS
QTC - MUSE: 450 MS
R AXIS - MUSE: 1 DEGREES
R AXIS - MUSE: 16 DEGREES
RADIOLOGIST FLAGS: NORMAL
RBC # BLD AUTO: 3.13 10E6/UL (ref 3.8–5.2)
RBC # BLD AUTO: 3.23 10E6/UL (ref 3.8–5.2)
RBC # BLD AUTO: 3.23 10E6/UL (ref 3.8–5.2)
RBC # BLD AUTO: 3.91 10E6/UL (ref 3.8–5.2)
RBC # BLD AUTO: 3.92 10E6/UL (ref 3.8–5.2)
RBC # BLD AUTO: 4 10E6/UL (ref 3.8–5.2)
RBC # BLD AUTO: 4.2 10E6/UL (ref 3.8–5.2)
RBC # BLD AUTO: 4.28 10E6/UL (ref 3.8–5.2)
RBC # BLD AUTO: 4.64 10E6/UL (ref 3.8–5.2)
RSV RNA SPEC NAA+PROBE: NEGATIVE
SAO2 % BLDV: 65 % (ref 94–100)
SARS-COV-2 RNA RESP QL NAA+PROBE: NEGATIVE
SARS-COV-2 RNA RESP QL NAA+PROBE: POSITIVE
SODIUM SERPL-SCNC: 139 MMOL/L (ref 136–145)
SODIUM SERPL-SCNC: 140 MMOL/L (ref 133–144)
SODIUM SERPL-SCNC: 140 MMOL/L (ref 136–145)
SODIUM SERPL-SCNC: 140 MMOL/L (ref 136–145)
SODIUM SERPL-SCNC: 141 MMOL/L (ref 136–145)
SODIUM SERPL-SCNC: 143 MMOL/L (ref 133–144)
SPECIMEN EXPIRATION DATE: NORMAL
SYSTOLIC BLOOD PRESSURE - MUSE: NORMAL MMHG
SYSTOLIC BLOOD PRESSURE - MUSE: NORMAL MMHG
T AXIS - MUSE: 7 DEGREES
T AXIS - MUSE: 8 DEGREES
TIBC SERPL-MCNC: 385 UG/DL (ref 240–430)
VENTRICULAR RATE- MUSE: 76 BPM
VENTRICULAR RATE- MUSE: 88 BPM
WBC # BLD AUTO: 10.5 10E3/UL (ref 4–11)
WBC # BLD AUTO: 5 10E3/UL (ref 4–11)
WBC # BLD AUTO: 5 10E3/UL (ref 4–11)
WBC # BLD AUTO: 5.6 10E3/UL (ref 4–11)
WBC # BLD AUTO: 6.3 10E3/UL (ref 4–11)
WBC # BLD AUTO: 6.6 10E3/UL (ref 4–11)
WBC # BLD AUTO: 6.7 10E3/UL (ref 4–11)
WBC # BLD AUTO: 8.6 10E3/UL (ref 4–11)
WBC # BLD AUTO: 9.4 10E3/UL (ref 4–11)

## 2022-01-01 PROCEDURE — 82310 ASSAY OF CALCIUM: CPT | Performed by: HOSPITALIST

## 2022-01-01 PROCEDURE — 93922 UPR/L XTREMITY ART 2 LEVELS: CPT

## 2022-01-01 PROCEDURE — 84132 ASSAY OF SERUM POTASSIUM: CPT | Performed by: STUDENT IN AN ORGANIZED HEALTH CARE EDUCATION/TRAINING PROGRAM

## 2022-01-01 PROCEDURE — 85379 FIBRIN DEGRADATION QUANT: CPT | Performed by: HOSPITALIST

## 2022-01-01 PROCEDURE — 85610 PROTHROMBIN TIME: CPT | Performed by: STUDENT IN AN ORGANIZED HEALTH CARE EDUCATION/TRAINING PROGRAM

## 2022-01-01 PROCEDURE — 99232 SBSQ HOSP IP/OBS MODERATE 35: CPT | Performed by: HOSPITALIST

## 2022-01-01 PROCEDURE — 86140 C-REACTIVE PROTEIN: CPT | Performed by: HOSPITALIST

## 2022-01-01 PROCEDURE — 97161 PT EVAL LOW COMPLEX 20 MIN: CPT | Mod: GP

## 2022-01-01 PROCEDURE — 120N000001 HC R&B MED SURG/OB

## 2022-01-01 PROCEDURE — 86850 RBC ANTIBODY SCREEN: CPT | Performed by: SURGERY

## 2022-01-01 PROCEDURE — 041L0JH BYPASS LEFT FEMORAL ARTERY TO RIGHT FEMORAL ARTERY WITH SYNTHETIC SUBSTITUTE, OPEN APPROACH: ICD-10-PCS | Performed by: SURGERY

## 2022-01-01 PROCEDURE — 250N000013 HC RX MED GY IP 250 OP 250 PS 637: Performed by: PHYSICIAN ASSISTANT

## 2022-01-01 PROCEDURE — 047J3DZ DILATION OF LEFT EXTERNAL ILIAC ARTERY WITH INTRALUMINAL DEVICE, PERCUTANEOUS APPROACH: ICD-10-PCS | Performed by: SURGERY

## 2022-01-01 PROCEDURE — 250N000011 HC RX IP 250 OP 636: Performed by: STUDENT IN AN ORGANIZED HEALTH CARE EDUCATION/TRAINING PROGRAM

## 2022-01-01 PROCEDURE — 36415 COLL VENOUS BLD VENIPUNCTURE: CPT

## 2022-01-01 PROCEDURE — 97116 GAIT TRAINING THERAPY: CPT | Mod: GP

## 2022-01-01 PROCEDURE — 047D3EZ DILATION OF LEFT COMMON ILIAC ARTERY WITH TWO INTRALUMINAL DEVICES, PERCUTANEOUS APPROACH: ICD-10-PCS | Performed by: SURGERY

## 2022-01-01 PROCEDURE — 85025 COMPLETE CBC W/AUTO DIFF WBC: CPT

## 2022-01-01 PROCEDURE — C1763 CONN TISS, NON-HUMAN: HCPCS | Performed by: SURGERY

## 2022-01-01 PROCEDURE — 85014 HEMATOCRIT: CPT | Performed by: HOSPITALIST

## 2022-01-01 PROCEDURE — U0005 INFEC AGEN DETEC AMPLI PROBE: HCPCS | Performed by: PHYSICIAN ASSISTANT

## 2022-01-01 PROCEDURE — 82565 ASSAY OF CREATININE: CPT | Performed by: SURGERY

## 2022-01-01 PROCEDURE — 99024 POSTOP FOLLOW-UP VISIT: CPT | Performed by: INTERNAL MEDICINE

## 2022-01-01 PROCEDURE — 72125 CT NECK SPINE W/O DYE: CPT

## 2022-01-01 PROCEDURE — 82947 ASSAY GLUCOSE BLOOD QUANT: CPT | Performed by: ANESTHESIOLOGY

## 2022-01-01 PROCEDURE — 250N000009 HC RX 250: Performed by: SURGERY

## 2022-01-01 PROCEDURE — 36415 COLL VENOUS BLD VENIPUNCTURE: CPT | Performed by: HOSPITALIST

## 2022-01-01 PROCEDURE — 250N000011 HC RX IP 250 OP 636: Performed by: SURGERY

## 2022-01-01 PROCEDURE — 71275 CT ANGIOGRAPHY CHEST: CPT

## 2022-01-01 PROCEDURE — 82803 BLOOD GASES ANY COMBINATION: CPT

## 2022-01-01 PROCEDURE — 250N000011 HC RX IP 250 OP 636: Performed by: HOSPITALIST

## 2022-01-01 PROCEDURE — C1876 STENT, NON-COA/NON-COV W/DEL: HCPCS

## 2022-01-01 PROCEDURE — 97530 THERAPEUTIC ACTIVITIES: CPT | Mod: GP

## 2022-01-01 PROCEDURE — 99212 OFFICE O/P EST SF 10 MIN: CPT | Mod: 95 | Performed by: SURGERY

## 2022-01-01 PROCEDURE — 710N000009 HC RECOVERY PHASE 1, LEVEL 1, PER MIN: Performed by: SURGERY

## 2022-01-01 PROCEDURE — 360N000078 HC SURGERY LEVEL 5, PER MIN: Performed by: SURGERY

## 2022-01-01 PROCEDURE — 250N000013 HC RX MED GY IP 250 OP 250 PS 637: Performed by: STUDENT IN AN ORGANIZED HEALTH CARE EDUCATION/TRAINING PROGRAM

## 2022-01-01 PROCEDURE — 250N000011 HC RX IP 250 OP 636: Performed by: EMERGENCY MEDICINE

## 2022-01-01 PROCEDURE — 250N000013 HC RX MED GY IP 250 OP 250 PS 637: Performed by: HOSPITALIST

## 2022-01-01 PROCEDURE — 99238 HOSP IP/OBS DSCHRG MGMT 30/<: CPT | Performed by: INTERNAL MEDICINE

## 2022-01-01 PROCEDURE — 250N000009 HC RX 250: Performed by: EMERGENCY MEDICINE

## 2022-01-01 PROCEDURE — 99421 OL DIG E/M SVC 5-10 MIN: CPT | Performed by: PHYSICIAN ASSISTANT

## 2022-01-01 PROCEDURE — 85041 AUTOMATED RBC COUNT: CPT | Performed by: HOSPITALIST

## 2022-01-01 PROCEDURE — 75635 CT ANGIO ABDOMINAL ARTERIES: CPT

## 2022-01-01 PROCEDURE — 71045 X-RAY EXAM CHEST 1 VIEW: CPT

## 2022-01-01 PROCEDURE — C1725 CATH, TRANSLUMIN NON-LASER: HCPCS | Performed by: SURGERY

## 2022-01-01 PROCEDURE — 86901 BLOOD TYPING SEROLOGIC RH(D): CPT | Performed by: SURGERY

## 2022-01-01 PROCEDURE — U0003 INFECTIOUS AGENT DETECTION BY NUCLEIC ACID (DNA OR RNA); SEVERE ACUTE RESPIRATORY SYNDROME CORONAVIRUS 2 (SARS-COV-2) (CORONAVIRUS DISEASE [COVID-19]), AMPLIFIED PROBE TECHNIQUE, MAKING USE OF HIGH THROUGHPUT TECHNOLOGIES AS DESCRIBED BY CMS-2020-01-R: HCPCS | Performed by: PHYSICIAN ASSISTANT

## 2022-01-01 PROCEDURE — 93294 REM INTERROG EVL PM/LDLS PM: CPT | Performed by: INTERNAL MEDICINE

## 2022-01-01 PROCEDURE — C1874 STENT, COATED/COV W/DEL SYS: HCPCS

## 2022-01-01 PROCEDURE — 36415 COLL VENOUS BLD VENIPUNCTURE: CPT | Performed by: EMERGENCY MEDICINE

## 2022-01-01 PROCEDURE — 82728 ASSAY OF FERRITIN: CPT

## 2022-01-01 PROCEDURE — 85004 AUTOMATED DIFF WBC COUNT: CPT | Performed by: STUDENT IN AN ORGANIZED HEALTH CARE EDUCATION/TRAINING PROGRAM

## 2022-01-01 PROCEDURE — 36415 COLL VENOUS BLD VENIPUNCTURE: CPT | Performed by: SURGERY

## 2022-01-01 PROCEDURE — 99233 SBSQ HOSP IP/OBS HIGH 50: CPT | Performed by: INTERNAL MEDICINE

## 2022-01-01 PROCEDURE — 99222 1ST HOSP IP/OBS MODERATE 55: CPT | Mod: AI | Performed by: HOSPITALIST

## 2022-01-01 PROCEDURE — 99024 POSTOP FOLLOW-UP VISIT: CPT | Performed by: PHYSICIAN ASSISTANT

## 2022-01-01 PROCEDURE — 82310 ASSAY OF CALCIUM: CPT | Performed by: EMERGENCY MEDICINE

## 2022-01-01 PROCEDURE — 99285 EMERGENCY DEPT VISIT HI MDM: CPT | Mod: 25

## 2022-01-01 PROCEDURE — 258N000003 HC RX IP 258 OP 636: Performed by: HOSPITALIST

## 2022-01-01 PROCEDURE — 96375 TX/PRO/DX INJ NEW DRUG ADDON: CPT

## 2022-01-01 PROCEDURE — 85379 FIBRIN DEGRADATION QUANT: CPT | Performed by: EMERGENCY MEDICINE

## 2022-01-01 PROCEDURE — 258N000003 HC RX IP 258 OP 636: Performed by: SURGERY

## 2022-01-01 PROCEDURE — 93296 REM INTERROG EVL PM/IDS: CPT | Performed by: INTERNAL MEDICINE

## 2022-01-01 PROCEDURE — C1725 CATH, TRANSLUMIN NON-LASER: HCPCS

## 2022-01-01 PROCEDURE — 85027 COMPLETE CBC AUTOMATED: CPT | Performed by: HOSPITALIST

## 2022-01-01 PROCEDURE — 94640 AIRWAY INHALATION TREATMENT: CPT

## 2022-01-01 PROCEDURE — 272N000001 HC OR GENERAL SUPPLY STERILE: Performed by: SURGERY

## 2022-01-01 PROCEDURE — 83550 IRON BINDING TEST: CPT

## 2022-01-01 PROCEDURE — 99223 1ST HOSP IP/OBS HIGH 75: CPT | Performed by: INTERNAL MEDICINE

## 2022-01-01 PROCEDURE — 96365 THER/PROPH/DIAG IV INF INIT: CPT

## 2022-01-01 PROCEDURE — C1894 INTRO/SHEATH, NON-LASER: HCPCS | Performed by: SURGERY

## 2022-01-01 PROCEDURE — C9803 HOPD COVID-19 SPEC COLLECT: HCPCS

## 2022-01-01 PROCEDURE — 97165 OT EVAL LOW COMPLEX 30 MIN: CPT | Mod: GO

## 2022-01-01 PROCEDURE — 250N000009 HC RX 250: Performed by: ANESTHESIOLOGY

## 2022-01-01 PROCEDURE — 99212 OFFICE O/P EST SF 10 MIN: CPT | Performed by: FAMILY MEDICINE

## 2022-01-01 PROCEDURE — 70450 CT HEAD/BRAIN W/O DYE: CPT

## 2022-01-01 PROCEDURE — 250N000013 HC RX MED GY IP 250 OP 250 PS 637: Performed by: INTERNAL MEDICINE

## 2022-01-01 PROCEDURE — 93978 VASCULAR STUDY: CPT

## 2022-01-01 PROCEDURE — 250N000013 HC RX MED GY IP 250 OP 250 PS 637: Performed by: SURGERY

## 2022-01-01 PROCEDURE — 04UL0KZ SUPPLEMENT LEFT FEMORAL ARTERY WITH NONAUTOLOGOUS TISSUE SUBSTITUTE, OPEN APPROACH: ICD-10-PCS | Performed by: SURGERY

## 2022-01-01 PROCEDURE — C1887 CATHETER, GUIDING: HCPCS | Performed by: SURGERY

## 2022-01-01 PROCEDURE — 99214 OFFICE O/P EST MOD 30 MIN: CPT | Performed by: NURSE PRACTITIONER

## 2022-01-01 PROCEDURE — 99232 SBSQ HOSP IP/OBS MODERATE 35: CPT | Performed by: INTERNAL MEDICINE

## 2022-01-01 PROCEDURE — 99024 POSTOP FOLLOW-UP VISIT: CPT | Performed by: SURGERY

## 2022-01-01 PROCEDURE — 97535 SELF CARE MNGMENT TRAINING: CPT | Mod: GO

## 2022-01-01 PROCEDURE — 85018 HEMOGLOBIN: CPT | Performed by: SURGERY

## 2022-01-01 PROCEDURE — 35661 BPG FEMORAL-FEMORAL: CPT | Performed by: SURGERY

## 2022-01-01 PROCEDURE — G0463 HOSPITAL OUTPT CLINIC VISIT: HCPCS

## 2022-01-01 PROCEDURE — 83036 HEMOGLOBIN GLYCOSYLATED A1C: CPT | Performed by: SURGERY

## 2022-01-01 PROCEDURE — 96366 THER/PROPH/DIAG IV INF ADDON: CPT

## 2022-01-01 PROCEDURE — C1768 GRAFT, VASCULAR: HCPCS | Performed by: SURGERY

## 2022-01-01 PROCEDURE — 82947 ASSAY GLUCOSE BLOOD QUANT: CPT | Performed by: HOSPITALIST

## 2022-01-01 PROCEDURE — 258N000003 HC RX IP 258 OP 636: Performed by: STUDENT IN AN ORGANIZED HEALTH CARE EDUCATION/TRAINING PROGRAM

## 2022-01-01 PROCEDURE — 83605 ASSAY OF LACTIC ACID: CPT

## 2022-01-01 PROCEDURE — 370N000017 HC ANESTHESIA TECHNICAL FEE, PER MIN: Performed by: SURGERY

## 2022-01-01 PROCEDURE — 250N000011 HC RX IP 250 OP 636: Performed by: ANESTHESIOLOGY

## 2022-01-01 PROCEDURE — 93005 ELECTROCARDIOGRAM TRACING: CPT

## 2022-01-01 PROCEDURE — 99214 OFFICE O/P EST MOD 30 MIN: CPT | Performed by: PHYSICIAN ASSISTANT

## 2022-01-01 PROCEDURE — 82248 BILIRUBIN DIRECT: CPT | Performed by: EMERGENCY MEDICINE

## 2022-01-01 PROCEDURE — 37221 PR REVASC ILIAC ART, ANGIO/STENT, INIT VESSEL: CPT | Mod: LT | Performed by: SURGERY

## 2022-01-01 PROCEDURE — 258N000003 HC RX IP 258 OP 636: Performed by: ANESTHESIOLOGY

## 2022-01-01 PROCEDURE — 250N000025 HC SEVOFLURANE, PER MIN: Performed by: SURGERY

## 2022-01-01 PROCEDURE — 80048 BASIC METABOLIC PNL TOTAL CA: CPT | Performed by: HOSPITALIST

## 2022-01-01 PROCEDURE — 93280 PM DEVICE PROGR EVAL DUAL: CPT | Performed by: INTERNAL MEDICINE

## 2022-01-01 PROCEDURE — 250N000009 HC RX 250: Performed by: RADIOLOGY

## 2022-01-01 PROCEDURE — 87637 SARSCOV2&INF A&B&RSV AMP PRB: CPT | Performed by: EMERGENCY MEDICINE

## 2022-01-01 PROCEDURE — 36415 COLL VENOUS BLD VENIPUNCTURE: CPT | Performed by: STUDENT IN AN ORGANIZED HEALTH CARE EDUCATION/TRAINING PROGRAM

## 2022-01-01 PROCEDURE — 73030 X-RAY EXAM OF SHOULDER: CPT | Mod: LT

## 2022-01-01 PROCEDURE — 85025 COMPLETE CBC W/AUTO DIFF WBC: CPT | Performed by: EMERGENCY MEDICINE

## 2022-01-01 PROCEDURE — 99204 OFFICE O/P NEW MOD 45 MIN: CPT | Performed by: SURGERY

## 2022-01-01 PROCEDURE — 999N000141 HC STATISTIC PRE-PROCEDURE NURSING ASSESSMENT: Performed by: SURGERY

## 2022-01-01 PROCEDURE — 04CL0ZZ EXTIRPATION OF MATTER FROM LEFT FEMORAL ARTERY, OPEN APPROACH: ICD-10-PCS | Performed by: SURGERY

## 2022-01-01 PROCEDURE — C1769 GUIDE WIRE: HCPCS | Performed by: SURGERY

## 2022-01-01 PROCEDURE — XW033E5 INTRODUCTION OF REMDESIVIR ANTI-INFECTIVE INTO PERIPHERAL VEIN, PERCUTANEOUS APPROACH, NEW TECHNOLOGY GROUP 5: ICD-10-PCS | Performed by: HOSPITALIST

## 2022-01-01 PROCEDURE — 99213 OFFICE O/P EST LOW 20 MIN: CPT | Mod: 95 | Performed by: SURGERY

## 2022-01-01 PROCEDURE — 250N000011 HC RX IP 250 OP 636: Performed by: RADIOLOGY

## 2022-01-01 PROCEDURE — 99214 OFFICE O/P EST MOD 30 MIN: CPT

## 2022-01-01 PROCEDURE — 84132 ASSAY OF SERUM POTASSIUM: CPT | Performed by: SURGERY

## 2022-01-01 PROCEDURE — 37223 PR REVASC ILIAC ART, ANGIO/STENT, EA ADL VESL: CPT | Mod: LT | Performed by: SURGERY

## 2022-01-01 DEVICE — PROPATEN VASCULAR GRAFT TW RR 8MMX50CM 40CM RINGS HEPARIN
Type: IMPLANTABLE DEVICE | Site: ILIAC/FEMORALS | Status: FUNCTIONAL
Brand: GORE PROPATEN VASCULAR GRAFT

## 2022-01-01 DEVICE — VIABAHN BX BALLOON EXP ENDO 8MMX39MM 7FR 135CMCATH HEPARIN
Type: IMPLANTABLE DEVICE | Site: GROIN | Status: FUNCTIONAL
Brand: GORE VIABAHN VBX BALLOON EXPANDABLE ENDO

## 2022-01-01 DEVICE — IMP PATCH PERICARDIUM PHOTOFIX BOVINE 0.8X8CM PFP0.8X8: Type: IMPLANTABLE DEVICE | Site: GROIN | Status: FUNCTIONAL

## 2022-01-01 RX ORDER — POLYETHYLENE GLYCOL 3350 17 G/17G
1 POWDER, FOR SOLUTION ORAL DAILY PRN
COMMUNITY

## 2022-01-01 RX ORDER — KETOROLAC TROMETHAMINE 30 MG/ML
15 INJECTION, SOLUTION INTRAMUSCULAR; INTRAVENOUS
Status: DISCONTINUED | OUTPATIENT
Start: 2022-01-01 | End: 2022-01-01 | Stop reason: HOSPADM

## 2022-01-01 RX ORDER — ENOXAPARIN SODIUM 100 MG/ML
40 INJECTION SUBCUTANEOUS EVERY 24 HOURS
Status: DISCONTINUED | OUTPATIENT
Start: 2022-01-01 | End: 2022-01-01 | Stop reason: HOSPADM

## 2022-01-01 RX ORDER — FERROUS SULFATE 325(65) MG
325 TABLET ORAL DAILY
Status: DISCONTINUED | OUTPATIENT
Start: 2022-01-01 | End: 2022-01-01

## 2022-01-01 RX ORDER — ASPIRIN 300 MG/1
600 SUPPOSITORY RECTAL ONCE
Status: COMPLETED | OUTPATIENT
Start: 2022-01-01 | End: 2022-01-01

## 2022-01-01 RX ORDER — AMOXICILLIN 250 MG
1 CAPSULE ORAL 2 TIMES DAILY
Status: DISCONTINUED | OUTPATIENT
Start: 2022-01-01 | End: 2022-01-01 | Stop reason: HOSPADM

## 2022-01-01 RX ORDER — SENNOSIDES A AND B 8.6 MG/1
1 TABLET, FILM COATED ORAL 2 TIMES DAILY
COMMUNITY

## 2022-01-01 RX ORDER — OLANZAPINE 5 MG/1
10 TABLET ORAL AT BEDTIME
Status: DISCONTINUED | OUTPATIENT
Start: 2022-01-01 | End: 2022-01-01 | Stop reason: HOSPADM

## 2022-01-01 RX ORDER — ACETAMINOPHEN 325 MG/1
975 TABLET ORAL EVERY 8 HOURS
Status: DISPENSED | OUTPATIENT
Start: 2022-01-01 | End: 2022-01-01

## 2022-01-01 RX ORDER — ACETAMINOPHEN 500 MG
500-1000 TABLET ORAL EVERY 6 HOURS PRN
COMMUNITY
End: 2022-01-01

## 2022-01-01 RX ORDER — PROPOFOL 10 MG/ML
INJECTION, EMULSION INTRAVENOUS PRN
Status: DISCONTINUED | OUTPATIENT
Start: 2022-01-01 | End: 2022-01-01

## 2022-01-01 RX ORDER — NALOXONE HYDROCHLORIDE 0.4 MG/ML
0.2 INJECTION, SOLUTION INTRAMUSCULAR; INTRAVENOUS; SUBCUTANEOUS
Status: DISCONTINUED | OUTPATIENT
Start: 2022-01-01 | End: 2022-01-01 | Stop reason: HOSPADM

## 2022-01-01 RX ORDER — MEMANTINE HYDROCHLORIDE 10 MG/1
10 TABLET ORAL 2 TIMES DAILY
Status: DISCONTINUED | OUTPATIENT
Start: 2022-01-01 | End: 2022-01-01 | Stop reason: HOSPADM

## 2022-01-01 RX ORDER — LIDOCAINE HYDROCHLORIDE 20 MG/ML
INJECTION, SOLUTION INFILTRATION; PERINEURAL PRN
Status: DISCONTINUED | OUTPATIENT
Start: 2022-01-01 | End: 2022-01-01

## 2022-01-01 RX ORDER — HYDROMORPHONE HCL IN WATER/PF 6 MG/30 ML
0.2 PATIENT CONTROLLED ANALGESIA SYRINGE INTRAVENOUS
Status: DISCONTINUED | OUTPATIENT
Start: 2022-01-01 | End: 2022-01-01 | Stop reason: HOSPADM

## 2022-01-01 RX ORDER — ACETAMINOPHEN 325 MG/1
650 TABLET ORAL EVERY 6 HOURS PRN
Status: DISCONTINUED | OUTPATIENT
Start: 2022-01-01 | End: 2022-01-01 | Stop reason: HOSPADM

## 2022-01-01 RX ORDER — AMOXICILLIN 250 MG
1 CAPSULE ORAL 2 TIMES DAILY
Qty: 60 TABLET | Refills: 0 | Status: SHIPPED | OUTPATIENT
Start: 2022-01-01 | End: 2022-01-01

## 2022-01-01 RX ORDER — ONDANSETRON 2 MG/ML
INJECTION INTRAMUSCULAR; INTRAVENOUS PRN
Status: DISCONTINUED | OUTPATIENT
Start: 2022-01-01 | End: 2022-01-01

## 2022-01-01 RX ORDER — ALBUTEROL SULFATE 90 UG/1
2 AEROSOL, METERED RESPIRATORY (INHALATION) EVERY 4 HOURS PRN
Status: DISCONTINUED | OUTPATIENT
Start: 2022-01-01 | End: 2022-01-01 | Stop reason: HOSPADM

## 2022-01-01 RX ORDER — CLINDAMYCIN PHOSPHATE 900 MG/50ML
900 INJECTION, SOLUTION INTRAVENOUS
Status: COMPLETED | OUTPATIENT
Start: 2022-01-01 | End: 2022-01-01

## 2022-01-01 RX ORDER — ONDANSETRON 4 MG/1
4 TABLET, ORALLY DISINTEGRATING ORAL EVERY 30 MIN PRN
Status: DISCONTINUED | OUTPATIENT
Start: 2022-01-01 | End: 2022-01-01 | Stop reason: HOSPADM

## 2022-01-01 RX ORDER — LORAZEPAM 0.5 MG/1
0.5 TABLET ORAL EVERY 8 HOURS PRN
Status: DISCONTINUED | OUTPATIENT
Start: 2022-01-01 | End: 2022-01-01 | Stop reason: HOSPADM

## 2022-01-01 RX ORDER — FERROUS SULFATE 325(65) MG
325 TABLET ORAL EVERY OTHER DAY
Status: DISCONTINUED | OUTPATIENT
Start: 2022-01-01 | End: 2022-01-01 | Stop reason: HOSPADM

## 2022-01-01 RX ORDER — LIDOCAINE 40 MG/G
CREAM TOPICAL
Status: DISCONTINUED | OUTPATIENT
Start: 2022-01-01 | End: 2022-01-01 | Stop reason: HOSPADM

## 2022-01-01 RX ORDER — POLYETHYLENE GLYCOL 3350 17 G/17G
17 POWDER, FOR SOLUTION ORAL DAILY PRN
Status: DISCONTINUED | OUTPATIENT
Start: 2022-01-01 | End: 2022-01-01 | Stop reason: HOSPADM

## 2022-01-01 RX ORDER — DEXAMETHASONE SODIUM PHOSPHATE 10 MG/ML
6 INJECTION, SOLUTION INTRAMUSCULAR; INTRAVENOUS DAILY
Status: DISCONTINUED | OUTPATIENT
Start: 2022-01-01 | End: 2022-01-01 | Stop reason: HOSPADM

## 2022-01-01 RX ORDER — OLANZAPINE 10 MG/1
10 TABLET ORAL AT BEDTIME
Status: DISCONTINUED | OUTPATIENT
Start: 2022-01-01 | End: 2022-01-01 | Stop reason: HOSPADM

## 2022-01-01 RX ORDER — BISACODYL 10 MG
10 SUPPOSITORY, RECTAL RECTAL DAILY PRN
Status: DISCONTINUED | OUTPATIENT
Start: 2022-01-01 | End: 2022-01-01 | Stop reason: HOSPADM

## 2022-01-01 RX ORDER — MEMANTINE HYDROCHLORIDE 5 MG/1
10 TABLET ORAL 2 TIMES DAILY
Status: DISCONTINUED | OUTPATIENT
Start: 2022-01-01 | End: 2022-01-01 | Stop reason: HOSPADM

## 2022-01-01 RX ORDER — ONDANSETRON 2 MG/ML
4 INJECTION INTRAMUSCULAR; INTRAVENOUS EVERY 30 MIN PRN
Status: DISCONTINUED | OUTPATIENT
Start: 2022-01-01 | End: 2022-01-01 | Stop reason: HOSPADM

## 2022-01-01 RX ORDER — DEXAMETHASONE SODIUM PHOSPHATE 10 MG/ML
6 INJECTION, SOLUTION INTRAMUSCULAR; INTRAVENOUS ONCE
Status: COMPLETED | OUTPATIENT
Start: 2022-01-01 | End: 2022-01-01

## 2022-01-01 RX ORDER — LORAZEPAM 0.5 MG/1
0.5 TABLET ORAL 3 TIMES DAILY PRN
Status: DISCONTINUED | OUTPATIENT
Start: 2022-01-01 | End: 2022-01-01 | Stop reason: HOSPADM

## 2022-01-01 RX ORDER — LABETALOL HYDROCHLORIDE 5 MG/ML
10 INJECTION, SOLUTION INTRAVENOUS
Status: DISCONTINUED | OUTPATIENT
Start: 2022-01-01 | End: 2022-01-01 | Stop reason: HOSPADM

## 2022-01-01 RX ORDER — CLINDAMYCIN PHOSPHATE 900 MG/50ML
900 INJECTION, SOLUTION INTRAVENOUS EVERY 8 HOURS
Status: COMPLETED | OUTPATIENT
Start: 2022-01-01 | End: 2022-01-01

## 2022-01-01 RX ORDER — IOPAMIDOL 612 MG/ML
100 INJECTION, SOLUTION INTRAVASCULAR ONCE
Status: DISCONTINUED | OUTPATIENT
Start: 2022-01-01 | End: 2022-01-01 | Stop reason: HOSPADM

## 2022-01-01 RX ORDER — IPRATROPIUM BROMIDE AND ALBUTEROL SULFATE 2.5; .5 MG/3ML; MG/3ML
1 SOLUTION RESPIRATORY (INHALATION) EVERY 6 HOURS PRN
Status: DISCONTINUED | OUTPATIENT
Start: 2022-01-01 | End: 2022-01-01 | Stop reason: HOSPADM

## 2022-01-01 RX ORDER — METOPROLOL TARTRATE 50 MG
TABLET ORAL
Qty: 180 TABLET | Refills: 0 | Status: SHIPPED | OUTPATIENT
Start: 2022-01-01 | End: 2022-01-01

## 2022-01-01 RX ORDER — METOPROLOL TARTRATE 50 MG
50 TABLET ORAL 2 TIMES DAILY
Status: DISCONTINUED | OUTPATIENT
Start: 2022-01-01 | End: 2022-01-01 | Stop reason: HOSPADM

## 2022-01-01 RX ORDER — FERROUS SULFATE 325(65) MG
325 TABLET ORAL EVERY OTHER DAY
Qty: 30 TABLET | Refills: 0 | Status: SHIPPED | OUTPATIENT
Start: 2022-01-01 | End: 2022-01-01

## 2022-01-01 RX ORDER — OXYCODONE HYDROCHLORIDE 5 MG/1
10 TABLET ORAL EVERY 4 HOURS PRN
Status: DISCONTINUED | OUTPATIENT
Start: 2022-01-01 | End: 2022-01-01

## 2022-01-01 RX ORDER — FENTANYL CITRATE 50 UG/ML
INJECTION, SOLUTION INTRAMUSCULAR; INTRAVENOUS PRN
Status: DISCONTINUED | OUTPATIENT
Start: 2022-01-01 | End: 2022-01-01

## 2022-01-01 RX ORDER — LORAZEPAM 0.5 MG/1
0.5 TABLET ORAL PRN
Status: CANCELLED | OUTPATIENT
Start: 2022-01-01

## 2022-01-01 RX ORDER — HEPARIN SODIUM 1000 [USP'U]/ML
INJECTION, SOLUTION INTRAVENOUS; SUBCUTANEOUS PRN
Status: DISCONTINUED | OUTPATIENT
Start: 2022-01-01 | End: 2022-01-01

## 2022-01-01 RX ORDER — SODIUM CHLORIDE, SODIUM LACTATE, POTASSIUM CHLORIDE, CALCIUM CHLORIDE 600; 310; 30; 20 MG/100ML; MG/100ML; MG/100ML; MG/100ML
INJECTION, SOLUTION INTRAVENOUS CONTINUOUS
Status: DISCONTINUED | OUTPATIENT
Start: 2022-01-01 | End: 2022-01-01 | Stop reason: HOSPADM

## 2022-01-01 RX ORDER — ACETAMINOPHEN 650 MG/1
650 SUPPOSITORY RECTAL EVERY 6 HOURS PRN
Status: DISCONTINUED | OUTPATIENT
Start: 2022-01-01 | End: 2022-01-01 | Stop reason: HOSPADM

## 2022-01-01 RX ORDER — DILTIAZEM HYDROCHLORIDE 180 MG/1
180 CAPSULE, COATED, EXTENDED RELEASE ORAL DAILY
Status: DISCONTINUED | OUTPATIENT
Start: 2022-01-01 | End: 2022-01-01 | Stop reason: HOSPADM

## 2022-01-01 RX ORDER — ONDANSETRON 4 MG/1
4 TABLET, ORALLY DISINTEGRATING ORAL EVERY 6 HOURS PRN
Status: DISCONTINUED | OUTPATIENT
Start: 2022-01-01 | End: 2022-01-01 | Stop reason: HOSPADM

## 2022-01-01 RX ORDER — DILTIAZEM HYDROCHLORIDE 180 MG/1
CAPSULE, COATED, EXTENDED RELEASE ORAL
Qty: 90 CAPSULE | Refills: 1 | OUTPATIENT
Start: 2022-01-01

## 2022-01-01 RX ORDER — IPRATROPIUM BROMIDE AND ALBUTEROL SULFATE 2.5; .5 MG/3ML; MG/3ML
3 SOLUTION RESPIRATORY (INHALATION) ONCE
Status: COMPLETED | OUTPATIENT
Start: 2022-01-01 | End: 2022-01-01

## 2022-01-01 RX ORDER — TRAZODONE HYDROCHLORIDE 100 MG/1
100 TABLET ORAL
COMMUNITY
Start: 2022-01-01

## 2022-01-01 RX ORDER — FENTANYL CITRATE 50 UG/ML
50 INJECTION, SOLUTION INTRAMUSCULAR; INTRAVENOUS EVERY 5 MIN PRN
Status: DISCONTINUED | OUTPATIENT
Start: 2022-01-01 | End: 2022-01-01 | Stop reason: HOSPADM

## 2022-01-01 RX ORDER — OXYCODONE HYDROCHLORIDE 5 MG/1
5 TABLET ORAL EVERY 4 HOURS PRN
Status: DISCONTINUED | OUTPATIENT
Start: 2022-01-01 | End: 2022-01-01 | Stop reason: HOSPADM

## 2022-01-01 RX ORDER — IPRATROPIUM BROMIDE AND ALBUTEROL SULFATE 2.5; .5 MG/3ML; MG/3ML
1 SOLUTION RESPIRATORY (INHALATION) 2 TIMES DAILY PRN
Status: DISCONTINUED | OUTPATIENT
Start: 2022-01-01 | End: 2022-01-01 | Stop reason: HOSPADM

## 2022-01-01 RX ORDER — LIDOCAINE 40 MG/G
CREAM TOPICAL
Status: DISCONTINUED | OUTPATIENT
Start: 2022-01-01 | End: 2022-01-01

## 2022-01-01 RX ORDER — CEFAZOLIN SODIUM 1 G/3ML
1 INJECTION, POWDER, FOR SOLUTION INTRAMUSCULAR; INTRAVENOUS EVERY 8 HOURS
Status: DISCONTINUED | OUTPATIENT
Start: 2022-01-01 | End: 2022-01-01

## 2022-01-01 RX ORDER — ACETAMINOPHEN 500 MG
500-1000 TABLET ORAL EVERY 6 HOURS PRN
Status: DISCONTINUED | OUTPATIENT
Start: 2022-01-01 | End: 2022-01-01

## 2022-01-01 RX ORDER — DILTIAZEM HYDROCHLORIDE 180 MG/1
CAPSULE, COATED, EXTENDED RELEASE ORAL
Qty: 90 CAPSULE | Refills: 1 | Status: SHIPPED | OUTPATIENT
Start: 2022-01-01

## 2022-01-01 RX ORDER — IOPAMIDOL 755 MG/ML
500 INJECTION, SOLUTION INTRAVASCULAR ONCE
Status: COMPLETED | OUTPATIENT
Start: 2022-01-01 | End: 2022-01-01

## 2022-01-01 RX ORDER — NALOXONE HYDROCHLORIDE 0.4 MG/ML
0.4 INJECTION, SOLUTION INTRAMUSCULAR; INTRAVENOUS; SUBCUTANEOUS
Status: DISCONTINUED | OUTPATIENT
Start: 2022-01-01 | End: 2022-01-01 | Stop reason: HOSPADM

## 2022-01-01 RX ORDER — SODIUM CHLORIDE 9 MG/ML
INJECTION, SOLUTION INTRAVENOUS CONTINUOUS
Status: DISCONTINUED | OUTPATIENT
Start: 2022-01-01 | End: 2022-01-01

## 2022-01-01 RX ORDER — LORAZEPAM 0.5 MG/1
0.5 TABLET ORAL PRN
COMMUNITY

## 2022-01-01 RX ORDER — ONDANSETRON 2 MG/ML
4 INJECTION INTRAMUSCULAR; INTRAVENOUS EVERY 6 HOURS PRN
Status: DISCONTINUED | OUTPATIENT
Start: 2022-01-01 | End: 2022-01-01 | Stop reason: HOSPADM

## 2022-01-01 RX ORDER — POLYETHYLENE GLYCOL 3350 17 G/17G
17 POWDER, FOR SOLUTION ORAL DAILY
Status: DISCONTINUED | OUTPATIENT
Start: 2022-01-01 | End: 2022-01-01 | Stop reason: HOSPADM

## 2022-01-01 RX ORDER — ASPIRIN 81 MG/1
81 TABLET, CHEWABLE ORAL EVERY EVENING
Status: DISCONTINUED | OUTPATIENT
Start: 2022-01-01 | End: 2022-01-01 | Stop reason: HOSPADM

## 2022-01-01 RX ORDER — PROCHLORPERAZINE MALEATE 5 MG
5 TABLET ORAL EVERY 6 HOURS PRN
Status: DISCONTINUED | OUTPATIENT
Start: 2022-01-01 | End: 2022-01-01 | Stop reason: HOSPADM

## 2022-01-01 RX ORDER — DEXAMETHASONE SODIUM PHOSPHATE 4 MG/ML
INJECTION, SOLUTION INTRA-ARTICULAR; INTRALESIONAL; INTRAMUSCULAR; INTRAVENOUS; SOFT TISSUE PRN
Status: DISCONTINUED | OUTPATIENT
Start: 2022-01-01 | End: 2022-01-01

## 2022-01-01 RX ORDER — BUPIVACAINE HYDROCHLORIDE AND EPINEPHRINE 2.5; 5 MG/ML; UG/ML
INJECTION, SOLUTION INFILTRATION; PERINEURAL PRN
Status: DISCONTINUED | OUTPATIENT
Start: 2022-01-01 | End: 2022-01-01 | Stop reason: HOSPADM

## 2022-01-01 RX ORDER — DEXMEDETOMIDINE HYDROCHLORIDE 4 UG/ML
INJECTION, SOLUTION INTRAVENOUS PRN
Status: DISCONTINUED | OUTPATIENT
Start: 2022-01-01 | End: 2022-01-01

## 2022-01-01 RX ORDER — SODIUM CHLORIDE, SODIUM LACTATE, POTASSIUM CHLORIDE, CALCIUM CHLORIDE 600; 310; 30; 20 MG/100ML; MG/100ML; MG/100ML; MG/100ML
INJECTION, SOLUTION INTRAVENOUS CONTINUOUS
Status: DISCONTINUED | OUTPATIENT
Start: 2022-01-01 | End: 2022-01-01

## 2022-01-01 RX ORDER — ACETAMINOPHEN 325 MG/1
650 TABLET ORAL EVERY 4 HOURS PRN
Status: DISCONTINUED | OUTPATIENT
Start: 2022-01-01 | End: 2022-01-01 | Stop reason: HOSPADM

## 2022-01-01 RX ORDER — SENNOSIDES A AND B 8.6 MG/1
2 TABLET, FILM COATED ORAL DAILY
Status: DISCONTINUED | OUTPATIENT
Start: 2022-01-01 | End: 2022-01-01

## 2022-01-01 RX ORDER — TRAZODONE HYDROCHLORIDE 50 MG/1
50-100 TABLET, FILM COATED ORAL AT BEDTIME
Status: DISCONTINUED | OUTPATIENT
Start: 2022-01-01 | End: 2022-01-01 | Stop reason: HOSPADM

## 2022-01-01 RX ORDER — KETOROLAC TROMETHAMINE 15 MG/ML
10 INJECTION, SOLUTION INTRAMUSCULAR; INTRAVENOUS EVERY 6 HOURS
Status: DISPENSED | OUTPATIENT
Start: 2022-01-01 | End: 2022-01-01

## 2022-01-01 RX ORDER — METOPROLOL TARTRATE 50 MG
TABLET ORAL
Qty: 180 TABLET | Refills: 0 | Status: SHIPPED | OUTPATIENT
Start: 2022-01-01

## 2022-01-01 RX ORDER — BISACODYL 10 MG
10 SUPPOSITORY, RECTAL RECTAL ONCE
Status: COMPLETED | OUTPATIENT
Start: 2022-01-01 | End: 2022-01-01

## 2022-01-01 RX ORDER — HYDROMORPHONE HCL IN WATER/PF 6 MG/30 ML
0.4 PATIENT CONTROLLED ANALGESIA SYRINGE INTRAVENOUS EVERY 5 MIN PRN
Status: DISCONTINUED | OUTPATIENT
Start: 2022-01-01 | End: 2022-01-01 | Stop reason: HOSPADM

## 2022-01-01 RX ORDER — TRAZODONE HYDROCHLORIDE 50 MG/1
50-100 TABLET, FILM COATED ORAL
Status: DISCONTINUED | OUTPATIENT
Start: 2022-01-01 | End: 2022-01-01 | Stop reason: HOSPADM

## 2022-01-01 RX ORDER — MEPERIDINE HYDROCHLORIDE 25 MG/ML
12.5 INJECTION INTRAMUSCULAR; INTRAVENOUS; SUBCUTANEOUS EVERY 5 MIN PRN
Status: DISCONTINUED | OUTPATIENT
Start: 2022-01-01 | End: 2022-01-01 | Stop reason: HOSPADM

## 2022-01-01 RX ADMIN — MEMANTINE 10 MG: 10 TABLET ORAL at 21:10

## 2022-01-01 RX ADMIN — HEPARIN SODIUM 3000 UNITS: 1000 INJECTION INTRAVENOUS; SUBCUTANEOUS at 12:26

## 2022-01-01 RX ADMIN — TRAZODONE HYDROCHLORIDE 100 MG: 50 TABLET ORAL at 21:02

## 2022-01-01 RX ADMIN — SENNOSIDES AND DOCUSATE SODIUM 1 TABLET: 50; 8.6 TABLET ORAL at 09:56

## 2022-01-01 RX ADMIN — DEXAMETHASONE SODIUM PHOSPHATE 6 MG: 10 INJECTION, SOLUTION INTRAMUSCULAR; INTRAVENOUS at 12:15

## 2022-01-01 RX ADMIN — SODIUM CHLORIDE: 9 INJECTION, SOLUTION INTRAVENOUS at 23:35

## 2022-01-01 RX ADMIN — KETOROLAC TROMETHAMINE 10 MG: 15 INJECTION, SOLUTION INTRAMUSCULAR; INTRAVENOUS at 23:12

## 2022-01-01 RX ADMIN — IOPAMIDOL 100 ML: 755 INJECTION, SOLUTION INTRAVENOUS at 12:54

## 2022-01-01 RX ADMIN — ACETAMINOPHEN 975 MG: 325 TABLET, FILM COATED ORAL at 02:31

## 2022-01-01 RX ADMIN — ONDANSETRON 4 MG: 2 INJECTION INTRAMUSCULAR; INTRAVENOUS at 10:28

## 2022-01-01 RX ADMIN — LORAZEPAM 0.5 MG: 0.5 TABLET ORAL at 18:33

## 2022-01-01 RX ADMIN — PHENYLEPHRINE HYDROCHLORIDE 100 MCG: 10 INJECTION INTRAVENOUS at 10:01

## 2022-01-01 RX ADMIN — METOPROLOL TARTRATE 50 MG: 50 TABLET, FILM COATED ORAL at 08:34

## 2022-01-01 RX ADMIN — SODIUM CHLORIDE 50 ML: 9 INJECTION, SOLUTION INTRAVENOUS at 16:30

## 2022-01-01 RX ADMIN — PHENYLEPHRINE HYDROCHLORIDE 100 MCG: 10 INJECTION INTRAVENOUS at 15:25

## 2022-01-01 RX ADMIN — SODIUM CHLORIDE: 9 INJECTION, SOLUTION INTRAVENOUS at 16:47

## 2022-01-01 RX ADMIN — OLANZAPINE 10 MG: 5 TABLET, FILM COATED ORAL at 22:15

## 2022-01-01 RX ADMIN — MEMANTINE 10 MG: 5 TABLET ORAL at 20:00

## 2022-01-01 RX ADMIN — OLANZAPINE 10 MG: 5 TABLET, FILM COATED ORAL at 21:02

## 2022-01-01 RX ADMIN — MEMANTINE 10 MG: 5 TABLET ORAL at 09:23

## 2022-01-01 RX ADMIN — PHENYLEPHRINE HYDROCHLORIDE 100 MCG: 10 INJECTION INTRAVENOUS at 14:21

## 2022-01-01 RX ADMIN — DILTIAZEM HYDROCHLORIDE 180 MG: 180 CAPSULE, COATED, EXTENDED RELEASE ORAL at 08:34

## 2022-01-01 RX ADMIN — ACETAMINOPHEN 975 MG: 325 TABLET, FILM COATED ORAL at 18:51

## 2022-01-01 RX ADMIN — TRAZODONE HYDROCHLORIDE 50 MG: 50 TABLET ORAL at 01:01

## 2022-01-01 RX ADMIN — KETOROLAC TROMETHAMINE 10 MG: 15 INJECTION, SOLUTION INTRAMUSCULAR; INTRAVENOUS at 23:52

## 2022-01-01 RX ADMIN — DILTIAZEM HYDROCHLORIDE 180 MG: 180 CAPSULE, COATED, EXTENDED RELEASE ORAL at 08:50

## 2022-01-01 RX ADMIN — DEXMEDETOMIDINE HYDROCHLORIDE 8 MCG: 200 INJECTION INTRAVENOUS at 10:15

## 2022-01-01 RX ADMIN — PHENYLEPHRINE HYDROCHLORIDE 150 MCG: 10 INJECTION INTRAVENOUS at 09:58

## 2022-01-01 RX ADMIN — MEMANTINE 10 MG: 10 TABLET ORAL at 10:06

## 2022-01-01 RX ADMIN — IPRATROPIUM BROMIDE AND ALBUTEROL SULFATE 3 ML: .5; 3 SOLUTION RESPIRATORY (INHALATION) at 21:05

## 2022-01-01 RX ADMIN — DILTIAZEM HYDROCHLORIDE 180 MG: 180 CAPSULE, COATED, EXTENDED RELEASE ORAL at 08:40

## 2022-01-01 RX ADMIN — DEXAMETHASONE SODIUM PHOSPHATE 6 MG: 10 INJECTION, SOLUTION INTRAMUSCULAR; INTRAVENOUS at 13:15

## 2022-01-01 RX ADMIN — ASPIRIN 81 MG CHEWABLE TABLET 81 MG: 81 TABLET CHEWABLE at 19:46

## 2022-01-01 RX ADMIN — HEPARIN SODIUM 5000 UNITS: 1000 INJECTION INTRAVENOUS; SUBCUTANEOUS at 11:24

## 2022-01-01 RX ADMIN — REMDESIVIR 100 MG: 100 INJECTION, POWDER, LYOPHILIZED, FOR SOLUTION INTRAVENOUS at 17:21

## 2022-01-01 RX ADMIN — KETOROLAC TROMETHAMINE 10 MG: 15 INJECTION, SOLUTION INTRAMUSCULAR; INTRAVENOUS at 10:54

## 2022-01-01 RX ADMIN — METOPROLOL TARTRATE 50 MG: 50 TABLET, FILM COATED ORAL at 19:47

## 2022-01-01 RX ADMIN — ASPIRIN 81 MG CHEWABLE TABLET 81 MG: 81 TABLET CHEWABLE at 20:46

## 2022-01-01 RX ADMIN — ACETAMINOPHEN 975 MG: 325 TABLET, FILM COATED ORAL at 09:55

## 2022-01-01 RX ADMIN — METOPROLOL TARTRATE 50 MG: 50 TABLET, FILM COATED ORAL at 10:31

## 2022-01-01 RX ADMIN — ROCURONIUM BROMIDE 10 MG: 50 INJECTION, SOLUTION INTRAVENOUS at 11:08

## 2022-01-01 RX ADMIN — SODIUM CHLORIDE 80 ML: 9 INJECTION, SOLUTION INTRAVENOUS at 12:54

## 2022-01-01 RX ADMIN — SENNOSIDES AND DOCUSATE SODIUM 1 TABLET: 50; 8.6 TABLET ORAL at 21:33

## 2022-01-01 RX ADMIN — METOPROLOL TARTRATE 50 MG: 50 TABLET, FILM COATED ORAL at 21:09

## 2022-01-01 RX ADMIN — OLANZAPINE 10 MG: 5 TABLET, FILM COATED ORAL at 21:09

## 2022-01-01 RX ADMIN — PROPOFOL 150 MG: 10 INJECTION, EMULSION INTRAVENOUS at 09:48

## 2022-01-01 RX ADMIN — METOPROLOL TARTRATE 50 MG: 50 TABLET, FILM COATED ORAL at 10:02

## 2022-01-01 RX ADMIN — DILTIAZEM HYDROCHLORIDE 180 MG: 180 CAPSULE, COATED, EXTENDED RELEASE ORAL at 10:22

## 2022-01-01 RX ADMIN — METOPROLOL TARTRATE 50 MG: 50 TABLET, FILM COATED ORAL at 20:55

## 2022-01-01 RX ADMIN — OLANZAPINE 10 MG: 5 TABLET, FILM COATED ORAL at 23:48

## 2022-01-01 RX ADMIN — MEMANTINE 10 MG: 10 TABLET ORAL at 10:02

## 2022-01-01 RX ADMIN — ENOXAPARIN SODIUM 40 MG: 40 INJECTION SUBCUTANEOUS at 21:21

## 2022-01-01 RX ADMIN — HYDROMORPHONE HYDROCHLORIDE 0.2 MG: 0.2 INJECTION, SOLUTION INTRAMUSCULAR; INTRAVENOUS; SUBCUTANEOUS at 23:10

## 2022-01-01 RX ADMIN — DEXMEDETOMIDINE HYDROCHLORIDE 4 MCG: 200 INJECTION INTRAVENOUS at 12:29

## 2022-01-01 RX ADMIN — SODIUM CHLORIDE: 9 INJECTION, SOLUTION INTRAVENOUS at 16:31

## 2022-01-01 RX ADMIN — METOPROLOL TARTRATE 50 MG: 50 TABLET, FILM COATED ORAL at 08:53

## 2022-01-01 RX ADMIN — SENNOSIDES AND DOCUSATE SODIUM 1 TABLET: 50; 8.6 TABLET ORAL at 10:06

## 2022-01-01 RX ADMIN — DILTIAZEM HYDROCHLORIDE 180 MG: 180 CAPSULE, COATED, EXTENDED RELEASE ORAL at 09:23

## 2022-01-01 RX ADMIN — FENTANYL CITRATE 50 MCG: 0.05 INJECTION, SOLUTION INTRAMUSCULAR; INTRAVENOUS at 15:50

## 2022-01-01 RX ADMIN — TRAZODONE HYDROCHLORIDE 50 MG: 50 TABLET ORAL at 21:10

## 2022-01-01 RX ADMIN — METOPROLOL TARTRATE 50 MG: 50 TABLET, FILM COATED ORAL at 10:22

## 2022-01-01 RX ADMIN — FENTANYL CITRATE 50 MCG: 50 INJECTION, SOLUTION INTRAMUSCULAR; INTRAVENOUS at 09:48

## 2022-01-01 RX ADMIN — ENOXAPARIN SODIUM 40 MG: 40 INJECTION SUBCUTANEOUS at 21:06

## 2022-01-01 RX ADMIN — SENNOSIDES AND DOCUSATE SODIUM 1 TABLET: 50; 8.6 TABLET ORAL at 08:34

## 2022-01-01 RX ADMIN — DILTIAZEM HYDROCHLORIDE 180 MG: 180 CAPSULE, COATED, EXTENDED RELEASE ORAL at 09:55

## 2022-01-01 RX ADMIN — TRAZODONE HYDROCHLORIDE 50 MG: 50 TABLET ORAL at 21:33

## 2022-01-01 RX ADMIN — ROCURONIUM BROMIDE 10 MG: 50 INJECTION, SOLUTION INTRAVENOUS at 13:37

## 2022-01-01 RX ADMIN — METOPROLOL TARTRATE 50 MG: 50 TABLET, FILM COATED ORAL at 09:21

## 2022-01-01 RX ADMIN — METOPROLOL TARTRATE 50 MG: 50 TABLET, FILM COATED ORAL at 10:06

## 2022-01-01 RX ADMIN — ROCURONIUM BROMIDE 20 MG: 50 INJECTION, SOLUTION INTRAVENOUS at 10:33

## 2022-01-01 RX ADMIN — SENNOSIDES AND DOCUSATE SODIUM 1 TABLET: 50; 8.6 TABLET ORAL at 08:40

## 2022-01-01 RX ADMIN — ROCURONIUM BROMIDE 20 MG: 50 INJECTION, SOLUTION INTRAVENOUS at 11:37

## 2022-01-01 RX ADMIN — ROCURONIUM BROMIDE 20 MG: 50 INJECTION, SOLUTION INTRAVENOUS at 13:04

## 2022-01-01 RX ADMIN — SODIUM CHLORIDE, POTASSIUM CHLORIDE, SODIUM LACTATE AND CALCIUM CHLORIDE: 600; 310; 30; 20 INJECTION, SOLUTION INTRAVENOUS at 12:13

## 2022-01-01 RX ADMIN — OLANZAPINE 10 MG: 5 TABLET, FILM COATED ORAL at 21:33

## 2022-01-01 RX ADMIN — REMDESIVIR 100 MG: 100 INJECTION, POWDER, LYOPHILIZED, FOR SOLUTION INTRAVENOUS at 16:28

## 2022-01-01 RX ADMIN — IOPAMIDOL 52 ML: 755 INJECTION, SOLUTION INTRAVENOUS at 22:18

## 2022-01-01 RX ADMIN — PHENYLEPHRINE HYDROCHLORIDE 100 MCG: 10 INJECTION INTRAVENOUS at 12:55

## 2022-01-01 RX ADMIN — ASPIRIN 81 MG CHEWABLE TABLET 81 MG: 81 TABLET CHEWABLE at 19:48

## 2022-01-01 RX ADMIN — BISACODYL 10 MG: 10 SUPPOSITORY RECTAL at 10:28

## 2022-01-01 RX ADMIN — MEMANTINE 10 MG: 10 TABLET ORAL at 08:33

## 2022-01-01 RX ADMIN — MEMANTINE 10 MG: 5 TABLET ORAL at 21:06

## 2022-01-01 RX ADMIN — MEMANTINE 10 MG: 10 TABLET ORAL at 08:39

## 2022-01-01 RX ADMIN — LORAZEPAM 0.5 MG: 0.5 TABLET ORAL at 16:28

## 2022-01-01 RX ADMIN — KETOROLAC TROMETHAMINE 10 MG: 15 INJECTION, SOLUTION INTRAMUSCULAR; INTRAVENOUS at 17:11

## 2022-01-01 RX ADMIN — REMDESIVIR 200 MG: 100 INJECTION, POWDER, LYOPHILIZED, FOR SOLUTION INTRAVENOUS at 01:10

## 2022-01-01 RX ADMIN — SODIUM CHLORIDE 73 ML: 9 INJECTION, SOLUTION INTRAVENOUS at 22:18

## 2022-01-01 RX ADMIN — SUGAMMADEX 150 MG: 100 INJECTION, SOLUTION INTRAVENOUS at 15:04

## 2022-01-01 RX ADMIN — SENNOSIDES AND DOCUSATE SODIUM 1 TABLET: 50; 8.6 TABLET ORAL at 20:41

## 2022-01-01 RX ADMIN — HEPARIN SODIUM 2000 UNITS: 1000 INJECTION INTRAVENOUS; SUBCUTANEOUS at 13:25

## 2022-01-01 RX ADMIN — OLANZAPINE 10 MG: 10 TABLET, FILM COATED ORAL at 21:21

## 2022-01-01 RX ADMIN — MEMANTINE 10 MG: 5 TABLET ORAL at 10:21

## 2022-01-01 RX ADMIN — POLYETHYLENE GLYCOL 3350 17 G: 17 POWDER, FOR SOLUTION ORAL at 10:03

## 2022-01-01 RX ADMIN — TRAZODONE HYDROCHLORIDE 50 MG: 50 TABLET ORAL at 23:00

## 2022-01-01 RX ADMIN — METOPROLOL TARTRATE 50 MG: 50 TABLET, FILM COATED ORAL at 08:50

## 2022-01-01 RX ADMIN — MEMANTINE 10 MG: 10 TABLET ORAL at 20:46

## 2022-01-01 RX ADMIN — ASPIRIN 81 MG CHEWABLE TABLET 81 MG: 81 TABLET CHEWABLE at 20:59

## 2022-01-01 RX ADMIN — SODIUM CHLORIDE: 9 INJECTION, SOLUTION INTRAVENOUS at 04:10

## 2022-01-01 RX ADMIN — PHENYLEPHRINE HYDROCHLORIDE 100 MCG: 10 INJECTION INTRAVENOUS at 10:19

## 2022-01-01 RX ADMIN — MEMANTINE 10 MG: 10 TABLET ORAL at 09:55

## 2022-01-01 RX ADMIN — DILTIAZEM HYDROCHLORIDE 180 MG: 180 CAPSULE, COATED, EXTENDED RELEASE ORAL at 08:53

## 2022-01-01 RX ADMIN — PHENYLEPHRINE HYDROCHLORIDE 100 MCG: 10 INJECTION INTRAVENOUS at 11:01

## 2022-01-01 RX ADMIN — MEMANTINE 10 MG: 10 TABLET ORAL at 20:56

## 2022-01-01 RX ADMIN — ASPIRIN 81 MG CHEWABLE TABLET 81 MG: 81 TABLET CHEWABLE at 21:33

## 2022-01-01 RX ADMIN — MEMANTINE 10 MG: 5 TABLET ORAL at 21:20

## 2022-01-01 RX ADMIN — ASPIRIN 81 MG CHEWABLE TABLET 81 MG: 81 TABLET CHEWABLE at 21:09

## 2022-01-01 RX ADMIN — OLANZAPINE 10 MG: 10 TABLET, FILM COATED ORAL at 22:00

## 2022-01-01 RX ADMIN — DEXAMETHASONE SODIUM PHOSPHATE 6 MG: 10 INJECTION, SOLUTION INTRAMUSCULAR; INTRAVENOUS at 21:04

## 2022-01-01 RX ADMIN — POLYETHYLENE GLYCOL 3350 17 G: 17 POWDER, FOR SOLUTION ORAL at 08:41

## 2022-01-01 RX ADMIN — ASPIRIN 600 MG: 300 SUPPOSITORY RECTAL at 16:28

## 2022-01-01 RX ADMIN — ROCURONIUM BROMIDE 10 MG: 50 INJECTION, SOLUTION INTRAVENOUS at 12:13

## 2022-01-01 RX ADMIN — ENOXAPARIN SODIUM 40 MG: 40 INJECTION SUBCUTANEOUS at 22:00

## 2022-01-01 RX ADMIN — OLANZAPINE 10 MG: 10 TABLET, FILM COATED ORAL at 21:20

## 2022-01-01 RX ADMIN — TRAZODONE HYDROCHLORIDE 100 MG: 50 TABLET ORAL at 23:48

## 2022-01-01 RX ADMIN — ACETAMINOPHEN 975 MG: 325 TABLET, FILM COATED ORAL at 10:55

## 2022-01-01 RX ADMIN — METOPROLOL TARTRATE 50 MG: 50 TABLET, FILM COATED ORAL at 19:46

## 2022-01-01 RX ADMIN — FERROUS SULFATE TAB 325 MG (65 MG ELEMENTAL FE) 325 MG: 325 (65 FE) TAB at 10:06

## 2022-01-01 RX ADMIN — POLYETHYLENE GLYCOL 3350 17 G: 17 POWDER, FOR SOLUTION ORAL at 08:34

## 2022-01-01 RX ADMIN — KETOROLAC TROMETHAMINE 10 MG: 15 INJECTION, SOLUTION INTRAMUSCULAR; INTRAVENOUS at 17:43

## 2022-01-01 RX ADMIN — METOPROLOL TARTRATE 50 MG: 50 TABLET, FILM COATED ORAL at 20:46

## 2022-01-01 RX ADMIN — DILTIAZEM HYDROCHLORIDE 180 MG: 180 CAPSULE, COATED, EXTENDED RELEASE ORAL at 10:02

## 2022-01-01 RX ADMIN — SODIUM CHLORIDE, POTASSIUM CHLORIDE, SODIUM LACTATE AND CALCIUM CHLORIDE: 600; 310; 30; 20 INJECTION, SOLUTION INTRAVENOUS at 08:39

## 2022-01-01 RX ADMIN — SODIUM CHLORIDE: 9 INJECTION, SOLUTION INTRAVENOUS at 14:11

## 2022-01-01 RX ADMIN — MEMANTINE 10 MG: 5 TABLET ORAL at 08:53

## 2022-01-01 RX ADMIN — DEXAMETHASONE SODIUM PHOSPHATE 6 MG: 10 INJECTION, SOLUTION INTRAMUSCULAR; INTRAVENOUS at 14:08

## 2022-01-01 RX ADMIN — SENNOSIDES AND DOCUSATE SODIUM 1 TABLET: 50; 8.6 TABLET ORAL at 10:02

## 2022-01-01 RX ADMIN — DEXAMETHASONE SODIUM PHOSPHATE 6 MG: 10 INJECTION, SOLUTION INTRAMUSCULAR; INTRAVENOUS at 12:18

## 2022-01-01 RX ADMIN — SODIUM CHLORIDE: 9 INJECTION, SOLUTION INTRAVENOUS at 05:25

## 2022-01-01 RX ADMIN — METOPROLOL TARTRATE 50 MG: 50 TABLET, FILM COATED ORAL at 21:33

## 2022-01-01 RX ADMIN — PHENYLEPHRINE HYDROCHLORIDE 50 MCG: 10 INJECTION INTRAVENOUS at 11:27

## 2022-01-01 RX ADMIN — HYDROMORPHONE HYDROCHLORIDE 0.2 MG: 0.2 INJECTION, SOLUTION INTRAMUSCULAR; INTRAVENOUS; SUBCUTANEOUS at 01:33

## 2022-01-01 RX ADMIN — SODIUM CHLORIDE 50 ML: 9 INJECTION, SOLUTION INTRAVENOUS at 17:58

## 2022-01-01 RX ADMIN — METOPROLOL TARTRATE 50 MG: 50 TABLET, FILM COATED ORAL at 21:06

## 2022-01-01 RX ADMIN — ASPIRIN 81 MG CHEWABLE TABLET 81 MG: 81 TABLET CHEWABLE at 20:41

## 2022-01-01 RX ADMIN — KETOROLAC TROMETHAMINE 10 MG: 15 INJECTION, SOLUTION INTRAMUSCULAR; INTRAVENOUS at 23:18

## 2022-01-01 RX ADMIN — TRAZODONE HYDROCHLORIDE 50 MG: 50 TABLET ORAL at 22:15

## 2022-01-01 RX ADMIN — OLANZAPINE 10 MG: 10 TABLET, FILM COATED ORAL at 21:06

## 2022-01-01 RX ADMIN — LORAZEPAM 0.5 MG: 0.5 TABLET ORAL at 01:48

## 2022-01-01 RX ADMIN — ROCURONIUM BROMIDE 20 MG: 50 INJECTION, SOLUTION INTRAVENOUS at 14:06

## 2022-01-01 RX ADMIN — ROCURONIUM BROMIDE 50 MG: 50 INJECTION, SOLUTION INTRAVENOUS at 09:48

## 2022-01-01 RX ADMIN — SODIUM CHLORIDE: 9 INJECTION, SOLUTION INTRAVENOUS at 17:44

## 2022-01-01 RX ADMIN — MEMANTINE 10 MG: 5 TABLET ORAL at 08:50

## 2022-01-01 RX ADMIN — KETOROLAC TROMETHAMINE 10 MG: 15 INJECTION, SOLUTION INTRAMUSCULAR; INTRAVENOUS at 17:01

## 2022-01-01 RX ADMIN — PHENYLEPHRINE HYDROCHLORIDE 100 MCG: 10 INJECTION INTRAVENOUS at 11:52

## 2022-01-01 RX ADMIN — MEMANTINE 10 MG: 10 TABLET ORAL at 21:33

## 2022-01-01 RX ADMIN — CLINDAMYCIN PHOSPHATE 900 MG: 900 INJECTION, SOLUTION INTRAVENOUS at 01:28

## 2022-01-01 RX ADMIN — METOPROLOL TARTRATE 50 MG: 50 TABLET, FILM COATED ORAL at 09:55

## 2022-01-01 RX ADMIN — LIDOCAINE HYDROCHLORIDE 40 MG: 20 INJECTION, SOLUTION INFILTRATION; PERINEURAL at 09:48

## 2022-01-01 RX ADMIN — METOPROLOL TARTRATE 50 MG: 50 TABLET, FILM COATED ORAL at 20:41

## 2022-01-01 RX ADMIN — KETOROLAC TROMETHAMINE 10 MG: 15 INJECTION, SOLUTION INTRAMUSCULAR; INTRAVENOUS at 11:55

## 2022-01-01 RX ADMIN — FERROUS SULFATE TAB 325 MG (65 MG ELEMENTAL FE) 325 MG: 325 (65 FE) TAB at 08:34

## 2022-01-01 RX ADMIN — ONDANSETRON 4 MG: 2 INJECTION INTRAMUSCULAR; INTRAVENOUS at 14:33

## 2022-01-01 RX ADMIN — ASPIRIN 81 MG CHEWABLE TABLET 81 MG: 81 TABLET CHEWABLE at 19:47

## 2022-01-01 RX ADMIN — FENTANYL CITRATE 25 MCG: 50 INJECTION, SOLUTION INTRAMUSCULAR; INTRAVENOUS at 11:31

## 2022-01-01 RX ADMIN — MEMANTINE 10 MG: 10 TABLET ORAL at 20:40

## 2022-01-01 RX ADMIN — FENTANYL CITRATE 25 MCG: 50 INJECTION, SOLUTION INTRAMUSCULAR; INTRAVENOUS at 14:49

## 2022-01-01 RX ADMIN — CLINDAMYCIN PHOSPHATE 900 MG: 900 INJECTION, SOLUTION INTRAVENOUS at 19:00

## 2022-01-01 RX ADMIN — PHENYLEPHRINE HYDROCHLORIDE 0.4 MCG/KG/MIN: 10 INJECTION INTRAVENOUS at 09:58

## 2022-01-01 RX ADMIN — METOPROLOL TARTRATE 50 MG: 50 TABLET, FILM COATED ORAL at 08:39

## 2022-01-01 RX ADMIN — DILTIAZEM HYDROCHLORIDE 180 MG: 180 CAPSULE, COATED, EXTENDED RELEASE ORAL at 17:21

## 2022-01-01 RX ADMIN — PHENYLEPHRINE HYDROCHLORIDE 100 MCG: 10 INJECTION INTRAVENOUS at 13:44

## 2022-01-01 RX ADMIN — DEXMEDETOMIDINE HYDROCHLORIDE 4 MCG: 200 INJECTION INTRAVENOUS at 12:13

## 2022-01-01 RX ADMIN — ACETAMINOPHEN 650 MG: 325 TABLET, FILM COATED ORAL at 10:02

## 2022-01-01 RX ADMIN — METOPROLOL TARTRATE 50 MG: 50 TABLET, FILM COATED ORAL at 19:48

## 2022-01-01 RX ADMIN — DILTIAZEM HYDROCHLORIDE 180 MG: 180 CAPSULE, COATED, EXTENDED RELEASE ORAL at 10:06

## 2022-01-01 RX ADMIN — ENOXAPARIN SODIUM 40 MG: 40 INJECTION SUBCUTANEOUS at 01:15

## 2022-01-01 RX ADMIN — MEMANTINE 10 MG: 5 TABLET ORAL at 22:00

## 2022-01-01 RX ADMIN — KETOROLAC TROMETHAMINE 10 MG: 15 INJECTION, SOLUTION INTRAMUSCULAR; INTRAVENOUS at 06:15

## 2022-01-01 RX ADMIN — ENOXAPARIN SODIUM 40 MG: 40 INJECTION SUBCUTANEOUS at 21:20

## 2022-01-01 RX ADMIN — DEXMEDETOMIDINE HYDROCHLORIDE 4 MCG: 200 INJECTION INTRAVENOUS at 11:01

## 2022-01-01 RX ADMIN — CLINDAMYCIN PHOSPHATE 900 MG: 900 INJECTION, SOLUTION INTRAVENOUS at 10:55

## 2022-01-01 RX ADMIN — SODIUM CHLORIDE, POTASSIUM CHLORIDE, SODIUM LACTATE AND CALCIUM CHLORIDE 500 ML: 600; 310; 30; 20 INJECTION, SOLUTION INTRAVENOUS at 10:37

## 2022-01-01 RX ADMIN — DEXAMETHASONE SODIUM PHOSPHATE 4 MG: 4 INJECTION, SOLUTION INTRA-ARTICULAR; INTRALESIONAL; INTRAMUSCULAR; INTRAVENOUS; SOFT TISSUE at 10:15

## 2022-01-01 RX ADMIN — POLYETHYLENE GLYCOL 3350 17 G: 17 POWDER, FOR SOLUTION ORAL at 09:56

## 2022-01-01 RX ADMIN — ROCURONIUM BROMIDE 10 MG: 50 INJECTION, SOLUTION INTRAVENOUS at 12:29

## 2022-01-01 RX ADMIN — CLINDAMYCIN PHOSPHATE 900 MG: 900 INJECTION, SOLUTION INTRAVENOUS at 09:25

## 2022-01-01 RX ADMIN — KETOROLAC TROMETHAMINE 10 MG: 15 INJECTION, SOLUTION INTRAMUSCULAR; INTRAVENOUS at 05:17

## 2022-01-01 RX ADMIN — ASPIRIN 81 MG CHEWABLE TABLET 81 MG: 81 TABLET CHEWABLE at 21:06

## 2022-01-01 RX ADMIN — FERROUS SULFATE TAB 325 MG (65 MG ELEMENTAL FE) 325 MG: 325 (65 FE) TAB at 08:40

## 2022-01-01 ASSESSMENT — ACTIVITIES OF DAILY LIVING (ADL)
ADLS_ACUITY_SCORE: 59
ADLS_ACUITY_SCORE: 14
ADLS_ACUITY_SCORE: 12
ADLS_ACUITY_SCORE: 14
ADLS_ACUITY_SCORE: 35
ADLS_ACUITY_SCORE: 59
ADLS_ACUITY_SCORE: 25
ADLS_ACUITY_SCORE: 12
ADLS_ACUITY_SCORE: 16
ADLS_ACUITY_SCORE: 16
ADLS_ACUITY_SCORE: 20
ADLS_ACUITY_SCORE: 16
ADLS_ACUITY_SCORE: 14
ADLS_ACUITY_SCORE: 16
ADLS_ACUITY_SCORE: 20
ADLS_ACUITY_SCORE: 59
ADLS_ACUITY_SCORE: 12
ADLS_ACUITY_SCORE: 12
ADLS_ACUITY_SCORE: 16
ADLS_ACUITY_SCORE: 21
ADLS_ACUITY_SCORE: 35
ADLS_ACUITY_SCORE: 21
ADLS_ACUITY_SCORE: 16
ADLS_ACUITY_SCORE: 12
ADLS_ACUITY_SCORE: 16
ADLS_ACUITY_SCORE: 20
ADLS_ACUITY_SCORE: 20
ADLS_ACUITY_SCORE: 55
ADLS_ACUITY_SCORE: 16
ADLS_ACUITY_SCORE: 20
ADLS_ACUITY_SCORE: 21
ADLS_ACUITY_SCORE: 16
ADLS_ACUITY_SCORE: 20
ADLS_ACUITY_SCORE: 25
ADLS_ACUITY_SCORE: 59
ADLS_ACUITY_SCORE: 55
ADLS_ACUITY_SCORE: 59
ADLS_ACUITY_SCORE: 59
ADLS_ACUITY_SCORE: 21
ADLS_ACUITY_SCORE: 16
ADLS_ACUITY_SCORE: 16
ADLS_ACUITY_SCORE: 25
ADLS_ACUITY_SCORE: 59
ADLS_ACUITY_SCORE: 59
ADLS_ACUITY_SCORE: 16
ADLS_ACUITY_SCORE: 16
ADLS_ACUITY_SCORE: 59
ADLS_ACUITY_SCORE: 55
ADLS_ACUITY_SCORE: 21
ADLS_ACUITY_SCORE: 16
ADLS_ACUITY_SCORE: 51
ADLS_ACUITY_SCORE: 12
ADLS_ACUITY_SCORE: 59
ADLS_ACUITY_SCORE: 16
ADLS_ACUITY_SCORE: 16
ADLS_ACUITY_SCORE: 51
ADLS_ACUITY_SCORE: 16
ADLS_ACUITY_SCORE: 59
ADLS_ACUITY_SCORE: 59
ADLS_ACUITY_SCORE: 21
ADLS_ACUITY_SCORE: 59
ADLS_ACUITY_SCORE: 25
ADLS_ACUITY_SCORE: 14
ADLS_ACUITY_SCORE: 59
ADLS_ACUITY_SCORE: 55
ADLS_ACUITY_SCORE: 59
ADLS_ACUITY_SCORE: 16
ADLS_ACUITY_SCORE: 35
ADLS_ACUITY_SCORE: 55
ADLS_ACUITY_SCORE: 20
ADLS_ACUITY_SCORE: 16
ADLS_ACUITY_SCORE: 59
ADLS_ACUITY_SCORE: 25
ADLS_ACUITY_SCORE: 35
ADLS_ACUITY_SCORE: 12
ADLS_ACUITY_SCORE: 35
ADLS_ACUITY_SCORE: 59
ADLS_ACUITY_SCORE: 21
ADLS_ACUITY_SCORE: 16
ADLS_ACUITY_SCORE: 25
ADLS_ACUITY_SCORE: 16
ADLS_ACUITY_SCORE: 21
ADLS_ACUITY_SCORE: 25
ADLS_ACUITY_SCORE: 21
ADLS_ACUITY_SCORE: 21
ADLS_ACUITY_SCORE: 35
ADLS_ACUITY_SCORE: 16
ADLS_ACUITY_SCORE: 55
ADLS_ACUITY_SCORE: 21
ADLS_ACUITY_SCORE: 16
ADLS_ACUITY_SCORE: 55
ADLS_ACUITY_SCORE: 16
ADLS_ACUITY_SCORE: 55
ADLS_ACUITY_SCORE: 20
ADLS_ACUITY_SCORE: 12
ADLS_ACUITY_SCORE: 12
ADLS_ACUITY_SCORE: 21
ADLS_ACUITY_SCORE: 12
ADLS_ACUITY_SCORE: 16
ADLS_ACUITY_SCORE: 25
ADLS_ACUITY_SCORE: 14
ADLS_ACUITY_SCORE: 59
ADLS_ACUITY_SCORE: 12
ADLS_ACUITY_SCORE: 20
ADLS_ACUITY_SCORE: 21
ADLS_ACUITY_SCORE: 59
ADLS_ACUITY_SCORE: 12
ADLS_ACUITY_SCORE: 41
ADLS_ACUITY_SCORE: 16
ADLS_ACUITY_SCORE: 16
ADLS_ACUITY_SCORE: 14
ADLS_ACUITY_SCORE: 59
ADLS_ACUITY_SCORE: 16
ADLS_ACUITY_SCORE: 14
ADLS_ACUITY_SCORE: 20
ADLS_ACUITY_SCORE: 16
ADLS_ACUITY_SCORE: 21
ADLS_ACUITY_SCORE: 59
ADLS_ACUITY_SCORE: 55
ADLS_ACUITY_SCORE: 25
ADLS_ACUITY_SCORE: 16
ADLS_ACUITY_SCORE: 55
ADLS_ACUITY_SCORE: 21
ADLS_ACUITY_SCORE: 16
ADLS_ACUITY_SCORE: 25
ADLS_ACUITY_SCORE: 55
ADLS_ACUITY_SCORE: 21
ADLS_ACUITY_SCORE: 59
ADLS_ACUITY_SCORE: 59
ADLS_ACUITY_SCORE: 16
ADLS_ACUITY_SCORE: 25
ADLS_ACUITY_SCORE: 12
ADLS_ACUITY_SCORE: 55
ADLS_ACUITY_SCORE: 25
ADLS_ACUITY_SCORE: 16
ADLS_ACUITY_SCORE: 25
ADLS_ACUITY_SCORE: 12
ADLS_ACUITY_SCORE: 25
ADLS_ACUITY_SCORE: 59
ADLS_ACUITY_SCORE: 16
ADLS_ACUITY_SCORE: 14
ADLS_ACUITY_SCORE: 21
ADLS_ACUITY_SCORE: 59
ADLS_ACUITY_SCORE: 59
ADLS_ACUITY_SCORE: 35
ADLS_ACUITY_SCORE: 21
ADLS_ACUITY_SCORE: 12
ADLS_ACUITY_SCORE: 12
ADLS_ACUITY_SCORE: 21
ADLS_ACUITY_SCORE: 21
ADLS_ACUITY_SCORE: 35
ADLS_ACUITY_SCORE: 25
ADLS_ACUITY_SCORE: 59
ADLS_ACUITY_SCORE: 35
ADLS_ACUITY_SCORE: 35
ADLS_ACUITY_SCORE: 20
ADLS_ACUITY_SCORE: 14
ADLS_ACUITY_SCORE: 21
ADLS_ACUITY_SCORE: 35
ADLS_ACUITY_SCORE: 16
ADLS_ACUITY_SCORE: 21
ADLS_ACUITY_SCORE: 55
ADLS_ACUITY_SCORE: 55
ADLS_ACUITY_SCORE: 14
ADLS_ACUITY_SCORE: 16
ADLS_ACUITY_SCORE: 21
ADLS_ACUITY_SCORE: 16
ADLS_ACUITY_SCORE: 25
ADLS_ACUITY_SCORE: 25
ADLS_ACUITY_SCORE: 21
ADLS_ACUITY_SCORE: 55
ADLS_ACUITY_SCORE: 20
ADLS_ACUITY_SCORE: 25
ADLS_ACUITY_SCORE: 25
ADLS_ACUITY_SCORE: 12
ADLS_ACUITY_SCORE: 21
ADLS_ACUITY_SCORE: 21
ADLS_ACUITY_SCORE: 14
ADLS_ACUITY_SCORE: 12
ADLS_ACUITY_SCORE: 21

## 2022-01-01 ASSESSMENT — LIFESTYLE VARIABLES: TOBACCO_USE: 1

## 2022-01-01 ASSESSMENT — PATIENT HEALTH QUESTIONNAIRE - PHQ9
SUM OF ALL RESPONSES TO PHQ QUESTIONS 1-9: 9
10. IF YOU CHECKED OFF ANY PROBLEMS, HOW DIFFICULT HAVE THESE PROBLEMS MADE IT FOR YOU TO DO YOUR WORK, TAKE CARE OF THINGS AT HOME, OR GET ALONG WITH OTHER PEOPLE: VERY DIFFICULT
SUM OF ALL RESPONSES TO PHQ QUESTIONS 1-9: 9
SUM OF ALL RESPONSES TO PHQ QUESTIONS 1-9: 9
10. IF YOU CHECKED OFF ANY PROBLEMS, HOW DIFFICULT HAVE THESE PROBLEMS MADE IT FOR YOU TO DO YOUR WORK, TAKE CARE OF THINGS AT HOME, OR GET ALONG WITH OTHER PEOPLE: VERY DIFFICULT
SUM OF ALL RESPONSES TO PHQ QUESTIONS 1-9: 9
SUM OF ALL RESPONSES TO PHQ QUESTIONS 1-9: 9

## 2022-01-01 ASSESSMENT — PAIN SCALES - GENERAL
PAINLEVEL: NO PAIN (0)
PAINLEVEL: NO PAIN (0)

## 2022-01-01 ASSESSMENT — COPD QUESTIONNAIRES: COPD: 1

## 2022-01-01 ASSESSMENT — ENCOUNTER SYMPTOMS: DYSRHYTHMIAS: 1

## 2022-01-01 ASSESSMENT — MIFFLIN-ST. JEOR: SCORE: 1031.75

## 2022-01-20 NOTE — PROGRESS NOTES
Assessment & Plan     1. Pain of right lower leg  Not c/o pain today though history is limited due to hx of dementia.  Continues to have discoloration over the foot.  Agree with vascular consult next week.       2. Dementia without behavioral disturbance, unspecified dementia type (H)  Managed by psych provider.  Has  with her 5 days a week.  Spouse is very supportive.     3. Chronic obstructive pulmonary disease with (acute) exacerbation (H)  No exacerbation.  Normal O2 sats.     4. Superior mesenteric artery stenosis (H)  Noted on CT scan in 2020. No abdominal pain.  Seeing vascular next week.     5. Atrial fibrillation, unspecified type (H)  Rate controlled.  Has a PPM; device check done yesterday.             Return in about 2 months (around 3/20/2022) for appointment already scheduled.    MARYCHUY Mckeon CNP  [facility] KARLY Mendoza is a 71 year old who presents for the following health issues  accompanied by her spouse.    HPI     Pain History:  When did you first notice your pain? - 1 to 6 weeks   Have you seen any provider previously for this issue? Yes  How has your pain affected your ability to work? Not applicable  Where in your body do you have pain? Musculoskeletal problem/pain  Onset/Duration: end of November   Description  Location: foot - right  Joint Swelling: YES  Redness: YES  Pain: YES  Warmth: YES  Intensity:  mild  Progression of Symptoms:  improving  Accompanying signs and symptoms:   Fevers: no  Numbness/tingling/weakness: hard too tell   History  Trauma to the area: no  Recent illness:  no  Previous similar problem: has a circulation issue  Previous evaluation:  YES  Precipitating or alleviating factors:  Aggravating factors include: standing and walking  Therapies tried and outcome: acetaminophen, Ibuprofen and bactrim    She was seen in the ER in Nov 2021 for R leg pain and foot swelling.  US was negative for DVT. She was seen for  "follow-up in the clinic in Dec 2021.  At that time there was swelling and redness in addition to ongoing leg pain.  Her uric acid was normal.  ESR was elevated.  She was given a course of antibiotics (bactrim) for possible cellulitis.  Spouse color is unchanged, but swelling has resolved.  Doesn't seem to be complaining of pain in the leg.  Has a vascular consult next week.       Review of Systems   Constitutional, HEENT, cardiovascular, pulmonary, gi and gu systems are negative, except as otherwise noted.      Objective    /64 (BP Location: Right arm, Patient Position: Sitting, Cuff Size: Adult Regular)   Pulse 74   Temp 97.4  F (36.3  C) (Oral)   Resp 16   Ht 1.568 m (5' 1.75\")   Wt 55.7 kg (122 lb 14.4 oz)   SpO2 98%   BMI 22.66 kg/m    Body mass index is 22.66 kg/m .  Physical Exam   GENERAL: healthy, alert and no distress  CV:  no peripheral edema and diminished peripheral pulses   MS: no gross musculoskeletal defects noted, no edema; R leg: no swelling or tenderness, redness over the R lower leg and foot, more pronounced distally over the toes  SKIN: no suspicious lesions or rashes  PSYCH: flat affect, poor eye contact, history of dementia                 "

## 2022-01-25 NOTE — PROGRESS NOTES
VEIN SOLUTIONS CONSULT    Impression:   1.  Peripheral arterial disease with probable right foot rest pain.  Nonpalpable bilateral femoral pulses with nondetectable right DP Doppler signal and monophasic right PT Doppler signal.  Reported history of what sounds like a right leg atherectomy in 2013.    2.  Severe dementia.    3.  Type 2 diabetes    4.  COPD    5.  Pacemaker.    6.  History of severe tobacco abuse although she quit smoking roughly 4 or 5 years ago.    Plan:   I had a lengthy discussion with Kelly's , Kendall regarding all of the above.  Adeline was incapable of participating in this encounter or speaking on her own behalf.  I explained to Zara that she does not have significant venous pathology.  She has nonpalpable pulses throughout her right leg and I believe she suffers from right foot rest pain.  She has no open wounds.  I will arrange for an expedited FROYLAN at rest and a CTA of the abdomen, pelvis, and bilateral iliofemoral runoff.  I will arrange for follow-up with me in the coming days to discuss her diagnosis and her treatment options.    He is rightly concerned about avoiding excessive interventions given her profound dementia and her multiple other medical comorbidities.  I have reassured him that we will certainly honor his wishes and I will have a detailed conversation as to what treatment options are once I have been able to define her anatomy.      HPI:   Kelly Nunez is a 71-year-old female with fairly profound dementia and multiple atherosclerotic risk factors who was evaluated at the [facility] ER on 11/23/2021 for right foot pain which was relieved by hanging her leg off the side of the bed.  She has a reported history of right leg atherectomy at [facility] in 2013.  She has no open wounds.  Venous ultrasound performed at that time was negative for DVT.  The ER physician felt that she had palpable femoral and pedal pulses.  She was discharged home and  encouraged to follow-up as an outpatient through our venous surgical practice.    She arrives today with her  who speaks on her behalf.  She ambulates on a very limited basis and currently has no open wounds.      CURRENT MEDICATIONS  aspirin (ASA) 81 MG chewable tablet, Take 81 mg by mouth every evening  diltiazem ER COATED BEADS (CARDIZEM CD/CARTIA XT) 180 MG 24 hr capsule, Take 1 capsule (180 mg) by mouth daily  ipratropium - albuterol 0.5 mg/2.5 mg/3 mL (DUONEB) 0.5-2.5 (3) MG/3ML neb solution,   LORazepam (ATIVAN) 0.5 MG tablet,   LORazepam (ATIVAN) 1 MG tablet, 0.5 mg As of 01/14/21 patinet taking 1 tablet at bedtime and one half tablet as needed.  As of 05/05/2021 Pt taking 0.5 mg 3x daily as needed  memantine (NAMENDA) 10 MG tablet, Take 10 mg by mouth 2 times daily   metoprolol tartrate (LOPRESSOR) 50 MG tablet, Take 1 tablet (50 mg) by mouth 2 times daily  Multiple Vitamins-Minerals (CENTRUM WOMEN PO), Take by mouth daily  Nebulizers (INNOSPIRE ESSENCE NEBULIZER) MISC,   OLANZapine (ZYPREXA) 10 MG tablet, Take 10 mg by mouth At Bedtime   traZODone (DESYREL) 100 MG tablet, Take 100 mg by mouth At Bedtime Take 1/2 to one tablet at bedtime    lidocaine 1% with EPINEPHrine 1:100,000 injection 3 mL          PAST MEDICAL HISTORY  Past Medical History:   Diagnosis Date     Dementia (H)      Diabetes (H)      Paroxysmal atrial fibrillation (H)      Sinus node dysfunction (H)          PAST SURGICAL HISTORY:  Past Surgical History:   Procedure Laterality Date     EP PACEMAKER N/A 10/12/2020    Procedure: EP Pacemaker;  Surgeon: Luis Onofre MD;  Location:  HEART CARDIAC CATH LAB     HEART CATH, ANGIOPLASTY       IR ABDOMINAL AORTOGRAM  5/10/2013     IR MISCELLANEOUS PROCEDURE  5/10/2013       ALLERGIES     Allergies   Allergen Reactions     Penicillins Swelling     Pt states she has tolerated cephalosporins in the past.        FAMILY HISTORY  History reviewed. No pertinent family  history.    SOCIAL HISTORY  Social History     Tobacco Use     Smoking status: Former Smoker     Packs/day: 1.00     Types: Cigarettes     Quit date: 2017     Years since quittin.0     Smokeless tobacco: Never Used   Substance Use Topics     Alcohol use: Not Currently     Drug use: No       ROS:   Review of Systems   Unable to perform ROS: dementia         EXAM:  There were no vitals taken for this visit.  Physical Exam  Constitutional:       Appearance: Normal appearance.   HENT:      Head: Normocephalic and atraumatic.   Eyes:      General: No scleral icterus.     Extraocular Movements: Extraocular movements intact.      Pupils: Pupils are equal, round, and reactive to light.   Cardiovascular:      Comments: Unable to palpate femoral pulses bilaterally.  She did have strong monophasic to biphasic femoral Doppler signals.  Pedal pulses are nonpalpable.  I was unable to detect a right DP Doppler signal.  She had a monophasic right PT Doppler signal.  Monophasic left DP Doppler signal.  Strong monophasic left PT Doppler signal.  Bilateral lower extremity veins are grossly normal.  Scattered spider telangiectasias throughout.  She has some dependent rubor of the right forefoot which blanches with elevation.  Musculoskeletal:         General: Normal range of motion.      Right lower leg: No edema.      Left lower leg: No edema.   Skin:     General: Skin is warm and dry.   Neurological:      General: No focal deficit present.      Mental Status: Mental status is at baseline.   Psychiatric:      Comments: Severe dementia without behavioral issues.       Labs:  LIPID RESULTS:  Lab Results   Component Value Date    CHOL 219 (H) 2019    CHOL 219 (A) 2019    HDL 87 2019    HDL 87 2019     2019     2019    TRIG 85 2019    TRIG 85 2019       CBC RESULTS:  Lab Results   Component Value Date    WBC 5.2 2021    WBC 5.7 2021    RBC 4.17 2021    RBC  4.11 01/19/2021    HGB 12.6 11/23/2021    HGB 12.6 01/19/2021    HCT 40.7 11/23/2021    HCT 38.9 01/19/2021    MCV 98 11/23/2021    MCV 95 01/19/2021    MCH 30.2 11/23/2021    MCH 30.7 01/19/2021    MCHC 31.0 (L) 11/23/2021    MCHC 32.4 01/19/2021    RDW 12.6 11/23/2021    RDW 12.3 01/19/2021     11/23/2021     01/19/2021       BMP RESULTS:  Lab Results   Component Value Date     12/01/2021     01/19/2021    POTASSIUM 4.7 12/01/2021    POTASSIUM 4.0 01/19/2021    CHLORIDE 109 12/01/2021    CHLORIDE 108 01/19/2021    CO2 26 12/01/2021    CO2 31 01/19/2021    ANIONGAP 8 12/01/2021    ANIONGAP 3 01/19/2021     (H) 12/01/2021     (H) 01/19/2021    BUN 21 12/01/2021    BUN 20 01/19/2021    CR 0.67 12/01/2021    CR 0.74 01/19/2021    GFRESTIMATED 90 12/01/2021    GFRESTIMATED 83 01/19/2021    GFRESTBLACK >90 01/19/2021    ATTILA 9.2 12/01/2021    ATTILA 9.2 01/19/2021        A1C RESULTS:  Lab Results   Component Value Date    A1C 5.8 05/31/2019    A1C 5.6 01/23/2018         Imaging:  No results found for this or any previous visit (from the past 24 hour(s)).      Total length of this encounter was 45 minutes.      Ray Ramirez MD

## 2022-01-25 NOTE — TELEPHONE ENCOUNTER
Pt was seen in consultation at [facility] with Dr. Ramirez.    He would like patient to get FROYLAN w/o exercise and CTA abdomen/pelvis/BLE in an expedited manor.  Patient will need to be scheduled for a RETURN appointment with Dr. Ramirez. This can be a phone visit.    Routing to scheduling to contact patient to coordinate above.  Please contact patient's spouse to scheduled appointments.    SCOT NelsonN, RN-[facility] [place]

## 2022-01-25 NOTE — LETTER
1/25/2022         RE: Kelly Nunez  [address] [address] [facility] [place] [address]        Dear Colleague,    Thank you for referring your patient, Kelly Nunez, to the [facility] [place]. Please see a copy of my visit note below.    VEIN SOLUTIONS CONSULT    Impression:   1.  Peripheral arterial disease with probable right foot rest pain.  Nonpalpable bilateral femoral pulses with nondetectable right DP Doppler signal and monophasic right PT Doppler signal.  Reported history of what sounds like a right leg atherectomy in 2013.    2.  Severe dementia.    3.  Type 2 diabetes    4.  COPD    5.  Pacemaker.    6.  History of severe tobacco abuse although she quit smoking roughly 4 or 5 years ago.    Plan:   I had a lengthy discussion with Kelly's , Kendall regarding all of the above.  Adeline was incapable of participating in this encounter or speaking on her own behalf.  I explained to Zara that she does not have significant venous pathology.  She has nonpalpable pulses throughout her right leg and I believe she suffers from right foot rest pain.  She has no open wounds.  I will arrange for an expedited FROYLAN at rest and a CTA of the abdomen, pelvis, and bilateral iliofemoral runoff.  I will arrange for follow-up with me in the coming days to discuss her diagnosis and her treatment options.    He is rightly concerned about avoiding excessive interventions given her profound dementia and her multiple other medical comorbidities.  I have reassured him that we will certainly honor his wishes and I will have a detailed conversation as to what treatment options are once I have been able to define her anatomy.      HPI:   Kelly Nunez is a 71-year-old female with fairly profound dementia and multiple atherosclerotic risk factors who was evaluated at the [facility] on 11/23/2021 for right foot pain which was relieved by hanging her leg off the side of the bed.  She has a reported  history of right leg atherectomy at [facility] in 2013.  She has no open wounds.  Venous ultrasound performed at that time was negative for DVT.  The ER physician felt that she had palpable femoral and pedal pulses.  She was discharged home and encouraged to follow-up as an outpatient through our venous surgical practice.    She arrives today with her  who speaks on her behalf.  She ambulates on a very limited basis and currently has no open wounds.      CURRENT MEDICATIONS  aspirin (ASA) 81 MG chewable tablet, Take 81 mg by mouth every evening  diltiazem ER COATED BEADS (CARDIZEM CD/CARTIA XT) 180 MG 24 hr capsule, Take 1 capsule (180 mg) by mouth daily  ipratropium - albuterol 0.5 mg/2.5 mg/3 mL (DUONEB) 0.5-2.5 (3) MG/3ML neb solution,   LORazepam (ATIVAN) 0.5 MG tablet,   LORazepam (ATIVAN) 1 MG tablet, 0.5 mg As of 01/14/21 patinet taking 1 tablet at bedtime and one half tablet as needed.  As of 05/05/2021 Pt taking 0.5 mg 3x daily as needed  memantine (NAMENDA) 10 MG tablet, Take 10 mg by mouth 2 times daily   metoprolol tartrate (LOPRESSOR) 50 MG tablet, Take 1 tablet (50 mg) by mouth 2 times daily  Multiple Vitamins-Minerals (CENTRUM WOMEN PO), Take by mouth daily  Nebulizers (INNOSPIRE ESSENCE NEBULIZER) MISC,   OLANZapine (ZYPREXA) 10 MG tablet, Take 10 mg by mouth At Bedtime   traZODone (DESYREL) 100 MG tablet, Take 100 mg by mouth At Bedtime Take 1/2 to one tablet at bedtime    lidocaine 1% with EPINEPHrine 1:100,000 injection 3 mL          PAST MEDICAL HISTORY  Past Medical History:   Diagnosis Date     Dementia (H)      Diabetes (H)      Paroxysmal atrial fibrillation (H)      Sinus node dysfunction (H)          PAST SURGICAL HISTORY:  Past Surgical History:   Procedure Laterality Date     EP PACEMAKER N/A 10/12/2020    Procedure: EP Pacemaker;  Surgeon: Luis Onofre MD;  Location:  HEART CARDIAC CATH LAB     HEART CATH, ANGIOPLASTY       IR ABDOMINAL AORTOGRAM   5/10/2013     IR MISCELLANEOUS PROCEDURE  5/10/2013       ALLERGIES     Allergies   Allergen Reactions     Penicillins Swelling     Pt states she has tolerated cephalosporins in the past.        FAMILY HISTORY  History reviewed. No pertinent family history.    SOCIAL HISTORY  Social History     Tobacco Use     Smoking status: Former Smoker     Packs/day: 1.00     Types: Cigarettes     Quit date: 2017     Years since quittin.0     Smokeless tobacco: Never Used   Substance Use Topics     Alcohol use: Not Currently     Drug use: No       ROS:   Review of Systems   Unable to perform ROS: dementia         EXAM:  There were no vitals taken for this visit.  Physical Exam  Constitutional:       Appearance: Normal appearance.   HENT:      Head: Normocephalic and atraumatic.   Eyes:      General: No scleral icterus.     Extraocular Movements: Extraocular movements intact.      Pupils: Pupils are equal, round, and reactive to light.   Cardiovascular:      Comments: Unable to palpate femoral pulses bilaterally.  She did have strong monophasic to biphasic femoral Doppler signals.  Pedal pulses are nonpalpable.  I was unable to detect a right DP Doppler signal.  She had a monophasic right PT Doppler signal.  Monophasic left DP Doppler signal.  Strong monophasic left PT Doppler signal.  Bilateral lower extremity veins are grossly normal.  Scattered spider telangiectasias throughout.  She has some dependent rubor of the right forefoot which blanches with elevation.  Musculoskeletal:         General: Normal range of motion.      Right lower leg: No edema.      Left lower leg: No edema.   Skin:     General: Skin is warm and dry.   Neurological:      General: No focal deficit present.      Mental Status: Mental status is at baseline.   Psychiatric:      Comments: Severe dementia without behavioral issues.       Labs:  LIPID RESULTS:  Lab Results   Component Value Date    CHOL 219 (H) 2019    CHOL 219 (A) 2019    HDL 87  05/31/2019    HDL 87 05/31/2019     05/31/2019     05/31/2019    TRIG 85 05/31/2019    TRIG 85 05/31/2019       CBC RESULTS:  Lab Results   Component Value Date    WBC 5.2 11/23/2021    WBC 5.7 01/19/2021    RBC 4.17 11/23/2021    RBC 4.11 01/19/2021    HGB 12.6 11/23/2021    HGB 12.6 01/19/2021    HCT 40.7 11/23/2021    HCT 38.9 01/19/2021    MCV 98 11/23/2021    MCV 95 01/19/2021    MCH 30.2 11/23/2021    MCH 30.7 01/19/2021    MCHC 31.0 (L) 11/23/2021    MCHC 32.4 01/19/2021    RDW 12.6 11/23/2021    RDW 12.3 01/19/2021     11/23/2021     01/19/2021       BMP RESULTS:  Lab Results   Component Value Date     12/01/2021     01/19/2021    POTASSIUM 4.7 12/01/2021    POTASSIUM 4.0 01/19/2021    CHLORIDE 109 12/01/2021    CHLORIDE 108 01/19/2021    CO2 26 12/01/2021    CO2 31 01/19/2021    ANIONGAP 8 12/01/2021    ANIONGAP 3 01/19/2021     (H) 12/01/2021     (H) 01/19/2021    BUN 21 12/01/2021    BUN 20 01/19/2021    CR 0.67 12/01/2021    CR 0.74 01/19/2021    GFRESTIMATED 90 12/01/2021    GFRESTIMATED 83 01/19/2021    GFRESTBLACK >90 01/19/2021    ATTILA 9.2 12/01/2021    ATTILA 9.2 01/19/2021        A1C RESULTS:  Lab Results   Component Value Date    A1C 5.8 05/31/2019    A1C 5.6 01/23/2018         Imaging:  No results found for this or any previous visit (from the past 24 hour(s)).      Total length of this encounter was 45 minutes.      Ray Ramirez MD          Again, thank you for allowing me to participate in the care of your patient.        Sincerely,        Ray Ramirez MD

## 2022-01-25 NOTE — NURSING NOTE
Patient Reported symptoms:    Right leg   Heaviness unsure  Achiness unsure  Swelling Most of the time   Throbbing unsure  Itching unusure   Appearance Not at all noticeable   Impact on work/activities Severely reduced

## 2022-01-25 NOTE — TELEPHONE ENCOUNTER
Scheduled CT and US for 1/28/22 in [place].     Scheduled televist with Dr Raimrez for 2/1/22.       Clotilde Rodriguez    [facility]   [phone]

## 2022-02-01 NOTE — PROGRESS NOTES
Kelly Nunez is a 71-year-old female with severe dementia, type 2 diabetes, COPD, pacemaker, and prior longstanding tobacco abuse evaluated by me in our vein office last week for right foot edema. She clearly had no venous issues but had nonpalpable femoral pulses and a nondetectable right DP Doppler signal with dependent right forefoot rubor and probable rest pain. I arranged for an outpatient FROYLAN at rest and a CTA of the abdomen, pelvis, and bilateral iliofemoral runoff.  I am conducting a telephone visit with her , Kendall to discuss her diagnosis and her treatment options. Please see my detailed consult from her 1/25/2022 office visit.    There has been no real change in her health history in the past week.  She is status post prior right iliac artery stent at an outside institution in 2013.  Her  informs me that she is rather unsteady on her feet and likely suffers from right foot rest pain.  He denies wounds on her right foot.    Imaging:  US FROYLAN DOPPLER NO EXERCISE, 1-2 LEVELS, BILAT   1/28/2022 2:31 PM      HISTORY: Decreased pedal pulses     COMPARISON: None.     FINDINGS:  Right FROYLAN:   PT: 0.21.  DP: Not able to obtain a waveform.     Left FROYLAN:   PT: 0.61.  DP: 0.54      Waveforms: Monophasic bilaterally.                                                                       IMPRESSION:   1. Right FROYLAN shows severe arterial insufficiency which is almost  critical.  2. Left FROYLAN shows moderate arterial insufficiency.       EXAM: CTA ABDOMEN PELVIS BILAT LEG RUNOFF W CONTR  DATE/TIME: 1/28/2022 1:22 PM     INDICATION: decreased pulses; Decreased pedal pulses  COMPARISON: CT abdomen and pelvis 1/26/2020     TECHNIQUE: Helical acquisition through the abdomen, pelvis, and  bilateral lower extremities was performed during the arterial phase of  contrast enhancement. Second phase arterial imaging was performed  through the bilateral lower extremities. 2D and 3D reconstructions  performed by the CT  technologist. Dose reduction techniques were used.  CONTRAST: 100 cc of Isovue-370, single use vial was utilized     FINDINGS:   AORTA: Severe disease of the celiac trunk origin. Occlusion of the SMA  origin and proximal aspect. Severe disease of the right renal artery  origin. Left renal artery is widely patent. ISABEL is widely patent.  Severe bulky atherosclerotic calcifications causing 50% luminal  narrowing of the proximal infrarenal aorta. An 40% luminal narrowing  of the distal infrarenal aorta.     RIGHT LEG: Occlusion of the right common iliac artery stent. Occlusion  of the right internal iliac artery and right external iliac artery.  Reconstitution of the right common femoral artery via deep circumflex  iliac collaterals. Moderate disease of the common femoral artery. The  profunda femoral artery is patent. Mild disease the proximal SFA. The  remainder this vein and popliteal artery are patent. Three-vessel  runoff.     LEFT LEG: Small chronic dissection in the distal common iliac artery.  Internal iliac arteries patent. Focal moderate to severe disease of  the distal external iliac artery. Moderate disease of the common  femoral artery. Profunda femoris artery is patent. Multifocal mild to  moderate disease of the proximal SFA with focal severe disease of the  distal SFA. Mild dilation of the mid left popliteal artery measuring  up to 7 mm in diameter distally whereas the proximal popliteal artery  measures 4 mm in diameter. Three-vessel runoff to the foot.     NONVASCULAR FINDINGS: (Arterial only phase imaging limits diagnostic  sensitivity of solid imaging organs.)     LUNG BASES: Severe emphysema. Lingular atelectasis. No pleural  effusion or pneumothorax. No significant cardiomegaly or pericardial  effusion.     HEPATOBILIARY: Enhancing lesion in left hepatic lobe measuring 10 mm  in diameter. Similar finding seen on 1/26/2020. Remainder of the liver  is unremarkable. Pancreas is  unremarkable.     SPLEEN: Normal     ADRENALS: No masses.     KIDNEYS: No solid lesions. No calculi. No hydroureteronephrosis.  Simple right renal cyst.     NODES:No enlarged lymph nodes meeting CT criteria for pathologic  enlargement.     BOWEL, BLADDER, PELVIC ORGANS: Fibroid uterus. Moderate stool burden.  No abnormally dilated loops of bowel. No free fluid or free air.  Mildly distended bladder.     MUSCULOSKELETAL: Degenerative disease of the lower lumbar spine and  hips.                                                                      IMPRESSION:  1.  Bulky calcified atherosclerotic disease seen throughout the aorta.  There is significant visceral arterial disease as follows: Severe  disease of the right renal artery origin, occlusion of the SMA origin  and proximal SMA, severe disease of the celiac trunk origin, and 50%  luminal narrowing of the proximal adrenal aorta. This visceral  arterial disease appears not significantly changed from 1/26/2020. The  amount of plaque leading to luminal narrowing within the aorta itself  is slightly increased. Recommend clinical correlation for chronic  mesenteric ischemia given the severity of disease in the SMA and  celiac trunk.  2.  Severe right lower extremity peripheral arterial disease with  findings as follows: Occlusion of the common, external, and internal  iliac arteries; moderate disease of the reconstituted common femoral  artery; mild disease of the superficial femoral artery with widely  patent popliteal artery and three-vessel runoff to the foot.  3.  Severe left lower extremity peripheral arterial disease with  findings as follows: Small chronic dissection of the distal common  iliac artery; focal moderate to severe disease of the distal external  iliac artery; moderate disease common femoral artery; multifocal  moderate disease of the proximal SFA and focal severe disease distal  SFA. Popliteal artery is widely patent and demonstrates some  focal  dilation up to 7 mm. Three-vessel runoff to the foot.  4.  Enhancing lesion in the left hepatic lobe also seen on 1/26/2020  likely represents a hemangioma or potentially a hepatic adenoma.  Consider MRI or CT liver mass protocol for further evaluation.  5.  Fibroid uterus.  6.  Severe emphysema.        [Consider Follow Up: Liver mass]     This report will be copied to the [facility] to ensure a  provider acknowledges the finding. [facility] Center is available Monday  through Friday 8am-3:30 pm.     BEREKET MOSQUERA MD     ASSESSMENT:  71-year-old female with severe dementia and multiple other medical comorbidities now with right foot rest pain largely secondary to an occluded right common iliac and external iliac artery.    RECOMMENDATION:  I reviewed all the above with her , Kendall.  She is not a candidate for an aortic revascularization.  I believe she is best served by stenting of the donor left iliac artery with a left to right femoral-femoral bypass.  I reviewed the specifics of this procedure and the anticipated postprocedural course.      He is not interested in pursuing work-up of the 1 cm enhancing lesion in the left hepatic lobe.  He is only interested in palliation or improving her quality of life and the least invasive possible ways.  He would like to schedule left iliac stenting with a left to right femoral femoral bypass graft.  I will ask my office to reach out to him to schedule this in a timely manner at [facility].  All of his questions were answered and verbalizes full understanding to the above and complete agreement with this management plan.    Total length of this encounter was 30 minutes.

## 2022-02-04 NOTE — TELEPHONE ENCOUNTER
Patient Education completed with patient's , Kendall, via phone.    Procedure: LEFT ILIAC ARTERY STENTING; LEFT TO RIGHT FEMORAL-FEMORAL BYPASS  Diagnosis: PAD WITH RIGHT FOOT REST PAIN  Anticoagulation Instruction: CONTINUE ASA  Pre-Operative Physical Exam: You need to have a pre-op physical exam within 30 days of your procedure. Your procedure may be cancelled if you do not have a current History and Physical. Call your PCP's office to schedule.  Allergies:  Updated in Epic  Bowel Prep: NPO per protocol.   Post Procedure Education: [facility] patient post-procedure fact sheet reviewed with patient.    COVID-19 instructions for isolation and pre testing reviewed with pt.    Learner(s): significant other  Method: Listening  Barriers to Learning:No Barrier  Outcome: Patient's  did verbalize understanding of above education.    Kendall is aware surgery scheduler will contact him to coordinate procedure and COVID-19 testing.  Patient also sent info via voxapp.    Belen Martel, SCOTN, RN-[facility]

## 2022-02-07 NOTE — TELEPHONE ENCOUNTER
Patient is awaiting scheduling for   Procedure: Left iliac artery stenting; left to right femoral-femoral bypass with Dr. Ramirez.    Ana Elena RN BSN  [facility]  [phone]      .

## 2022-02-07 NOTE — TELEPHONE ENCOUNTER
Teena Hall routed conversation to Rm Triage 3 hours ago (11:34 AM)     Kelly Nunez  Patient Customer Service Request Pool 9 hours ago (5:16 AM)     VD      Topic: Non-Medical Question.     Hoping to hear from .  With [place] dementia there are many things to consider on timing.  It is difficult to manage her food/beverage intake as the day progresses.  Have work and care issues that need to be dealt with.     Thanks, Kendall  [phone]

## 2022-02-08 NOTE — TELEPHONE ENCOUNTER
Pt's  was notified last week surgery scheduler would be in touch with her the following week.  Message was also sent via [employer]  Surgery scheduler aware.  SCOT NelsonN, RN-[facility]

## 2022-02-15 NOTE — PROGRESS NOTES
[facility]  [address] [address] [facility] [address]  Phone: [phone]  Primary Provider: Page Jacobs  Pre-op Performing Provider: CAR ST      PREOPERATIVE EVALUATION:  Today's date: 2/15/2022    Kelly Nunez is a 71 year old female who presents for a preoperative evaluation.    Surgical Information:  Surgery/Procedure: Left Iliac Artery stenting and left to right femoral bypass  Surgery Location: [facility]  Surgeon: Dr. Ramirez  Surgery Date:2/18/22  Time of Surgery: 7:20  Where patient plans to recover: At a TCU (Transitional Care Unit)  Fax number for surgical facility:     Type of Anesthesia Anticipated: General    Assessment & Plan     The proposed surgical procedure is considered INTERMEDIATE risk.    Pre-op exam  Check labs- ok for surgery.  - Basic metabolic panel  (Ca, Cl, CO2, Creat, Gluc, K, Na, BUN); Future  - CBC with platelets; Future    Iliac artery occlusion (H)  Follow with vascular surgery.    Encounter for screening for other viral diseases    - Asymptomatic COVID-19 Virus (Coronavirus) by PCR Nose    Primary pulmonary hypertension (H)  Follows with cardiology    Atrial fibrillation, unspecified type (H)  Controlled today- follows with cardiology- continue with current medication plan.    Dementia without behavioral disturbance, unspecified dementia type (H)  Has Neurology following.    Cardiac pacemaker in situ  Had device check Jan 19, 2022- heart exam normal today.       Implanted Device:   - Type of device:   Patient advised to bring device information on day of surgery.   - For full details on implanted device, refer to device management note in Epic from date: 1/19/22      Risks and Recommendations:  The patient has the following additional risks and recommendations for perioperative complications:   - Consult Hospitalist / IM to assist with post-op medical management   - History of delirium or  dementia  Cardiovascular:    Pulmonary:       Medication Instructions:  Patient is to take all scheduled medications on the day of surgery   - aspirin: Patient is at increased risk of thrombosis (e.g. stenting within the last year), continue aspirin without modification. Consider consultation with cardiology.     RECOMMENDATION:  APPROVAL GIVEN to proceed with proposed procedure, without further diagnostic evaluation.        Subjective     HPI related to upcoming procedure: Kelly will be having surgery to fix iliac artery occlusion and placement of stent.    Preop Questions 2/15/2022   1. Have you ever had a heart attack or stroke? No   2. Have you ever had surgery on your heart or blood vessels, such as a stent placement, a coronary artery bypass, or surgery on an artery in your head, neck, heart, or legs? YES - previous stent iliac, pacemaker   3. Do you have chest pain with activity? No   4. Do you have a history of  heart failure? YES    5. Do you currently have a cold, bronchitis or symptoms of other infection? No   6. Do you have a cough, shortness of breath, or wheezing? YES - from COPD   7. Do you or anyone in your family have previous history of blood clots? No   8. Do you or does anyone in your family have a serious bleeding problem such as prolonged bleeding following surgeries or cuts? No   9. Have you ever had problems with anemia or been told to take iron pills? No   10. Have you had any abnormal blood loss such as black, tarry or bloody stools, or abnormal vaginal bleeding? No   11. Have you ever had a blood transfusion? No   12. Are you willing to have a blood transfusion if it is medically needed before, during, or after your surgery? Yes   13. Have you or any of your relatives ever had problems with anesthesia? No   14. Do you have sleep apnea, excessive snoring or daytime drowsiness? No   15. Do you have any artifical heart valves or other implanted medical devices like a pacemaker,  defibrillator, or continuous glucose monitor? YES    15a. What type of device do you have? Pacemaker   15b. Name of the clinic that manages your device:  [facility]   16. Do you have artificial joints? No   17. Are you allergic to latex? No       Health Care Directive:  Patient has a Health Care Directive on file      Preoperative Review of :   reviewed - no record of controlled substances prescribed.      Status of Chronic Conditions:  A-FIB - Patient has a longstanding history of chronic A-fib currently on rate and rhythm control. Current treatment regimen includes Aspirin for stroke prevention and denies significant symptoms of lightheadedness, palpitations or dyspnea.     CHF - Patient has a longstanding history of moderate-severe CHF. Exacerbating conditions include hypertension, COPD and atrial fibrilation. Currently the patient's condition is same. Current treatment regimen includes beta blocker, calcium channel blocker and ASA. The patient denies chest pain, edema, orthopnea, SOB or recent weight gain. Last Echocardiogram done on 9/16/21, EKG done 11/23/21.     COPD - Patient has a longstanding history of moderate-severe COPD . Patient has been doing well overall noting NO CURRENT SYMPTOMS BUT OCCASIONAL COUGH and continues on medication regimen consisting of Duo- nebs daily without adverse reactions or side effects.    HYPERTENSION - Patient has longstanding history of HTN , currently denies any symptoms referable to elevated blood pressure. Specifically denies chest pain, palpitations, dyspnea, orthopnea, PND or peripheral edema. Blood pressure readings have been in normal range. Current medication regimen is as listed below. Patient denies any side effects of medication.       Review of Systems  Constitutional, neuro, ENT, endocrine, pulmonary, cardiac, gastrointestinal, genitourinary, musculoskeletal, integument and psychiatric systems are negative, except as otherwise noted.    Patient Active Problem  List    Diagnosis Date Noted     Superior mesenteric artery stenosis (H) 09/14/2021     Priority: Medium     Cardiac pacemaker in situ 01/07/2021     Priority: Medium     HTN (hypertension) 01/07/2021     Priority: Medium     Bradycardia 10/11/2020     Priority: Medium     Chronic obstructive pulmonary disease with (acute) exacerbation (H) 07/16/2020     Priority: Medium     Primary pulmonary hypertension (H) 07/16/2020     Priority: Medium     Dementia without behavioral disturbance, unspecified dementia type (H) 07/10/2020     Priority: Medium     Encounter for palliative care      Priority: Medium     A-fib (H) 01/23/2018     Priority: Medium      Past Medical History:   Diagnosis Date     Dementia (H)      Diabetes (H)      Paroxysmal atrial fibrillation (H)      Sinus node dysfunction (H)      Past Surgical History:   Procedure Laterality Date     EP PACEMAKER N/A 10/12/2020    Procedure: EP Pacemaker;  Surgeon: Luis Onofre MD;  Location:  HEART CARDIAC CATH LAB     HEART CATH, ANGIOPLASTY       IR ABDOMINAL AORTOGRAM  5/10/2013     IR MISCELLANEOUS PROCEDURE  5/10/2013     Current Outpatient Medications   Medication Sig Dispense Refill     aspirin (ASA) 81 MG chewable tablet Take 81 mg by mouth every evening       diltiazem ER COATED BEADS (CARDIZEM CD/CARTIA XT) 180 MG 24 hr capsule Take 1 capsule (180 mg) by mouth daily 90 capsule 1     ipratropium - albuterol 0.5 mg/2.5 mg/3 mL (DUONEB) 0.5-2.5 (3) MG/3ML neb solution        LORazepam (ATIVAN) 0.5 MG tablet        LORazepam (ATIVAN) 1 MG tablet 0.5 mg As of 01/14/21 patinet taking 1 tablet at bedtime and one half tablet as needed.  As of 05/05/2021 Pt taking 0.5 mg 3x daily as needed       memantine (NAMENDA) 10 MG tablet Take 10 mg by mouth 2 times daily        metoprolol tartrate (LOPRESSOR) 50 MG tablet Take 1 tablet (50 mg) by mouth 2 times daily 180 tablet 1     Multiple Vitamins-Minerals (CENTRUM WOMEN PO) Take by mouth daily        Nebulizers (INNOSPIRE ESSENCE NEBULIZER) MISC        OLANZapine (ZYPREXA) 10 MG tablet Take 10 mg by mouth At Bedtime        traZODone (DESYREL) 100 MG tablet Take 100 mg by mouth At Bedtime Take 1/2 to one tablet at bedtime         Allergies   Allergen Reactions     Penicillins Swelling     Pt states she has tolerated cephalosporins in the past.         Social History     Tobacco Use     Smoking status: Former Smoker     Packs/day: 1.00     Types: Cigarettes     Quit date:      Years since quittin.1     Smokeless tobacco: Never Used   Substance Use Topics     Alcohol use: Not Currently       History   Drug Use No         Objective     There were no vitals taken for this visit.    Physical Exam    GENERAL APPEARANCE: healthy, alert and no distress     EYES: EOMI, PERRL     NECK: no adenopathy, no asymmetry, masses, or scars and thyroid normal to palpation     RESP: lungs clear to auscultation - no rales, rhonchi or wheezes     CV: regular rates and rhythm, normal S1 S2, no S3 or S4 and no murmur, click or rub     ABDOMEN:  soft, nontender, no HSM or masses and bowel sounds normal     MS: extremities normal- no gross deformities noted, no evidence of inflammation in joints, FROM in all extremities.     SKIN: no suspicious lesions or rashes     NEURO: Normal strength and tone, sensory exam grossly normal, mentation intact and speech normal     PSYCH: affect flat and patient with dementia     LYMPHATICS: No cervical adenopathy    Recent Labs   Lab Test 21  1337 21  0545 21  1135 21  1001 10/12/20  0528   HGB  --  12.6  --  12.6 12.0   PLT  --  341  --  305 354   INR  --   --   --   --  1.13    142   < > 142 141   POTASSIUM 4.7 4.0   < > 4.0 3.9   CR 0.67 0.71   < > 0.74 0.78    < > = values in this interval not displayed.        Diagnostics:  Labs pending at this time.  Results will be reviewed when available.   No EKG this visit, completed in the last 90 days.    Revised Cardiac  Risk Index (RCRI):  The patient has the following serious cardiovascular risks for perioperative complications:   - Congestive Heart Failure (pulmonary edema, PND, s3 kwan, CXR with pulmonary congestion, basilar rales) = 1 point     RCRI Interpretation: 1 point: Class II (low risk - 0.9% complication rate)           Signed Electronically by: Kolby Noonan PA-C  Copy of this evaluation report is provided to requesting physician.

## 2022-02-15 NOTE — H&P (VIEW-ONLY)
[facility]  [address] [facility] [address]  Phone: [phone]  Primary Provider: Page Jacobs  Pre-op Performing Provider: CAR ST      PREOPERATIVE EVALUATION:  Today's date: 2/15/2022    Kelly Nunez is a 71 year old female who presents for a preoperative evaluation.    Surgical Information:  Surgery/Procedure: Left Iliac Artery stenting and left to right femoral bypass  Surgery Location: [facility]  Surgeon: Dr. Ramirez  Surgery Date:2/18/22  Time of Surgery: 7:20  Where patient plans to recover: At a TCU (Transitional Care Unit)  Fax number for surgical facility:     Type of Anesthesia Anticipated: General    Assessment & Plan     The proposed surgical procedure is considered INTERMEDIATE risk.    Pre-op exam  Check labs- ok for surgery.  - Basic metabolic panel  (Ca, Cl, CO2, Creat, Gluc, K, Na, BUN); Future  - CBC with platelets; Future    Iliac artery occlusion (H)  Follow with vascular surgery.    Encounter for screening for other viral diseases    - Asymptomatic COVID-19 Virus (Coronavirus) by PCR Nose    Primary pulmonary hypertension (H)  Follows with cardiology    Atrial fibrillation, unspecified type (H)  Controlled today- follows with cardiology- continue with current medication plan.    Dementia without behavioral disturbance, unspecified dementia type (H)  Has Neurology following.    Cardiac pacemaker in situ  Had device check Jan 19, 2022- heart exam normal today.       Implanted Device:   - Type of device:   Patient advised to bring device information on day of surgery.   - For full details on implanted device, refer to device management note in Epic from date: 1/19/22      Risks and Recommendations:  The patient has the following additional risks and recommendations for perioperative complications:   - Consult Hospitalist / IM to assist with post-op medical management   - History of delirium or  dementia  Cardiovascular:    Pulmonary:       Medication Instructions:  Patient is to take all scheduled medications on the day of surgery   - aspirin: Patient is at increased risk of thrombosis (e.g. stenting within the last year), continue aspirin without modification. Consider consultation with cardiology.     RECOMMENDATION:  APPROVAL GIVEN to proceed with proposed procedure, without further diagnostic evaluation.        Subjective     HPI related to upcoming procedure: Kelly will be having surgery to fix iliac artery occlusion and placement of stent.    Preop Questions 2/15/2022   1. Have you ever had a heart attack or stroke? No   2. Have you ever had surgery on your heart or blood vessels, such as a stent placement, a coronary artery bypass, or surgery on an artery in your head, neck, heart, or legs? YES - previous stent iliac, pacemaker   3. Do you have chest pain with activity? No   4. Do you have a history of  heart failure? YES    5. Do you currently have a cold, bronchitis or symptoms of other infection? No   6. Do you have a cough, shortness of breath, or wheezing? YES - from COPD   7. Do you or anyone in your family have previous history of blood clots? No   8. Do you or does anyone in your family have a serious bleeding problem such as prolonged bleeding following surgeries or cuts? No   9. Have you ever had problems with anemia or been told to take iron pills? No   10. Have you had any abnormal blood loss such as black, tarry or bloody stools, or abnormal vaginal bleeding? No   11. Have you ever had a blood transfusion? No   12. Are you willing to have a blood transfusion if it is medically needed before, during, or after your surgery? Yes   13. Have you or any of your relatives ever had problems with anesthesia? No   14. Do you have sleep apnea, excessive snoring or daytime drowsiness? No   15. Do you have any artifical heart valves or other implanted medical devices like a pacemaker,  defibrillator, or continuous glucose monitor? YES    15a. What type of device do you have? Pacemaker   15b. Name of the clinic that manages your device:  [facility]   16. Do you have artificial joints? No   17. Are you allergic to latex? No       Health Care Directive:  Patient has a Health Care Directive on file      Preoperative Review of :   reviewed - no record of controlled substances prescribed.      Status of Chronic Conditions:  A-FIB - Patient has a longstanding history of chronic A-fib currently on rate and rhythm control. Current treatment regimen includes Aspirin for stroke prevention and denies significant symptoms of lightheadedness, palpitations or dyspnea.     CHF - Patient has a longstanding history of moderate-severe CHF. Exacerbating conditions include hypertension, COPD and atrial fibrilation. Currently the patient's condition is same. Current treatment regimen includes beta blocker, calcium channel blocker and ASA. The patient denies chest pain, edema, orthopnea, SOB or recent weight gain. Last Echocardiogram done on 9/16/21, EKG done 11/23/21.     COPD - Patient has a longstanding history of moderate-severe COPD . Patient has been doing well overall noting NO CURRENT SYMPTOMS BUT OCCASIONAL COUGH and continues on medication regimen consisting of Duo- nebs daily without adverse reactions or side effects.    HYPERTENSION - Patient has longstanding history of HTN , currently denies any symptoms referable to elevated blood pressure. Specifically denies chest pain, palpitations, dyspnea, orthopnea, PND or peripheral edema. Blood pressure readings have been in normal range. Current medication regimen is as listed below. Patient denies any side effects of medication.       Review of Systems  Constitutional, neuro, ENT, endocrine, pulmonary, cardiac, gastrointestinal, genitourinary, musculoskeletal, integument and psychiatric systems are negative, except as otherwise noted.    Patient Active Problem  List    Diagnosis Date Noted     Superior mesenteric artery stenosis (H) 09/14/2021     Priority: Medium     Cardiac pacemaker in situ 01/07/2021     Priority: Medium     HTN (hypertension) 01/07/2021     Priority: Medium     Bradycardia 10/11/2020     Priority: Medium     Chronic obstructive pulmonary disease with (acute) exacerbation (H) 07/16/2020     Priority: Medium     Primary pulmonary hypertension (H) 07/16/2020     Priority: Medium     Dementia without behavioral disturbance, unspecified dementia type (H) 07/10/2020     Priority: Medium     Encounter for palliative care      Priority: Medium     A-fib (H) 01/23/2018     Priority: Medium      Past Medical History:   Diagnosis Date     Dementia (H)      Diabetes (H)      Paroxysmal atrial fibrillation (H)      Sinus node dysfunction (H)      Past Surgical History:   Procedure Laterality Date     EP PACEMAKER N/A 10/12/2020    Procedure: EP Pacemaker;  Surgeon: Luis Onofre MD;  Location:  HEART CARDIAC CATH LAB     HEART CATH, ANGIOPLASTY       IR ABDOMINAL AORTOGRAM  5/10/2013     IR MISCELLANEOUS PROCEDURE  5/10/2013     Current Outpatient Medications   Medication Sig Dispense Refill     aspirin (ASA) 81 MG chewable tablet Take 81 mg by mouth every evening       diltiazem ER COATED BEADS (CARDIZEM CD/CARTIA XT) 180 MG 24 hr capsule Take 1 capsule (180 mg) by mouth daily 90 capsule 1     ipratropium - albuterol 0.5 mg/2.5 mg/3 mL (DUONEB) 0.5-2.5 (3) MG/3ML neb solution        LORazepam (ATIVAN) 0.5 MG tablet        LORazepam (ATIVAN) 1 MG tablet 0.5 mg As of 01/14/21 patinet taking 1 tablet at bedtime and one half tablet as needed.  As of 05/05/2021 Pt taking 0.5 mg 3x daily as needed       memantine (NAMENDA) 10 MG tablet Take 10 mg by mouth 2 times daily        metoprolol tartrate (LOPRESSOR) 50 MG tablet Take 1 tablet (50 mg) by mouth 2 times daily 180 tablet 1     Multiple Vitamins-Minerals (CENTRUM WOMEN PO) Take by mouth daily        Nebulizers (INNOSPIRE ESSENCE NEBULIZER) MISC        OLANZapine (ZYPREXA) 10 MG tablet Take 10 mg by mouth At Bedtime        traZODone (DESYREL) 100 MG tablet Take 100 mg by mouth At Bedtime Take 1/2 to one tablet at bedtime         Allergies   Allergen Reactions     Penicillins Swelling     Pt states she has tolerated cephalosporins in the past.         Social History     Tobacco Use     Smoking status: Former Smoker     Packs/day: 1.00     Types: Cigarettes     Quit date:      Years since quittin.1     Smokeless tobacco: Never Used   Substance Use Topics     Alcohol use: Not Currently       History   Drug Use No         Objective     There were no vitals taken for this visit.    Physical Exam    GENERAL APPEARANCE: healthy, alert and no distress     EYES: EOMI, PERRL     NECK: no adenopathy, no asymmetry, masses, or scars and thyroid normal to palpation     RESP: lungs clear to auscultation - no rales, rhonchi or wheezes     CV: regular rates and rhythm, normal S1 S2, no S3 or S4 and no murmur, click or rub     ABDOMEN:  soft, nontender, no HSM or masses and bowel sounds normal     MS: extremities normal- no gross deformities noted, no evidence of inflammation in joints, FROM in all extremities.     SKIN: no suspicious lesions or rashes     NEURO: Normal strength and tone, sensory exam grossly normal, mentation intact and speech normal     PSYCH: affect flat and patient with dementia     LYMPHATICS: No cervical adenopathy    Recent Labs   Lab Test 21  1337 21  0545 21  1135 21  1001 10/12/20  0528   HGB  --  12.6  --  12.6 12.0   PLT  --  341  --  305 354   INR  --   --   --   --  1.13    142   < > 142 141   POTASSIUM 4.7 4.0   < > 4.0 3.9   CR 0.67 0.71   < > 0.74 0.78    < > = values in this interval not displayed.        Diagnostics:  Labs pending at this time.  Results will be reviewed when available.   No EKG this visit, completed in the last 90 days.    Revised Cardiac  Risk Index (RCRI):  The patient has the following serious cardiovascular risks for perioperative complications:   - Congestive Heart Failure (pulmonary edema, PND, s3 kwan, CXR with pulmonary congestion, basilar rales) = 1 point     RCRI Interpretation: 1 point: Class II (low risk - 0.9% complication rate)           Signed Electronically by: Kolby Noonan PA-C  Copy of this evaluation report is provided to requesting physician.

## 2022-02-16 NOTE — TELEPHONE ENCOUNTER
Type of surgery: LEFT ILIAC ARTERY STENTING; LEFT TO RIGHT FEMORAL TO FEMORAL BYPASS  Location of surgery: [facility] OR  Date and time of surgery: 02/18/22 @ 9:20AM  Surgeon: DR. KEN HERNANDEZ  Pre-Op Appt Date: PT TO SCHEDULE AT [facility]  Post-Op Appt Date: 03/03/22 @ 2:20PM IN [place]   Packet sent out: MAILED 02/11/22  Pre-cert/Authorization completed:  Yes  Date: 02/16/22

## 2022-02-17 NOTE — ANESTHESIA PREPROCEDURE EVALUATION
Anesthesia Pre-Procedure Evaluation    Patient: Kelly Nunez   MRN: [id number] : 1950        Preoperative Diagnosis: Atherosclerosis of native artery of right leg with rest pain (H) [I70.221]    Procedure : Procedure(s):  LEFT ILIAC ARTERY STENTING  LEFT TO RIGHT FEMORAL-FEMORAL BYPASS          Past Medical History:   Diagnosis Date     Dementia (H)      Diabetes (H)      Paroxysmal atrial fibrillation (H)      Sinus node dysfunction (H)       Past Surgical History:   Procedure Laterality Date     EP PACEMAKER N/A 10/12/2020    Procedure: EP Pacemaker;  Surgeon: Luis Onofre MD;  Location:  [facility]     HEART CATH, ANGIOPLASTY       IR ABDOMINAL AORTOGRAM  5/10/2013     IR MISCELLANEOUS PROCEDURE  5/10/2013      Allergies   Allergen Reactions     Penicillins Swelling     Pt states she has tolerated cephalosporins in the past.       Social History     Tobacco Use     Smoking status: Former Smoker     Packs/day: 1.00     Types: Cigarettes     Quit date:      Years since quittin.1     Smokeless tobacco: Never Used   Substance Use Topics     Alcohol use: Not Currently      Wt Readings from Last 1 Encounters:   02/15/22 55.3 kg (122 lb)        Anesthesia Evaluation            ROS/MED HX  ENT/Pulmonary:     (+) tobacco use, COPD,     Neurologic: Comment: Oriented to self    (+) dementia,     Cardiovascular: Comment: Iliac artery occlusion    (+) Dyslipidemia hypertension-Peripheral Vascular Disease----CHF pacemaker, dysrhythmias, a-fib, pulmonary hypertension, Previous cardiac testing   Echo: Date: 2021 Results:  Interpretation Summary     1. The left ventricle is normal in size. There is normal left ventricular wall  thickness. Left ventricular systolic function is normal. The visual ejection  fraction is 55-60%. Diastolic Doppler findings (E/E' ratio and/or other  parameters) suggest left ventricular filling pressures are increased. No  regional wall motion  abnormalities noted.  2. The right ventricle is normal size. There is a catheter/pacemaker lead seen  in the right ventricle. The right ventricular systolic function is normal  3. Mild mitral and tricuspid regurgitation.  4. No pericardial effusion.  5. In comparison to the previous report dated 10/12/2020, the findings are  similar.  ______________________________________________________________________________  Left Ventricle  The left ventricle is normal in size. There is normal left ventricular wall  thickness. Left ventricular systolic function is normal. The visual ejection  fraction is 55-60%. Diastolic Doppler findings (E/E' ratio and/or other  parameters) suggest left ventricular filling pressures are increased. No  regional wall motion abnormalities noted.     Right Ventricle  The right ventricle is normal size. There is a catheter/pacemaker lead seen in  the right ventricle. The right ventricular systolic function is normal.     Atria  Normal left atrial size. Right atrial size is normal. There is no color  Doppler evidence of an atrial shunt.     Mitral Valve  There is mild (1+) mitral regurgitation.     Tricuspid Valve  There is mild (1+) tricuspid regurgitation. The right ventricular systolic  pressure is approximated at 25.1 mmHg plus the right atrial pressure.     Aortic Valve  There is mild trileaflet aortic sclerosis. No aortic regurgitation is present.  No aortic stenosis is present.     Pulmonic Valve  There is trace pulmonic valvular regurgitation. There is no pulmonic valvular  stenosis.     Vessels  The aortic root is normal size. Normal size ascending aorta. Descending aortic  velocity normal. The inferior vena cava is normal.     Pericardium  There is no pericardial effusion.     Rhythm  Sinus rhythm was noted.  Stress Test: Date: Results:    ECG Reviewed: Date: Results:    Cath: Date: Results:      METS/Exercise Tolerance:     Hematologic:       Musculoskeletal:       GI/Hepatic:        Renal/Genitourinary:     (+) renal disease, type: CRI,     Endo:     (+) type II DM,     Psychiatric/Substance Use:       Infectious Disease:       Malignancy:       Other:            Physical Exam    Airway      Comment: Small mouth    Mallampati: III   TM distance: < 3 FB   Neck ROM: full   Mouth opening: < 3 cm    Respiratory Devices and Support         Dental  no notable dental history         Cardiovascular   cardiovascular exam normal          Pulmonary   pulmonary exam normal        breath sounds clear to auscultation           OUTSIDE LABS:  CBC:   Lab Results   Component Value Date    WBC 5.2 11/23/2021    WBC 5.7 01/19/2021    HGB 12.6 11/23/2021    HGB 12.6 01/19/2021    HCT 40.7 11/23/2021    HCT 38.9 01/19/2021     11/23/2021     01/19/2021     BMP:   Lab Results   Component Value Date     12/01/2021     11/23/2021    POTASSIUM 4.7 12/01/2021    POTASSIUM 4.0 11/23/2021    CHLORIDE 109 12/01/2021    CHLORIDE 111 (H) 11/23/2021    CO2 26 12/01/2021    CO2 24 11/23/2021    BUN 21 12/01/2021    BUN 28 11/23/2021    CR 0.67 12/01/2021    CR 0.71 11/23/2021     (H) 12/01/2021     (H) 11/23/2021     COAGS:   Lab Results   Component Value Date    INR 1.13 10/12/2020     POC:   Lab Results   Component Value Date    BGM 86 02/02/2018     HEPATIC:   Lab Results   Component Value Date    ALBUMIN 3.1 (L) 12/01/2021    PROTTOTAL 7.2 12/01/2021    ALT 25 12/01/2021    AST 18 12/01/2021    ALKPHOS 87 12/01/2021    BILITOTAL 0.2 12/01/2021     OTHER:   Lab Results   Component Value Date    LACT 1.2 02/02/2018    A1C 5.8 05/31/2019    ATTILA 9.2 12/01/2021    MAG 2.4 (H) 10/11/2020    LIPASE 185 01/26/2020    TSH 2.28 10/11/2020    SED 62 (H) 12/01/2021       Anesthesia Plan    ASA Status:  3   NPO Status:  NPO Appropriate    Anesthesia Type: General.     - Airway: ETT   Induction: Intravenous, Propofol.   Maintenance: Balanced.   Techniques and Equipment:     - Lines/Monitors: 2nd  IV, Arterial Line     Consents    Anesthesia Plan(s) and associated risks, benefits, and realistic alternatives discussed. Questions answered and patient/representative(s) expressed understanding.    - Discussed:     - Discussed with:  Spouse, Legal Guardian      - Extended Intubation/Ventilatory Support Discussed: No.      - Patient is DNR/DNI Status: No    Use of blood products discussed: Yes.     - Discussed with: Legal guardian.     - Consented: consented to blood products            Reason for refusal: other.     Postoperative Care    Pain management: IV analgesics, Oral pain medications, Multi-modal analgesia.   PONV prophylaxis: Ondansetron (or other 5HT-3), Dexamethasone or Solumedrol     Comments:                Ki Munoz, DO

## 2022-02-17 NOTE — PROGRESS NOTES
PTA medications updated by Medication Scribe prior to surgery via phone call with patient (last doses completed by Nurse)     Medication history sources: Patient's family/friend (Kendall (spouse)), Surescripts and H&P  In the past week, patient estimated taking medication this percent of the time: Greater than 90%  Adherence assessment: N/A Not Observed    Significant changes made to the medication list:  None      Additional medication history information:   Patient brought own home meds: Nebulizer    Medication reconciliation completed by provider prior to medication history? No    Time spent in this activity: 25 minutes    The information provided in this note is only as accurate as the sources available at the time of update(s)    Prior to Admission medications    Medication Sig Last Dose Taking? Auth Provider   acetaminophen (TYLENOL) 500 MG tablet Take 500-1,000 mg by mouth every 6 hours as needed for mild pain  at prn Yes Reported, Patient   aspirin (ASA) 81 MG chewable tablet Take 81 mg by mouth every evening 2/17/2022 at pm Yes Unknown, Entered By History   diltiazem ER COATED BEADS (CARDIZEM CD/CARTIA XT) 180 MG 24 hr capsule Take 1 capsule (180 mg) by mouth daily  at am Yes Page Jacobs APRN CNP   ipratropium - albuterol 0.5 mg/2.5 mg/3 mL (DUONEB) 0.5-2.5 (3) MG/3ML neb solution Take 1 vial by nebulization every 6 hours as needed  2/17/2022 at am Yes Reported, Patient   LORazepam (ATIVAN) 0.5 MG tablet Take 0.5 mg by mouth 3 times daily as needed   at am Yes Reported, Patient   memantine (NAMENDA) 10 MG tablet Take 10 mg by mouth 2 times daily   at am Yes Reported, Patient   metoprolol tartrate (LOPRESSOR) 50 MG tablet Take 1 tablet (50 mg) by mouth 2 times daily  at am Yes Page Jacobs APRN CNP   Multiple Vitamins-Minerals (CENTRUM WOMEN PO) Take 1 capsule by mouth daily  2/17/2022 at am Yes Reported, Patient   Nebulizers (INNOSPIRE ESSENCE NEBULIZER) MISC Take by nebulization daily as  needed  Unknown at Unknown time Yes Reported, Patient   OLANZapine (ZYPREXA) 10 MG tablet Take 10 mg by mouth At Bedtime  2/17/2022 at pm Yes Reported, Patient   polyethylene glycol (MIRALAX) 17 GM/Dose powder Take 1 capful by mouth daily as needed for constipation  at prn Yes Reported, Patient   senna (SENOKOT) 8.6 MG tablet Take 2 tablets by mouth daily 2/17/2022 at pm Yes Reported, Patient   traZODone (DESYREL) 100 MG tablet Take 0.5-100 mg by mouth At Bedtime  2/17/2022 at pm Yes Reported, Patient     Medication history completed by:    Rolando Walker CPhT  Medication [facility]

## 2022-02-18 PROBLEM — I70.221: Status: ACTIVE | Noted: 2022-01-01

## 2022-02-18 PROBLEM — Z79.2 NEED FOR PROPHYLACTIC ANTIBIOTIC: Status: ACTIVE | Noted: 2022-01-01

## 2022-02-18 NOTE — CONSULTS
[facility]    Vascular Medicine Consultation     Date of Admission:  2/18/2022  Date of Consult (When I saw the patient): 02/18/22      Physician Attestation     I, Marilou Weinstein, saw and evaluated Kelly Nunez as part of a shared APRN/PA visit.     We were asked by Dr. Ramirez to evaluate vascular risk factors and assist with medical management in this 71 year old female with severe dementia who presents today for stenting of the donor left iliac artery with a left to right femoral-femoral bypass for palliative reasons given her right leg rest pain.      I personally reviewed the vital signs, medications, labs and imaging.    I personally performed the substantive portion of the medical decision making for this visit - please see the LINDA's documentation for full details.      Key management decisions made by me and carried out under my direction:     Postoperative cares, activity and antiplatelet medication per vascular surgery service    Her lipids have historically been elevated but she is not on a statin with her severe dementia. Given her dementia and desire of family to no be overly aggressive, will hold off on initiating a statin at this time.     Continue PTA diltiazem  and metoprolol   Hx of paroxysmal A fib, cards following closely not on any anticoagulation . She has pace maker in place they are planning to consider only if A Fib burden .    Thank you for the consult !    No VM rounder this weekend , we will ask Hospitalist service to follow her and manage medical issues .    Copy to Dr. Ramirez and primary MD     Marilou Weinstein MD, Health system, [facility]  Vascular Medicine  Date of Service (when I saw the patient): 02/18/22    Assessment & Plan   1. Critical limb ischemia of right foot rest pain undergoing left iliac artery stenting and left to right femoral-femoral bypass 2/18/22    Post-operative cares per Dr. Ramirez. she was on aspirin 81 mg daily as an outpatient.      2. Hyperlipidemia    Her lipids have historically been elevated but she is not on a statin with her severe dementia. Given her dementia and desire of family to no be overly aggressive, will hold off on initiating a statin at this time.     3. Hypertension    Continue prior to admission Metoprolol 50 mg BID and Diltiazem as blood pressure allows. Monitor blood pressure closely.     4. Severe dementia    Continue Namenda. Her  is very involved in her care and speaks on her behalf.     5. Paroxysmal atrial fibrillation  Junctional bradycardia status post pacemaker placement (10/2020)    Continue Diltiazem and metoprolol. She is not on anticoagulation. Cardiology is following as an outpatient and would consider anticoagulation if her pacemaker revealed high burden of atrial fibrillation.     6. COPD with history of tobacco use    Continue prior to admission nebulizershh as needed.       Reason for Consult   Reason for consult: Asked by Dr. Ramirez to evaluate vascular risk factors and assist with medical management in this 71 year old female with severe dementia who presents today for stenting of the donor left iliac artery with a left to right femoral-femoral bypass for palliative reasons given her right leg rest pain.    Primary Care Physician   Page Jacobs      History of Present Illness   Kelly Nunez is a 71 year old female with severe dementia, type 2 diabetes, COPD, pacemaker, and prior longstanding tobacco abuse who was evaluated by Dr. Ramirez in the vein office three weeks ago for right foot edema.  Her  informed him that she is rather unsteady on her feet and likely suffers from right foot rest pain. She is status post prior right iliac artery stent at an outside institution in 2013. She had no venous issues but had nonpalpable femoral pulses and a nondetectable right DP Doppler signal with dependent right forefoot rubor and probable rest pain.  She underwent FROYLAN at rest (right was  0.21 and left was 0.61) and a CTA of the abdomen, pelvis, and bilateral iliofemoral runoff (which showed significant bilateral lower extremity arterial disease and an occluded right common iliac and external iliac artery). She has no wounds on her right foot.      Given her severe dementia, family is not interested in being too aggressive, but given her rest pain, it was felt that she would likely benefit from stenting of the donor left iliac artery with a left to right femoral-femoral bypass for palliative reasons given her right leg rest pain. She presented for this today.       Past Medical History   Past Medical History:   Diagnosis Date     Atrial fibrillation (H)      COPD (chronic obstructive pulmonary disease) (H)      Dementia (H)      Diabetes (H)     past history     Hypertension      Paroxysmal atrial fibrillation (H)      Sinus node dysfunction (H)        Past Surgical History   Past Surgical History:   Procedure Laterality Date     EP PACEMAKER N/A 10/12/2020    Procedure: EP Pacemaker;  Surgeon: Luis Onofre MD;  Location:  HEART CARDIAC CATH LAB     HEART CATH, ANGIOPLASTY       IR ABDOMINAL AORTOGRAM  5/10/2013     IR MISCELLANEOUS PROCEDURE  5/10/2013     VASCULAR SURGERY  2014    right leg stent       Prior to Admission Medications   Prior to Admission Medications   Prescriptions Last Dose Informant Patient Reported? Taking?   LORazepam (ATIVAN) 0.5 MG tablet 2/18/2022 at 0530 Spouse/Significant Other Yes Yes   Sig: Take 0.5 mg by mouth 3 times daily as needed    Multiple Vitamins-Minerals (CENTRUM WOMEN PO) 2/17/2022 at am Spouse/Significant Other Yes Yes   Sig: Take 1 capsule by mouth daily    Nebulizers (INNOSPIRE ESSENCE NEBULIZER) MISC Unknown at Unknown time Spouse/Significant Other Yes Yes   Sig: Take by nebulization daily as needed    OLANZapine (ZYPREXA) 10 MG tablet 2/17/2022 at pm Spouse/Significant Other Yes Yes   Sig: Take 10 mg by mouth At Bedtime    acetaminophen  (TYLENOL) 500 MG tablet Past Month at prn Spouse/Significant Other Yes Yes   Sig: Take 500-1,000 mg by mouth every 6 hours as needed for mild pain   aspirin (ASA) 81 MG chewable tablet 2/17/2022 at pm Spouse/Significant Other Yes Yes   Sig: Take 81 mg by mouth every evening   diltiazem ER COATED BEADS (CARDIZEM CD/CARTIA XT) 180 MG 24 hr capsule 2/18/2022 at 0530 Spouse/Significant Other No Yes   Sig: Take 1 capsule (180 mg) by mouth daily   ipratropium - albuterol 0.5 mg/2.5 mg/3 mL (DUONEB) 0.5-2.5 (3) MG/3ML neb solution 2/17/2022 at am Spouse/Significant Other Yes Yes   Sig: Take 1 vial by nebulization every 6 hours as needed    memantine (NAMENDA) 10 MG tablet 2/18/2022 at a0530 Spouse/Significant Other Yes Yes   Sig: Take 10 mg by mouth 2 times daily    metoprolol tartrate (LOPRESSOR) 50 MG tablet 2/18/2022 at 0530 Spouse/Significant Other No Yes   Sig: Take 1 tablet (50 mg) by mouth 2 times daily   polyethylene glycol (MIRALAX) 17 GM/Dose powder Past Month at prn Spouse/Significant Other Yes Yes   Sig: Take 1 capful by mouth daily as needed for constipation   senna (SENOKOT) 8.6 MG tablet 2/17/2022 at pm Spouse/Significant Other Yes Yes   Sig: Take 2 tablets by mouth daily   traZODone (DESYREL) 100 MG tablet 2/17/2022 at pm Spouse/Significant Other Yes Yes   Sig: Take 0.5-100 mg by mouth At Bedtime       Facility-Administered Medications Last Administration Doses Remaining   lidocaine 1% with EPINEPHrine 1:100,000 injection 3 mL None recorded 1        Allergies   Allergies   Allergen Reactions     Penicillins Swelling     Pt states she has tolerated cephalosporins in the past.        Social History   Kelly CINDY Nunez  reports that she quit smoking about 5 years ago. Her smoking use included cigarettes. She smoked 1.00 pack per day. She has never used smokeless tobacco. She reports previous alcohol use. She reports that she does not use drugs.    Family History   History reviewed. No pertinent family  history.    Review of Systems   The 10 point Review of Systems is negative other than noted in the HPI or here.    Physical Exam   Temp: 97.2  F (36.2  C) Temp src: Temporal BP: 122/65 Pulse: 69   Resp: 14 SpO2: 96 % O2 Device: None (Room air)    Vital Signs with Ranges  Temp:  [97.2  F (36.2  C)] 97.2  F (36.2  C)  Pulse:  [69] 69  Resp:  [14] 14  BP: (122)/(65) 122/65  SpO2:  [96 %] 96 %  123 lbs 0 oz    Constitutional: awake, alert, cooperative, no apparent distress, and appears stated age  Eyes: Lids and lashes normal, pupils equal, round and reactive to light, extra ocular muscles intact, sclera clear, conjunctiva normal  ENT: normocepalic, without obvious abnormality, oropharynx pink and moist  Hematologic / Lymphatic: no lymphadenopathy  Respiratory: No increased work of breathing, good air exchange, clear to auscultation bilaterally, no crackles or wheezing  Cardiovascular: regular rate and rhythm, normal S1 and S2 and no murmur noted  GI: Normal bowel sounds, soft, non-distended, non-tender  Skin: no redness, warmth, or swelling, no rashes and no lesions  Musculoskeletal: There is no redness, warmth, or swelling of the joints.    Neurologic: Awake, alert, + dementia. Cranial nerves II-XII are grossly intact.  Motor is 5 out of 5 bilaterally.    Neuropsychiatric:  Flat affect, poor memory, insight.  Pulses: Non-palpable pedal pulses bilaterally. No carotid bruits appreciated.     Data   Most Recent 3 CBC's:  Recent Labs   Lab Test 02/18/22  0838 11/23/21  0545 01/19/21  1001 10/12/20  0528   WBC  --  5.2 5.7 7.3   HGB 12.4 12.6 12.6 12.0   MCV  --  98 95 95   PLT  --  341 305 354     Most Recent 3 BMP's:  Recent Labs   Lab Test 02/18/22  0838 12/01/21  1337 11/23/21  0545 09/14/21  1135   NA  --  143 142 141   POTASSIUM 3.9 4.7 4.0 4.1   CHLORIDE  --  109 111* 110*   CO2  --  26 24 30   BUN  --  21 28 19   CR 0.56 0.67 0.71 0.80   ANIONGAP  --  8 7 1*   ATTILA  --  9.2 9.0 8.5   GLC 99 112* 125* 93     Most  Recent 3 INR's:  Recent Labs   Lab Test 10/12/20  0528   INR 1.13     Most Recent Cholesterol Panel:  Recent Labs   Lab Test 05/31/19  0944   CHOL 219*      HDL 87   TRIG 85     Most Recent Hemoglobin A1c:  Recent Labs   Lab Test 02/18/22  0838   A1C 5.4

## 2022-02-18 NOTE — ANESTHESIA PROCEDURE NOTES
Arterial Line Procedure Note    Pre-Procedure   Staff -        Anesthesiologist:  Ki Munoz DO       Performed By: anesthesiologist       Location: pre-op       Pre-Anesthestic Checklist: patient identified, IV checked, site marked, risks and benefits discussed, informed consent, monitors and equipment checked, pre-op evaluation and at physician/surgeon's request  Timeout:       Correct Patient: Yes        Correct Procedure: Yes        Correct Site: Yes        Correct Position: Yes   Line Placement:   This line was placed Post Induction  Procedure   Procedure: arterial line       Laterality: left       Insertion Site: brachial.  Sterile Prep        All elements of maximal sterile barrier technique followed       Skin prep: Chloraprep  Insertion/Injection        Technique: Seldinger Technique and ultrasound guided        1. Ultrasound was used to evaluate the access site.       2. Artery evaluated via ultrasound for patency/adequacy.       3. Using real-time ultrasound the needle/catheter was observed entering the artery/vein.       4. Permanent image was captured and entered into the patient's record.       5. The visualized structures were anatomically normal.       6. There were no apparent abnormal pathologic findings.       Catheter Type/Size: 20 gauge, 1.75 in/4.5 cm quick cath (integral wire)  Narrative        Tegaderm dressing used.       Complications: None apparent,        Arterial waveform: Yes        IBP within 10% of NIBP: Yes

## 2022-02-18 NOTE — ANESTHESIA PROCEDURE NOTES
Airway       Patient location during procedure: OR       Procedure Start/Stop Times: 2/18/2022 9:50 AM  Staff -        CRNA: Yoli Rg APRN CRNA       Performed By: CRNA  Consent for Airway        Urgency: elective  Indications and Patient Condition       Indications for airway management: yue-procedural       Induction type:intravenous       Mask difficulty assessment: 1 - vent by mask    Final Airway Details       Final airway type: endotracheal airway       Successful airway: ETT - single  Endotracheal Airway Details        ETT size (mm): 7.0       Cuffed: yes       Successful intubation technique: direct laryngoscopy       DL Blade Type: MAC 3       Grade View of Cords: 1       Adjucts: stylet       Position: Right       Measured from: gums/teeth       Secured at (cm): 22       Bite block used: None    Post intubation assessment        Placement verified by: capnometry, equal breath sounds and chest rise        Number of attempts at approach: 1       Number of other approaches attempted: 0       Secured with: pink tape       Ease of procedure: easy       Dentition: Unchanged

## 2022-02-18 NOTE — BRIEF OP NOTE
[facility]    Brief Operative Note    Pre-operative diagnosis: Atherosclerosis of native artery of right leg with rest pain (H) [I70.221]  Post-operative diagnosis Same as pre-operative diagnosis    Procedure: Procedure(s):  LEFT common ILIAC ARTERY STENTING with 8 X 39 stent, left femoral endarterectomy with bovine patch angioplasty, pelvic arteriogram, external iliac artery stenting with 7mm self expandning grafts X 2, left to right femoral to femoral bypass with 8mm PTFE graft  LEFT TO RIGHT FEMORAL-FEMORAL BYPASS  Surgeon: Surgeon(s) and Role:     * Ray Ramirez MD - Primary  Anesthesia: General   Estimated Blood Loss: 150cc     Drains: None  Specimens: * No specimens in log *  Findings:   Left femoral artery dissected out, controlled. Pelvic angiogram performed with placement of common and external iliac artery stenting. Arterectomy with dense plaque- endarterectomy performed. 8mm PTFE bypass used for left to right femoral to femoral bypass. Dopplerable PT and DP in R foot at end of case. .  Complications: None.  Implants:   Implant Name Type Inv. Item Serial No.  Lot No. LRB No. Used Action   GRAFT PROPATEN RR [id number] THIN WALL [id number] - [id number] Graft GRAFT PROPATEN RR [id number] THIN WALL [id number] [employer]  N/A 1 Implanted   VIABAHN VBX   [id number] [employer]  Left 1 Implanted   SFA AND ILIAC STENT     [id number] Left 1 Implanted   SFA AND ILIAC STENT     [id number] Left 1 Implanted   IMP PATCH PERICARDIUM PHOTOFIX BOVINE 0.8X8CM [id number] - [id number] Bone/Tissue/Biologic IMP PATCH PERICARDIUM PHOTOFIX BOVINE 0.8X8CM [id number] [id number] [employer] [id number] Left 1 Implanted       Amanda Olivo MD   PGY-4 General Surgery [facility] [place]  Vascular Surgery Service- [facility]

## 2022-02-18 NOTE — ANESTHESIA CARE TRANSFER NOTE
Patient: Kelly Nunez    Procedure: Procedure(s):  LEFT common ILIAC ARTERY STENTING with 8 X 39 stent, left femoral endarterectomy with bovine patch angioplasty, pelvic arteriogram, external iliac artery stenting with 7mm self expandning grafts X 2, left to right femoral to femoral bypass with 8mm PTFE graft  LEFT TO RIGHT FEMORAL-FEMORAL BYPASS       Diagnosis: Atherosclerosis of native artery of right leg with rest pain (H) [I70.221]  Diagnosis Additional Information: No value filed.    Anesthesia Type:   General     Note:    Oropharynx: oropharynx clear of all foreign objects and spontaneously breathing  Level of Consciousness: drowsy  Oxygen Supplementation: nasal cannula  Level of Supplemental Oxygen (L/min / FiO2): 3  Independent Airway: airway patency satisfactory and stable  Dentition: dentition unchanged  Vital Signs Stable: post-procedure vital signs reviewed and stable  Report to RN Given: handoff report given  Patient transferred to: PACU    Handoff Report: Identifed the Patient, Identified the Reponsible Provider, Reviewed the pertinent medical history, Discussed the surgical course, Reviewed Intra-OP anesthesia mangement and issues during anesthesia, Set expectations for post-procedure period and Allowed opportunity for questions and acknowledgement of understanding      Vitals:  Vitals Value Taken Time   BP 97/50    Temp 99.5    Pulse 75    Resp 16    SpO2 97%        Electronically Signed By: MARYCHUY Quiñones CRNA  February 18, 2022  3:29 PM

## 2022-02-19 NOTE — PROGRESS NOTES
VSS. On RA. A/O  To self only. Does not follow commands, has not yet sipped water. PIV infusing. Not yet OOB. Pulses auscultated and palpable. Incision site dressing CDI. Cabral patent, output is clear yellow. Continue to monitor.

## 2022-02-19 NOTE — PROGRESS NOTES
02/19/22 1035   Quick Adds   Type of Visit Initial PT Evaluation   Living Environment   People in Home spouse   Current Living Arrangements house   Home Accessibility stairs within home;stairs to enter home   Number of Stairs, Main Entrance 3   Stair Railings, Main Entrance none   Number of Stairs, Within Home, Primary ten   Stair Railings, Within Home, Primary railing on left side (ascending)   Transportation Anticipated family or friend will provide   Self-Care   Usual Activity Tolerance good   Current Activity Tolerance fair   Equipment Currently Used at Home none   Fall history within last six months yes   Number of times patient has fallen within last six months 1   Activity/Exercise/Self-Care Comment History obtained from spouse via phone call. Pt needs help on and off toilet, needs help with dressing from spouse. long term care, week days [facility] 12 hours a day  from 9-6pm. Pt was ambulating without AD,  handhold on the stairs.    General Information   Onset of Illness/Injury or Date of Surgery 02/18/22   Referring Physician Amanda Olivo MD   Patient/Family Therapy Goals Statement (PT) Would be open to going home if pt is moving at baseline.    Pertinent History of Current Problem (include personal factors and/or comorbidities that impact the POC)  Pt is a 71 y.o. female admitted on 2/18/22 after vascular intervention. Pt underwent pelvis arteriorgram, stenting of L common iliac artery, and L external artery and L to R femoral to femoral bypass surgery on 2/18/22. Pt is POD#1. PMHx significant for severe dementia, COPD, HLD, HTN, pacemaker placement (2020).    Existing Precautions/Restrictions fall   Cognition   Affect/Mental Status (Cognition) confused   Orientation Status (Cognition) oriented to;person   Cognitive Status Comments baseline severe dementia   Pain Assessment   Patient Currently in Pain No   Integumentary/Edema   Integumentary/Edema Comments Incision sites not  observed.    Posture    Posture Forward head position;Protracted shoulders   Range of Motion (ROM)   ROM Comment Grossly WFL BUE and LE   Strength (Manual Muscle Testing)   Strength (Manual Muscle Testing) Able to perform R SLR;Able to perform L SLR   Strength Comments Grossly antigravity strength with functional transfers.    Bed Mobility   Impairments Contributing to Impaired Bed Mobility other (see comments)  (impaired cognition)   Comment, (Bed Mobility) Supine HOB flat<>sitting EOB, Aniceto, increased time.    Transfers   Comment, (Transfers) Sit<>stand to FWW, CGA.    Gait/Stairs (Locomotion)   Distance in Feet (Required for LE Total Joints) 5' eval + 150' treatment   Deviations/Abnormal Patterns (Gait) base of support, wide   Comment, (Gait/Stairs) Amb in hallway, FWW, Adalid.    Balance   Balance Comments Able to maintain seated balance BUE support. Able to maintain standing balance, CGA in FWW.    Sensory Examination   Sensory Perception patient reports no sensory changes   Clinical Impression   Criteria for Skilled Therapeutic Intervention Yes, treatment indicated   PT Diagnosis (PT) Impaired gait   Influenced by the following impairments pain, decreased functional strength, decreased activity tolerance   Functional limitations due to impairments pain, impaired ADLs and IADLs, cognition, fall risk, impaired functional independence   Clinical Presentation (PT Evaluation Complexity) Stable/Uncomplicated   Clinical Presentation Rationale per MR and clinical judgement.    Clinical Decision Making (Complexity) low complexity   Planned Therapy Interventions (PT) balance training;bed mobility training;gait training;home exercise program;ROM (range of motion);strengthening;stair training;patient/family education;transfer training   Anticipated Equipment Needs at Discharge (PT) walker, rolling   Risk & Benefits of therapy have been explained evaluation/treatment results reviewed;care plan/treatment goals  reviewed;risks/benefits reviewed;current/potential barriers reviewed;participants voiced agreement with care plan;participants included;patient   PT Discharge Planning   PT Discharge Recommendation (DC Rec) home with home care physical therapy;home with assist;Leaving home requires significant assistance;Leaving home requires significant taxing effort   PT Rationale for DC Rec Pt appears to be close to baseline functional mobility, anticipate when PT goals are met and medically ready, pt may be able to discharge home with spouse and services. Pt has 24H supervision at home. Pt may benefit from continued home PT to progress functional strength.    PT Brief overview of current status Amb with Adalid KAUR.    Plan of Care Review   Plan of Care Reviewed With patient;spouse   Total Evaluation Time   Total Evaluation Time (Minutes) 3   Physical Therapy Goals   PT Frequency Daily   PT Predicated Duration/Target Date for Goal Attainment 02/26/22   PT Goals Bed Mobility;Transfers;Gait;Stairs   PT: Bed Mobility Supine to/from sit;Rolling;Bridging;Supervision/stand-by assist   PT: Transfers Bed to/from chair;Sit to/from stand;Supervision/stand-by assist   PT: Gait Greater than 200 feet;Supervision/stand-by assist;Rolling walker  (least restrictive device)   PT: Stairs 3 stairs;Minimal assist

## 2022-02-19 NOTE — PROGRESS NOTES
"   02/19/22 1600   Quick Adds   Type of Visit Initial Occupational Therapy Evaluation   Living Environment   People in Home spouse   Current Living Arrangements house   Home Accessibility stairs within home;stairs to enter home   Number of Stairs, Main Entrance 3   Stair Railings, Main Entrance none   Number of Stairs, Within Home, Primary ten   Stair Railings, Within Home, Primary railing on left side (ascending)   Transportation Anticipated family or friend will provide   Living Environment Comments Pt lives with  in rambler style home. Has daughter and PCA that visits often and assist with needed care. There is a downstairs, however pt rarely goes down. Pt has needed equipment for safety and  feels confident in their setup at home.    Self-Care   Usual Activity Tolerance good   Current Activity Tolerance fair   Equipment Currently Used at Home shower chair   Fall history within last six months yes   Number of times patient has fallen within last six months 1   Activity/Exercise/Self-Care Comment History obtained from spouse (Kendall). Kendall present in room. States that pt is near baseline. Pt does not amb with FWW, and receives assistance in all ADLs. Does not perform IADLs.   Instrumental Activities of Daily Living (IADL)   IADL Comments Pt receives total A for IADLs   General Information   Onset of Illness/Injury or Date of Surgery 02/18/22  (order date)   Referring Physician Ray Ramirez MD   Patient/Family Therapy Goal Statement (OT) To return home    Additional Occupational Profile Info/Pertinent History of Current Problem per chart \"Pt is a 71 y.o. female admitted on 2/18/22 after vascular intervention. Pt underwent pelvis arteriorgram, stenting of L common iliac artery, and L external artery and L to R femoral to femoral bypass surgery on 2/18/22. Pt is POD#1. PMHx significant for severe dementia, COPD, HLD, HTN, pacemaker placement (2020)\"   Existing Precautions/Restrictions fall  (Severe " dementia)   Limitations/Impairments safety/cognitive  (Severe dementia)   General Observations and Info Pt very pleasant. VSS on RA. Significant dementia present. Pt with purewick in place. Follows one step commands. Benefits from short statements to communicate.   Cognitive Status Examination   Orientation Status person   Pain Assessment   Patient Currently in Pain No   Posture   Posture forward head position;protracted shoulders   Range of Motion Comprehensive   General Range of Motion bilateral upper extremity ROM WFL   Strength Comprehensive (MMT)   Comment, General Manual Muscle Testing (MMT) Assessment Cannot follow commands necessary for muscle testing at this eval   Coordination   Coordination Comments Difficult to assess. Able to complete necessary transfers with FWW.   Bed Mobility   Comment (Bed Mobility) Total A from  d/t cognitive deficits   Transfers   Transfers sit-stand transfer;toilet transfer   Sit-Stand Transfer   Sit-Stand Cis[place] (Transfers) contact guard;verbal cues   Assistive Device (Sit-Stand Transfers) walker, front-wheeled   Toilet Transfer   Type (Toilet Transfer) sit-stand   Cisne Level (Toilet Transfer) contact guard;verbal cues   Assistive Device (Toilet Transfer) walker, front-wheeled;grab bars/safety frame   Toilet Transfer Comments Multiple commands   Balance   Balance Comments Balance deficits likely d/t swelling in feet.  notes that it looks like her balance is improving. Loks near baseline   Activities of Daily Living   BADL Assessment/Intervention other (see comments)  ( states she is near baseline for assist levels)   Clinical Impression   Criteria for Skilled Therapeutic Interventions Met (OT) Yes, treatment indicated   OT Diagnosis Decreased independence   Influenced by the following impairments s/p surgery, dementia symptoms   OT Problem List-Impairments impacting ADL problems related to;activity tolerance impaired   ADL comments/analysis Pt  appears near baseline. Pt's  confirms this is about the assist level she needs for daily tasks. Himself, PCA, and  (Mali) are active in her care.   Assessment of Occupational Performance 1-3 Performance Deficits   Identified Performance Deficits functional ambulation, participation in ADLs   Planned Therapy Interventions (OT) ADL retraining;progressive activity/exercise   Intervention Comments Pt would benefit from eval and treat, with additional treatment session to ensure safe d/c plan   Clinical Decision Making Complexity (OT) low complexity   Risk & Benefits of therapy have been explained evaluation/treatment results reviewed;participants voiced agreement with care plan   Clinical Impression Comments Pt is a very pleasant 71 year old female with significant dementia symptoms. Family is very involved in her care and are willing to give her assist as needed .She currently has 24/7 assist and assist with ADLs. Pt does not use AE, however pt presents with balance deficits.  would like pt to return home.    OT Discharge Planning   OT Discharge Recommendation (DC Rec) home with assist   OT Rationale for DC Rec OT anticipates pt is near baseline and family is able to provide assist as needed. Family has 24/7 supervision set up that is sustainable. Family is very involved in care and are confident in their set up. Family has all needed equipment. Pt is not expected to improve dementia symptoms with further OT treatment.     OT Brief overview of current status Overall min A with amb, has needed assistance at home. [place] () very involved in care.   Total Evaluation Time (Minutes)   Total Evaluation Time (Minutes) 8   OT Goals   Therapy Frequency (OT) One time eval and treatment   OT Predicated Duration/Target Date for Goal Attainment 02/20/22   OT Goals Hygiene/Grooming;Toilet Transfer/Toileting;Bed Mobility;Transfers   OT: Hygiene/Grooming minimal assist   OT: Bed Mobility Maximum assist    OT: Transfer Minimal assist   OT: Toilet Transfer/Toileting Minimal assist

## 2022-02-19 NOTE — PROGRESS NOTES
[facility]    Hospitalist Progress Note      Assessment & Plan   Kelly Nunez is a 71 year old female who was admitted on 2/18/2022 after vascular surgery intervention. Vascular medicine team was consulted for vascular risk factors and to assist with medical co-management, hospitalist service has been consulted for overnight cross cover and weekend coverage.    Critical limb ischemia of right foot rest pain s/p left iliac artery stenting and left to right femoral-femoral bypass 2/18/22  Acute blood loss anemia secondary to above  Post-operative cares per vascular surgery. she was on aspirin 81 mg daily as an outpatient. Hgb 12.4 on 2/18 down to 9.8 on 2/19  - iron daily started per primary team, if not tolerated from GI standpoint, can switch to every other day dosing  - hgb check in AM  - crawford removed 2/19     Hyperlipidemia  Her lipids have historically been elevated but she is not on a statin with her severe dementia. Given her dementia and desire of family to no be overly aggressive, no plans for statin initiation.     Hypertension  PTA Metoprolol 50 mg BID and Diltiazem 180mg daily continued     Severe dementia  Oriented to self only.  Her  is very involved in her care and speaks on her behalf.   - Continue Namenda.     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 She is not on anticoagulation. Cardiology is following as an outpatient and would consider anticoagulation if her pacemaker revealed high burden of atrial fibrillation.      COPD with history of tobacco use  Continue prior to admission nebulizers as needed.    Clinically Significant Risk Factors Present on Admission                 DVT Prophylaxis: Defer to primary service  Code Status: Full Code  Expected Discharge: 02/21/2022     Anticipated discharge location: home with family    Delays:     await stable from primary  standpoint      Claribel Lay DO  Hospitalist Service  [facility]  Securely message with the [employer] Web Console (learn more here)  Text Page (7am - 6pm) via [facility] Paging/Directory      Interval History   Patient seen and examined. She is doing well. Has no complaints. Tolerated breakfast. Has purewick in place after removal of crawford. Denies pain even in legs. Looks comfortable  Spent 25 minutes reviewing chart for first time, evaluating patient    -Data reviewed today: I reviewed all new labs and imaging results over the last 24 hours. I personally reviewed no images or EKG's today.    Physical Exam   Temp: (!) 96.4  F (35.8  C) Temp src: Oral BP: 114/46 Pulse: 84   Resp: 16 SpO2: 91 % O2 Device: Nasal cannula Oxygen Delivery: 1.5 LPM  Vitals:    02/18/22 0811   Weight: 55.8 kg (123 lb)     Vital Signs with Ranges  Temp:  [96.4  F (35.8  C)-99.5  F (37.5  C)] 96.4  F (35.8  C)  Pulse:  [63-84] 84  Resp:  [10-28] 16  BP: ()/(44-64) 114/46  SpO2:  [91 %-99 %] 91 %  I/O last 3 completed shifts:  In: 1600 [I.V.:1600]  Out: 2225 [Urine:2075; Blood:150]    Constitutional: Awake, alert, cooperative, no apparent distress  Respiratory: Clear to auscultation bilaterally, no crackles or wheezing  Cardiovascular: Regular rate and rhythm, normal S1 and S2, and no murmur noted  GI: Normal bowel sounds, soft, non-distended, non-tender  Skin/Integumen: No rashes, no cyanosis, no edema. Dressings at bilateral groin intact.  Other:     Medications     lactated ringers       sodium chloride Stopped (02/19/22 0838)       acetaminophen  975 mg Oral Q8H     aspirin  81 mg Oral QPM     clindamycin  900 mg Intravenous Q8H     diltiazem ER COATED BEADS  180 mg Oral Daily     ferrous sulfate  325 mg Oral Daily     ketorolac  10 mg Intravenous Q6H     memantine  10 mg Oral BID     metoprolol tartrate  50 mg Oral BID     OLANZapine  10 mg Oral At Bedtime     polyethylene glycol  17 g Oral Daily      senna-docusate  1 tablet Oral BID     sodium chloride (PF)  3 mL Intracatheter Q8H     traZODone   mg Oral At Bedtime       Data   Recent Labs   Lab 02/19/22  0713 02/18/22  0838   WBC 9.4  --    HGB 9.8* 12.4   MCV 96  --      --    INR 1.11  --    POTASSIUM 4.0 3.9   CR  --  0.56   * 99       Recent Results (from the past 24 hour(s))   IR OR Angiogram    Narrative    IR OR ANGIOGRAM 2/18/2022 12:19 PM    PREOPERATIVE DIAGNOSIS: Right leg rest pain.    POSTOPERATIVE DIAGNOSIS: Same    PROCEDURES PERFORMED:  1. Pelvic arteriogram.  2. Stenting of left common iliac artery utilizing a VBX 8 x 39 mm  stent.  3. Stenting of the entire left external iliac artery utilizing  self-expanding 7 x 80 and 7 x 60 mm Smart stents.   4. Limited left femoral endarterectomy with bovine pericardial patch  angioplasty.  5. Left to right femoral to femoral bypass using a ringed 8 mm PTFE  graft.    Surgeon: Denny Ramirez    Assistant: Dr. Kylah Olivo    Anesthesia: General endotracheal anesthesia    Estimated blood loss: 150 mL    OPERATIVE: This patient is a 71-year-old woman with profound dementia  who presents at this time with right foot rest pain and an FROYLAN of 0.2.  CT angiography has demonstrated occlusion of the right common and  external iliac arteries with fairly preserved right-sided  infrainguinal runoff. On the left side she has moderate to severe  disease of her iliac system. She is not a candidate for an open aortic  reconstruction. My plan for today will be stenting of the donor left  iliac system followed by a left to right femoral-femoral bypass.    DESCRIPTION OF TECHNIQUE: After informed consent was obtained from her   the patient was brought to the hybrid suite and placed on the  table in a supine position. General endotracheal anesthesia was  achieved without incident. Her lower abdomen and bilateral upper  thighs were prepped and draped in the usual sterile fashion. We began  with  bilateral open exposure of the femoral arteries. I will dictate  the specifics of that separately on the hospital dictation line.  Following exposure of the femoral arteries the patient was  systemically heparinized with 5000 units of intravenous heparin. The  left common femoral artery was directly accessed using an 18-gauge  needle and a guidewire and 6 Turkish sheath were placed. A Bragg wire  was advanced up into the aorta and a marker pigtail was advanced over  this into the distal abdominal aorta. A right anterior oblique pelvic  arteriogram was performed. I chose to stent the proximal and mid left  common iliac artery using a VBX 8 x 39 mm stent. This was deployed and  post stent imaging was performed through the pigtail catheter.  Following this I extended all the way down to the circumflex vessels  stenting the distal left common iliac and the entire left external  iliac artery with a 7 x 80 and a 7 x 60 mm self-expanding stent. I  ensured adequate overlap. Post stent angioplasty was performed with a  7 mm balloon. Post stent imaging was performed via injections through  the pigtail catheter. The pigtail catheter and sheath were  subsequently removed and we proceeded with a left common femoral  endarterectomy and a left to right femoral-femoral bypass. Again, I  will dictate the specifics of that separately on the hospital  dictation line.  Total fluoroscopy time was 5.8 minutes. We used 26 cc  of Isovue. Total air kerma 52.8 mGy.    FINDINGS: Moderate disease of the visualized portion of the distal  abdominal aorta. The previously stented right common iliac artery is  occluded. The right internal iliac and external iliac arteries are  also occluded. The left common iliac artery has moderate disease  proximally and in the central portion of this vessel. The left  hypogastric artery is diseased yet patent. The entire left external  iliac artery is patent albeit somewhat small in caliber and diseased.  There  is a large inferior mesenteric artery which gives off a very  large meandering mesenteric artery. Following stenting of the left  common iliac artery with an 8 x 39 mm balloon-expandable stent and  stenting of the entire left external iliac artery with 7 mm  self-expanding stents there was an excellent technical result now with  a widely patent left iliac system. The left hypogastric artery remains  patent. Flow to the left inguinal ligament has essentially been  maximized from an endovascular standpoint.      Impression    IMPRESSION:  Successful stenting of the donor left common iliac and external iliac  arteries using an 8 x 39 mm balloon-expandable stent and two 7 mm  self-expanding stents with an excellent technical result. There is a  significantly improved pulse now in the left common femoral artery  which served as the donor vessel for the left to right femoral-femoral  bypass.      KEN HERNANDEZ MD         SYSTEM ID:  [id number]

## 2022-02-19 NOTE — PROGRESS NOTES
Vascular Surgery Progress Note:  POD#1    S: Very comfortable this morning.  No complaints. (Baseline dementia)    O:   Vitals:  BP  Min: 82/47  Max: 133/64  Temp  Av.5  F (36.4  C)  Min: 96.4  F (35.8  C)  Max: 99.5  F (37.5  C)  Pulse  Av.3  Min: 63  Max: 84  I/O last 3 completed shifts:  In: 1600 [I.V.:1600]  Out: 2225 [Urine:2075; Blood:150]    Physical Exam: Sitting up in bed.  Conversant.  Comfortable.    Dressings intact to both groins.  Feet are warm and pink.    Positive Doppler to femoral-femoral graft     Laboratory: K= 4.0   Hemoglobin= 9.8  (preop= 12.4)      Assessment/Plan: #1.  Doing well following stenting of left common iliac/external leg artery with left femoral to right femoral PTFE bypass graft.  Adequate distal flow.  On aspirin.     #2.  Mild asymptomatic acute blood loss anemia.  Initiate iron     #3.  Very good urine output.  Remove Cabral catheter.       #4.  Baseline dementia.  Social service to evaluate for plan when discharged in several days.  Does live with her .  Able speak with him.              Wm. Mariusz MD

## 2022-02-19 NOTE — PLAN OF CARE
Patient is alert to self only (dementia). VSS on 1.5 LPM O2. Patent takes medications crushed in pudding. Pedal pulses with doppler. Groin incision dressings are CDI. Cabral is patent and is putting out clear yellow urine. Turn and repo q2hr. Continue to monitor.

## 2022-02-19 NOTE — ANESTHESIA POSTPROCEDURE EVALUATION
Patient: Kelly Nunez    Procedure: Procedure(s):  LEFT common ILIAC ARTERY STENTING with 8 X 39 stent, left femoral endarterectomy with bovine patch angioplasty, pelvic arteriogram, external iliac artery stenting with 7mm self expandning grafts X 2, left to right femoral to femoral bypass with 8mm PTFE graft  LEFT TO RIGHT FEMORAL-FEMORAL BYPASS       Diagnosis:Atherosclerosis of native artery of right leg with rest pain (H) [I70.221]  Diagnosis Additional Information: No value filed.    Anesthesia Type:  General    Note:  Disposition: Inpatient   Postop Pain Control: Uneventful            Sign Out: Well controlled pain   PONV: No   Neuro/Psych: Uneventful            Sign Out: Acceptable/Baseline neuro status   Airway/Respiratory: Uneventful            Sign Out: Acceptable/Baseline resp. status   CV/Hemodynamics: Uneventful            Sign Out: Acceptable CV status; No obvious hypovolemia; No obvious fluid overload   Other NRE: NONE   DID A NON-ROUTINE EVENT OCCUR? No           Last vitals:  Vitals Value Taken Time   BP 97/55 02/18/22 1630   Temp 36.4  C (97.6  F) 02/18/22 1617   Pulse 69 02/18/22 1633   Resp 11 02/18/22 1633   SpO2 96 % 02/18/22 1633   Vitals shown include unvalidated device data.    Electronically Signed By: Ki Munoz DO  February 18, 2022  6:05 PM

## 2022-02-19 NOTE — OP NOTE
Procedure Date: 02/18/2022    PREOPERATIVE DIAGNOSIS:  Right leg rest pain.    POSTOPERATIVE DIAGNOSIS:  Right leg rest pain.    PROCEDURE PERFORMED:    1.  Pelvic arteriogram.  2.  Stenting of left common iliac artery using an 8 x 39 mm balloon expandable stent.  3.  Stenting of the entire left external iliac artery using two 7 mm self-expanding stents.  4.  Limited left femoral endarterectomy with bovine pericardial patch angioplasty.  5.  Left to right femoral to femoral bypass using 8 mm ringed PTFE graft.    SURGEON:  Denny Ramirez MD    ASSISTANT:  Amanda Olivo MD ([id number]).  She is a [facility] surgery resident.    ANESTHESIA:  General endotracheal anesthesia.    ESTIMATED BLOOD LOSS:  150 mL    INDICATIONS FOR PROCEDURE:  This patient is a 71-year-old female with rather profound dementia, who presents at this time with right foot rest pain.  Resting FROYLAN is 0.21.  CT angiography demonstrates occlusion of her right common and external iliac arteries.  She has a moderately diseased yet patent left iliac system.      She is not a candidate for open aortic reconstruction.  My plan for today will be stenting of the donor left iliac system followed by a left to right femoral-femoral bypass.    OPERATIVE FINDINGS:  Following successful stenting of the left common and external iliac arteries.  We had an excellent technical result with significantly improved inflow to the left groin.  There was a moderate posterior plaque in the donor left common femoral artery and ultimately I did perform a very limited left femoral endarterectomy at the site of the anastomosis.  I placed a 3 cm long bovine pericardial patch at this level and then made a slit in the patch to come off that with the 8 mm ringed PTFE graft.  In like manner, there was a fairly significant posterior tongue of plaque extending down the right common femoral artery to the femoral bifurcation.  I chose not to perform formal endarterectomy of that structure  and simply accepted that disease.  At the completion of this procedure, this patient did have reasonable quality Doppler signals in her right dorsalis pedis and posterior tibial arteries - significantly improved from her preoperative baseline.    DESCRIPTION OF PROCEDURE:  After informed consent was obtained from her .  She was brought to the hybrid suite and placed on the table in a supine position.  General endotracheal anesthesia was achieved without incident.  A Cabral catheter was placed.  Her lower abdomen and bilateral upper thighs were prepped and draped in the usual sterile fashion.  Timeout was called and we verified the patient's identity, the operative site, and the proposed procedure.      An oblique incision was made centered over the left common femoral artery.  Dissection proceeded sharply downward to expose that vessel at the level of the inguinal ligament.  Dissection continued distally to the femoral bifurcation and the profunda and superficial femoral arteries were encircled with vessel loops.  Next, a small oblique incision was made centered over the right common femoral artery.  Dissection proceeded sharply downward to expose that vessel including the origins of the superficial femoral and profunda femoral arteries.  A subcutaneous tunnel was then created between these two groin incisions forming a gentle C-shaped arc in the lower abdomen.  Care was taken to keep this tunnel anterior to the rectus fascia.  An 8 mm ringed PTFE graft was drawn through the tunnel.  At this point, we proceeded with the endovascular portion of the procedure.  Ultimately, I stented the left common iliac and the entire left external iliac artery.  The specifics of that have been dictated separately on the interventional radiologic dictation line.      Following successful stenting of the donor left iliac system, proximal and distal control was achieved on the left common femoral artery.  An arteriotomy was made  and extended vertically for 15 mm.  At this point, I checked inflow and I was not fully satisfied with the quality of the inflow.  I placed a #4 Molina catheter up into the external iliac artery and inflated it.  I extended the arteriotomy for a distance of 3 cm and actually performed a limited femoral endarterectomy of a thick plaque at the level of the circumflex vessel.  I did not want to follow the plaque down into the femoral bifurcation.  The proximal and distal taper points were tacked down with interrupted 7-0 Prolene sutures.  I utilized a bovine pericardial patch and secured this circumferentially around the arteriotomy using running 6-0 Prolene suture.  This was subsequently secured and observed to be hemostatic as flow was briefly restored back down the left leg.      Next, we reestablished proximal and distal control on the left common femoral artery and I made a 12 mm arteriotomy on the patched portion of the mid left common femoral artery.  Our PTFE graft was cut to length in an appropriately spatulated manner.  I proceeded with an end-to-side PTFE graft to patched left common femoral artery anastomosis using running 5-0 Prolene suture.  This anastomosis was subsequently secured and observed to be hemostatic.  Good quality, flow was noted out the PTFE graft in the right groin.  Heparinized saline was instilled down the graft and it was occluded at its origin.  Proximal and distal control was achieved on the right common femoral artery.  A 12 mm arteriotomy was made.  Our PTFE graft was cut to length in an appropriately spatulated manner.  Great care was taken to avoid any twisting or kinking of the graft at either anastomosis.  We proceeded with an end-to-side PTFE graft to mid right common femoral artery anastomosis using running 5-0 Prolene suture.  Prior to securing that suture line, all clamps were briefly released to flush any debris out of the PTFE graft.  The anastomosis was subsequently  secured and flow was directed down the right leg.  Immediately noted was a palpable pulse in the right superficial femoral artery distal to the fem-fem bypass.  I chose to accept this.      Both groins were infiltrated with a total of 30 mL of 0.25% Marcaine with epinephrine.  Hemostasis for both wounds was assured.  Both wounds were then meticulously closed in layers using interrupted absorbable suture and skin was closed with 4-0 Monocryl running subcuticular stitches.  Steri-Strips and sterile dressings were applied.  Final sponge and needle count were reported as correct.      She tolerated the procedure without incident.  She was returned extubated and hemodynamically stable to the recovery room.  In the recovery room she is noted to have strong monophasic to nearly biphasic right dorsalis pedis and posterior tibial Doppler signals with good quality biphasic left dorsalis pedis and posterior tibial Doppler signals.  Right foot is warm and pink.    Ray Ramirez MD        D: 2022   T: 2022   MT: [id number]    Name:     KIM JORDAN  MRN:      [id number]        Account:        [id number]   :      1950           Procedure Date: 2022     Document: [id number]

## 2022-02-19 NOTE — PLAN OF CARE
Goal Outcome Evaluation:    Pt A and OX1, to self only. Pleasant and cooperative. Up amb with SBA, walker and gait belt. Bilateral groin dressings CD&I.  DP/PT pulses with doppler. Denies nb/T.  Tylenol and toradol for pain.  Tolerating regular diet.  Cabral discontinued at 0900, incont of urine, voided once I diaper.     Plan of Care Reviewed With: patient, spouse

## 2022-02-20 NOTE — PLAN OF CARE
[id number]. Patient is disoriented X4, VSS on RA. patinet is up with Ax1 and gait belt/walker. Right and left groin incision dressings have scant dried drainage. Patient was bladder scanned at 2330 for 521 mls and then straight cathed 500 mls. Purewick was put back in place. Meds were taken in apple sauce. tolerating regular diet.

## 2022-02-20 NOTE — PROGRESS NOTES
"Vascular Surgery Progress Note:  POD#1    S: KAJAL. Continues to report pain in her feet. Did not walk yesterday.    O:   /85 (BP Location: Right arm)   Pulse 82   Temp 98.4  F (36.9  C) (Oral)   Resp 16   Ht 1.549 m (5' 1\")   Wt 55.8 kg (123 lb)   SpO2 91%   BMI 23.24 kg/m     I/O last 3 completed shifts:  In: 1120 [P.O.:1120]  Out: 700 [Urine:700]    Physical Exam: Sitting in chair eating breakfast.  Conversant.  Comfortable.    Dressings intact to both groins, removed, steristrips in place, expected bruising in the groin.  Feet are warm and pink.    Biphasic DP/PT bilaterally    Labs reviewed. Hgb stable.      Assessment/Plan: #1.  Doing well following stenting of left common iliac/external leg artery with left femoral to right femoral PTFE bypass graft.  Adequate distal flow.  On aspirin.     #2.  Mild asymptomatic acute blood loss anemia.  Continue iron     #3.  Baseline dementia.  Social service to evaluate for plan when discharged in several days.  Does live with her .    Kemi Moore MD    "

## 2022-02-20 NOTE — PROGRESS NOTES
[facility]    Hospitalist Progress Note      Assessment & Plan   Kelly Nunez is a 71 year old female who was admitted on 2/18/2022 after vascular surgery intervention. Vascular medicine team was consulted for vascular risk factors and to assist with medical co-management, hospitalist service has been consulted for overnight cross cover and weekend coverage.    Critical limb ischemia of right foot rest pain s/p left iliac artery stenting and left to right femoral-femoral bypass 2/18/22  Acute blood loss anemia secondary to above  Post-operative cares per vascular surgery. she was on aspirin 81 mg daily as an outpatient. Hgb 12.4 on 2/18 down to 9.8 on 2/19  - iron daily started per primary team, switch to every other day dosing due to GI disturbance on 2/20  - hgb 9.5 on 2/20  - recommended home with home care PT     Constipation  Urine retention  Nausea  - one time 500ml IVF on 2/20  - continue senna-docusate BID and miralax daily  - give dulcolax suppository  - prn anti-emetics  - had BM late morning 2/20, monitor for further improvement. Consideration for KUB if worsening abdominal discomfort.    Hyperlipidemia  Her lipids have historically been elevated but she is not on a statin with her severe dementia. Given her dementia and desire of family to no be overly aggressive, no plans for statin initiation.     Hypertension  PTA Metoprolol 50 mg BID and Diltiazem 180mg daily continued     Severe dementia  Oriented to self only.  Her  is very involved in her care and speaks on her behalf.   - Continue Namenda.     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 She is not on anticoagulation. Cardiology is following as an outpatient and would consider anticoagulation if her pacemaker revealed high burden of atrial fibrillation.      COPD with history of tobacco use  Continue prior to admission  nebulizers as needed.    Clinically Significant Risk Factors Present on Admission                 DVT Prophylaxis: Defer to primary service  Code Status: Full Code  Expected Discharge: 02/23/2022     Anticipated discharge location: home with family    Delays:     Placement - TCU   await stable from primary standpoint      Claribel Lay DO  Hospitalist Service  [facility]  Securely message with the Vocera Web Console (learn more here)  Text Page (7am - 6pm) via [employer] Paging/Directory      Interval History   Patient seen and examined. She is not doing well today. Has nausea, belly hurts. She has not had a BM since 2/16 and usually goes daily. She is not taking in much orally. She is more lethargic today, but also had a rough night last night and required ativan in evening after  left  Spent 25 minutes reviewing chart, evaluating patient, discussing care plan with  and RN    **vascular medicine will resume care on 2/21 in place of hospitalist service, we will be available for overnight cross cover**    -Data reviewed today: I reviewed all new labs and imaging results over the last 24 hours. I personally reviewed no images or EKG's today.    Physical Exam   Temp: 98.4  F (36.9  C) Temp src: Oral BP: 122/85 Pulse: 82   Resp: 16 SpO2: 91 % O2 Device: None (Room air)    Vitals:    02/18/22 0811   Weight: 55.8 kg (123 lb)     Vital Signs with Ranges  Temp:  [95.9  F (35.5  C)-98.4  F (36.9  C)] 98.4  F (36.9  C)  Pulse:  [63-82] 82  Resp:  [16] 16  BP: ()/(45-85) 122/85  SpO2:  [84 %-97 %] 91 %  I/O last 3 completed shifts:  In: 1120 [P.O.:1120]  Out: 700 [Urine:700]    Constitutional: Drowsy, cooperative, no apparent distress  Respiratory: Clear to auscultation bilaterally, no crackles or wheezing  Cardiovascular: Regular rate and rhythm, normal S1 and S2, and no murmur noted  GI: Normal bowel sounds, soft, non-distended, non-tender  Skin/Integumen: No rashes, no cyanosis, no  edema. Dressings at bilateral groin intact.  Other:     Medications     lactated ringers       sodium chloride Stopped (02/19/22 0838)       acetaminophen  975 mg Oral Q8H     aspirin  81 mg Oral QPM     diltiazem ER COATED BEADS  180 mg Oral Daily     [START ON 2/22/2022] ferrous sulfate  325 mg Oral Every Other Day     ketorolac  10 mg Intravenous Q6H     lactated ringers  500 mL Intravenous Once     memantine  10 mg Oral BID     metoprolol tartrate  50 mg Oral BID     OLANZapine  10 mg Oral At Bedtime     polyethylene glycol  17 g Oral Daily     senna-docusate  1 tablet Oral BID     sodium chloride (PF)  3 mL Intracatheter Q8H     traZODone   mg Oral At Bedtime       Data   Recent Labs   Lab 02/20/22  0730 02/19/22 2226 02/19/22  0713 02/18/22  0838   WBC 5.0  --  9.4  --    HGB 9.5*  --  9.8* 12.4   MCV 93  --  96  --      --  249  --    INR  --   --  1.11  --      --   --   --    POTASSIUM 3.4  --  4.0 3.9   CHLORIDE 111*  --   --   --    CO2 28  --   --   --    BUN 18  --   --   --    CR 0.61  --   --  0.56   ANIONGAP 4  --   --   --    ATTILA 8.1*  --   --   --    GLC 87  87 162* 135* 99       No results found for this or any previous visit (from the past 24 hour(s)).

## 2022-02-20 NOTE — PLAN OF CARE
Goal Outcome Evaluation:    Pt confused, OX1, self only.  Agitated at times getting OOB.  VSS.  Denies pain.  Bilateral groin dressings CD&I.  Bilateral PT/DP with doppler.  Denies nb/t.  Incontinent of BM times 1.  Unable to void.  Bladder scanned for 400 at 1400.  Straight cathed for 600 at 1500.  Poor appetite, not wanting to drink.  Encourage increased po intake.  Up ambulating with SBA, gait belt and walker.     Plan of Care Reviewed With: patient, spouse

## 2022-02-20 NOTE — PLAN OF CARE
POD 1 lft iliac artery stenting. Pt. VSS, on RA, Disoriented x4. 2 bilateral groin dressings with scant dried serosanguinous drainage.  Pt incontinent, purewick in place, refused bladder scan. PAINAD pain scale scored 0, scheduled tylenol and toradol given.  CMS intact, up with 1 assist Gb/walker.  Pt ambulated in schroeder this shift. Meds crushed and given in applesauce. Tolerating regular diet.

## 2022-02-21 NOTE — PLAN OF CARE
Pt A&O to self. Up SBA. Ambulated in hallway with staff using walker. Voiding spontaneously in BR. VSS on 2LPM. CMS intact, pulses identified with doppler. Patient denies pain for this writer. BG checks stable. Tolerating regular appetite, good appetite. Discharge tomorrow AM pending homecare services set up.

## 2022-02-21 NOTE — CONSULTS
Care Management Initial Consult    General Information  Assessment completed with: Spouse or significant other, Kendall  Type of CM/SW Visit: Initial Assessment    Primary Care Provider verified and updated as needed: Yes   Readmission within the last 30 days: no previous admission in last 30 days      Reason for Consult: discharge planning  Advance Care Planning: Advance Care Planning Reviewed: present on chart          Communication Assessment  Patient's communication style: spoken language (English or Bilingual)    Hearing Difficulty or Deaf: no   Wear Glasses or Blind: no    Cognitive  Cognitive/Neuro/Behavioral: .WDL except  Level of Consciousness: alert, confused  Arousal Level: opens eyes spontaneously  Orientation: disoriented to, place, time, situation  Mood/Behavior: cooperative  Best Language: 0 - No aphasia  Speech: illogical    Living Environment:   People in home: spouse  Kendall   spouse  Current living Arrangements: house      Able to return to prior arrangements: yes       Family/Social Support:  Care provided by: spouse/significant other  Provides care for: no one  Marital Status:          Current Resources:   Patient receiving home care services: No     Community Resources: Other (see comment) ([facility] )  Equipment currently used at home: shower chair, walker, rolling              Additional Information:  RNCM called Kendall, pt , for consult d/t pt dementia. Per Kendall, he and Kelly live in an house with 3 steps entering the home from the garage. He is primary caretaker of Kelly. Pt has  services from [facility] 5 days a week 9a-6p. Shower chair and roller walker available to pt at home.     Spoke with Kendall about home with home care PT/RN referral; accepting and agreeable.     [facility] accepts pt for home care PT/RN.    PCP f/u appt request made.    Outpt CC order placed.      Keiko Minor, RN, BSN, PHN, CMSRN  Care Coordination  [facility]  [facility]  Phone [phone]

## 2022-02-21 NOTE — DISCHARGE INSTRUCTIONS
HOMECARE NOTE:   Your doctor has ordered home care to help you after your hospital stay.  The staff will contact you to schedule your first visit.  This service will be provided by [facility].  If you have any question, or have not received a call within 48 hours of discharge, please call them at [phone] or [phone]. *please see homecare quality ratings for all homecares in your area at www.medicare.gov/care-compare

## 2022-02-21 NOTE — PROGRESS NOTES
Vascular Surgery Progress Note    S: Patient and nurses report that she is doing well.  Has been up to the bathroom with limited assistance.  Tolerating diet.    O: Slept well last evening  Vitals:  BP  Min: 106/48  Max: 131/61  Temp  Av.6  F (36.4  C)  Min: 96.6  F (35.9  C)  Max: 98.5  F (36.9  C)  Pulse  Av.7  Min: 74  Max: 78  I/O last 3 completed shifts:  In: 1300 [P.O.:800; I.V.:500]  Out: 800 [Urine:800]    Physical Exam: Very comfortable this morning.  Talkative   Groin dressings= intact   Both feet are warm and pink with Doppler signals      K= 3.7 SCr= 0.59 Hgb= 9.7    Assessment/Plan: #1.  Doing well from a vascular standpoint.  Social service talking with patient and  about placement.  Ready to go from vascular standpoint       #2.  Stable acute blood loss anemia.  On iron.      Wm. Mariusz MD

## 2022-02-21 NOTE — PLAN OF CARE
POD 2 lft iliac artery stenting. Pt. VSS, on RA, Disoriented x4. 2 bilateral groin dressings CDI, dressings changed this shift.  Pt incontinent, no urine output, bladder scanned at 2100, 233 ml, and 2230, 245 ml, encouraged liquids. PAINAD pain scale scored 0, scheduled tylenol and toradol given.  CMS intact, up with 1 assist Gb/walker. Meds crushed and given in applesauce. Tolerating regular diet.

## 2022-02-21 NOTE — PLAN OF CARE
Pt AO to self only. Confused but pleasant. Difficulty sleeping this shift. Fell asleep around 0500. VSS ex O2 sats 88-89% on RA, 93% on 2L NC. Bilateral groin sites CDI. Denies pain, on scheduled tylenol and toradol. Posterior tibial and dorsalis pedis pulses with doppler. CMS intact. PIV SL. Regular diet, blood sugar checks, encouraged fluids. Takes pills crushed in apple sauce. Moves SBA GB/W to BR. Continent of urine x1, incontinent smear BM x1.  Discharge pending-CM/SW following for disposition. Continue to monitor.

## 2022-02-21 NOTE — PROGRESS NOTES
[facility]    Vascular Medicine Progress Note    Date of Service (when I saw the patient): 02/21/2022     Assessment & Plan   1. Critical limb ischemia of right foot rest pain s/p left iliac artery stenting and left to right femoral-femoral bypass 2/18/22     -Some trouble with constipation, urinary retention and nausea, but these are improving. Did have BM on 2/20. Voiding better this morning.   -Post-operative cares per Dr. Ramirez.  -Continue aspirin 81 mg daily.  -Pan controlled.   -Hgb stable. On oral iron.   -Working with PT. Her  would ideally like to take her home. PT is in agreement that she should be safe for home. Will plan on tomorrow morning if she continues to do well as her  will have more support available tomorrow morning to assist with her coming home.      2. Hyperlipidemia     Her lipids have historically been elevated but she is not on a statin with her severe dementia. Given her dementia and desire of family to no be overly aggressive, will hold off on initiating a statin at this time.      3. Hypertension     Continue prior to admission Metoprolol 50 mg BID and Diltiazem as blood pressure allows. Monitor blood pressure closely.      4. Severe dementia     Continue Namenda. Her  is very involved in her care and speaks on her behalf.      5. Paroxysmal atrial fibrillation  Junctional bradycardia status post pacemaker placement (10/2020)     Continue Diltiazem and metoprolol. She is not on anticoagulation. Cardiology is following as an outpatient and would consider anticoagulation if her pacemaker revealed high burden of atrial fibrillation.      6. COPD with history of tobacco use     Continue prior to admission nebulizers as needed.           Interval History   Doing well this morning. Voiding better - was having some trouble earlier with urinary retention. Getting up with PT now.  is visiting - plan is to hopefully take her home tomorrow.      Physical Exam   Temp: (!) 96.6  F (35.9  C) Temp src: Oral BP: 131/61 Pulse: 74   Resp: 16 SpO2: 96 % O2 Device: Nasal cannula Oxygen Delivery: 2 LPM  Vitals:    02/18/22 0811 02/21/22 0500   Weight: 55.8 kg (123 lb) 57.3 kg (126 lb 5.2 oz)     Vital Signs with Ranges  Temp:  [96.6  F (35.9  C)-98.5  F (36.9  C)] 96.6  F (35.9  C)  Pulse:  [74-78] 74  Resp:  [16-18] 16  BP: (106-131)/(48-61) 131/61  SpO2:  [88 %-96 %] 96 %  I/O last 3 completed shifts:  In: 1300 [P.O.:800; I.V.:500]  Out: 800 [Urine:800]    Constitutional: Awake, alert, cooperative, no apparent distress, and appears stated age.  Eyes: Lids and lashes normal, sclera clear, conjunctiva normal.  ENT: Normocephalic, without obvious abnormality, atraumatic, oral pharynx with moist mucus membranes  Respiratory: No increased work of breathing, good air exchange, clear to auscultation bilaterally, no crackles or wheezing.  Cardiovascular: Regular rate and rhythm, normal S1 and S2, no S3 or S4, and no murmur noted.  GI: Normal bowel sounds, soft, non-distended, non-tender  Genitourinary:  Cabral is out.   Skin: No bruising or bleeding, normal skin color, texture, turgor, no redness, warmth, or swelling, no rashes, surgical site is c/d/i.   Musculoskeletal: There is no redness, warmth, or swelling of the joints.    Neurologic: Awake, alert, oriented to name, place and time.  Cranial nerves II-XII are grossly intact.    Neuropsychiatric: Calm, normal eye contact, alert, normal affect, oriented to self, place, memory for past and recent events poor due to dementia.     Medications     lactated ringers       sodium chloride Stopped (02/19/22 0838)       acetaminophen  975 mg Oral Q8H     aspirin  81 mg Oral QPM     diltiazem ER COATED BEADS  180 mg Oral Daily     [START ON 2/22/2022] ferrous sulfate  325 mg Oral Every Other Day     ketorolac  10 mg Intravenous Q6H     memantine  10 mg Oral BID     metoprolol tartrate  50 mg Oral BID     OLANZapine  10 mg Oral  At Bedtime     polyethylene glycol  17 g Oral Daily     senna-docusate  1 tablet Oral BID     sodium chloride (PF)  3 mL Intracatheter Q8H     traZODone   mg Oral At Bedtime       Data   Recent Labs   Lab 02/21/22  1044 02/21/22  0708 02/21/22  0544 02/20/22  1722 02/20/22  0730 02/19/22  2226 02/19/22  0713 02/18/22  0838   WBC  --  6.3  --   --  5.0  --  9.4  --    HGB  --  9.7*  --   --  9.5*  --  9.8* 12.4   MCV  --  95  --   --  93  --  96  --    PLT  --  219  --   --  200  --  249  --    INR  --   --   --   --   --   --  1.11  --    NA  --  140  --   --  143  --   --   --    POTASSIUM  --  3.7  --   --  3.4  --  4.0 3.9   CHLORIDE  --  109  --   --  111*  --   --   --    CO2  --  28  --   --  28  --   --   --    BUN  --  15  --   --  18  --   --   --    CR  --  0.59  --   --  0.61  --   --  0.56   ANIONGAP  --  3  --   --  4  --   --   --    ATTILA  --  8.5  --   --  8.1*  --   --   --    * 99 101*   < > 87  87   < > 135* 99    < > = values in this interval not displayed.     No results found for this or any previous visit (from the past 24 hour(s)).

## 2022-02-22 NOTE — PROGRESS NOTES
[facility]    Vascular Medicine Progress Note    Date of Service (when I saw the patient): 02/22/2022     Assessment & Plan   1. Critical limb ischemia of right foot rest pain s/p left iliac artery stenting and left to right femoral-femoral bypass 2/18/22    POD 4    sitting in chair , looks comfortable , pain wee controled   -Some trouble with constipation, urinary retention and nausea, but these are improving. Did have BM on 2/20. Voiding better this morning.   -Post-operative cares per Dr. Ramirez.  -Continue aspirin 81 mg daily.  -Hgb stable. On oral iron.   -Working with PT. Her  would ideally like to take her home. PT is in agreement that she should be safe for home.   Home care orders placed ( [facility] Home [facility] etc)      2. Hyperlipidemia     Her lipids have historically been elevated but she is not on a statin with her severe dementia. Given her dementia and desire of family to no be overly aggressive, will hold off on initiating a statin at this time.      3. Hypertension     Continue prior to admission Metoprolol 50 mg BID and Diltiazem as blood pressure allows. Monitor blood pressure closely.      4. Severe dementia     Continue Namenda. Her  is very involved in her care and speaks on her behalf.      5. Paroxysmal atrial fibrillation  Junctional bradycardia status post pacemaker placement (10/2020)     Continue Diltiazem and metoprolol. She is not on anticoagulation. Cardiology is following as an outpatient and would consider anticoagulation if her pacemaker revealed high burden of atrial fibrillation.      6. COPD with history of tobacco use     Continue prior to admission nebulizers as needed.     Patient care time spent 35 minutes today , reviewed overnight events, discussed with nursing team , care coordination and documentation. Home care orders placed.      Marilou Weinstein MD, ALEJANDRO, [facility], FNLA  Vascular Medicine      Interval History   Doing well this  morning. Voiding better - was having some trouble earlier with urinary retention.working  with PT.   VSS, afebrile     Physical Exam   Temp: 97.9  F (36.6  C) Temp src: Axillary BP: 125/54 Pulse: 80   Resp: 20 SpO2: 95 % O2 Device: Nasal cannula Oxygen Delivery: 1 LPM  Vitals:    02/18/22 0811 02/21/22 0500 02/22/22 0638   Weight: 55.8 kg (123 lb) 57.3 kg (126 lb 5.2 oz) 59.3 kg (130 lb 11.7 oz)     Vital Signs with Ranges  Temp:  [96.5  F (35.8  C)-99.4  F (37.4  C)] 97.9  F (36.6  C)  Pulse:  [] 80  Resp:  [16-20] 20  BP: (118-137)/(53-61) 125/54  SpO2:  [94 %-96 %] 95 %  I/O last 3 completed shifts:  In: 200 [P.O.:200]  Out: 400 [Urine:400]    Constitutional: Awake, alert, cooperative, no apparent distress, and appears stated age.  Eyes: Lids and lashes normal, sclera clear, conjunctiva normal.  ENT: Normocephalic, without obvious abnormality, atraumatic, oral pharynx with moist mucus membranes  Respiratory: No increased work of breathing, good air exchange, clear to auscultation bilaterally, no crackles or wheezing.  Cardiovascular: Regular rate and rhythm, normal S1 and S2, no S3 or S4, and no murmur noted.  GI: Normal bowel sounds, soft, non-distended, non-tender  Genitourinary:  Cabral is out.   Skin: No bruising or bleeding, normal skin color, texture, turgor, no redness, warmth, or swelling, no rashes, surgical site is c/d/i.   Musculoskeletal: There is no redness, warmth, or swelling of the joints.    Neurologic: Awake, alert, oriented to name, place and time.  Cranial nerves II-XII are grossly intact.    Neuropsychiatric: Calm, normal eye contact, alert, normal affect, oriented to self, place, memory for past and recent events poor due to dementia.   Vascular : Both feet are warm and pink with Doppler signals  groin site looks god    Medications       aspirin  81 mg Oral QPM     diltiazem ER COATED BEADS  180 mg Oral Daily     ferrous sulfate  325 mg Oral Every Other Day     memantine  10 mg Oral BID      metoprolol tartrate  50 mg Oral BID     OLANZapine  10 mg Oral At Bedtime     polyethylene glycol  17 g Oral Daily     senna-docusate  1 tablet Oral BID     sodium chloride (PF)  3 mL Intracatheter Q8H     traZODone   mg Oral At Bedtime       Data   Recent Labs   Lab 02/22/22  0116 02/21/22  1413 02/21/22  1044 02/21/22  0708 02/20/22  1722 02/20/22  0730 02/19/22  2226 02/19/22  0713 02/18/22  0838   WBC  --   --   --  6.3  --  5.0  --  9.4  --    HGB  --   --   --  9.7*  --  9.5*  --  9.8* 12.4   MCV  --   --   --  95  --  93  --  96  --    PLT  --   --   --  219  --  200  --  249  --    INR  --   --   --   --   --   --   --  1.11  --    NA  --   --   --  140  --  143  --   --   --    POTASSIUM  --   --   --  3.7  --  3.4  --  4.0 3.9   CHLORIDE  --   --   --  109  --  111*  --   --   --    CO2  --   --   --  28  --  28  --   --   --    BUN  --   --   --  15  --  18  --   --   --    CR  --   --   --  0.59  --  0.61  --   --  0.56   ANIONGAP  --   --   --  3  --  4  --   --   --    ATTILA  --   --   --  8.5  --  8.1*  --   --   --    * 104* 102* 99   < > 87  87   < > 135* 99    < > = values in this interval not displayed.

## 2022-02-22 NOTE — PROGRESS NOTES
Vascular Surgery Progress Note    S: JOHN. Pain adequately controlled. Up to chair.     O:   Vitals reviewed.  I/O last 3 completed shifts:  In: 200 [P.O.:200]  Out: 400 [Urine:400]    Physical Exam: Sitting in chair in no distress. Oriented to self. Answers questions with some difficulty finding words. Monophasic left DP/PT, biphasic right DP/PT. Feet warm and well perfused. No wounds. Bilateral groin incisions dressed with gauze and medipore tape, removed. Steristrips in place an no erythema or drainage from the wounds. Skin well approximated.       No new labs today.    Assessment/Plan:    #1.  Doing well from a vascular standpoint, ready for discharge      #2.  Stable acute blood loss anemia.  On iron.        Kemi Moore MD  02/22/22  11:26 AM

## 2022-02-22 NOTE — PLAN OF CARE
Goal Outcome Evaluation: A&O to self only. Disoriented to place/time/situation. VSS on 1L NC. Bilateral groin dressing CDI.  PIV SL. Doppler pulses present in BLE. Ativan given x1 for agitation, effective. Sleeping most of the night. Discharge pending home today with home care orders.

## 2022-02-22 NOTE — PLAN OF CARE
Pt. VSS, on RA, Disoriented x4. 2 bilateral groin, CDI.  Pt incontinent, voiding spontaneously. PAINAD pain scale scored 0.  CMS intact, pulses doppler on BLE, up with 1 assist Gb/walker. Meds crushed and given in applesauce. Tolerating regular diet.

## 2022-02-22 NOTE — PLAN OF CARE
Pt is AOx1, only to self, pleasantly confused, restless and impulsive at times, dressings changed, denies pain, pulses BLE with Doppler, tolerating diet, voiding, O2 >92% on RA, 1L for comfort at night, SBA. Pending discharge with home care tomorrow.

## 2022-02-23 NOTE — TELEPHONE ENCOUNTER
Please see below.    Patient needs to be scheduled for 2 week post op for left to right femoral bypass on 2/18/22 with Dr. Ramirez.     Tiffany ELLISONN, RN    [facility]  Vascular [facility]  Office: [phone]  Fax: [phone]

## 2022-02-23 NOTE — DISCHARGE SUMMARY
Discharge Summary    Kelly Nunez MRN# [id number]   YOB: 1950 Age: 71 year old     Date of Admission:  2/18/2022  Date of Discharge:  2/23/2022  Admitting Physician:  Ray Ramirez MD  Discharging Service:  Surgery  Primary Provider: Page Jacobs         Admission/Discharge Diagnosis:   Principle Diagnosis: Atherosclerosis of native artery of right leg with rest pain (H) [I70.221]  Secondary diagnosis: Acute blood loss anemia   Hyperlipidemia   Hypertension   Dementia   Paroxysmal atrial fibrillation  COPD with history of tobacco use           Procedures:   Procedure(s):  LEFT common ILIAC ARTERY STENTING with 8 X 39 stent, left femoral endarterectomy with bovine patch angioplasty, pelvic arteriogram, external iliac artery stenting with 7mm self expandning grafts X 2, left to right femoral to femoral bypass with 8mm PTFE graft  LEFT TO RIGHT FEMORAL-FEMORAL BYPASS  On 2/18/22 with Dr. Ramirez          Brief History of Illness:   This patient was a 71 year old female who presented to vein clinic for evaluation of right foot edema.   She was found to not have vein issues, but non palpable right femoral pulse and no DP signal in the foot, likely rest pain. An FROYLAN and CTA of the abdomen pelvis were performed and were significant for severe arterial insufficiency on the right with FROYLAN and occlusion of the right common, external, and internal iliac arteries with a reconstituted common femoral artery. She is not a candidate for an open aortic repair/procedure so was offered a left femoral to right femoral bypass.    After discussing the risks, benefits, and possible complications, an informed consent was obtained from her - her medical decision maker and primary caregiver and the patient underwent the above procedure.  There were no complications.  Please see the Operative Report for full details.         Hospital Course:   The patient's hospital course was unremarkable.  She  "recovered as anticipated and experienced no post-operative complications. She worked with therapies and was recommended to have continued home health PT which was ordered. Her  is her primary caregiver and they also have additional help that comes to their home daily. On the date of discharge, the patient was discharged to home in stable condition and afebrile. Discharge plan was discussed with her  verbalized understanding of all discharge instructions and felt comfortable with the discharge plan.    Her incisions were inspected on her date of discharge and are doing well, without wound concerns. She has dopplerable PT/DP bilaterally. They were asked to call with any further questions or concerns.  Please see chart for details.          Consultations:     Vascular Medicine   Physical Therapy           Discharge Disposition:     Discharged to home          Condition on Discharge:     Discharge condition: Stable   Discharge vitals: Blood pressure 137/59, pulse 77, temperature 97.5  F (36.4  C), temperature source Oral, resp. rate 24, height 1.549 m (5' 1\"), weight 59.3 kg (130 lb 11.7 oz), SpO2 91 %.           Pending Results:     Pathology: n/a           Discharge Medications:     Current Discharge Medication List      START taking these medications    Details   ferrous sulfate (FEROSUL) 325 (65 Fe) MG tablet Take 1 tablet (325 mg) by mouth every other day  Qty: 30 tablet, Refills: 0    Associated Diagnoses: Atherosclerosis of native artery of right leg with rest pain (H)      senna-docusate (SENOKOT-S/PERICOLACE) 8.6-50 MG tablet Take 1 tablet by mouth 2 times daily  Qty: 60 tablet, Refills: 0    Associated Diagnoses: Atherosclerosis of native artery of right leg with rest pain (H)         CONTINUE these medications which have NOT CHANGED    Details   acetaminophen (TYLENOL) 500 MG tablet Take 500-1,000 mg by mouth every 6 hours as needed for mild pain      aspirin (ASA) 81 MG chewable tablet Take 81 " mg by mouth every evening      diltiazem ER COATED BEADS (CARDIZEM CD/CARTIA XT) 180 MG 24 hr capsule Take 1 capsule (180 mg) by mouth daily  Qty: 90 capsule, Refills: 1    Associated Diagnoses: Atrial fibrillation, unspecified type (H)      ipratropium - albuterol 0.5 mg/2.5 mg/3 mL (DUONEB) 0.5-2.5 (3) MG/3ML neb solution Take 1 vial by nebulization every 6 hours as needed       LORazepam (ATIVAN) 0.5 MG tablet Take 0.5 mg by mouth 3 times daily as needed       memantine (NAMENDA) 10 MG tablet Take 10 mg by mouth 2 times daily       metoprolol tartrate (LOPRESSOR) 50 MG tablet Take 1 tablet (50 mg) by mouth 2 times daily  Qty: 180 tablet, Refills: 1    Associated Diagnoses: Atrial fibrillation, unspecified type (H)      Multiple Vitamins-Minerals (CENTRUM WOMEN PO) Take 1 capsule by mouth daily       Nebulizers (INNOSPIRE ESSENCE NEBULIZER) MISC Take by nebulization daily as needed       OLANZapine (ZYPREXA) 10 MG tablet Take 10 mg by mouth At Bedtime       polyethylene glycol (MIRALAX) 17 GM/Dose powder Take 1 capful by mouth daily as needed for constipation      senna (SENOKOT) 8.6 MG tablet Take 2 tablets by mouth daily      traZODone (DESYREL) 100 MG tablet Take 0.5-100 mg by mouth At Bedtime                    Discharge Instructions:   Follow-up Appointments     Follow-up and recommended labs and tests       Follow up with primary care provider, Page Jacobs, within 7 days to   evaluate after surgery and for hospital follow- up    Follow up with Dr. Ramirez at the [facility] within 2 weeks to   evaluate after surgery.           After Care Instructions     Activity      Your activity upon discharge: activity as tolerated         Diet      Follow this diet upon discharge: Orders Placed This Encounter      Advance Diet as Tolerated: Regular Diet Adult; Regular Diet Adult         Wound care and dressings      Instructions to care for your wound at home: Keep the bilateral groin wounds clean and dry. It  is okay to get the incisions wet in the shower but do not soak or scrub for at least 2 weeks. Wash the area gently with soap and water and pat to dry. Okay to place gauze of the small bandages (steristrips) for comfort but no additional dressing is needed. If you do want the wounds covered with gauze, change it daily or more often if needed to keep the dressing clean and dry.               Amanda Olivo MD   PGY-4 General Surgery [facility]  Vascular Surgery Service- [facility]     STAFF: As above.  Home with  and care attendant (dementia).  Follow-up with Dr Ramirez in two weeks     Dante Vee MD

## 2022-02-23 NOTE — TELEPHONE ENCOUNTER
Scheduled Date: 3/9  Scheduled Time: 2:20  Provider:             James Ramirez I   [facility]  [email or web address]  [phone]

## 2022-02-23 NOTE — PLAN OF CARE
Pt is AOx1, only to self, pleasantly confused, restless and impulsive at times, incisions wnl, denies pain, pulses BLE with Doppler, tolerating diet, incontinent care meds crushed in apple sauce. Pending discharge with home care tomorrow.

## 2022-02-23 NOTE — PLAN OF CARE
Physical Therapy Discharge Summary    Reason for therapy discharge:    Discharged to home with home therapy.    Progress towards therapy goal(s). See goals on Care Plan in [facility] electronic health record for goal details.  Goals partially met.  Barriers to achieving goals:   discharge from facility.    Therapy recommendation(s):    Continued therapy is recommended.  Rationale/Recommendations:  continue to progress functional strength, balance and activity tolerance. Assist from spouse as needed. .

## 2022-02-23 NOTE — PROGRESS NOTES
Vascular Surgery Progress Note    S: JOHN. Off supplemental oxygen for the past 24h. Talked with  Kendall yesterday and today who is set up and ready for Kelly to be discharged to home.    O:   Vitals reviewed.  I/O last 3 completed shifts:  In: 440 [P.O.:440]  Out: -     Physical Exam: Sitting in chair in no distress. Eating breakfast independently. Pleasant and answers questions. Monophasic left DP/PT, biphasic right DP/PT. Feet warm and well perfused. No wounds on the feet. Bilateral groin incisions dressed with Steristrips, no erythema or drainage. Skin well approximated.       No new labs today.    Assessment/Plan:    #1.  Doing well from a vascular standpoint, ready for discharge. Has follow up with Dr. Ramirez on March 9     #2.  Stable acute blood loss anemia. On iron.        Kemi Moore MD  02/23/22  9:42 AM

## 2022-02-23 NOTE — PLAN OF CARE
Goal Outcome Evaluation:    Plan of Care Reviewed With: patient, spouse       Pt A/O to self, pleasant and cooperative. Denies pain. Ready for discharge to home [facility]. Pt up; A1+w+GB for ambulation and transfers. Incontinence. Regular diet- tray set up with good appetite.  here to review discharge instructions. Acknowledges understanding. IV removed. Pt dressed and left via w/c to home with personal belongings and

## 2022-02-23 NOTE — PLAN OF CARE
Pt is A&O to  to self, RA, Ax1, meds crushed in apple sauce, tolerating regular diet,   denies pain & no S&S of pain noted, incontinent, voiding adequately, M BM, steri-strips to bilat groin incisions CDI, CMS intact , well rested. Pt. to discharge home with home care later today.

## 2022-02-23 NOTE — PROGRESS NOTES
[facility]    Vascular Medicine Progress Note    Date of Service (when I saw the patient): 02/23/2022     Assessment & Plan   1. Critical limb ischemia of right foot rest pain s/p left iliac artery stenting and left to right femoral-femoral bypass 2/18/22    POD 5    looks comfortable , pain well controled   Per nursing restless and impulsive at times  But this am sittng in chair , pleasant following commands  Not requiring oxygen   Incontinent per nursing   -Post-operative cares per Dr. Ramirez?vasc surgery  -Continue aspirin 81 mg daily.  -Hgb stable. On oral iron.   -Working  Daily with PT. Her  would ideally like to take her home. PT is in agreement that she should be safe for home.   Home care orders placed ( [facility] and Home PT etc)   Discussed with nursing staff and OK to discharge home per vascular medicine stand point      2. Hyperlipidemia     Her lipids have historically been elevated but she is not on a statin with her severe dementia. Given her dementia and desire of family to no be overly aggressive, will hold off on initiating a statin at this time.      3. Hypertension     Continue prior to admission Metoprolol 50 mg BID and Diltiazem as blood pressure allows. Monitor blood pressure closely.      4. Severe dementia     Continue Namenda. Her  is very involved in her care and speaks on her behalf.      5. Paroxysmal atrial fibrillation  Junctional bradycardia status post pacemaker placement (10/2020)     Continue Diltiazem and metoprolol. She is not on anticoagulation. Cardiology is following as an outpatient and would consider anticoagulation if her pacemaker revealed high burden of atrial fibrillation.      6. COPD with history of tobacco use     Continue prior to admission nebulizers as needed.     Patient care time spent 35 minutes today , reviewed overnight events, discussed with nursing staff , care coordination and documentation etc.      Marilou Mccoy  MD Js, ALEJANDRO, [facility], [facility]  Vascular Medicine      Interval History   Doing well this morning. Urine incontinent   VSS, afebrile   Alert and awake X2   Answering appropriately   Pain well controlled.    Physical Exam   Temp: 97.9  F (36.6  C) Temp src: Axillary BP: 129/59 Pulse: 81   Resp: 20 SpO2: 91 % O2 Device: None (Room air)    Vitals:    02/18/22 0811 02/21/22 0500 02/22/22 0638   Weight: 55.8 kg (123 lb) 57.3 kg (126 lb 5.2 oz) 59.3 kg (130 lb 11.7 oz)     Vital Signs with Ranges  Temp:  [97.9  F (36.6  C)-98  F (36.7  C)] 97.9  F (36.6  C)  Pulse:  [81-86] 81  Resp:  [20] 20  BP: (129-134)/(49-59) 129/59  SpO2:  [91 %-95 %] 91 %  I/O last 3 completed shifts:  In: 440 [P.O.:440]  Out: -     Constitutional: Awake, alert, cooperative, no apparent distress, and appears stated age.  Eyes: Lids and lashes normal, sclera clear, conjunctiva normal.  ENT: Normocephalic, without obvious abnormality, atraumatic, oral pharynx with moist mucus membranes  Respiratory: No increased work of breathing, good air exchange, clear to auscultation bilaterally, no crackles or wheezing.  Cardiovascular: Regular rate and rhythm, normal S1 and S2, no S3 or S4, and no murmur noted.  GI: Normal bowel sounds, soft, non-distended, non-tender  Genitourinary:  Cabral is out.   Skin: No bruising or bleeding, normal skin color, texture, turgor, no redness, warmth, or swelling, no rashes, surgical site is c/d/i.   Musculoskeletal: There is no redness, warmth, or swelling of the joints.    Neurologic: Awake, alert, oriented to name, place and time.  Cranial nerves II-XII are grossly intact.    Neuropsychiatric: Calm, normal eye contact, alert, normal affect, oriented to self, place, memory for past and recent events poor due to dementia.   Vascular : Both feet are warm and pink with Doppler signals  groin site looks god    Medications       aspirin  81 mg Oral QPM     diltiazem ER COATED BEADS  180 mg Oral Daily     ferrous sulfate  325 mg  Oral Every Other Day     memantine  10 mg Oral BID     metoprolol tartrate  50 mg Oral BID     OLANZapine  10 mg Oral At Bedtime     polyethylene glycol  17 g Oral Daily     senna-docusate  1 tablet Oral BID     sodium chloride (PF)  3 mL Intracatheter Q8H     traZODone   mg Oral At Bedtime       Data   Recent Labs   Lab 02/23/22  0546 02/23/22  0148 02/22/22  0116 02/21/22  1044 02/21/22  0708 02/20/22  1722 02/20/22  0730 02/19/22  2226 02/19/22  0713 02/18/22  0838   WBC  --   --   --   --  6.3  --  5.0  --  9.4  --    HGB  --   --   --   --  9.7*  --  9.5*  --  9.8* 12.4   MCV  --   --   --   --  95  --  93  --  96  --    PLT  --   --   --   --  219  --  200  --  249  --    INR  --   --   --   --   --   --   --   --  1.11  --    NA  --   --   --   --  140  --  143  --   --   --    POTASSIUM  --   --   --   --  3.7  --  3.4  --  4.0 3.9   CHLORIDE  --   --   --   --  109  --  111*  --   --   --    CO2  --   --   --   --  28  --  28  --   --   --    BUN  --   --   --   --  15  --  18  --   --   --    CR  --   --   --   --  0.59  --  0.61  --   --  0.56   ANIONGAP  --   --   --   --  3  --  4  --   --   --    ATTLIA  --   --   --   --  8.5  --  8.1*  --   --   --    GLC 98 102* 100*   < > 99   < > 87  87   < > 135* 99    < > = values in this interval not displayed.

## 2022-02-23 NOTE — TELEPHONE ENCOUNTER
----- Message from Amanda Olivo MD sent at 2/23/2022 10:28 AM CST -----  Regarding: Follow up  Hello!     Could you please schedule her for a follow up with Dr. Ramirez in roughly 2 weeks. She had a left to right femoral bypass on 2/18.     Thanks!   Amanda

## 2022-02-24 NOTE — LETTER
[facility] CARE COORDINATION  [facility]  February 24, 2022    Kelly Nunez  [address] [address]      Dear Olivia,    I am a clinic community health worker who works with MARYCHUY Mckeon CNP at the [facility]. I wanted to thank you for spending the time to talk with me.  Below is a description of clinic care coordination and how I can further assist you.      The clinic care coordination team is made up of a registered nurse,  and community health worker who understand the health care system. The goal of clinic care coordination is to help you manage your health and improve access to the health care system in the most efficient manner. The team can assist you in meeting your health care goals by providing education, coordinating services, strengthening the communication among your providers and supporting you with any resource needs.    Please feel free to contact me at [phone] with any questions or concerns. We are focused on providing you with the highest-quality healthcare experience possible and that all starts with you.     Sincerely,     Mary Palumbo, JUNITO, B.S. [facility]  Clinic Care Coordination  [facility]:  [place], [place] and [place]  [phone]  [email or web address]

## 2022-02-24 NOTE — PROGRESS NOTES
Clinic Care Coordination Contact  [facility]: Post-Discharge Note  SITUATION                                                      Admission:    Admission Date: 02/18/22   Reason for Admission: LEFT common ILIAC ARTERY STENTING,  LEFT TO RIGHT FEMORAL-FEMORAL BYPASS  Discharge:   Discharge Date: 02/23/22  Discharge Diagnosis: Atherosclerosis of native artery of right leg with rest pain    BACKGROUND                                                      Per hospital discharge summary and inpatient provider notes:  This patient was a 71 year old female who presented to vein clinic for evaluation of right foot edema.   She was found to not have vein issues, but non palpable right femoral pulse and no DP signal in the foot, likely rest pain. An FROYLAN and CTA of the abdomen pelvis were performed and were significant for severe arterial insufficiency on the right with FROYLAN and occlusion of the right common, external, and internal iliac arteries with a reconstituted common femoral artery. She is not a candidate for an open aortic repair/procedure so was offered a left femoral to right femoral bypass.    After discussing the risks, benefits, and possible complications, an informed consent was obtained from her - her medical decision maker and primary caregiver and the patient underwent the above procedure.  There were no complications.      ASSESSMENT      Enrollment  Primary Care Care Coordination Status: Declined    Discharge Assessment  How are you doing now that you are home?: Kendall states that patient is doing really well  How are your symptoms? (Red Flag symptoms escalate to triage hotline per guidelines): Improved  Do you feel your condition is stable enough to be safe at home until your provider visit?: Yes  Does the patient have their discharge instructions? : Yes  Does the patient have questions regarding their discharge instructions? : No  Does the patient have all of their medications?: Yes  Do you have  questions regarding any of your medications? : No  Do you have all of your needed medical supplies or equipment (DME)?  (i.e. oxygen tank, CPAP, cane, etc.): Yes  Discharge follow-up appointment scheduled within 14 calendar days? : Yes  Discharge Follow Up Appointment Date: 03/02/22  Discharge Follow Up Appointment Scheduled with?: Primary Care Provider    Care Management   Community Health Worker Initial Outreach  Spoke with patient , Kendall     CHW introduced self and role with CC  Kendall states that patient is doing really well since being home. He has questions on her wound care, feels that it wasn't clear.   CHW reviewed AVS with Kendall on wound care instruction, he understands and feels confident for cares.   He shares that there was a conflict with home care, patient has a contracted  through [facility] [employer], who happens to be a CNA. They are checking to determine coverage. Kendall states that patient doesn't need PT or OT, she is up walking fine. He states that Home care plans to call Monday to see what is needed at that time.   CHW inquired if patient is in need of any additional support or resources however patient declines.  CHW provided contact information and encouraged patient to reach out with any future needs or concerns, patient agrees.    Patient accepts CC: No, declines outstanding need for CC at this time. Patient will be sent Care Coordination introduction letter for future reference.     PLAN                                                      Outpatient Plan: Follow up with primary care provider, Page Jacobs, within 7 days to evaluate after surgery and for hospital follow- up.  Follow up with Dr. Ramirez at the [facility] within 2 weeks to evaluate after surgery.      Future Appointments   Date Time Provider Department [place]   3/2/2022 11:00 AM Khoa Townsend MD [facility]   3/9/2022  2:20 PM Ray Ramirez MD [facility]   3/14/2022 11:30 AM Page Jacobs,  APRN CNP [facility]   4/20/2022 12:00 AM TOLBERT TECH1 [facility]         For any urgent concerns, please contact our 24 hour nurse triage line: [phone] ([phone])       JUNITO Hagan, B.S. [facility] Care Coordination  [facility] Clinics:  [place], [place] and [place]  [phone]  [email or web address]

## 2022-03-11 NOTE — TELEPHONE ENCOUNTER
Prescription approved per [facility] Refill Protocol.  Esther Guerrero RN

## 2022-03-13 NOTE — PROGRESS NOTES
Kelly Nunez returns POD #19 status post stenting of her left common iliac and external iliac arteries with a right to left femoral-femoral bypass all performed on 2/18/2022 for right foot rest pain.  She suffers from Alzheimer's disease and she presents today with her  who provides the meaningful history.  She had no complaints.  He feels that she is much more comfortable.  She is no longer sleeping with her right leg in a dependent position and does not seem to be uncomfortable.    Exam:  Elderly female in no acute distress.  She does not provide meaningful history.  Bilateral groin incisions are clear.  Strong Doppler signal in the femoral-femoral graft.  Bilateral feet are warm and pink.  No open wounds.  Dopplerable pedal pulses bilaterally.    ASSESSMENT:  POD #19 status post stenting of the left common and external iliac arteries with a left to right femoral-femoral bypass graft clinically doing well with apparent resolution of right leg rest pain.    RECOMMENDATION:  I reviewed all the above with Kelly's .  I have no vascular surgical concerns.  She will continue her medical regimen which includes daily aspirin.  Vascular surgical follow-up will be with me in 3 months for an aortoiliac ultrasound and ultrasound of her left to right femoral-femoral bypass graft along with an FROYLAN at rest.    Denny Ramirez MD

## 2022-04-22 NOTE — PROGRESS NOTES
Assessment & Plan     Hypertension, unspecified type  Controlled, recently refilled medications so does not need refills today.  Reviewed recent labs.     Atrial fibrillation, unspecified type (H)  Pacemaker in place.  Regular rate and rhythm today.  She is on diltiazem. Reviewed last several weights with spouse today.  Overall weight is quite stable with slow gain over time.  Clear lungs, no edema to indicate fluid overload today.  Suspect slow weight gain is due to inactivity related to dementia.               Return in about 6 months (around 10/22/2022) for HTN.    MARYCHUY Mckeon [employer] KARLY Mendoza is a 72 year old who presents for the following health issues     History of Present Illness       Mental Health Follow-up:                    Today's PHQ-9         PHQ-9 Total Score: 9  PHQ-9 Q9 Thoughts of better off dead/self-harm past 2 weeks :   (P) Not at all    How difficult have these problems made it for you to do your work, take care of things at home, or get along with other people: Very difficult        Vascular Disease:  She presents for follow up of vascular disease.  She never takes nitroglycerin. She takes daily aspirin.    She eats 0-1 servings of fruits and vegetables daily.She consumes 0 sweetened beverage(s) daily.She exercises with enough effort to increase her heart rate 9 or less minutes per day.  She exercises with enough effort to increase her heart rate 3 or less days per week.   She is taking medications regularly.    Biggest concerns  Severe dementia.   Good appetite. Having fluid retention which might only be the cause of weight gain.  Due to dementia has not been as active.       Hypertension Follow-up      Do you check your blood pressure regularly outside of the clinic? No     Are you following a low salt diet? Yes    Are your blood pressures ever more than 140 on the top number (systolic) OR more   than 90 on the bottom number  "(diastolic), for example 140/90? No    Medication Followup of Diltiazem 180 mg     Taking Medication as prescribed: yes    Side Effects:  None    Medication Helping Symptoms:  yes     Recently received a stent in her RT leg- now has circulation   Stumbled getting out of bed recently and has bruising on the R wrist.   Has been up a bit more at night recently.   Souse found her in the laundry room during the night recently.   Has a PCA with her during the day.         Review of Systems   Constitutional, HEENT, cardiovascular, pulmonary, gi and gu systems are negative, except as otherwise noted.      Objective    /60   Pulse 69   Temp 97.6  F (36.4  C) (Oral)   Resp 14   Ht 1.549 m (5' 1\")   Wt 57.7 kg (127 lb 4.8 oz)   SpO2 96%   BMI 24.05 kg/m    Body mass index is 24.05 kg/m .  Physical Exam   GENERAL: healthy, alert and no distress  NECK: no adenopathy, no asymmetry, masses, or scars and thyroid normal to palpation  RESP: lungs clear to auscultation - no rales, rhonchi or wheezes  CV: regular rate and rhythm, normal S1 S2, no S3 or S4, no murmur, no peripheral edema   MS: no gross musculoskeletal defects noted, no edema, bruising on the R wrist, no swelling deformities or tednerness  PSYCH: flat affect, demenia    No results found for this or any previous visit (from the past 24 hour(s)).            "

## 2022-06-07 NOTE — TELEPHONE ENCOUNTER
Prescription approved per [facility] Refill Protocol.    Katya Bolton RN

## 2022-07-20 NOTE — PROGRESS NOTES
VASCULAR SURGERY PROGRESS NOTE    LOCATION[facility]    Kelly Nunez  Medical Record #:  [id number]  YOB: 1950  Age:  72 year old     Date of Service: 7/20/2022    PRIMARY CARE PROVIDER: Page Jacobs    Reason for visit:  3 month follow-up for stenting of left iliac artery with left to right fem/fem bypass on 2/18    IMPRESSION:  Ms. Nunez is 5 months post stenting of her left common iliac and external iliac arteries with a left to right femoral-femoral bypass on 2/18/2022. The patient suffers from advanced Alzheimer's disease and her  provides much of her history. The patient has had no complaints, the patient is much more comfortable and able to move more. The patient has no dependent rubor noted on exam nor at home, no edema noted on examination. The patient does not seem to be uncomfortable.     The patient today is in no acute distress, does not provide any history. The patient's has a strong doppler signal in the femoral-femoral graft, with biphasic PT and DP pulses bilaterally. The patient's feet are warm and pink bilaterally, no wounds nor discoloration noted. No edema noted on examination.     RECOMMENDATION/RISKS: Reviewing the imaging the patient's ABIs remain much improved. The patient shows slight elevation in velocities of the proximal anastomosis however no stenosis noted.     Plan for follow up is as follows: Follow-up in 3 months with US ABIs+Us aortoiliac+bypass graft ultrasound along with ABIs at rest. Continue on daily aspirin. Follow-up with Dr. Ramirez.    HPI:  Kelly Nunez is a 72 year old female with past medical history significant for alzheimer's disease who initially was seen for symptomatic PAD, improved significantly following stenting of her left common iliac and external iliac arteries with a right to left femoral-femoral bypass on 2/18/2022 for right foot rest pain.    REVIEW OF SYSTEMS:    A 12 point ROS was reviewed and  is negative except for what is listed above in HPI.    PHH:    Past Medical History:   Diagnosis Date     Atrial fibrillation (H)      COPD (chronic obstructive pulmonary disease) (H)      Dementia (H)      Diabetes (H)     past history     Hypertension      Paroxysmal atrial fibrillation (H)      Sinus node dysfunction (H)           Past Surgical History:   Procedure Laterality Date     BYPASS GRAFT FEMORAL FEMORAL Left 2/18/2022    Procedure: LEFT TO RIGHT FEMORAL-FEMORAL BYPASS;  Surgeon: Ray Ramirez MD;  Location:  OR     ENDOVASCULAR ILIAC STENT PLACEMENT Left 2/18/2022    Procedure: LEFT common ILIAC ARTERY STENTING with 8 X 39 stent, left femoral endarterectomy with bovine patch angioplasty, pelvic arteriogram, external iliac artery stenting with 7mm self expandning grafts X 2, left to right femoral to femoral bypass with 8mm PTFE graft;  Surgeon: Ray Ramirez MD;  Location:  OR     EP PACEMAKER N/A 10/12/2020    Procedure: EP Pacemaker;  Surgeon: Luis Onofre MD;  Location:  HEART CARDIAC CATH LAB     HEART CATH, ANGIOPLASTY       IR ABDOMINAL AORTOGRAM  5/10/2013     IR MISCELLANEOUS PROCEDURE  5/10/2013     IR OR ANGIOGRAM  2/18/2022     VASCULAR SURGERY  2014    right leg stent       ALLERGIES:  Penicillins    MEDS:    Current Outpatient Medications:      acetaminophen (TYLENOL) 500 MG tablet, Take 500-1,000 mg by mouth every 6 hours as needed for mild pain, Disp: , Rfl:      aspirin (ASA) 81 MG chewable tablet, Take 81 mg by mouth every evening, Disp: , Rfl:      diltiazem ER COATED BEADS (CARDIZEM CD/CARTIA XT) 180 MG 24 hr capsule, TAKE 1 CAPSULE BY MOUTH EVERY DAY, Disp: 90 capsule, Rfl: 1     ipratropium - albuterol 0.5 mg/2.5 mg/3 mL (DUONEB) 0.5-2.5 (3) MG/3ML neb solution, Take 1 vial by nebulization every 6 hours as needed , Disp: , Rfl:      LORazepam (ATIVAN) 0.5 MG tablet, Take 0.5 mg by mouth 3 times daily as needed , Disp: , Rfl:      memantine (NAMENDA)  10 MG tablet, Take 10 mg by mouth 2 times daily , Disp: , Rfl:      metoprolol tartrate (LOPRESSOR) 50 MG tablet, TAKE 1 TABLET BY MOUTH TWICE A DAY, Disp: 180 tablet, Rfl: 0     Multiple Vitamins-Minerals (CENTRUM WOMEN PO), Take 1 capsule by mouth daily , Disp: , Rfl:      Nebulizers (INNOSPIRE ESSENCE NEBULIZER) MISC, Take by nebulization daily as needed , Disp: , Rfl:      OLANZapine (ZYPREXA) 10 MG tablet, Take 10 mg by mouth At Bedtime , Disp: , Rfl:      polyethylene glycol (MIRALAX) 17 GM/Dose powder, Take 1 capful by mouth daily as needed for constipation, Disp: , Rfl:      senna (SENOKOT) 8.6 MG tablet, Take 2 tablets by mouth daily, Disp: , Rfl:      traZODone (DESYREL) 100 MG tablet, Take 0.5-100 mg by mouth At Bedtime , Disp: , Rfl:     Current Facility-Administered Medications:      lidocaine 1% with EPINEPHrine 1:100,000 injection 3 mL, 3 mL, Intradermal, Once, Kriss Genao MD    SOCIAL HABITS:    History   Smoking Status     Former Smoker     Packs/day: 1.00     Types: Cigarettes     Quit date: 2017   Smokeless Tobacco     Never Used     Social History    Substance and Sexual Activity      Alcohol use: Not Currently      History   Drug Use No       FAMILY HISTORY:  No family history on file.    PE:  /69 (BP Location: Left arm, Patient Position: Chair, Cuff Size: Adult Regular)   Pulse 63   Breastfeeding No   Wt Readings from Last 1 Encounters:   04/22/22 127 lb 4.8 oz (57.7 kg)     There is no height or weight on file to calculate BMI.    DIAGNOSTIC STUDIES:     Images:  US Aorta/Ivc/Iliac Duplex Complete    Result Date: 7/15/2022  US AORTA/IVC/ILIAC DUPLEX COMPLETE   7/15/2022 10:46 AM HISTORY: History of left iliac artery stenting, left to right fem-fem bypass on 2/18/2022. Atherosclerosis of native artery of right leg with rest pain (H). Other specified symptoms and signs involving the circulatory and respiratory systems. COMPARISON: 2/18/2022. FINDINGS: Color Doppler and spectral  waveform analysis was performed throughout the right and left lower extremities. Left to right femoral to femoral artery bypass: The left to right femoral-femoral artery bypass is patent with diameters ranging between 6.5 and 7.5 mm. Inflow and outflow arteries are patent. Slightly elevated velocities are noted at the proximal anastomosis with peak systolic velocity of 194 cm/s without visual stenosis. Left common iliac artery stent: Left common iliac artery stent is patent with diameters ranging between 6.8-9.0 mm. Inflow and outflow arteries are patent. Left external iliac artery stent: Left external iliac artery stent is patent with diameters ranging between 5.0 and 7.1 mm. Inflow and outflow arteries are patent.     IMPRESSION: 1. Patent left common iliac and external iliac artery stents. 2. Patent left to right femoral-femoral artery bypass. RISHI SINGH DO   SYSTEM ID:  [id number]    US FROYLAN Doppler No Exercise 1-2 Levels Bilateral    Result Date: 7/15/2022  US FROYLAN DOPPLER NO EXERCISE, 1-2 LEVELS, BILATERAL   7/15/2022 10:32 AM HISTORY: History of left iliac artery stenting, left to right fem-fem bypass on 2/18/2022. Atherosclerosis of native artery of right leg with rest pain (H). COMPARISON: 1/28/2022. FINDINGS: Right FROYLAN: PT: 0.62 DP: 0.57 Left FROYLAN: PT: 0.53 DP: 0.52 Waveforms: Monophasic bilaterally     IMPRESSION: Bilateral ABIs show moderate arterial insufficiency. FROYLAN CRITERIA: >1.4 NC 0.95-1.4 Normal 0.90 - 0.94 Mild 0.5 - 0.89 Moderate 0.2 - 0.49 Severe <0.2 Critical  RISHI SINGH DO   SYSTEM ID:  [id number]    LABS:      Sodium   Date Value Ref Range Status   02/21/2022 140 133 - 144 mmol/L Final   02/20/2022 143 133 - 144 mmol/L Final   12/01/2021 143 133 - 144 mmol/L Final   01/19/2021 142 133 - 144 mmol/L Final   10/12/2020 141 133 - 144 mmol/L Final   10/11/2020 140 133 - 144 mmol/L Final     Urea Nitrogen   Date Value Ref Range Status   02/21/2022 15 7 - 30 mg/dL Final   02/20/2022 18 7 -  30 mg/dL Final   12/01/2021 21 7 - 30 mg/dL Final   01/19/2021 20 7 - 30 mg/dL Final   10/12/2020 11 7 - 30 mg/dL Final   10/11/2020 16 7 - 30 mg/dL Final     Hemoglobin   Date Value Ref Range Status   03/09/2022 11.8 11.7 - 15.7 g/dL Final   02/21/2022 9.7 (L) 11.7 - 15.7 g/dL Final   02/20/2022 9.5 (L) 11.7 - 15.7 g/dL Final   01/19/2021 12.6 11.7 - 15.7 g/dL Final   10/12/2020 12.0 11.7 - 15.7 g/dL Final   10/11/2020 11.3 (L) 11.7 - 15.7 g/dL Final     Platelet Count   Date Value Ref Range Status   03/09/2022 445 150 - 450 10e3/uL Final   02/21/2022 219 150 - 450 10e3/uL Final   02/20/2022 200 150 - 450 10e3/uL Final   01/19/2021 305 150 - 450 10e9/L Final   10/12/2020 354 150 - 450 10e9/L Final   10/11/2020 340 150 - 450 10e9/L Final     INR   Date Value Ref Range Status   02/19/2022 1.11 0.85 - 1.15 Final   10/12/2020 1.13 0.86 - 1.14 Final       30 minutes spent on the day of encounter doing chart review, history and exam, documentation, and further activities as noted.     Alejandra Noland NP  VASCULAR SURGERY

## 2022-07-20 NOTE — PROGRESS NOTES
[facility]        Patient is here for a  follow up.     Pt is currently taking Aspirin.    /69 (BP Location: Left arm, Patient Position: Chair, Cuff Size: Adult Regular)   Pulse 63   Breastfeeding No     The provider has been notified that the patient has no concerns.     Questions patient would like addressed today are: N/A.    Refills are needed: N/A    Has homecare services and agency name:  Alyce Beasley MA

## 2022-08-19 NOTE — TELEPHONE ENCOUNTER
[employer]/BRYANNA Fernando.    Provider advised that patient is now outside the window for COVID-19 treatment options. Provider recommends to continue to monitor symptoms and if any new or worsening symptoms to bring patient to the ER.    Provider Recommendation Follow Up:   Reached patient/caregiver (pt's spouse). Informed of provider's recommendations. Patient verbalized understanding and agrees with the plan.     Madeline EDWARD RN

## 2022-08-19 NOTE — TELEPHONE ENCOUNTER
Nurse Triage SBAR    Is this a 2nd Level Triage? YES, LICENSED PRACTITIONER REVIEW IS REQUIRED    Situation:   Pt's spouse reports positive COVID-19 test for himself and patient yesterday AM (8/18/22). (consent to communicate with pt's spouse verified).    Background:   Symptom onset 8/13/22. Pt's spouse states that symptoms were worse earlier in the week, have much improved and pt is now nearly back to normal. Per spouse, pt has a history of dementia and does not easily communicate verbally how she is feeling. Pt also has history of Afib, pacemaker (SSS), HTN, circulatory problems (stent in R leg). Pt is fully vaccinated and boosted x1.    Assessment:   Pt has low grade temp currently (99.6 oral). Pt denies SOB, wheezing, chest pain. Pt has occasional productive cough and mild sore throat. Pt's current SPO2 91% (normally 89-92%). Pt was using O2 earlier in the week, but is not currently using. (per spouse was sent home with her 4 years ago after she was discharged from hospital with Afib). HR is 70. Pt reports that she is feeling much improved.    Protocol Recommended Disposition:   Discuss With PCP And Callback By Nurse Within 1 Hour    Recommendation:   Reviewed home care recommendations and red flag symptoms with pt and spouse and advised that pt go to the ER if these occur. Advised that will review with provider if any additional recommendations. Pt and spouse verbalized understanding and agree with plan.    Routed to provider: BRYANNA Fernando (pt's PCP is out of office).    Does the patient meet one of the following criteria for ADS visit consideration? 16+ years old, with an [facility] PCP     TIP  Providers, please consider if this condition is appropriate for management at one of our Acute and Diagnostic Services sites.     If patient is a good candidate, please use dotphrase <dot>triageresponse and select Refer to ADS to document.      Reason for Disposition    [1] HIGH RISK for severe COVID complications  (e.g., weak immune system, age > 64 years, obesity with BMI of 30 or higher, pregnant, chronic lung disease or other chronic medical condition) AND [2] COVID symptoms (e.g., cough, fever)  (Exceptions: Already seen by PCP and no new or worsening symptoms.)    Additional Information    Negative: SEVERE difficulty breathing (e.g., struggling for each breath, speaks in single words)    Negative: Difficult to awaken or acting confused (e.g., disoriented, slurred speech)    Negative: Bluish (or gray) lips or face now    Negative: Shock suspected (e.g., cold/pale/clammy skin, too weak to stand, low BP, rapid pulse)    Negative: Sounds like a life-threatening emergency to the triager    Negative: [1] Diagnosed or suspected COVID-19 AND [2] symptoms lasting 3 or more weeks    Negative: [1] COVID-19 exposure AND [2] no symptoms    Negative: COVID-19 vaccine reaction suspected (e.g., fever, headache, muscle aches) occurring 1 to 3 days after getting vaccine    Negative: COVID-19 vaccine, questions about    Negative: [1] Lives with someone known to have influenza (flu test positive) AND [2] flu-like symptoms (e.g., cough, runny nose, sore throat, SOB; with or without fever)    Negative: [1] Adult with possible COVID-19 symptoms AND [2] triager concerned about severity of symptoms or other causes    Negative: COVID-19 and breastfeeding, questions about    Negative: SEVERE or constant chest pain or pressure  (Exception: Mild central chest pain, present only when coughing.)    Negative: MODERATE difficulty breathing (e.g., speaks in phrases, SOB even at rest, pulse 100-120)    Negative: Headache and stiff neck (can't touch chin to chest)    Negative: Oxygen level (e.g., pulse oximetry) 90 percent or lower    Negative: Chest pain or pressure  (Exception: MILD central chest pain, present only when coughing)    Negative: Patient sounds very sick or weak to the triager    Negative: MILD difficulty breathing (e.g., minimal/no SOB at  "rest, SOB with walking, pulse <100)    Negative: Fever > 103 F (39.4 C)    Negative: [1] Fever > 101 F (38.3 C) AND [2] over 60 years of age    Negative: [1] Fever > 100.0 F (37.8 C) AND [2] bedridden (e.g., nursing home patient, CVA, chronic illness, recovering from surgery)    Answer Assessment - Initial Assessment Questions  1. COVID-19 DIAGNOSIS: \"Who made your COVID-19 diagnosis?\" \"Was it confirmed by a positive lab test or self-test?\" If not diagnosed by a doctor (or NP/PA), ask \"Are there lots of cases (community spread) where you live?\" Note: See [facility] health department website, if unsure.      Home test positive 8/18/22  2. COVID-19 EXPOSURE: \"Was there any known exposure to COVID before the symptoms began?\" CDC Definition of close contact: within 6 feet (2 meters) for a total of 15 minutes or more over a 24-hour period.       No.  3. ONSET: \"When did the COVID-19 symptoms start?\"       Symptom onset 8/13/22.  4. WORST SYMPTOM: \"What is your worst symptom?\" (e.g., cough, fever, shortness of breath, muscle aches)      Pt almost asymptomatic now, feels almost normal now.  5. COUGH: \"Do you have a cough?\" If Yes, ask: \"How bad is the cough?\"        Occasional. Mild, congested cough.  6. FEVER: \"Do you have a fever?\" If Yes, ask: \"What is your temperature, how was it measured, and when did it start?\"      Low grade temp currently 99.6 oral.  7. RESPIRATORY STATUS: \"Describe your breathing?\" (e.g., shortness of breath, wheezing, unable to speak)       No.  8. BETTER-SAME-WORSE: \"Are you getting better, staying the same or getting worse compared to yesterday?\"  If getting worse, ask, \"In what way?\"      Symptoms.  9. HIGH RISK DISEASE: \"Do you have any chronic medical problems?\" (e.g., asthma, heart or lung disease, weak immune system, obesity, etc.)      History of Afib, pacemaker, circulatory issues (stent in leg)  10. VACCINE: \"Have you had the COVID-19 vaccine?\" If Yes, ask: \"Which one, how many shots, when " "did you get it?\"        Yes.  11. BOOSTER: \"Have you received your COVID-19 booster?\" If Yes, ask: \"Which one and when did you get it?\"        Booster x1  12. PREGNANCY: \"Is there any chance you are pregnant?\" \"When was your last menstrual period?\"        N/A.  13. OTHER SYMPTOMS: \"Do you have any other symptoms?\"  (e.g., chills, fatigue, headache, loss of smell or taste, muscle pain, sore throat)        Mild sore throat, otherwise symptoms have much improved.  14. O2 SATURATION MONITOR:  \"Do you use an oxygen saturation monitor (pulse oximeter) at home?\" If Yes, ask \"What is your reading (oxygen level) today?\" \"What is your usual oxygen saturation reading?\" (e.g., 95%)        91% (normal 89-92% per ). HR 70.    Protocols used: CORONAVIRUS (COVID-19) DIAGNOSED OR [id number]    Madeline EDWARD RN    "

## 2022-08-19 NOTE — TELEPHONE ENCOUNTER
See phone triage note dated 8/19/22.    Infection prevention notified.    Madeline EDWARD RN

## 2022-08-20 PROBLEM — U07.1 INFECTION DUE TO 2019 NOVEL CORONAVIRUS: Status: ACTIVE | Noted: 2022-01-01

## 2022-08-20 PROBLEM — J96.01 ACUTE RESPIRATORY FAILURE WITH HYPOXIA (H): Status: ACTIVE | Noted: 2022-01-01

## 2022-08-21 NOTE — ED NOTES
[facility]  ED Nurse Handoff Report    Kelly Nunez is a 72 year old female   ED Chief complaint: Shortness of Breath (R/t covid )  . ED Diagnosis:   Final diagnoses:   Infection due to 2019 novel coronavirus   Acute respiratory failure with hypoxia (H)     Allergies:   Allergies   Allergen Reactions     Penicillins Swelling     Pt states she has tolerated cephalosporins in the past.        Code Status: Full Code  Activity level - Baseline/Home:  Independent. Activity Level - Current:   Assist X 1. Lift room needed: No. Bariatric: No   Needed: No   Isolation: Yes. Infection: Not Applicable  COVID r/o and special precautions.     Vital Signs:   Vitals:    08/20/22 2345 08/21/22 0015 08/21/22 0500 08/21/22 0800   BP: 123/60 110/54 101/52 133/69   Pulse: 74 72  71   Resp: 18 14 14    Temp:       TempSrc:       SpO2: 100% 100% 99% 100%       Cardiac Rhythm:  ,      Pain level:    Patient confused: Yes. Patient Falls Risk: Yes.   Elimination Status: Has voided   Patient Report - Initial Complaint: hypoxia. Focused Assessment:   From 7a-12p on 8/21 pt has slept most of the morning. Wearing 2L oxygen via oxymask and maintaining O2 sats at 99%. Pt is confused, but mostly cooperative. Tried to get pt to eat, but she would not eat when staff tried to feed her.    Tests Performed:   CT Chest Pulmonary Embolism w Contrast   Final Result   IMPRESSION:   1.  No pulmonary embolism.   2.  Chronic consolidation with calcification right upper lobe.   3.  Mucous plugging bilateral lung bases.   4.  Emphysema.      XR Chest Port 1 View   Final Result   IMPRESSION: A subclavian dual-chamber pacer is stable. Heart size and vascularity are normal. Shallow inspiration accentuates bibasilar subsegmental atelectasis versus scar. Right lateral apical scarring unchanged. No new focal consolidation or pleural    effusion.        Labs Ordered and Resulted from Time of ED Arrival to Time of ED Departure   INFLUENZA  A/B & SARS-COV2 PCR MULTIPLEX - Abnormal       Result Value    Influenza A PCR Negative      Influenza B PCR Negative      RSV PCR Negative      SARS CoV2 PCR Positive (*)    D DIMER QUANTITATIVE - Abnormal    D-Dimer Quantitative 1.00 (*)    BASIC METABOLIC PANEL - Abnormal    Creatinine 0.94      Sodium 141      Potassium 4.3      Urea Nitrogen 20.1      Chloride 103      Carbon Dioxide (CO2) 28      Anion Gap 10      Glucose 122 (*)     GFR Estimate 65      Calcium 9.1     CBC WITH PLATELETS AND DIFFERENTIAL - Abnormal    WBC Count 6.6      RBC Count 4.00      Hemoglobin 11.8      Hematocrit 37.9      MCV 95      MCH 29.5      MCHC 31.1 (*)     RDW 13.4      Platelet Count 292      % Neutrophils 69      % Lymphocytes 18      % Monocytes 12      % Eosinophils 1      % Basophils 0      % Immature Granulocytes 0      NRBCs per 100 WBC 0      Absolute Neutrophils 4.6      Absolute Lymphocytes 1.2      Absolute Monocytes 0.8      Absolute Eosinophils 0.0      Absolute Basophils 0.0      Absolute Immature Granulocytes 0.0      Absolute NRBCs 0.0     ISTAT GASES LACTATE VENOUS POCT - Abnormal    Lactic Acid POCT 0.7      Bicarbonate Venous POCT 28      O2 Sat, Venous POCT 65 (*)     pCO2V Venous POCT 45      pH Venous POCT 7.41      pO2 Venous POCT 34     CRP INFLAMMATION - Abnormal    CRP Inflammation 54.89 (*)    CBC WITH PLATELETS - Abnormal    WBC Count 5.6      RBC Count 3.91      Hemoglobin 11.5 (*)     Hematocrit 37.8      MCV 97      MCH 29.4      MCHC 30.4 (*)     RDW 13.2      Platelet Count 270     BASIC METABOLIC PANEL - Abnormal    Creatinine 0.76      Sodium 140      Potassium 4.3      Urea Nitrogen 23.4 (*)     Chloride 105      Carbon Dioxide (CO2) 26      Anion Gap 9      Glucose 208 (*)     GFR Estimate 84      Calcium 8.3 (*)    CRP INFLAMMATION - Abnormal    CRP Inflammation 61.11 (*)    D DIMER QUANTITATIVE - Abnormal    D-Dimer Quantitative 0.94 (*)    HEPATIC FUNCTION PANEL - Normal    Protein  Total 7.2      Albumin 4.0      Bilirubin Total 0.2      Alkaline Phosphatase 91      AST 21      ALT 15      Bilirubin Direct <0.20       Treatments provided: monitoring, oxygen ,remdesivir  Family Comments:  is aware pt is here -  is pt's primary care taker  OBS brochure/video discussed/provided to patient:  Yes  ED Medications:   Medications   acetaminophen (TYLENOL) tablet 500-1,000 mg (has no administration in time range)   aspirin (ASA) chewable tablet 81 mg (has no administration in time range)   metoprolol tartrate (LOPRESSOR) tablet 50 mg (50 mg Oral Given 8/21/22 1031)   polyethylene glycol (MIRALAX) Packet 17 g (has no administration in time range)   lidocaine 1 % 0.1-1 mL (has no administration in time range)   lidocaine (LMX4) cream (has no administration in time range)   sodium chloride (PF) 0.9% PF flush 3 mL (has no administration in time range)   sodium chloride (PF) 0.9% PF flush 3 mL (has no administration in time range)   melatonin tablet 1 mg (has no administration in time range)   lidocaine 1 % 0.1-1 mL (has no administration in time range)   lidocaine (LMX4) cream (has no administration in time range)   sodium chloride (PF) 0.9% PF flush 3 mL (has no administration in time range)   sodium chloride (PF) 0.9% PF flush 3 mL (has no administration in time range)   Medication instructions: Do NOT use nebulized medications (has no administration in time range)   sodium chloride 0.9% infusion (has no administration in time range)   dexamethasone PF (DECADRON) injection 6 mg (has no administration in time range)   remdesivir 200 mg in sodium chloride 0.9 % 250 mL intermittent infusion (0 mg Intravenous Stopped 8/21/22 0502)     Followed by   0.9% sodium chloride BOLUS (50 mLs Intravenous Not Given 8/21/22 0503)   remdesivir 100 mg in sodium chloride 0.9 % 250 mL intermittent infusion (has no administration in time range)     And   0.9% sodium chloride BOLUS (has no administration in time  range)   albuterol (PROVENTIL HFA/VENTOLIN HFA) inhaler (has no administration in time range)   acetaminophen (TYLENOL) tablet 650 mg (has no administration in time range)     Or   acetaminophen (TYLENOL) Suppository 650 mg (has no administration in time range)   ondansetron (ZOFRAN ODT) ODT tab 4 mg (has no administration in time range)     Or   ondansetron (ZOFRAN) injection 4 mg (has no administration in time range)   enoxaparin ANTICOAGULANT (LOVENOX) injection 40 mg (40 mg Subcutaneous Given 8/21/22 0115)   ipratropium - albuterol 0.5 mg/2.5 mg/3 mL (DUONEB) neb solution 3 mL (3 mLs Nebulization Given 8/20/22 2105)   dexamethasone PF (DECADRON) injection 6 mg (6 mg Intravenous Given 8/20/22 2104)   iopamidol (ISOVUE-370) solution 500 mL (52 mLs Intravenous Given 8/20/22 2218)   for CT scan flush use (73 mLs Intravenous Given 8/20/22 2218)     Drips infusing:  No  For the majority of the shift, the patient's behavior Green. Interventions performed were frequent rounding.    Sepsis treatment initiated: No     Patient tested for COVID 19 prior to admission: YES    ED Nurse Name/Phone Number: Gissel Garcia RN,   12:03 PM    RECEIVING UNIT ED HANDOFF REVIEW    Above ED Nurse Handoff Report was reviewed: Yes  Reviewed by: Clotilde Robledo RN on August 21, 2022 at 12:40 PM   Clotilde Robledo RN

## 2022-08-21 NOTE — PHARMACY-ADMISSION MEDICATION HISTORY
Admission medication history interview status for this patient is complete. See [facility] admission navigator for allergy information, prior to admission medications and immunization status.     Medication history interview done, indicate source(s): Family ( [place])  Medication history resources (including written lists, pill bottles, clinic record): [facility] and [employer]  Pharmacy: [facility]/pharmacy #[id number] [place], [place] - [address] [address]    Changes made to PTA medication list:  Added: Lorazepam 0.5 mg PRN  Changed: Duoneb 1 vial Q6H PRN --> BID, Lorazepam TID PRN --> BID, Senna 2 tablets daily --> 1 tab BID  Reported as Not Taking: None  Removed: None    Actions taken by pharmacist (provider contacted, etc): Sticky note to provider     Additional medication history information:None    Medication reconciliation/reorder completed by provider prior to medication history?  N   (Y/N)       Prior to Admission medications    Medication Sig Last Dose Taking? Auth Provider Long Term End Date   aspirin (ASA) 81 MG chewable tablet Take 81 mg by mouth every evening 8/20/2022 at Unknown time Yes Unknown, Entered By History     diltiazem ER COATED BEADS (CARDIZEM CD/CARTIA XT) 180 MG 24 hr capsule TAKE 1 CAPSULE BY MOUTH EVERY DAY 8/20/2022 at Unknown time Yes Page Jacobs, APRN CNP Yes    ipratropium - albuterol 0.5 mg/2.5 mg/3 mL (DUONEB) 0.5-2.5 (3) MG/3ML neb solution Take 1 vial by nebulization 2 times daily as needed 8/20/2022 at Unknown time Yes Reported, Patient No    LORazepam (ATIVAN) 0.5 MG tablet Take 0.5 mg by mouth as needed for anxiety  at PRN Yes Unknown, Entered By History     LORazepam (ATIVAN) 0.5 MG tablet Take 0.5 mg by mouth 2 times daily 8/20/2022 at Unknown time Yes Reported, Patient     memantine (NAMENDA) 10 MG tablet Take 10 mg by mouth 2 times daily  8/20/2022 at Unknown time Yes Reported, Patient     metoprolol tartrate (LOPRESSOR) 50 MG tablet TAKE 1 TABLET BY MOUTH TWICE A DAY 8/20/2022 at  Unknown time Yes Page Jacobs, APRN CNP Yes    Multiple Vitamins-Minerals (CENTRUM WOMEN PO) Take 1 capsule by mouth daily  8/20/2022 at Unknown time Yes Reported, Patient     OLANZapine (ZYPREXA) 10 MG tablet Take 10 mg by mouth At Bedtime  8/20/2022 at Unknown time Yes Reported, Patient Yes    senna (SENOKOT) 8.6 MG tablet Take 1 tablet by mouth 2 times daily 8/20/2022 at Unknown time Yes Reported, Patient     traZODone (DESYREL) 100 MG tablet Take  mg by mouth At Bedtime  Past Week at Unknown time Yes Reported, Patient Yes    acetaminophen (TYLENOL) 500 MG tablet Take 500-1,000 mg by mouth every 6 hours as needed for mild pain  at PRN  Reported, Patient     Nebulizers (INNOSPIRE ESSENCE NEBULIZER) MISC Take by nebulization daily as needed    Reported, Patient     polyethylene glycol (MIRALAX) 17 GM/Dose powder Take 1 capful by mouth daily as needed for constipation  at PRN  Reported, Patient

## 2022-08-21 NOTE — ED PROVIDER NOTES
History   Chief Complaint:  Shortness of Breath (R/t covid )       HPI   Kelly Nunez is a 72 year old female with a history of COPD and pulmonary hypertension as well as severe baseline dementia presenting with home COVID test positive and hypoxia.    History is obtained from the patient's  given the patient underlying dementia.  He states that on Monday she started develop symptoms of fatigue and URI symptoms followed by cough and worsening shortness of breath.  He monitors her oxygen daily at home and usually is in the low to mid 90s.  As the week went along she started to use supplemental oxygen that they had at home from previous hospitalizations.  Of note she is not normally on supplemental oxygen.  He was giving her DuoNeb's as prescribed but given oxygen saturations dipping into the 80s he called the ambulance as he himself has COVID and did not want to expose anybody here at the hospital.  She has had 2 shot vaccine series and a booster.    She denies any current pain.  Her  denied any vomiting.  Decreased p.o. intake.  Increased hallucinations since developing COVID.  She has had some loose stools but not melena or hematochezia.    Review of Systems  Completed with the  as patient has underlying dementia.  10 point review of systems otherwise unremarkable other than mentioned above in HPI.      Allergies:  Penicillins    Medications:    Aspirin 81 mg  Cardizem  Duoneb  Ativan  Namenda  Lopressor  Zyprexa  Senokot  Trazodone    Past Medical History:     Atrial fibrillation  Dementia  COPD  Hypertension  Bradycardia  Pacemaker  Superior mesenteric artery stenosis  Atherosclerosis     Past Surgical History:    Femoral bypass graft  Endovascular iliac stent  EP Pacemaker  Angioplasty  Abdominal aortogram  Angiogram  Right leg stent     Family History:    There is no reported family history    Social History:  Patient came from EMS.  Patient is unaccompanied in the ED.  PCP: Reynaldo  Page Be       Physical Exam     Patient Vitals for the past 24 hrs:   BP Temp Temp src Pulse Resp SpO2   08/20/22 2101 125/68 98.1  F (36.7  C) Oral 89 24 90 %       Physical Exam    HENT:  mmm, no rhinorrhea  Eyes: periorbital tissues and sclera normal   Neck: supple, no abnormal swelling  Lungs: Tachypnea mild accessory muscle use, slightly prolonged expiratory phase  CV: rrr, no m/r/g, ppi  Abd: soft, nontender, nondistended, no rebound/masses/guarding/hsm  Ext: no peripheral edema  Skin: warm, dry, well perfused, no rashes/bruising/lesions on exposed skin  Neuro: Awake, speech clear, disoriented (severe baseline dementia per ), MAEE, no gross motor or sensory deficits,  Psych: Normal mood, normal affect      Emergency Department Course     ECG  ECG obtained at 2032, ECG read at 2036.  Normal sinus rhythm  Normal ECG  Rate 88 bpm. VA interval 134 ms. QRS duration 86 ms. QT/QTc 356/430 ms. P-R-T axes 56 1 7.    Imaging:  XR Chest Port 1 View   Final Result   IMPRESSION: A subclavian dual-chamber pacer is stable. Heart size and vascularity are normal. Shallow inspiration accentuates bibasilar subsegmental atelectasis versus scar. Right lateral apical scarring unchanged. No new focal consolidation or pleural    effusion.      CT Chest Pulmonary Embolism w Contrast    (Results Pending)     Report per radiology    Laboratory:  Labs Ordered and Resulted from Time of ED Arrival to Time of ED Departure   D DIMER QUANTITATIVE - Abnormal       Result Value    D-Dimer Quantitative 1.00 (*)    BASIC METABOLIC PANEL - Abnormal    Creatinine 0.94      Sodium 141      Potassium 4.3      Urea Nitrogen 20.1      Chloride 103      Carbon Dioxide (CO2) 28      Anion Gap 10      Glucose 122 (*)     GFR Estimate 65      Calcium 9.1     CBC WITH PLATELETS AND DIFFERENTIAL - Abnormal    WBC Count 6.6      RBC Count 4.00      Hemoglobin 11.8      Hematocrit 37.9      MCV 95      MCH 29.5      MCHC 31.1 (*)     RDW 13.4       Platelet Count 292      % Neutrophils 69      % Lymphocytes 18      % Monocytes 12      % Eosinophils 1      % Basophils 0      % Immature Granulocytes 0      NRBCs per 100 WBC 0      Absolute Neutrophils 4.6      Absolute Lymphocytes 1.2      Absolute Monocytes 0.8      Absolute Eosinophils 0.0      Absolute Basophils 0.0      Absolute Immature Granulocytes 0.0      Absolute NRBCs 0.0     ISTAT GASES LACTATE VENOUS POCT - Abnormal    Lactic Acid POCT 0.7      Bicarbonate Venous POCT 28      O2 Sat, Venous POCT 65 (*)     pCO2V Venous POCT 45      pH Venous POCT 7.41      pO2 Venous POCT 34     HEPATIC FUNCTION PANEL - Normal    Protein Total 7.2      Albumin 4.0      Bilirubin Total 0.2      Alkaline Phosphatase 91      AST 21      ALT 15      Bilirubin Direct <0.20     INFLUENZA A/B & SARS-COV2 PCR MULTIPLEX        Emergency Department Course:       Reviewed:  I reviewed nursing notes, vitals, past medical history and Care Everywhere    Assessments:   I obtained history and examined the patient as noted above.    I talked with the patient's  and explained the plan of care.    I rechecked the patient and explained findings.        Interventions:  Medications   ipratropium - albuterol 0.5 mg/2.5 mg/3 mL (DUONEB) neb solution 3 mL (3 mLs Nebulization Given 22)   dexamethasone PF (DECADRON) injection 6 mg (6 mg Intravenous Given 22)   iopamidol (ISOVUE-370) solution 500 mL (52 mLs Intravenous Given 22)   for CT scan flush use (73 mLs Intravenous Given 22)       Disposition:  The patient was admitted to the hospitalist service.     Impression & Plan     CMS Diagnoses: None      Medical Decision Makin-year-old female presenting with hypoxia into the 80s in setting of positive home COVID test.  Arrives on facemask at 10 L.  She was in the high 90s and so transition to nasal cannula after about 5 minutes she was in the 87 to 88% for saturation so placed back on  an oxygen mask at 8 L that sustained saturations in the high 90s.  Given duo nebs as well as Decadron.  Chest radiograph negative for superimposed pneumonia.  D-dimer elevated and so we will do a CT scan to rule out concomitant PE.  Will need admission given the degree of hypoxia.  If needing escalating oxygen requirement to go to high flow nasal cannula next.    Diagnosis:    ICD-10-CM    1. Infection due to 2019 novel coronavirus  U07.1    2. Acute respiratory failure with hypoxia (H)  J96.01        Scribe Disclosure:  I, Graciela Krueger, am serving as a scribe at 8:30 PM on 8/20/2022 to document services personally performed by Aniket Lebron MD based on my observations and the provider's statements to me.     I, Truman Moya, am serving as a scribe at 9:42 PM on 8/20/2022 to document services personally performed by Aniket Lebron MD based on my observations and the provider's statements to me.       Aniket Lebron MD  08/20/22 9226

## 2022-08-21 NOTE — PLAN OF CARE
Goal Outcome Evaluation:    End of Shift Summary  For vital signs and complete assessments, please see documentation flowsheets.     Pertinent assessments: Pt alert to self only, extremely confused and unable to follow commands. Slow to respond. VSS, 94% on 1L oxymask. SOB reported, SMITH. No cough heard, and pt denies having one. Purewick in place. Bed alarms on.     Major Shift Events: Admitted to floor @ 1330.    Treatment Plan: IVF, O2 monitoring, tele, decadron, remdesivir    Clotilde Robledo RN

## 2022-08-21 NOTE — PROGRESS NOTES
[facility]    Hospitalist Progress Note  Provider : Hollie Hugo MD, MD  Date of Service (when I saw the patient): 08/21/2022    Assessment & Plan      Kelly Nunez is a 72 year old female w/PMH COPD, pulmonary hypertension, dementia, hypertension, PAD s/p stents, DLD who presents from home with sob, hypoxia and found to have covid, hypoxia    Acute hypoxic respiratory failure  covid +  -tested positive for covid with sob, cough, malaise. CRP and d dimer elevated but CXR without focal infiltrates. Suspect viral pneumonia but ruling out PE at this time as well with CT angio-pending  -s/p decadron in ED.  -Admitted and continued on dexamethasone. Also started on remdesivir. Initially requiring oxygen 8 L/min.  Currently on oxygen 1 L/min.   -serial labs and wean oxygen as able, not on at home  -monitor on tele, albuterol inhaler PRN  -Updated patient's     History of COPD, pulmonary HTN, dementia, HTN, PAD s/p stents, DLD  -continue  home metoprolol, ASA but awaiting med rec for most meds    Diet: Combination Diet Regular Diet Adult regular diet  DVT Prophylaxis: Enoxaparin (Lovenox) SQ  Cabral Catheter: Not present  Central Lines: None  Cardiac Monitoring: ACTIVE order. Indication: covid 19  Code Status: Full Code full code    DVT Prophylaxis: Pneumatic Compression Devices  Code Status: Full Code    Disposition: Expected discharge: Anticipate at least 2 nights of hospital course until her breathing improves.     Hollie Hugo MD    Interval History   Patient seen and examined.Patient looks a little agitated.  Breathing looks stable. She has no nausea or vomiting. She is a poor historian.     -Data reviewed today: I reviewed all new labs and imaging results over the last 24 hours. I personally reviewed  Physical Exam   Temp: 98.2  F (36.8  C) Temp src: Axillary BP: 134/58 Pulse: 78   Resp: 24 SpO2: 94 % O2 Device: Oxymask Oxygen Delivery: 1 LPM  There were no vitals  filed for this visit.  Vital Signs with Ranges  Temp:  [98.1  F (36.7  C)-98.2  F (36.8  C)] 98.2  F (36.8  C)  Pulse:  [71-89] 78  Resp:  [14-24] 24  BP: ()/(52-82) 134/58  SpO2:  [90 %-100 %] 94 %  I/O last 3 completed shifts:  In: 230 [IV Piggyback:230]  Out: -     GEN:  Alert, oriented x 3, appears comfortable, NAD.  HEENT:  Normocephalic/atraumatic, no scleral icterus, no nasal discharge, mouth moist.  CV:  Regular rate and rhythm, no murmur or JVD.  S1 + S2 noted, no S3 or S4.  LUNGS:  Clear to auscultation bilaterally without rales/rhonchi/wheezing/retractions.  Symmetric chest rise on inhalation noted.  ABD:  Active bowel sounds, soft, non-tender/non-distended.  No rebound/guarding/rigidity.  EXT:  No edema or cyanosis.  Hands/feet warm to touch with good signs of peripheral perfusion.  No joint synovitis noted.  SKIN:  Dry to touch, no exanthems noted in the visualized areas.  NEURO:  Symmetric muscle strength, sensation to touch grossly intact.  No new focal deficits appreciated.    Medications     - MEDICATION INSTRUCTIONS -       sodium chloride 75 mL/hr at 08/21/22 1411       sodium chloride 0.9%  50 mL Intravenous Once     remdesivir  100 mg Intravenous Q24H    And     sodium chloride 0.9%  50 mL Intravenous Q24H     aspirin  81 mg Oral QPM     dexamethasone  6 mg Intravenous Daily     enoxaparin ANTICOAGULANT  40 mg Subcutaneous Q24H     metoprolol tartrate  50 mg Oral BID     sodium chloride (PF)  3 mL Intracatheter Q8H     sodium chloride (PF)  3 mL Intracatheter Q8H       Data   Recent Labs   Lab 08/21/22  0516 08/20/22  2103   WBC 5.6 6.6   HGB 11.5* 11.8   MCV 97 95    292    141   POTASSIUM 4.3 4.3   CHLORIDE 105 103   CO2 26 28   BUN 23.4* 20.1   CR 0.76 0.94   ANIONGAP 9 10   ATTILA 8.3* 9.1   * 122*   ALBUMIN  --  4.0   PROTTOTAL  --  7.2   BILITOTAL  --  0.2   ALKPHOS  --  91   ALT  --  15   AST  --  21       Recent Results (from the past 24 hour(s))   XR Chest Port 1  View    Narrative    EXAM: XR CHEST PORT 1 VIEW  LOCATION: [facility]  DATE/TIME: 8/20/2022 9:49 PM    INDICATION: Cough  COMPARISON: 03/13/2020      Impression    IMPRESSION: A subclavian dual-chamber pacer is stable. Heart size and vascularity are normal. Shallow inspiration accentuates bibasilar subsegmental atelectasis versus scar. Right lateral apical scarring unchanged. No new focal consolidation or pleural   effusion.   CT Chest Pulmonary Embolism w Contrast    Narrative    EXAM: CT CHEST PULMONARY EMBOLISM W CONTRAST  LOCATION: [facility]  DATE/TIME: 8/20/2022 10:26 PM    INDICATION: covid, hypoxia, elevated ddimer  COMPARISON: 01/23/2018  TECHNIQUE: CT chest pulmonary angiogram during arterial phase injection of IV contrast. Multiplanar reformats and MIP reconstructions were performed. Dose reduction techniques were used.   CONTRAST: 52mL Isovue 370    FINDINGS:  ANGIOGRAM CHEST: Pulmonary arteries are normal caliber and negative for pulmonary emboli. Thoracic aorta is not well opacified and is  indeterminate for dissection. No CT evidence of right heart strain.    LUNGS AND PLEURA: 4 mm subpleural nodule right lower lobe series 9 image 155. Emphysema. Chronic consolidation in the right upper lobe with mineralization. Mucous plugging in the lung bases. Basilar atelectasis.    MEDIASTINUM/AXILLAE: Atherosclerotic aorta. No adenopathy or pericardial effusion. Cardiac leads.    CORONARY ARTERY CALCIFICATION: Moderate.    UPPER ABDOMEN: Nodular thickening left adrenal.    MUSCULOSKELETAL: Degenerative change osseous structures. Mild loss of superior endplate L1 per      Impression    IMPRESSION:  1.  No pulmonary embolism.  2.  Chronic consolidation with calcification right upper lobe.  3.  Mucous plugging bilateral lung bases.  4.  Emphysema.

## 2022-08-21 NOTE — ED NOTES
Bed: ED23  Expected date: 8/20/22  Expected time: 8:10 PM  Means of arrival: Ambulance  Comments:  Iliana 597- 71 yo +COVID

## 2022-08-21 NOTE — ED TRIAGE NOTES
Pt's  called EMS due to the patient seeming hypoxic. Both pt and  covid (+) with symptoms worsening over last few days. Pt SATing 83% on RA. Neb given in rig

## 2022-08-21 NOTE — ED NOTES
RN and ERT changed pt into clean brief. Changed linens on bed. Pt had a fully formed, brown stool in her brief; urine also present in brief. Pt continues to wear 2L of oxygen and tolerate well.

## 2022-08-21 NOTE — H&P
[facility]    History and Physical - Hospitalist Service       Date of Admission:  8/20/2022    Assessment & Plan      Kelyl Nunez is a 72 year old female w/PMH COPD, pulml HTN, dementia, HTN, PAD s/p stents, DLD who presents from home with sob, hypoxia and found to have covid, hypoxia    Acute hypoxic respiratory failure  covid +  -tested positive for covid with sob, cough, malaise. CRP and d dimer elevated but CXR without focal infiltrates. Suspect viral pneumonia but ruling out PE at this time as well with CT angio-pending  -s/p decadron in ED and will continue and add remdesivir based on hospital guideline as she's on 8L NC  -serial labs and wean oxygen as able, not on at home  -monitor on tele, albuterol inhaler PRN    Hx COPD, pulmonary HTN, dementia, HTN, PAD s/p stents, DLD  -resume home metoprolol, ASA but awaiting med rec for most meds    Diet:   regular diet  DVT Prophylaxis: Enoxaparin (Lovenox) SQ  Cabral Catheter: Not present  Central Lines: None  Cardiac Monitoring: None  Code Status:   full code     The patient's care was discussed with the Patient and ED Team.    Ever Jiang DO  Hospitalist Service  [facility]  Securely message with the Vocera Web Console (learn more here)  Text page via Stayzilla Paging/Directory   ______________________________________________________________________    Chief Complaint   Sob, fatigue    History of Present Illness   Kelly Nunez is a 72 year old female w/PMH COPD, pulml HTN, dementia, HTN, PAD s/p stents, DLD who presents from home with sob, hypoxia. She is severely demented so history is taken from ED team as no one at bedside. Basically on Monday started having fatigue, cough, worsening sob. They apparently had oxygen at home from previous hospitalization and had been using that as well as duonebs without improvement. She lives with her  and he has tested positive for covid. She apparently has had  two covid shots and a booster.     In ED was up to 8L of oxygen and reports some sob but deneis any CP, fever, chills but she is an extremely poor historian. CRP markedly elevated but otherwise labs are unremarkable. She is positive for covid. D dimer is 1, CXR shows no new consolidation or effusion. She has gone for CT angio chest, awaiting radiology read.    Review of Systems    ROS as above but very poor historian    Past Medical History    I have reviewed this patient's medical history and updated it with pertinent information if needed.   Past Medical History:   Diagnosis Date     Atrial fibrillation (H)      COPD (chronic obstructive pulmonary disease) (H)      Dementia (H)      Diabetes (H)     past history     Hypertension      Paroxysmal atrial fibrillation (H)      Sinus node dysfunction (H)        Past Surgical History   I have reviewed this patient's surgical history and updated it with pertinent information if needed.  Past Surgical History:   Procedure Laterality Date     BYPASS GRAFT FEMORAL FEMORAL Left 2/18/2022    Procedure: LEFT TO RIGHT FEMORAL-FEMORAL BYPASS;  Surgeon: Ray Ramirez MD;  Location:  OR     ENDOVASCULAR ILIAC STENT PLACEMENT Left 2/18/2022    Procedure: LEFT common ILIAC ARTERY STENTING with 8 X 39 stent, left femoral endarterectomy with bovine patch angioplasty, pelvic arteriogram, external iliac artery stenting with 7mm self expandning grafts X 2, left to right femoral to femoral bypass with 8mm PTFE graft;  Surgeon: Ray Ramirez MD;  Location:  OR     EP PACEMAKER N/A 10/12/2020    Procedure: EP Pacemaker;  Surgeon: Luis Onofre MD;  Location:  HEART CARDIAC CATH LAB     HEART CATH, ANGIOPLASTY       IR ABDOMINAL AORTOGRAM  5/10/2013     IR MISCELLANEOUS PROCEDURE  5/10/2013     IR OR ANGIOGRAM  2/18/2022     VASCULAR SURGERY  2014    right leg stent       Social History   I have reviewed this patient's social history and updated it with  pertinent information if needed.  Social History     Tobacco Use     Smoking status: Former Smoker     Packs/day: 1.00     Types: Cigarettes     Quit date: 2017     Years since quittin.6     Smokeless tobacco: Never Used   Vaping Use     Vaping Use: Former   Substance Use Topics     Alcohol use: Not Currently     Drug use: No       Family History   Unable to tell me due dementia    Prior to Admission Medications   Prior to Admission Medications   Prescriptions Last Dose Informant Patient Reported? Taking?   LORazepam (ATIVAN) 0.5 MG tablet  Spouse/Significant Other Yes No   Sig: Take 0.5 mg by mouth 3 times daily as needed    Multiple Vitamins-Minerals (CENTRUM WOMEN PO)  Spouse/Significant Other Yes No   Sig: Take 1 capsule by mouth daily    Nebulizers (INNOSPIRE ESSENCE NEBULIZER) MISC  Spouse/Significant Other Yes No   Sig: Take by nebulization daily as needed    OLANZapine (ZYPREXA) 10 MG tablet  Spouse/Significant Other Yes No   Sig: Take 10 mg by mouth At Bedtime    acetaminophen (TYLENOL) 500 MG tablet  Spouse/Significant Other Yes No   Sig: Take 500-1,000 mg by mouth every 6 hours as needed for mild pain   aspirin (ASA) 81 MG chewable tablet  Spouse/Significant Other Yes No   Sig: Take 81 mg by mouth every evening   diltiazem ER COATED BEADS (CARDIZEM CD/CARTIA XT) 180 MG 24 hr capsule   No No   Sig: TAKE 1 CAPSULE BY MOUTH EVERY DAY   ipratropium - albuterol 0.5 mg/2.5 mg/3 mL (DUONEB) 0.5-2.5 (3) MG/3ML neb solution  Spouse/Significant Other Yes No   Sig: Take 1 vial by nebulization every 6 hours as needed    memantine (NAMENDA) 10 MG tablet  Spouse/Significant Other Yes No   Sig: Take 10 mg by mouth 2 times daily    metoprolol tartrate (LOPRESSOR) 50 MG tablet   No No   Sig: TAKE 1 TABLET BY MOUTH TWICE A DAY   polyethylene glycol (MIRALAX) 17 GM/Dose powder  Spouse/Significant Other Yes No   Sig: Take 1 capful by mouth daily as needed for constipation   senna (SENOKOT) 8.6 MG tablet   Spouse/Significant Other Yes No   Sig: Take 2 tablets by mouth daily   traZODone (DESYREL) 100 MG tablet  Spouse/Significant Other Yes No   Sig: Take 0.5-100 mg by mouth At Bedtime       Facility-Administered Medications Last Administration Doses Remaining   lidocaine 1% with EPINEPHrine 1:100,000 injection 3 mL None recorded 1        Allergies   Allergies   Allergen Reactions     Penicillins Swelling     Pt states she has tolerated cephalosporins in the past.        Physical Exam   Vital Signs: Temp: 98.1  F (36.7  C) Temp src: Oral BP: 125/68 Pulse: 89   Resp: 24 SpO2: 90 % O2 Device: Oxymask    Weight: 0 lbs 0 oz    Constitutional: awake, alert, cooperative and no apparent distress  Eyes: pupils equal, round and reactive to light and conjunctiva normal  ENT: mask in place  Respiratory: no increased work of breathing, good air exchange and scattered ronchi bilaterally  Cardiovascular: regular rate and rhythm and no murmur noted  GI: normal bowel sounds, soft, non-distended and non-tender  Skin: no bruising or bleeding  Neurologic: alert, oriented x1, generally very weak    Data   Data reviewed today: I reviewed all medications, new labs and imaging results over the last 24 hours.    Recent Labs   Lab 08/20/22 2103   WBC 6.6   HGB 11.8   MCV 95         POTASSIUM 4.3   CHLORIDE 103   CO2 28   BUN 20.1   CR 0.94   ANIONGAP 10   ATTILA 9.1   *   ALBUMIN 4.0   PROTTOTAL 7.2   BILITOTAL 0.2   ALKPHOS 91   ALT 15   AST 21     Most Recent 3 CBC's:Recent Labs   Lab Test 08/20/22  2103 03/09/22  1505 02/21/22  0708   WBC 6.6 5.0 6.3   HGB 11.8 11.8 9.7*   MCV 95 96 95    445 219     Most Recent 3 BMP's:Recent Labs   Lab Test 08/20/22  2103 02/23/22  0954 02/23/22  0546 02/21/22  1044 02/21/22  0708 02/20/22  1722 02/20/22  0730     --   --   --  140  --  143   POTASSIUM 4.3  --   --   --  3.7  --  3.4   CHLORIDE 103  --   --   --  109  --  111*   CO2 28  --   --   --  28  --  28   BUN 20.1  --    --   --  15  --  18   CR 0.94  --   --   --  0.59  --  0.61   ANIONGAP 10  --   --   --  3  --  4   ATTILA 9.1  --   --   --  8.5  --  8.1*   * 106* 98   < > 99   < > 87  87    < > = values in this interval not displayed.     Most Recent 2 LFT's:Recent Labs   Lab Test 08/20/22 2103 12/01/21  1337   AST 21 18   ALT 15 25   ALKPHOS 91 87   BILITOTAL 0.2 0.2     Most Recent 3 INR's:Recent Labs   Lab Test 02/19/22  0713 10/12/20  0528   INR 1.11 1.13     Most Recent D-dimer:Recent Labs   Lab Test 08/20/22  2144   DD 1.00*     Most Recent ABG:Recent Labs   Lab Test 08/20/22  2100   PH 7.41     Most Recent ESR & CRP:Recent Labs   Lab Test 08/20/22 2103 12/01/21  1337   SED  --  62*   CRP 54.89*  --      6.6    \    11.8    /    292   N 69    L N/A    141    103    20.1 /   ------------------------------------ 122 (H)   ALT 15   AST 21   AP 91   ALB 4.0   Ca 9.1  4.3    28    0.94 \    % RETIC N/A    LDH N/A  Troponin N/A    BNP N/A    CK N/A  INR N/A   PTT N/A    D-dimer 1.00 (H)    Fibrinogen N/A    Antithrombin N/A  Ferritin N/A  CRP 54.89 (H)    IL-6 N/A  Recent Results (from the past 24 hour(s))   XR Chest Port 1 View    Narrative    EXAM: XR CHEST PORT 1 VIEW  LOCATION: [facility]  DATE/TIME: 8/20/2022 9:49 PM    INDICATION: Cough  COMPARISON: 03/13/2020      Impression    IMPRESSION: A subclavian dual-chamber pacer is stable. Heart size and vascularity are normal. Shallow inspiration accentuates bibasilar subsegmental atelectasis versus scar. Right lateral apical scarring unchanged. No new focal consolidation or pleural   effusion.

## 2022-08-21 NOTE — ED NOTES
"Ordered food for patient. ERT attempted to feed patient. Pt is showing no interest in food. Pt says \"no\" when you hold a spoon of food up to her mouth. Offered eggs, fruit, and toast, which pt's  states she typically eats at home.   "

## 2022-08-21 NOTE — ED TRIAGE NOTES
Triage Assessment     Row Name 08/20/22 2100       Triage Assessment (Adult)    Airway WDL WDL       Respiratory WDL    Respiratory WDL X;rhythm/pattern    Rhythm/Pattern, Respiratory dyspnea on exertion;shortness of breath       Skin Circulation/Temperature WDL    Skin Circulation/Temperature WDL WDL       Cardiac WDL    Cardiac WDL WDL       Peripheral/Neurovascular WDL    Peripheral Neurovascular WDL WDL       Cognitive/Neuro/Behavioral WDL    Cognitive/Neuro/Behavioral WDL X;orientation    Level of Consciousness confused  baseline dementia    Orientation disoriented to;place;time;situation    Speech spontaneous    Mood/Behavior calm;cooperative       Maxwell Coma Scale    Best Eye Response 4-->(E4) spontaneous    Best Motor Response 6-->(M6) obeys commands    Best Verbal Response 4-->(V4) confused    Maxwell Coma Scale Score 14

## 2022-08-21 NOTE — ED PROVIDER NOTES
I received patient in signout from Dr. Lebron.  Please refer to their complete H&P for further information.  Briefly, patient is a 71 yo female presenting with dyspnea, known recent COVID + home test.  Hypoxic on arrival, placed on supplemental O2.  Nebs and decadron given. At time of signout, CT chest to exclude PE and admission call pending.     CT Chest Pulmonary Embolism w Contrast   Final Result   IMPRESSION:   1.  No pulmonary embolism.   2.  Chronic consolidation with calcification right upper lobe.   3.  Mucous plugging bilateral lung bases.   4.  Emphysema.      XR Chest Port 1 View   Final Result   IMPRESSION: A subclavian dual-chamber pacer is stable. Heart size and vascularity are normal. Shallow inspiration accentuates bibasilar subsegmental atelectasis versus scar. Right lateral apical scarring unchanged. No new focal consolidation or pleural    effusion.            10:42 PM  I spoke to hospitalist Dr. Jiang. No PE on CT.  No acute events overnight.  Boarding in ED at this time, signed out to AM physician.      Donna Nunez, DO  08/21/22 0042

## 2022-08-21 NOTE — ED NOTES
[facility]  ED Nurse Handoff Report    Kelly Nunez is a 72 year old female   ED Chief complaint: Shortness of Breath (R/t covid )  . ED Diagnosis:   Final diagnoses:   Infection due to 2019 novel coronavirus   Acute respiratory failure with hypoxia (H)     Allergies:   Allergies   Allergen Reactions     Penicillins Swelling     Pt states she has tolerated cephalosporins in the past.        Code Status: Full Code  Activity level - Baseline/Home:  Assist X 1. Activity Level - Current:   Assist X 1. Lift room needed: No. Bariatric: No   Needed: No   Isolation: Yes. Infection: Not Applicable  COVID r/o and special precautions.     Vital Signs:   Vitals:    08/20/22 2101   BP: 125/68   Pulse: 89   Resp: 24   Temp: 98.1  F (36.7  C)   TempSrc: Oral   SpO2: 90%       Cardiac Rhythm:  ,      Pain level:    Patient confused: Yes. Patient Falls Risk: Yes.   Elimination Status: Has voided   Patient Report - Initial Complaint :History is obtained from the patient's  given the patient underlying dementia.  He states that on Monday she started develop symptoms of fatigue and URI symptoms followed by cough and worsening shortness of breath.  He monitors her oxygen daily at home and usually is in the low to mid 90s.  As the week went along she started to use supplemental oxygen that they had at home from previous hospitalizations.  Of note she is not normally on supplemental oxygen.  He was giving her DuoNeb's as prescribed but given oxygen saturations dipping into the 80s he called the ambulance as he himself has COVID and did not want to expose anybody here at the hospital.  She has had 2 shot vaccine series and a booster.     She denies any current pain.  Her  denied any vomiting.  Decreased p.o. intake.  Increased hallucinations since developing COVID.  She has had some loose stools but not melena or hematochezia.: . Focused Assessment: Confused at baseline. Maintaining SATs on 8L  oxymask.   Tests Performed:   Labs Ordered and Resulted from Time of ED Arrival to Time of ED Departure   INFLUENZA A/B & SARS-COV2 PCR MULTIPLEX - Abnormal       Result Value    Influenza A PCR Negative      Influenza B PCR Negative      RSV PCR Negative      SARS CoV2 PCR Positive (*)    D DIMER QUANTITATIVE - Abnormal    D-Dimer Quantitative 1.00 (*)    BASIC METABOLIC PANEL - Abnormal    Creatinine 0.94      Sodium 141      Potassium 4.3      Urea Nitrogen 20.1      Chloride 103      Carbon Dioxide (CO2) 28      Anion Gap 10      Glucose 122 (*)     GFR Estimate 65      Calcium 9.1     CBC WITH PLATELETS AND DIFFERENTIAL - Abnormal    WBC Count 6.6      RBC Count 4.00      Hemoglobin 11.8      Hematocrit 37.9      MCV 95      MCH 29.5      MCHC 31.1 (*)     RDW 13.4      Platelet Count 292      % Neutrophils 69      % Lymphocytes 18      % Monocytes 12      % Eosinophils 1      % Basophils 0      % Immature Granulocytes 0      NRBCs per 100 WBC 0      Absolute Neutrophils 4.6      Absolute Lymphocytes 1.2      Absolute Monocytes 0.8      Absolute Eosinophils 0.0      Absolute Basophils 0.0      Absolute Immature Granulocytes 0.0      Absolute NRBCs 0.0     ISTAT GASES LACTATE VENOUS POCT - Abnormal    Lactic Acid POCT 0.7      Bicarbonate Venous POCT 28      O2 Sat, Venous POCT 65 (*)     pCO2V Venous POCT 45      pH Venous POCT 7.41      pO2 Venous POCT 34     CRP INFLAMMATION - Abnormal    CRP Inflammation 54.89 (*)    HEPATIC FUNCTION PANEL - Normal    Protein Total 7.2      Albumin 4.0      Bilirubin Total 0.2      Alkaline Phosphatase 91      AST 21      ALT 15      Bilirubin Direct <0.20       . Abnormal Results:   XR Chest Port 1 View   Final Result   IMPRESSION: A subclavian dual-chamber pacer is stable. Heart size and vascularity are normal. Shallow inspiration accentuates bibasilar subsegmental atelectasis versus scar. Right lateral apical scarring unchanged. No new focal consolidation or pleural     effusion.      CT Chest Pulmonary Embolism w Contrast    (Results Pending)     .   Treatments provided: See mar  Family Comments: None  OBS brochure/video discussed/provided to patient:  No  ED Medications:   Medications   ipratropium - albuterol 0.5 mg/2.5 mg/3 mL (DUONEB) neb solution 3 mL (3 mLs Nebulization Given 8/20/22 2105)   dexamethasone PF (DECADRON) injection 6 mg (6 mg Intravenous Given 8/20/22 2104)   iopamidol (ISOVUE-370) solution 500 mL (52 mLs Intravenous Given 8/20/22 2218)   for CT scan flush use (73 mLs Intravenous Given 8/20/22 2218)     Drips infusing:  No  For the majority of the shift, the patient's behavior Green. Interventions performed were none  .    Sepsis treatment initiated: No     Patient tested for COVID 19 prior to admission: YES    ED Nurse Name/Phone Number: Alexandria Chase RN,   11:06 PM

## 2022-08-22 NOTE — PLAN OF CARE
Goal Outcome Evaluation:        End of Shift Summary  For vital signs and complete assessments, please see documentation flowsheets.     Pertinent assessments:   Alert, disoriented x4. Speech clear, illogical at times. Visual hallucinations noted. Denies pain, declined tylenol. Appears anxious intermittently, PRN ativan given. Weaned to 2L NC, continuous pulse ox, sats in the low 90s. Purewick in place, change this shift, no skin breakdown noted. Yellow, clear output. Bed alarms on, video monitoring in place via ipad to ensure patient safety.     Major Shift Events: none    Treatment Plan: IVF, O2 monitoring, tele, decadron, remdesivir

## 2022-08-22 NOTE — PLAN OF CARE
Goal Outcome Evaluation:        End of Shift Summary  For vital signs and complete assessments, please see documentation flowsheets.     Pertinent assessments: Pt alert to self only, extremely confused and unable to follow commands. Slow to respond. VSS,  02 93% on 3LNC. SOB, . No cough heard, and pt denies having one. Denies pain, Purewick in place. Bed alarms on. Did not sleep all NOC    Major Shift Events: Did not sleep / restless/agitated. Unccoperative/pulling at 02 lines and IV tubing.    Treatment Plan: IVF, O2 monitoring, tele, decadron, remdesivir    Bedside Nurse: Robert BLACK RN

## 2022-08-22 NOTE — PROGRESS NOTES
Hospitalist Medicine Progress Note   [facility]       Kelly Nunez is a 72 year old lady with COPD, pulmonary hypertension, dementia, hypertension, PAD s/p stents who came in to [facility] 8/20/2022 with 5 day symptoms of fatigue, cough, worsening SOB after her  contacted Covid. She was diagnosed with Covid at admission requiring 3 Liters of Oxygen. She was started on Remdesivir and dexamethazone        Date of Admission:  8/20/2022  Assessment & Plan     Acute Hypoxic Respiratory Failure   covid 19 infection  COVID symptoms started 8/15/2022 was diagnosed with COVID 8/20/2022  Started on remdesivir and dexamethasone  8/21/2022  On 3 L/min of oxygen which will be monitored  CRP is going down  On enoxaparin 40 mg daily for DVT prophylaxis    COPD  With acute COPD exacerbation  On steroids and nebulizations    Pulmonary hypertension  Will need to decide how much oxygen to give with pulmonary hypertension    Hypertension  On metoprolol 50 mg twice daily and diltiazem 180 mg daily will be continued    Health vascular disease  S/p stenting and aspirin and beta-blockade along with statins    Dementia  On Namenda which will be continued    Plan:   Follow CRP in a.m.  With history of pulmonary arterial hypertension it might not be a bad idea to discharge the patient home on oxygen nevertheless we need to also keep in mind that she has a history of dementia      Diet: Combination Diet Regular Diet Adult    DVT Prophylaxis: Enoxaparin (Lovenox) SQ  Cabral Catheter: Not present  Code Status: Full Code               The patient's care was discussed with the Patient and patient's  Kendall -who feels that if his wife is discharged a couple of days later that would be best for him but I told him that we will monitor her progress on a day-to-day basis and decide about the discharge on a day-to-day basis.      Milton Hutchison MD  Hospitalist Service  [facility]  Hospital    ______________________________________________________________________    Interval History     Symptoms   Patient is pleasantly confused and denies any significant symptoms    Review of Systems:   As above    Data reviewed today: I reviewed all medications, new labs and imaging results over the last 24 hours.     Physical Exam   Vital Signs: Temp: 98.1  F (36.7  C) Temp src: Axillary BP: 136/58 Pulse: 78   Resp: 20 SpO2: 94 % O2 Device: Nasal cannula Oxygen Delivery: 3 LPM  Weight: 0 lbs 0 oz      GENERAL: Patient is not in acute distress  HEENT: EOM+,Conjunctiva is clear   NECK:  no Jugular Venous distention  HEART: S1 S2 regular Rate and Rhythm, there is  no murmur,   LUNGS: Respirations are  not laboured, Lungs have few crackles in the bases otherwise to auscultation without Wheezing   ABDOMEN: Soft , there is no tenderness ,Bowel Sounds are  Positive   LOWER LIMBS: no  Pedal Edema  Bilaterally   CNS:  Alert, pleasantly confused, Moving all the Four Limbs     Data   Recent Labs   Lab 08/22/22  0644 08/21/22  0516 08/20/22  2103   WBC 10.5 5.6 6.6   HGB 12.4 11.5* 11.8   MCV 94 97 95    270 292    140 141   POTASSIUM 4.1 4.3 4.3   CHLORIDE 106 105 103   CO2 24 26 28   BUN 23.1* 23.4* 20.1   CR 0.51 0.76 0.94   ANIONGAP 9 9 10   ATTILA 8.6* 8.3* 9.1   * 208* 122*   ALBUMIN  --   --  4.0   PROTTOTAL  --   --  7.2   BILITOTAL  --   --  0.2   ALKPHOS  --   --  91   ALT  --   --  15   AST  --   --  21         No results found for this or any previous visit (from the past 24 hour(s)).

## 2022-08-23 NOTE — PROGRESS NOTES
Care Management Note    Discharge Date: 08/24/2022       Discharge Disposition:  Home with spouse    Discharge Transportation:  spouse      Handoff Referral Completed: Yes [facility] dated for discharge on 8/25    Additional Information:  Pt identified as a Service Bundle #3. No needs or assessment needed at this time. Please consult CM/SW  if discharge needs should arise.    Laury Man RN BSN OCN  Care Coordinator  [facility]  [phone]

## 2022-08-23 NOTE — PROGRESS NOTES
Care Management Discharge Note    Discharge Date: 08/25/2022       Discharge Disposition: Home    Discharge Services: PCA    Discharge DME: Walker - has at baseline, but doesn't use due to confusion    Discharge Transportation: family or friend will provide - pt's spouse and PCA    Private pay costs discussed: transportation costs    PAS Confirmation Code:  (N/A)  Patient/family educated on Medicare website which has current facility and service quality ratings:  (N/A)    Education Provided on the Discharge Plan:  yes  Persons Notified of Discharge Plans: Pt's spouse  Patient/Family in Agreement with the Plan: yes    Handoff Referral Completed: Yes    Additional Information:  Sw spoke with the pt's spouse who said that the pt has a HHA M-F [id number] through [employer].  Transportation options and costs were discussed and the pt's spouse and HHA will provide transport.  The pt's spouse is hopeful that the pt will not require O2 at discharge.  He had no additional questions or concerns for Sw at this time.    Sw will continue to be available as needed until discharge.    JOSE Rios, [facility]  Inpatient Care Coordination  [facility]  [phone]

## 2022-08-23 NOTE — PROGRESS NOTES
[facility]    Progress Note - Hospitalist Service       Date of Admission:  8/20/2022    Assessment & Plan          Kelly Nunez is a 72 year old female with history of COPD, pulmonary hypertension, dementia, hypertension, and PAD s/p previous stenting.  She also has chronic dementia and is disoriented completely at baseline.  She presented to [facility] on 8/20/2022 with 5 days of fatigue, cough, and worsening SOB.  Her  had recently contacted Covid. She was diagnosed with acute hypoxic respiratory failure due to COVID-19.  She was admitted and treated with supplemental oxygen, Remdesivir, and dexamethazone.  She has slowly improved       Problem list:     Acute Hypoxic Respiratory Failure   COVID 19 infection  -COVID symptoms started 8/15/2022 and she was diagnosed with COVID on 8/20/2022  -Started on remdesivir and dexamethasone 8/21/2022  -Has required supplemental oxygen.  Wean as tolerated.  -CRP is going down  -On enoxaparin 40 mg daily for DVT prophylaxis     COPD with acute COPD exacerbation  -On steroids and nebulized breathing treatments     Pulmonary hypertension  -Presumably stable     Hypertension  -Continue prior to admission metoprolol 50 mg twice daily and diltiazem 180 mg daily      Peripheral vascular disease  -S/p stenting   -Continue prior to admission aspirin,  beta-blocker, and statin     Dementia  -Continue prior to admission Namenda        Diet: Combination Diet Regular Diet Adult    DVT Prophylaxis: Enoxaparin (Lovenox) SQ  Cabral Catheter: Not present  Fluids: None  Central Lines: None  Cardiac Monitoring: ACTIVE order. Indication: covid 19  Code Status: Full Code      Disposition Plan   Likely home with family on 8/25/2022, hopefully once off oxygen       The patient's care was discussed with the Bedside Nurse and Patient.    Toby Tapia MD  Hospitalist Service  [facility]  Securely message with the Vocera Web Console (learn  more here)  Text page via [facility] Paging/Directory         Clinically Significant Risk Factors Present on Admission                     ______________________________________________________________________    Interval History   Feeling better.  No new problems.  No chest pain.  Disoriented.    Data reviewed today: I reviewed all medications, new labs and imaging results over the last 24 hours. I personally reviewed no images or EKG's today.    Physical Exam   Vital Signs: Temp: 97.6  F (36.4  C) Temp src: Oral BP: 125/58 Pulse: 66   Resp: 22 SpO2: 96 %   Oxygen Delivery: 1 LPM  Weight: 0 lbs 0 oz  GENERAL:  Comfortable. Cooperative.  PSYCH: pleasant, oriented, No acute distress.  EYES: PERRLA, Normal conjunctiva.  HEART:  Regular rate and rhythm. No JVD. Pulses normal. No edema.  LUNGS:  Clear to auscultation, normal Respiratory effort.  ABDOMEN:  Soft, no hepatosplenomegaly, normal bowel sounds.  EXTREMETIES: No clubbing, cyanosis or ischemia  SKIN:  Dry to touch, No rash.      Data   Recent Labs   Lab 08/23/22  0631 08/22/22  0644 08/21/22  0516 08/20/22  2103   WBC 8.6 10.5 5.6 6.6   HGB 12.4 12.4 11.5* 11.8   MCV 95 94 97 95    350 270 292    139 140 141   POTASSIUM 4.2 4.1 4.3 4.3   CHLORIDE 107 106 105 103   CO2 22 24 26 28   BUN 23.4* 23.1* 23.4* 20.1   CR 0.48* 0.51 0.76 0.94   ANIONGAP 11 9 9 10   ATTILA 8.5* 8.6* 8.3* 9.1   * 122* 208* 122*   ALBUMIN  --   --   --  4.0   PROTTOTAL  --   --   --  7.2   BILITOTAL  --   --   --  0.2   ALKPHOS  --   --   --  91   ALT  --   --   --  15   AST  --   --   --  21     No results found for this or any previous visit (from the past 24 hour(s)).

## 2022-08-23 NOTE — PLAN OF CARE
[id number]  Pertinent assessments: Alert, disoriented x4, confused, and hallucinating. Denies pain. On 2L O2. LS coarse, infrequent nonproductive cough. Purewick in place. Bed alarms on, ipad video monitoring in place.     Major Shift Events: uneventful    Treatment Plan: IVF, O2 monitoring, tele, decadron, remdesivir    Bedside Nurse: Elenita Gutiérrez RN

## 2022-08-23 NOTE — PLAN OF CARE
Plan of Care: [id number]    Pertinent Assessment: Pt is disoriented x4, hallucinating. Up with Ax2 walker/gb. Purewick in place. LS coarse crackles, cough. PIV infusing NS at 75mL/hr. Tele- SR. 2 LPM O2 via NC. Ipad monitoring.     Major Shift Events: uneventful    Plan: IVF, airborne precautions maintained, O2 therapy

## 2022-08-23 NOTE — PLAN OF CARE
To Do:  End of Shift Summary  For vital signs and complete assessments, please see documentation flowsheets.     Pertinent assessments: VSS. Pt disoriented x4, denies pain.  Lungs coarse, oxygen turned down to 1 L. Infrequent cough.  Repositioned Q2H, incontinent of bowel and bladder.  Ambulated in room, assist of one.     Major Shift Events: Weaned oxygen to one liter.    Treatment Plan: IVF, O2 monitoring, tele, decadron, remdesivir    Bedside Nurse: Shad Munoz RN

## 2022-08-24 NOTE — PLAN OF CARE
Goal Outcome Evaluation:        End of Shift Summary  For vital signs and complete assessments, please see documentation flowsheets.     Pertinent assessments:   Alert, disoriented x4. Denies pain. Lung sounds coarse. On room air, continuous pulse oximetry. Incontinent of bowel, one bm this evening. Incontinent of bladder, purewick in place, changed this evening.     Major Shift Events: Weaned to room air.    Treatment Plan: IVF, O2 monitoring, tele, decadron, remdesivir

## 2022-08-24 NOTE — PROGRESS NOTES
Care Management Discharge Note    Discharge Date: 08/25/2022       Discharge Disposition: Home    Discharge Services: HHA    Discharge DME: Walker - has at baseline, but doesn't use due to confusion    Discharge Transportation: family or friend will provide - Pt's spouse and HHA    Private pay costs discussed: transportation costs    PAS Confirmation Code:  (N/A)  Patient/family educated on Medicare website which has current facility and service quality ratings:  (N/A)    Education Provided on the Discharge Plan:  yes  Persons Notified of Discharge Plans: Pt's spouse, physician  Patient/Family in Agreement with the Plan: yes    Handoff Referral Completed: Yes    Additional Information:  Sw met with the pt's spouse this morning when he dropped off clothing for the pt's discharge tomorrow.  He said that he will be at the hospital tomorrow between [id number] to pick the pt up.  He has the pt's HHA services resumed for tomorrow.  Pt's spouse clarified with Sw that the pt's HHA is through [employer] and is private pay.    Pt's spouse had no additional questions or concerns at this time.    JOSE Rios, [facility]  Inpatient Care Coordination  [facility]  [phone]

## 2022-08-24 NOTE — PLAN OF CARE
End of Shift Summary  For vital signs and complete assessments, please see documentation flowsheets.     Pertinent assessments: Disoriented x4. VSS on room air. No pain this shift. Pt restless, gave prn trazadone. Voiding adequately using the purewick. Ax1 with the walker.     Major Shift Events: Uneventful     Treatment Plan: Decadron, Remdesivir, symptom management, plan for discharge home on thursday

## 2022-08-24 NOTE — PROGRESS NOTES
[facility]    Medicine Progress Note - Hospitalist Service    Date of Admission:  8/20/2022    Assessment & Plan            Kelly Nunez is a 72 year old female with history of COPD, pulmonary hypertension, dementia, hypertension, and PAD s/p previous stenting.  She also has chronic dementia and is disoriented at baseline.  She presented to [facility] on 8/20/2022 with 5 days of fatigue, cough, and worsening SOB.  Her  had recently contacted Covid. She was diagnosed with acute hypoxic respiratory failure due to COVID-19.  She was admitted and treated with supplemental oxygen, Remdesivir, and dexamethazone.  She slowly improved.        Problem list:     Acute Hypoxic Respiratory Failure   COVID 19 infection  -COVID symptoms started 8/15/2022 and she was diagnosed with COVID on 8/20/2022  -Started on remdesivir and dexamethasone 8/21/2022; remdesivir completed  -Has required supplemental oxygen.  Wean as tolerated.  -CRP is going down  -D-dimer has risen significantly but with no chest pain, increased shortness of breath, tachycardia, or hypoxia; likely due to COVID-19  -On enoxaparin 40 mg daily for DVT prophylaxis  -Likely home tomorrow if stable off of oxygen     COPD with acute COPD exacerbation  -On steroids and nebulized breathing treatments     Pulmonary hypertension  -Presumably stable     Hypertension  -Continue prior to admission metoprolol 50 mg twice daily and diltiazem 180 mg daily      Peripheral vascular disease  -S/p stenting   -Continue prior to admission aspirin,  beta-blocker, and statin     Dementia  -Continue prior to admission Namenda          Diet: Combination Diet Regular Diet Adult    DVT Prophylaxis: Enoxaparin (Lovenox) SQ  Cabral Catheter: Not present  Central Lines: None  Cardiac Monitoring: ACTIVE order. Indication: covid 19  Code Status: Full Code      Disposition Plan      Expected Discharge Date: 08/25/2022,  9:00 AM  Discharge Delays: *Early Discharge  Anticipated  Destination: home with family          The patient's care was discussed with the patient and bedside nurse    Toby Tapia MD  Hospitalist Service  [facility]  Securely message with the [employer] Web Console (learn more here)  Text page via [employer] Paging/Directory         Clinically Significant Risk Factors Present on Admission                     ______________________________________________________________________    Interval History   Feeling fine. No chest pain or shortness of breath.     Data reviewed today: I reviewed all medications, new labs and imaging results over the last 24 hours. I personally reviewed no images or EKG's today.    Physical Exam   Vital Signs: Temp: 98  F (36.7  C) Temp src: Oral BP: (!) 152/70 Pulse: 69   Resp: 20 SpO2: 92 % O2 Device: None (Room air) Oxygen Delivery: 1 LPM  Weight: 0 lbs 0 oz  GENERAL:  Comfortable. Cooperative.  PSYCH: pleasant, oriented, No acute distress.  EYES: PERRLA, Normal conjunctiva.  HEART:  Regular rate and rhythm. No JVD. Pulses normal. No edema.  LUNGS:  Clear to auscultation, normal Respiratory effort.  ABDOMEN:  Soft, no hepatosplenomegaly, normal bowel sounds.  EXTREMETIES: No clubbing, cyanosis or ischemia  SKIN:  Dry to touch, No rash.      Data   Recent Labs   Lab 08/24/22  0933 08/23/22  0631 08/22/22  0644 08/21/22  0516 08/20/22  2103   WBC 6.7 8.6 10.5 5.6 6.6   HGB 13.9 12.4 12.4 11.5* 11.8   MCV 92 95 94 97 95    365 350 270 292   NA  --  140 139 140 141   POTASSIUM  --  4.2 4.1 4.3 4.3   CHLORIDE  --  107 106 105 103   CO2  --  22 24 26 28   BUN  --  23.4* 23.1* 23.4* 20.1   CR  --  0.48* 0.51 0.76 0.94   ANIONGAP  --  11 9 9 10   ATTILA  --  8.5* 8.6* 8.3* 9.1   GLC  --  113* 122* 208* 122*   ALBUMIN  --   --   --   --  4.0   PROTTOTAL  --   --   --   --  7.2   BILITOTAL  --   --   --   --  0.2   ALKPHOS  --   --   --   --  91   ALT  --   --   --   --  15   AST  --   --   --   --  21

## 2022-08-24 NOTE — PLAN OF CARE
End of shift summary- [id number]  Dx: Covid, SOB  A/O: Demenita - baseline confusion - on room air   Diet: Regular  Fluids: is Saline locked. - ivf discontinue/'d  Transfer: are 1 Assist with Walker  Bathroom: voiding well - purewick   Pain: denying pain.  Telemetry Monitoring: Yes - normal sinus rhythm  Treatment: zyprexa, decadron, remdes, IVF dc'd, monitor resp status  Discharge Plans: tbd - plan seems to be tomorrow - home w/ services     Blood pressure 128/58, pulse 65, temperature 97.9  F (36.6  C), temperature source Oral, resp. rate 20, SpO2 94 %, not currently breastfeeding.

## 2022-08-25 NOTE — DISCHARGE SUMMARY
[facility]  Hospitalist Discharge Summary      Date of Admission:  8/20/2022  Date of Discharge:  8/25/2022  Discharging Provider: Toby Tapia MD  Discharge Service: Hospitalist Service    Discharge Diagnoses      Acute hypoxic respiratory failure, resolved  COVID 19 infection  COPD with acute COPD exacerbation  Pulmonary hypertension  Hypertension  Peripheral vascular disease  Dementia    Follow-ups Needed After Discharge   Follow-up Appointments     Follow-up and recommended labs and tests       Follow up with primary care provider, Page Jacobs, within 7-14 days   for hospital follow- up.  No follow up labs or test are needed.    Resume prior to admission home care             Unresulted Labs Ordered in the Past 30 Days of this Admission     No orders found from 7/21/2022 to 8/21/2022.          Discharge Disposition   Discharged to home  Condition at discharge: Stable      Hospital Course     Kelly Nunez is a 72 year old female with history of COPD, pulmonary hypertension, dementia, hypertension, and PAD s/p previous stenting.  She also has chronic dementia and is disoriented at baseline.  She presented to [facility] on 8/20/2022 with 5 days of fatigue, cough, and worsening SOB.  Her  had recently contracted COVID. She was diagnosed with acute hypoxic respiratory failure due to COVID-19.  She was admitted and treated with supplemental oxygen, Remdesivir, and dexamethazone.  She slowly improved and will discharge home today without need of supplemental oxygen. She will be on Xarelto for 30 days for blood clot prevention.     Consultations This Hospital Stay   None    Code Status   Full Code    Time Spent on this Encounter   I, Toby Tapia MD, personally saw the patient today and spent less than or equal to 30 minutes discharging this patient.       Toby Tapia MD  [facility] MEDICAL SURGICAL  [address] [place]  [address]  Phone: [phone]  Fax: [phone]  ______________________________________________________________________    Physical Exam   Vital Signs: Temp: 98  F (36.7  C) Temp src: Axillary BP: 127/65 Pulse: 76   Resp: 20 SpO2: 93 % O2 Device: None (Room air)    Weight: 0 lbs 0 oz  GENERAL:  Comfortable. Cooperative.  PSYCH: pleasant, oriented, No acute distress.  EYES: PERRLA, Normal conjunctiva.  HEART:  Regular rate and rhythm. No JVD. Pulses normal. No edema.  LUNGS:  Clear to auscultation, normal Respiratory effort.  ABDOMEN:  Soft, no hepatosplenomegaly, normal bowel sounds.  EXTREMETIES: No clubbing, cyanosis or ischemia  SKIN:  Dry to touch, No rash.         Primary Care Physician   Page Jacobs    Discharge Orders      Primary Care - Care Coordination Referral      COVID-19 GetWell Loop Referral      Reason for your hospital stay    COVID-19 pneumonia with hypoxia.     Contact provider    Contact your primary care provider if If increased trouble breathing, new arm/leg swelling, dizziness/passing out, falls, bleeding that doesn't stop, or uncontrolled pain.     Discharge - Quarantine/Isolation Instruction    Date of symptom onset:     Date of first positive test:    If you tested positive COVID-19 and show symptoms (fever, cough, body aches or trouble breathing):        Stay home and away from others (self-isolate) until:        At least 10 days have passed since your symptoms started. AND...        You've had no fever-and no medicine that reduces fever for 1 full day (24 hours). AND...         Your other symptoms have resolved (gotten better).  If you tested positive for COVID-19 but don't show symptoms:       Stay home and away from others (self-isolate) until at least 10 days have passed since the date of your first positive COVID-19 test.     Activity    Your activity upon discharge: activity as tolerated     Follow-up and recommended labs and tests     Follow up with primary care provider,  Page Jacobs, within 7-14 days for hospital follow- up.  No follow up labs or test are needed.    Resume prior to admission home care     Diet    Follow this diet upon discharge: Regular       Significant Results and Procedures   Most Recent 3 CBC's:Recent Labs   Lab Test 08/24/22  0933 08/23/22  0631 08/22/22  0644   WBC 6.7 8.6 10.5   HGB 13.9 12.4 12.4   MCV 92 95 94    365 350     Most Recent 3 BMP's:Recent Labs   Lab Test 08/23/22  0631 08/22/22  0644 08/21/22  0516    139 140   POTASSIUM 4.2 4.1 4.3   CHLORIDE 107 106 105   CO2 22 24 26   BUN 23.4* 23.1* 23.4*   CR 0.48* 0.51 0.76   ANIONGAP 11 9 9   ATTILA 8.5* 8.6* 8.3*   * 122* 208*     Most Recent 2 LFT's:Recent Labs   Lab Test 08/20/22  2103 12/01/21  1337   AST 21 18   ALT 15 25   ALKPHOS 91 87   BILITOTAL 0.2 0.2   ,   Results for orders placed or performed during the hospital encounter of 08/20/22   XR Chest Port 1 View    Narrative    EXAM: XR CHEST PORT 1 VIEW  LOCATION: [facility]  DATE/TIME: 8/20/2022 9:49 PM    INDICATION: Cough  COMPARISON: 03/13/2020      Impression    IMPRESSION: A subclavian dual-chamber pacer is stable. Heart size and vascularity are normal. Shallow inspiration accentuates bibasilar subsegmental atelectasis versus scar. Right lateral apical scarring unchanged. No new focal consolidation or pleural   effusion.   CT Chest Pulmonary Embolism w Contrast    Narrative    EXAM: CT CHEST PULMONARY EMBOLISM W CONTRAST  LOCATION: [facility]  DATE/TIME: 8/20/2022 10:26 PM    INDICATION: covid, hypoxia, elevated ddimer  COMPARISON: 01/23/2018  TECHNIQUE: CT chest pulmonary angiogram during arterial phase injection of IV contrast. Multiplanar reformats and MIP reconstructions were performed. Dose reduction techniques were used.   CONTRAST: 52mL Isovue 370    FINDINGS:  ANGIOGRAM CHEST: Pulmonary arteries are normal caliber and negative for pulmonary emboli. Thoracic aorta  is not well opacified and is  indeterminate for dissection. No CT evidence of right heart strain.    LUNGS AND PLEURA: 4 mm subpleural nodule right lower lobe series 9 image 155. Emphysema. Chronic consolidation in the right upper lobe with mineralization. Mucous plugging in the lung bases. Basilar atelectasis.    MEDIASTINUM/AXILLAE: Atherosclerotic aorta. No adenopathy or pericardial effusion. Cardiac leads.    CORONARY ARTERY CALCIFICATION: Moderate.    UPPER ABDOMEN: Nodular thickening left adrenal.    MUSCULOSKELETAL: Degenerative change osseous structures. Mild loss of superior endplate L1 per      Impression    IMPRESSION:  1.  No pulmonary embolism.  2.  Chronic consolidation with calcification right upper lobe.  3.  Mucous plugging bilateral lung bases.  4.  Emphysema.       Discharge Medications   Current Discharge Medication List      START taking these medications    Details   rivaroxaban ANTICOAGULANT (XARELTO) 10 MG TABS tablet Take 1 tablet (10 mg) by mouth daily (with dinner)  Qty: 30 tablet, Refills: 0    Associated Diagnoses: Infection due to 2019 novel coronavirus         CONTINUE these medications which have NOT CHANGED    Details   aspirin (ASA) 81 MG chewable tablet Take 81 mg by mouth every evening      diltiazem ER COATED BEADS (CARDIZEM CD/CARTIA XT) 180 MG 24 hr capsule TAKE 1 CAPSULE BY MOUTH EVERY DAY  Qty: 90 capsule, Refills: 1    Associated Diagnoses: Atrial fibrillation, unspecified type (H)      ipratropium - albuterol 0.5 mg/2.5 mg/3 mL (DUONEB) 0.5-2.5 (3) MG/3ML neb solution Take 1 vial by nebulization 2 times daily as needed      !! LORazepam (ATIVAN) 0.5 MG tablet Take 0.5 mg by mouth as needed for anxiety      !! LORazepam (ATIVAN) 0.5 MG tablet Take 0.5 mg by mouth 2 times daily      memantine (NAMENDA) 10 MG tablet Take 10 mg by mouth 2 times daily       metoprolol tartrate (LOPRESSOR) 50 MG tablet TAKE 1 TABLET BY MOUTH TWICE A DAY  Qty: 180 tablet, Refills: 0    Associated  Diagnoses: Atrial fibrillation, unspecified type (H)      Multiple Vitamins-Minerals (CENTRUM WOMEN PO) Take 1 capsule by mouth daily       OLANZapine (ZYPREXA) 10 MG tablet Take 10 mg by mouth At Bedtime       senna (SENOKOT) 8.6 MG tablet Take 1 tablet by mouth 2 times daily      traZODone (DESYREL) 100 MG tablet Take  mg by mouth nightly as needed for sleep      acetaminophen (TYLENOL) 500 MG tablet Take 500-1,000 mg by mouth every 6 hours as needed for mild pain      Nebulizers (INNOSPIRE ESSENCE NEBULIZER) MISC Take by nebulization daily as needed       polyethylene glycol (MIRALAX) 17 GM/Dose powder Take 1 capful by mouth daily as needed for constipation       !! - Potential duplicate medications found. Please discuss with provider.        Allergies   Allergies   Allergen Reactions     Penicillins Swelling     Pt states she has tolerated cephalosporins in the past.

## 2022-08-25 NOTE — PLAN OF CARE
End of Shift Summary  For vital signs and complete assessments, please see documentation flowsheets.     Pertinent assessments: Pt is disoriented x4. No pain this shift. Gave scheduled zyprexa. VSS on room air. Ax2 in the bed. Voiding adequately using the purewick.     Major Shift Events: Uneventful     Treatment Plan: Decadron,  Remdesivir, monitor O2, plan for discharge home 8/25

## 2022-09-06 NOTE — TELEPHONE ENCOUNTER
Prescription approved per [facility] Refill Protocol.    Zaheer Butler RN on 9/6/2022 at 10:25 AM

## 2022-09-07 NOTE — NURSING NOTE
"/70   Pulse 74   Temp 97.6  F (36.4  C) (Tympanic)   Resp 14   Ht 1.549 m (5' 1\")   Wt 53.6 kg (118 lb 1.6 oz)   SpO2 95%   BMI 22.31 kg/m    Patient in for hospital follow up.  "

## 2022-09-07 NOTE — PROGRESS NOTES
Assessment & Plan   Problem List Items Addressed This Visit     Dementia without behavioral disturbance, unspecified dementia type (H)    Chronic obstructive pulmonary disease with (acute) exacerbation (H)    Cardiac pacemaker in situ    HTN (hypertension)    Infection due to 2019 novel coronavirus - Primary           [id number]}    is planning her admission to memory care unit in [place].    No follow-ups on file.    Thom Barry MD  [facility]    Subjective     Follow-up recent admit for COVID.  Has history of significant dementia and COPD.    [facility]       Hospital Follow-up Visit:    Hospital/Nursing Home/IP Rehab Facility: [facility]  Date of Admission: 8/20/22  Date of Discharge: 8/25/22  Reason(s) for Admission: COVID    Was your hospitalization related to COVID-19? YES   How are you feeling today? Better  In the past 24 hours have you had shortness of breath when speaking, walking, or climbing stairs? My breathing issues have stayed the same  Do you have a cough? Yes, I have a cough but it's not worse  When is the last time you had a fever greater than 100? No  Are you having any other symptoms? Fatigue, Confusion and dementia has worsened   Do you have any other stressors you would like to discuss with your provider? No    PHQ Assesment Total Score(s) 9/7/2022 9/7/2022   PHQ-2 Score 3 0   PHQ-9 Score 9 -   Some recent data might be hidden       No flowsheet data found.  No flowsheet data found.    Was the patient in the ICU or did the patient experience delirium during hospitalization?  Yes     Problems taking medications regularly:  None  Medication changes since discharge: Xarelto  Problems adhering to non-medication therapy:  None    Summary of hospitalization:  [facility] discharge summary reviewed  Diagnostic Tests/Treatments reviewed.  Follow up needed: none  Other Healthcare Providers Involved in Patient s Care:          "vascular  Update since discharge: stable.         Post Medication Reconciliation Status:            Patient admitted with COVID and respiratory failure.    Treatment was remdesivir and Decadron and supplemental oxygen.  She did not require a ventilator.    There has been improvement in her respiratory status.  She receives a DuoNeb twice daily presently.  Not on supplemental oxygen.     reports that her dementia has seem to increase in terms of more lethargy, slower shuffling gait, less expression.    He is looking strongly into memory care.  Placement in the next couple of weeks.      Review of Systems     No fevers.  Occasional cough without wheezing or labored breathing.  No chest pain.  No syncope.  No vomiting or diarrhea.  No focal weakness.  Confusion and lethargy.      Objective    /70   Pulse 74   Temp 97.6  F (36.4  C) (Tympanic)   Resp 14   Ht 1.549 m (5' 1\")   Wt 53.6 kg (118 lb 1.6 oz)   SpO2 95%   BMI 22.31 kg/m    There is no height or weight on file to calculate BMI.  Physical Exam     Thin woman, NAD.  Sits quietly and says an occasional word.  HEENT unremarkable.  No neck masses or other abnormalities.  Basilar rales especially right lung field.  Cardiac RSR without murmur  Abdomen nondistended, nontender.  Extremities no edema            Answers for HPI/ROS submitted by the patient on 9/7/2022  If you checked off any problems, how difficult have these problems made it for you to do your work, take care of things at home, or get along with other people?: Very difficult  PHQ9 TOTAL SCORE: 9      "

## 2022-09-22 NOTE — TELEPHONE ENCOUNTER
Willow with [facility] calls again stating that she needs specific orders for the tylenol and the trazodone cannot be a range has to be one set dose.     Tiffany Todd

## 2022-09-22 NOTE — TELEPHONE ENCOUNTER
Received call from Sana Daugherty. Ex. Director of [facility].  Requesting updated med list for resident to be able to move in today.  Original list needs doses.      Huddled with MARIETTA Allen.     Advised Willow, that Page is out of office today and can look at it/sign it tomorrow.  Then we will fax it over to them at [phone].    Soledad BLACK RN, BSN  [facility]

## 2022-09-22 NOTE — TELEPHONE ENCOUNTER
Per chart review signed medication list was faxed to [facility] today.    See telephone encounter dated 9/22/22.    Will advise Kendall that this was completed.    Madeline EDWARD RN

## 2022-09-22 NOTE — TELEPHONE ENCOUNTER
Rec'd request for signed medication list and problem list from [facility]. Printed both and placed in PCP basket.    Tiffany Todd

## 2022-09-23 NOTE — TELEPHONE ENCOUNTER
[facility] calling, requesting updated med list. Sent mychart to patient asking for dosage.    Macy MCMANUS  [facility]   [facility]

## 2022-09-23 NOTE — TELEPHONE ENCOUNTER
For trazodone did she usually do 1 tab or 2?  If they can't have the range will need to know which dose she was typically taking.     Page Jacobs CNP

## 2022-09-23 NOTE — TELEPHONE ENCOUNTER
See  message.    Please revise med list as necessary and fax back to [facility].    Soledad BLACK RN, BSN  [facility]

## 2022-09-26 NOTE — TELEPHONE ENCOUNTER
I have never prescribed this medication for her (which is why it is listed as historical).  They will need to seek additional information from the prescribing provider.     Page Jacobs CNP

## 2022-09-26 NOTE — TELEPHONE ENCOUNTER
Returned call to niru, relayed provider information. Will reach out to patient  to get psychiatry phone number.     Tiffany Todd

## 2022-09-26 NOTE — TELEPHONE ENCOUNTER
Received call from [facility] director Jarrod Wolff in reference to patient's Lorazepam script sig. There is no frequency listed besides as needed. The script is also reported as historical.     Page Jacobs, please advise if you want this script updated or office appointment needed.     Fax number to facility for sending update to script: Fax [phone]    Zaheer Butler RN on 9/26/2022 at 11:29 AM

## 2022-10-26 NOTE — PROGRESS NOTES
Kelly is a 72 year old who is being evaluated via a billable telephone visit.      What phone number would you like to be contacted at? [phone]  How would you like to obtain your AVS? Angi Watson

## 2022-10-27 NOTE — PROGRESS NOTES
Kelly Nunez is a 72-year-old female with severe dementia who is status post stenting of her left common and external iliac arteries with a left to right femoral-femoral bypass performed by me on 2/18/2022 secondary to right leg rest pain.  She is now a resident of a memory care unit.  I am conducting a telephone interview today with her , Kendall.  To assess her overall progress.    From the standpoint of her lower extremities he reports that she seems to be doing well.  Both feet appear warm and pink and she certainly does not suffer from right foot rest pain.  She ambulates on a limited basis.  He had no concerns.    Imaging:  FROYLAN from 10/20/2022 was 0.68 on the right and 0.58 on the left.  These are stable and the right side is significantly improved compared to her preoperative values.    Ultrasound reveals a patent left to right femoral-femoral bypass.    ASSESSMENT:  8 months status post stenting of her left common and external iliac arteries with left to right femoral-femoral bypass graft clinically doing well with complete resolution of right leg rest pain.    She is far more limited by the manifestations of her severe dementia.    RECOMMENDATION:  I reviewed all the above with Kelly's .  I have no vascular surgical concerns.  She will continue her medical regimen including daily aspirin.  Vascular surgical follow-up will be with me in 1 year for an FROYLAN at rest and an ultrasound of her femoral-femoral bypass graft.    Total length of this telephone conversation was 12 minutes.    Denny Ramirez MD

## 2022-11-02 NOTE — TELEPHONE ENCOUNTER
Reji Moser MD  You; Usha Bonilla 2 hours ago (11:59 AM)     AH  Thanks for the update. Nkechi can we please have her scheduled for a telephone virtual visit with cardiology LINDA to discuss possible anticoag? She is in a memory care unit so it looks like the recent Vascular surgery clinic visit was virtual with the , thanks!      Call placed to pt's  to review recommendations and review scheduling a visit. No answer - MARCIA / ELSA 11/02/22 2:30 PM

## 2022-11-02 NOTE — TELEPHONE ENCOUNTER
Dr. Moser,    NORM: Your patient had episodes of AF documented on today's device check. There were about 108 atrial episodes. All episodes occurred on the same day (8/30/22) and comprised about 15% of the time. Longest episode lasted 12 minutes 52 seconds, mean ventricular rate 116bpm. 2 EGMs for review show AF.  Taking ASA only. Pt has Covid during the time of the episodes. Any changes?        [employer]ronik Edora (D) Remote PPM Device Check  AP: 47%  : 0%  Mode: DDD-CLS 60/130  Presenting Rhythm: AS/VS, AP/VS  Heart Rate: adequate rates per histograms   Sensing: stable   Pacing Threshold: stable   Impedance: stable   Battery Status: 85% remaining  Atrial Arrhythmia: 108 mode switch episodes comprising <1% of the time. All episodes occurred on the same day (8/30/22). Longest episode lasted 12 minutes 52 seconds, mean ventricular rate 116bpm. 2 EGMs for review show AF. Reviewed with ASHLEIGH Barrios. Taking ASA only. Pt has Covid during the time of the episodes. This is a new finding. Will notify Dr. Moser.  Ventricular Arrhythmia: none     Care Plan: F/u annual threshold in 3 months. LM with results. VIET Kitchen

## 2022-11-06 NOTE — ED PROVIDER NOTES
History   Chief Complaint:  Fall     The history is provided by the EMS personnel and the patient. History limited by: dementia.      Kelly Nunez is a 72 year old female with history of dementia, atrial fibrillation, COPD, hypertension, and atherosclerosis who presents via EMS from [facility] following an unwitnessed fall. Per report, the patient was found by staff at 0840 this morning on her left side between her bed and walker. EMS was called and upon arrival the patient was put in a c-collar. The patient denies having any pain including neck pain or a headache. She also denies any abdominal pain or shortness of breath.    Review of Systems   Unable to perform ROS: Dementia     Allergies:  Penicillins    Medications:  Cardizem  Duoneb  Ativan  Namenda  Lopressor  Zyprexa  Miralax  Senokot  Trazodone    Past Medical History:     A-fib  Dementia  COPD  Hypertension  Superior mesenteric artery stenosis  Atherosclerosis of native artery  Acute respiratory failure with hypoxia    Past Surgical History:    Left to right femoral-femoral bypass  Endovascular iliac stent placement, left  EP pacemaker  Heart cath, angioplasty  Abdominal aortogram  OR angiogram  Right leg stent    Social History:  The patient presents to the ED via EMS.  The patient lives in [facility].  PCP: Page Jacobs     Physical Exam     Patient Vitals for the past 24 hrs:   BP Temp Temp src Pulse Resp SpO2   11/06/22 1330 (!) 155/72 -- -- 82 -- --   11/06/22 1315 (!) 149/71 -- -- 73 -- --   11/06/22 1300 (!) 147/69 -- -- 78 -- --   11/06/22 1245 (!) 142/76 -- -- 86 -- --   11/06/22 1230 (!) 152/65 -- -- 76 -- --   11/06/22 1200 (!) 143/78 -- -- 78 -- --   11/06/22 1145 (!) 152/72 -- -- 82 -- --   11/06/22 1015 (!) 148/69 -- -- -- -- 94 %   11/06/22 1000 (!) 150/74 97.4  F (36.3  C) Oral -- 16 94 %   11/06/22 0951 -- -- Temporal 77 18 100 %     Physical Exam  General: The patient is alert, in no respiratory distress. Wearing  a c-collar    HENT: Mucous membranes moist.    Cardiovascular: Regular rate and rhythm. Good pulses in all four extremities. Normal capillary refill and skin turgor.     Respiratory: Lungs are clear. No nasal flaring. No retractions. No wheezing, no crackles.    Gastrointestinal: Abdomen soft. No guarding, no rebound. No palpable hernias.     Musculoskeletal: No gross deformity.     Skin: No rashes or petechiae. Pallor.    Neurologic: The patient is alert and attempts to answer questions with clear speech. Moves all extremities.     Lymphatic: No cervical adenopathy. No lower extremity swelling.    Psychiatric: The patient is non-tearful.    Emergency Department Course     Imaging:  XR Chest 1 View   Final Result   IMPRESSION: Stable position 2-lead left subclavian pacemaker. Hyperinflation consistent with obstructive pulmonary disease. Stable right upper and left basilar lung scarring. Normal heart size. No pulmonary vascular congestion. No pleural effusion or    acute infiltrate seen.      XR Shoulder Left G/E 3 Views   Final Result   IMPRESSION: No acute fracture or dislocation. Mild degenerative changes of the acromioclavicular joint. Small calcification near the greater tuberosity, which could reflect underlying calcific tendinosis. Left chest wall cardiac pacer device.      CT Cervical Spine w/o Contrast   Final Result   IMPRESSION:      HEAD CT:   1. Senescent changes and sequelae of chronic microangiopathy without acute intracranial abnormality.      CERVICAL SPINE CT:   1. No acute traumatic injury of the cervical spine.       CT Head w/o Contrast   Final Result   IMPRESSION:      HEAD CT:   1. Senescent changes and sequelae of chronic microangiopathy without acute intracranial abnormality.      CERVICAL SPINE CT:   1. No acute traumatic injury of the cervical spine.         Report per radiology    Emergency Department Course:     Reviewed:  I reviewed nursing notes, vitals, past medical history and Care  Everywhere    Assessments:  1000 I obtained history and examined the patient as noted above.   1310 I rechecked the patient. I believe that they are safe for discharge at this time.     Disposition:  The patient was discharged to home.     Impression & Plan     Medical Decision Making:  The patient is significantly demented and therefore cannot provide much history for she is she was on the ground we do not know if she had an injury.  Originally when I examined her she had difficulty lifting her left arm leading to think for the potential of a fracture.  Her x-rays and CT scan were all reassuring.  The daughter came and reports the patient is at baseline felt comfortable the plan for her going home.  There is no signs of intracranial intrathoracic or intra-abdominal injury I do not feel labs would likely be helpful.    Diagnosis:    ICD-10-CM    1. Fall, initial encounter  W19.XXXA       2. Dementia without behavioral disturbance, psychotic disturbance, mood disturbance, or anxiety, unspecified dementia severity, unspecified dementia type (H)  F03.90         Scribe Disclosure:  I, Roopa Hirsch, am serving as a scribe at 9:59 AM on 11/6/2022 to document services personally performed by Gerson Cano MD based on my observations and the provider's statements to me.            Gerson Cano MD  11/06/22 0471

## 2022-11-06 NOTE — ED NOTES
Bed: HW01  Expected date: 11/6/22  Expected time: 9:24 AM  Means of arrival: Ambulance  Comments:  [id number]

## 2022-11-06 NOTE — ED TRIAGE NOTES
Pt arrives via EMS from [facility] d/t unwitnessed fall. Pt rounded on at 0640 in bed and found L lateral sided down between bed and walker around 0840. C-collar pre protocol. Pt denies neck, back, hip pain. No lacerations noted. Unknown if anticoagulated. Orientated to self, which is baseline. ABC intact.

## 2022-11-07 NOTE — TELEPHONE ENCOUNTER
Call placed to review recommendation for LINDA OV to review OAC for Afib noted on recent PPM check. Pt's  declined a this time. Pt is now living in memory care and the pt had a fall yesterday. He is familiar with OAC and feels his wife is at greater risk of bleeding with falls vs recurrent Afib with stroke risk. Per report if there is recurrence noted on future exams or if elevated HR's noted during in home checks will reach out for review. Will hold off at this item with scheduling. riks of Afib occurrence without OAC reviewed. Pt's  again declined. Will await call back if any abnormalities noted.   EDMOND Talamantes RN, BSN.

## 2022-12-19 ENCOUNTER — TELEPHONE (OUTPATIENT)
Dept: CARDIOLOGY | Facility: CLINIC | Age: 72
End: 2022-12-19

## 2022-12-19 NOTE — TELEPHONE ENCOUNTER
Alert for AT/AF episodes with HVR received. Number of high deyvi. rate episodes (HVR) per day above limit (at least one).Last value: 298 episode(s) per day reported on Dec 18, 2022 1:15:00.     1 EGM available shows  AT/AF with V rates in the 130-210's. 13% of the day, V rates at 170-180s, 11% of the day, HR are above 180bpm.  AT burden at 13%. Longest episode 1h 47m 48s.     Pt is not on an OAC due to falls. This was discussed in November, 2022. However, V rates are increasing and this past weekend were steadily in the 130-210s. Pt on diltiazem ER 180mg daily, metoprolol 50mg twice daily.    Will route to Dr. Moser for review and recommendations.   ASHLEIGH Neff

## 2022-12-21 NOTE — TELEPHONE ENCOUNTER
Call placed to pt's  Kendall to review recommendations and read out. Per Pt's  pt passed away 12/18/2022 at her care home. Will send in information to close the chart.     Pt's  has the remote device check machine. Will send message to care team to review what the process is for the machine.   EDMOND Talamantes RN, BSN.

## (undated) DEVICE — NDL 21GA 1.5"

## (undated) DEVICE — CATH ANGIO OMNI FLUSH 5FRX65CM CVD 10732201

## (undated) DEVICE — BLADE CLIPPER 4406

## (undated) DEVICE — PINNACLE R/O II

## (undated) DEVICE — GOWN IMPERVIOUS SPECIALTY XL/XLONG 39049

## (undated) DEVICE — CLIP ETHICON LIGACLIP MED WHITE LT200

## (undated) DEVICE — SU PROLENE 6-0 C-1DA 30" 8706H

## (undated) DEVICE — DRAPE IOBAN INCISE 23X17" 6650EZ

## (undated) DEVICE — NDL BLUNT 19GA 1.5"

## (undated) DEVICE — NDL 19GA 1.5"

## (undated) DEVICE — DECANTER BAG 2002S

## (undated) DEVICE — SU PROLENE 5-0 RB-1DA 36"  8556H

## (undated) DEVICE — PACK PCMKR PERM SRG PROC LF SAN32PC573

## (undated) DEVICE — Device

## (undated) DEVICE — SU PROLENE 5-0 C-1DA 36" 8720H

## (undated) DEVICE — GLOVE PROTEXIS POWDER FREE 8.0 ORTHOPEDIC 2D73ET80

## (undated) DEVICE — CATH PIGTAIL W/MARKERS 5FRX65CM 7602-20M65SH

## (undated) DEVICE — SU PROLENE 7-0 BV-1DA 4X24" M8702

## (undated) DEVICE — DEVICE MULTI TORQUE TD01

## (undated) DEVICE — CATH ANGIO ANG GLIDE 5FRX65CM CG507

## (undated) DEVICE — INSERT EVERGRIP 33MM

## (undated) DEVICE — SU SILK 2-0 SH 30" K833H

## (undated) DEVICE — PACK VASCULAR SCV15VAFSB

## (undated) DEVICE — PACK UNIVERSAL SPLIT 29131

## (undated) DEVICE — ESU GROUND PAD UNIVERSAL W/O CORD

## (undated) DEVICE — SU SILK 3-0 TIE 24" SA74H

## (undated) DEVICE — SU SILK 2-0 TIE 24" SA75H

## (undated) DEVICE — SUCTION CANISTER MEDIVAC LINER 3000ML W/LID 65651-530

## (undated) DEVICE — SOL NACL 0.9% INJ 250ML BAG 2B1322Q

## (undated) DEVICE — RAD INTRODUCER KIT MICRO 5FRX10CM .018 NITINOL G/W

## (undated) DEVICE — PREP CHLORAPREP 26ML TINTED ORANGE  260815

## (undated) DEVICE — LINEN TOWEL PACK X5 5464

## (undated) DEVICE — DRSG GAUZE 4X4" 3033

## (undated) DEVICE — SHEATH PRELUDE SNAP 13CM 6FR

## (undated) DEVICE — SYR 30ML SLIP TIP W/O NDL

## (undated) DEVICE — RAD INFLATOR BASIC COMPAK  IN4130

## (undated) DEVICE — SYR 03ML LL W/O NDL 309657

## (undated) DEVICE — SOL NACL 0.9% IRRIG 1000ML BOTTLE 2F7124

## (undated) DEVICE — NDL BLUNT 15GA 1.5"

## (undated) DEVICE — SURGICEL HEMOSTAT 2X14" 1951

## (undated) DEVICE — MEDITRACE MULTIFUNTION ADULT RADIOTRANSPARENT ELECTRODE FOR ZOLL

## (undated) DEVICE — SU PROLENE 7-0 BV-1DA 24" 8702H

## (undated) DEVICE — SU MONOCRYL 4-0 PS-2 18" UND Y496G

## (undated) DEVICE — DRAPE XRAY CASSETTE 20X23

## (undated) DEVICE — SYR 10ML SLIP TIP W/O NDL

## (undated) DEVICE — SUTURE BOOTS 051003PBX

## (undated) DEVICE — SPONGE RAY-TEC 4X8" 7318

## (undated) DEVICE — CABLE PACING ALLIGATOR CLIP 301-CG

## (undated) DEVICE — RAD RX ISOVUE 300 (50ML) 61% IOPAMIDOL CHARGE PER ML

## (undated) DEVICE — SU VICRYL 3-0 CT-1 36" J338H

## (undated) DEVICE — SU UMBILICAL TAPE 36X.125" U12T

## (undated) DEVICE — SU VICRYL 2-0 CT-1 27" J339H

## (undated) DEVICE — STOPCOCK HIGH PRESSURE 3-WAY

## (undated) DEVICE — TUBING ANGIOGRAPHY 1200PSI 30"

## (undated) DEVICE — RAD G/W INQWIRE .035X260CM J-TIP EXCHANGE IQ35F260J1O5RS

## (undated) DEVICE — SYR 20ML LL W/O NDL 302830

## (undated) DEVICE — GLIDEWIRE

## (undated) DEVICE — SOL WATER IRRIG 1000ML BOTTLE 2F7114

## (undated) RX ORDER — FENTANYL CITRATE 50 UG/ML
INJECTION, SOLUTION INTRAMUSCULAR; INTRAVENOUS
Status: DISPENSED
Start: 2020-10-12

## (undated) RX ORDER — LIDOCAINE HYDROCHLORIDE 20 MG/ML
INJECTION, SOLUTION EPIDURAL; INFILTRATION; INTRACAUDAL; PERINEURAL
Status: DISPENSED
Start: 2022-01-01

## (undated) RX ORDER — ASPIRIN 300 MG/1
SUPPOSITORY RECTAL
Status: DISPENSED
Start: 2022-01-01

## (undated) RX ORDER — DEXAMETHASONE SODIUM PHOSPHATE 4 MG/ML
INJECTION, SOLUTION INTRA-ARTICULAR; INTRALESIONAL; INTRAMUSCULAR; INTRAVENOUS; SOFT TISSUE
Status: DISPENSED
Start: 2022-01-01

## (undated) RX ORDER — HEPARIN SODIUM 1000 [USP'U]/ML
INJECTION, SOLUTION INTRAVENOUS; SUBCUTANEOUS
Status: DISPENSED
Start: 2022-01-01

## (undated) RX ORDER — LIDOCAINE HYDROCHLORIDE 10 MG/ML
INJECTION, SOLUTION EPIDURAL; INFILTRATION; INTRACAUDAL; PERINEURAL
Status: DISPENSED
Start: 2020-10-12

## (undated) RX ORDER — PROTAMINE SULFATE 10 MG/ML
INJECTION, SOLUTION INTRAVENOUS
Status: DISPENSED
Start: 2022-01-01

## (undated) RX ORDER — CLINDAMYCIN PHOSPHATE 900 MG/50ML
INJECTION, SOLUTION INTRAVENOUS
Status: DISPENSED
Start: 2022-01-01

## (undated) RX ORDER — ONDANSETRON 2 MG/ML
INJECTION INTRAMUSCULAR; INTRAVENOUS
Status: DISPENSED
Start: 2022-01-01

## (undated) RX ORDER — FENTANYL CITRATE 50 UG/ML
INJECTION, SOLUTION INTRAMUSCULAR; INTRAVENOUS
Status: DISPENSED
Start: 2022-01-01

## (undated) RX ORDER — BUPIVACAINE HYDROCHLORIDE 2.5 MG/ML
INJECTION, SOLUTION EPIDURAL; INFILTRATION; INTRACAUDAL
Status: DISPENSED
Start: 2020-10-12

## (undated) RX ORDER — FENTANYL CITRATE 0.05 MG/ML
INJECTION, SOLUTION INTRAMUSCULAR; INTRAVENOUS
Status: DISPENSED
Start: 2022-01-01

## (undated) RX ORDER — PROPOFOL 10 MG/ML
INJECTION, EMULSION INTRAVENOUS
Status: DISPENSED
Start: 2022-01-01